# Patient Record
Sex: FEMALE | Race: WHITE | Employment: FULL TIME | URBAN - METROPOLITAN AREA
[De-identification: names, ages, dates, MRNs, and addresses within clinical notes are randomized per-mention and may not be internally consistent; named-entity substitution may affect disease eponyms.]

---

## 2017-03-01 ENCOUNTER — OFFICE VISIT (OUTPATIENT)
Dept: FAMILY MEDICINE CLINIC | Age: 37
End: 2017-03-01

## 2017-03-01 VITALS
BODY MASS INDEX: 32.49 KG/M2 | DIASTOLIC BLOOD PRESSURE: 74 MMHG | HEART RATE: 82 BPM | SYSTOLIC BLOOD PRESSURE: 116 MMHG | HEIGHT: 65 IN | TEMPERATURE: 97.3 F | RESPIRATION RATE: 12 BRPM | OXYGEN SATURATION: 98 % | WEIGHT: 195 LBS

## 2017-03-01 DIAGNOSIS — J06.9 PROTRACTED URI: Primary | ICD-10-CM

## 2017-03-01 PROCEDURE — 99213 OFFICE O/P EST LOW 20 MIN: CPT | Performed by: NURSE PRACTITIONER

## 2017-03-01 RX ORDER — NORETHINDRONE ACETATE AND ETHINYL ESTRADIOL 1MG-20(21)
1 KIT ORAL DAILY
COMMUNITY
Start: 2017-02-03 | End: 2019-02-11

## 2017-03-01 RX ORDER — AZITHROMYCIN 250 MG/1
TABLET, FILM COATED ORAL
Qty: 1 PACKET | Refills: 0 | Status: SHIPPED | OUTPATIENT
Start: 2017-03-01 | End: 2017-03-11

## 2017-03-01 ASSESSMENT — ENCOUNTER SYMPTOMS
CHEST TIGHTNESS: 1
SORE THROAT: 0
SINUS PRESSURE: 0
SHORTNESS OF BREATH: 1
COUGH: 1
WHEEZING: 0
RHINORRHEA: 1

## 2017-03-07 RX ORDER — ESCITALOPRAM OXALATE 20 MG/1
TABLET ORAL
Qty: 30 TABLET | Refills: 5 | Status: SHIPPED | OUTPATIENT
Start: 2017-03-07 | End: 2017-09-12 | Stop reason: SDUPTHER

## 2017-04-01 ENCOUNTER — OFFICE VISIT (OUTPATIENT)
Dept: FAMILY MEDICINE CLINIC | Age: 37
End: 2017-04-01

## 2017-04-01 VITALS
BODY MASS INDEX: 32.45 KG/M2 | OXYGEN SATURATION: 98 % | HEART RATE: 97 BPM | DIASTOLIC BLOOD PRESSURE: 76 MMHG | SYSTOLIC BLOOD PRESSURE: 106 MMHG | WEIGHT: 195 LBS | TEMPERATURE: 98.4 F

## 2017-04-01 DIAGNOSIS — R50.9 FEVER, UNSPECIFIED FEVER CAUSE: ICD-10-CM

## 2017-04-01 DIAGNOSIS — J10.1 INFLUENZA B: Primary | ICD-10-CM

## 2017-04-01 LAB
INFLUENZA A ANTIBODY: NEGATIVE
INFLUENZA B ANTIBODY: NORMAL

## 2017-04-01 PROCEDURE — 87804 INFLUENZA ASSAY W/OPTIC: CPT | Performed by: FAMILY MEDICINE

## 2017-04-01 PROCEDURE — 99213 OFFICE O/P EST LOW 20 MIN: CPT | Performed by: FAMILY MEDICINE

## 2017-04-01 RX ORDER — OSELTAMIVIR PHOSPHATE 75 MG/1
75 CAPSULE ORAL 2 TIMES DAILY
Qty: 10 CAPSULE | Refills: 0 | Status: SHIPPED | OUTPATIENT
Start: 2017-04-01 | End: 2017-04-06

## 2017-06-13 ENCOUNTER — OFFICE VISIT (OUTPATIENT)
Dept: FAMILY MEDICINE CLINIC | Age: 37
End: 2017-06-13

## 2017-06-13 ENCOUNTER — TELEPHONE (OUTPATIENT)
Dept: FAMILY MEDICINE CLINIC | Age: 37
End: 2017-06-13

## 2017-06-13 VITALS
HEIGHT: 65 IN | RESPIRATION RATE: 16 BRPM | SYSTOLIC BLOOD PRESSURE: 116 MMHG | WEIGHT: 196 LBS | DIASTOLIC BLOOD PRESSURE: 64 MMHG | HEART RATE: 72 BPM | OXYGEN SATURATION: 99 % | BODY MASS INDEX: 32.65 KG/M2

## 2017-06-13 DIAGNOSIS — R55 VASOVAGAL NEAR-SYNCOPE: Primary | ICD-10-CM

## 2017-06-13 PROCEDURE — 99213 OFFICE O/P EST LOW 20 MIN: CPT | Performed by: NURSE PRACTITIONER

## 2017-06-13 ASSESSMENT — ENCOUNTER SYMPTOMS
BLURRED VISION: 0
DOUBLE VISION: 0
SHORTNESS OF BREATH: 0
EYE PAIN: 0
SORE THROAT: 0
NAUSEA: 0
COUGH: 0
VOMITING: 0

## 2017-06-13 ASSESSMENT — PATIENT HEALTH QUESTIONNAIRE - PHQ9
SUM OF ALL RESPONSES TO PHQ QUESTIONS 1-9: 0
1. LITTLE INTEREST OR PLEASURE IN DOING THINGS: 0
SUM OF ALL RESPONSES TO PHQ9 QUESTIONS 1 & 2: 0
2. FEELING DOWN, DEPRESSED OR HOPELESS: 0

## 2017-08-25 ENCOUNTER — OFFICE VISIT (OUTPATIENT)
Dept: FAMILY MEDICINE CLINIC | Age: 37
End: 2017-08-25

## 2017-08-25 VITALS
TEMPERATURE: 96.8 F | BODY MASS INDEX: 32.45 KG/M2 | HEART RATE: 81 BPM | OXYGEN SATURATION: 98 % | SYSTOLIC BLOOD PRESSURE: 120 MMHG | DIASTOLIC BLOOD PRESSURE: 80 MMHG | WEIGHT: 195 LBS

## 2017-08-25 DIAGNOSIS — R09.82 POSTNASAL DRIP: ICD-10-CM

## 2017-08-25 DIAGNOSIS — J06.9 PROTRACTED URI: Primary | ICD-10-CM

## 2017-08-25 PROCEDURE — 99213 OFFICE O/P EST LOW 20 MIN: CPT | Performed by: FAMILY MEDICINE

## 2017-08-25 RX ORDER — FLUTICASONE PROPIONATE 50 MCG
2 SPRAY, SUSPENSION (ML) NASAL DAILY
Qty: 1 BOTTLE | Refills: 3 | Status: SHIPPED | OUTPATIENT
Start: 2017-08-25 | End: 2019-04-09 | Stop reason: SDUPTHER

## 2017-08-25 RX ORDER — AMOXICILLIN 500 MG/1
500 CAPSULE ORAL 3 TIMES DAILY
Qty: 21 CAPSULE | Refills: 0 | Status: SHIPPED | OUTPATIENT
Start: 2017-08-25 | End: 2017-09-01

## 2017-09-12 RX ORDER — ESCITALOPRAM OXALATE 20 MG/1
TABLET ORAL
Qty: 30 TABLET | Refills: 5 | Status: SHIPPED | OUTPATIENT
Start: 2017-09-12 | End: 2018-03-18 | Stop reason: SDUPTHER

## 2017-11-30 ENCOUNTER — OFFICE VISIT (OUTPATIENT)
Dept: FAMILY MEDICINE CLINIC | Age: 37
End: 2017-11-30

## 2017-11-30 VITALS
SYSTOLIC BLOOD PRESSURE: 110 MMHG | DIASTOLIC BLOOD PRESSURE: 70 MMHG | WEIGHT: 190 LBS | OXYGEN SATURATION: 98 % | BODY MASS INDEX: 31.65 KG/M2 | HEIGHT: 65 IN | HEART RATE: 86 BPM

## 2017-11-30 DIAGNOSIS — R05.9 COUGH: ICD-10-CM

## 2017-11-30 DIAGNOSIS — J00 NASOPHARYNGITIS: Primary | ICD-10-CM

## 2017-11-30 PROCEDURE — 99213 OFFICE O/P EST LOW 20 MIN: CPT | Performed by: NURSE PRACTITIONER

## 2017-11-30 RX ORDER — BENZONATATE 200 MG/1
200 CAPSULE ORAL 3 TIMES DAILY PRN
Qty: 30 CAPSULE | Refills: 0 | Status: SHIPPED | OUTPATIENT
Start: 2017-11-30 | End: 2017-12-10

## 2017-11-30 ASSESSMENT — ENCOUNTER SYMPTOMS
SHORTNESS OF BREATH: 1
SINUS PRESSURE: 1
COUGH: 1
SORE THROAT: 0
CHEST TIGHTNESS: 1
SINUS PAIN: 1
RHINORRHEA: 1
WHEEZING: 1

## 2017-11-30 NOTE — PROGRESS NOTES
Subjective:      Patient ID: Nydia Dudley is a 40 y.o. female. URI    This is a new problem. The current episode started in the past 7 days. The problem has been gradually worsening. There has been no fever. Associated symptoms include congestion, coughing, headaches, rhinorrhea, sinus pain and wheezing. Pertinent negatives include no ear pain or sore throat. She has tried nothing for the symptoms. Prior to Visit Medications    Medication Sig Taking? Authorizing Provider   escitalopram (LEXAPRO) 20 MG tablet TAKE ONE TABLET BY MOUTH DAILY WITH ONE-HALF OF A 10 MG TABLET  TO EQUAL 25 MG  Patient taking differently: Take 20 mg by mouth daily TAKE ONE TABLET BY MOUTH DAILY WITH ONE-HALF OF A 10 MG TABLET  TO EQUAL 25 MG PT TAKES 20 MG DAILY Yes Elías Badillo CNP   fluticasone (FLONASE) 50 MCG/ACT nasal spray 2 sprays by Nasal route daily  Patient taking differently: 2 sprays by Nasal route daily NOT USING Yes Kyle Virgen MD   BLISOVI FE 1/20 1-20 MG-MCG per tablet Take 1 tablet by mouth daily Yes Historical Provider, MD   Multiple Vitamin (VITAMIN E/FOLIC QHTZ/L-8/T-16 PO) Take 1 capsule by mouth daily  Yes Historical Provider, MD     Patient's allergies, past medical, surgical, social and family histories were reviewed and updated as appropriate. Review of Systems   Constitutional: Positive for chills and fatigue. Negative for fever. HENT: Positive for congestion, postnasal drip, rhinorrhea, sinus pain and sinus pressure. Negative for ear pain and sore throat. Respiratory: Positive for cough, chest tightness, shortness of breath and wheezing. Neurological: Positive for headaches. Objective:   Physical Exam   Constitutional: She is oriented to person, place, and time. She appears well-developed and well-nourished.    HENT:   Right Ear: Tympanic membrane normal.   Left Ear: Tympanic membrane normal.   Nose: Nose normal.   Mouth/Throat: Uvula is midline and mucous membranes are normal. Posterior oropharyngeal erythema present. No oropharyngeal exudate. Cardiovascular: Normal rate, regular rhythm and normal heart sounds. Pulmonary/Chest: Effort normal and breath sounds normal.   Lymphadenopathy:     She has no cervical adenopathy. Neurological: She is alert and oriented to person, place, and time. Skin: Skin is warm and dry. Vitals reviewed. Assessment:      1. Nasopharyngitis  benzonatate (TESSALON) 200 MG capsule   2. Cough             Plan:      Per orders  Symtomatic treatment: Tylenol / Advil, rest, salt water gargles, increase fluids. May also use: Advil cold and sinus. Can make appointment of symptoms worsen or fail to improve over the next 3-4 days. Most viral illnesses last 7-10 days, and antibiotics do not help. See pt instructions  Discussed use, benefit, and side effects of prescribed medications. All patient questions answered. Pt voiced understanding.

## 2017-11-30 NOTE — LETTER
[address] [address]  Phone: [phone]  Fax: [phone]    Ron Pugh CNP        November 30, 2017     Patient: Nader Babcock   YOB: 1980   Date of Visit: 11/30/2017       To Whom it May Concern:    Elyse Forrester was seen in my office on 11/30/2017. She may return to work on 12/4/17. If you have any questions or concerns, please don't hesitate to call.     Sincerely,         Ron Pugh CNP

## 2017-11-30 NOTE — PATIENT INSTRUCTIONS
Symtomatic treatment: Tylenol / Advil, rest, salt water gargles, increase fluids. May also use: Advil cold and sinus. Can make appointment of symptoms worsen or fail to improve over the next 3-4 days. Most viral illnesses last 7-10 days, and antibiotics do not help. Patient Education        Viral Respiratory Infection: Care Instructions  Your Care Instructions  Viruses are very small organisms. They grow in number after they enter your body. There are many types that cause different illnesses, such as colds and the mumps. The symptoms of a viral respiratory infection often start quickly. They include a fever, sore throat, and runny nose. You may also just not feel well. Or you may not want to eat much. Most viral respiratory infections are not serious. They usually get better with time and self-care. Antibiotics are not used to treat a viral infection. That's because antibiotics will not help cure a viral illness. In some cases, antiviral medicine can help your body fight a serious viral infection. Follow-up care is a key part of your treatment and safety. Be sure to make and go to all appointments, and call your doctor if you are having problems. It's also a good idea to know your test results and keep a list of the medicines you take. How can you care for yourself at home? · Rest as much as possible until you feel better. · Be safe with medicines. Take your medicine exactly as prescribed. Call your doctor if you think you are having a problem with your medicine. You will get more details on the specific medicine your doctor prescribes. · Take an over-the-counter pain medicine, such as acetaminophen (Tylenol), ibuprofen (Advil, Motrin), or naproxen (Aleve), as needed for pain and fever. Read and follow all instructions on the label. Do not give aspirin to anyone younger than 20. It has been linked to Reye syndrome, a serious illness.   · Drink plenty of fluids, enough so that your urine is light adapted under license by [facility] ([facility] If you have questions about a medical condition or this instruction, always ask your healthcare professional. [address] any warranty or liability for your use of this information.

## 2018-01-16 ENCOUNTER — OFFICE VISIT (OUTPATIENT)
Dept: FAMILY MEDICINE CLINIC | Age: 38
End: 2018-01-16

## 2018-01-16 VITALS
OXYGEN SATURATION: 98 % | HEART RATE: 81 BPM | BODY MASS INDEX: 32.82 KG/M2 | SYSTOLIC BLOOD PRESSURE: 120 MMHG | DIASTOLIC BLOOD PRESSURE: 80 MMHG | WEIGHT: 197.2 LBS | RESPIRATION RATE: 16 BRPM

## 2018-01-16 DIAGNOSIS — M54.42 ACUTE LEFT-SIDED LOW BACK PAIN WITH LEFT-SIDED SCIATICA: Primary | ICD-10-CM

## 2018-01-16 PROCEDURE — 99213 OFFICE O/P EST LOW 20 MIN: CPT | Performed by: NURSE PRACTITIONER

## 2018-01-16 RX ORDER — PREDNISONE 20 MG/1
TABLET ORAL
Qty: 20 TABLET | Refills: 0 | Status: SHIPPED | OUTPATIENT
Start: 2018-01-16 | End: 2018-03-15 | Stop reason: ALTCHOICE

## 2018-01-16 RX ORDER — TRAMADOL HYDROCHLORIDE 50 MG/1
50 TABLET ORAL EVERY 6 HOURS PRN
Qty: 28 TABLET | Refills: 0 | Status: SHIPPED | OUTPATIENT
Start: 2018-01-16 | End: 2018-01-23

## 2018-01-16 ASSESSMENT — ENCOUNTER SYMPTOMS
ABDOMINAL PAIN: 0
BACK PAIN: 1

## 2018-01-16 NOTE — LETTER
[address] [address]  Phone: [phone]  Fax: [phone]    Zafar Licea CNP        January 16, 2018     Patient: Anjelica Hanley   YOB: 1980   Date of Visit: 1/16/2018       To Whom it May Concern:    Gavino Choe was seen in my office on 1/16/2018. She may return to work on 1/17/18. If you have any questions or concerns, please don't hesitate to call.     Sincerely,         Zafar Licea CNP

## 2018-01-16 NOTE — PATIENT INSTRUCTIONS
Lali [phone]         Patient Education        Low Back Pain: Exercises  Your Care Instructions  Here are some examples of typical rehabilitation exercises for your condition. Start each exercise slowly. Ease off the exercise if you start to have pain. Your doctor or physical therapist will tell you when you can start these exercises and which ones will work best for you. How to do the exercises  Press-up    1. Lie on your stomach, supporting your body with your forearms. 2. Press your elbows down into the floor to raise your upper back. As you do this, relax your stomach muscles and allow your back to arch without using your back muscles. As your press up, do not let your hips or pelvis come off the floor. 3. Hold for 15 to 30 seconds, then relax. 4. Repeat 2 to 4 times. Alternate arm and leg (bird dog) exercise    Do this exercise slowly. Try to keep your body straight at all times, and do not let one hip drop lower than the other. 1. Start on the floor, on your hands and knees. 2. Tighten your belly muscles. 3. Raise one leg off the floor, and hold it straight out behind you. Be careful not to let your hip drop down, because that will twist your trunk. 4. Hold for about 6 seconds, then lower your leg and switch to the other leg. 5. Repeat 8 to 12 times on each leg. 6. Over time, work up to holding for 10 to 30 seconds each time. 7. If you feel stable and secure with your leg raised, try raising the opposite arm straight out in front of you at the same time. Knee-to-chest exercise    1. Lie on your back with your knees bent and your feet flat on the floor. 2. Bring one knee to your chest, keeping the other foot flat on the floor (or keeping the other leg straight, whichever feels better on your lower back). 3. Keep your lower back pressed to the floor. Hold for at least 15 to 30 seconds. 4. Relax, and lower the knee to the starting position. 5. Repeat with the other leg.  Repeat 2

## 2018-01-16 NOTE — PROGRESS NOTES
SUBJECTIVE:  Pt is a of 40 y.o. female comes in today with   Chief Complaint   Patient presents with    Back Pain     started 12/22/2017. had a herniated disk 2103. feels like the same pain. lower back into her leg, shooting pain       Patient presenting today for evaluation of low back pain. Symptoms present for 6 weeks. Symptoms worsening for 10 days. States pain intermittent initially, now constant for 24 hours. Associated symptoms: change in gait- limping due to pain  and paresthesias- left leg. She denies weakness, saddle anesthesia and new bowel or bladder dysfunction. Current treatment: rest, Tyl and Ibu, which has been  ineffective. Medication side effects: none. H/o herniated disc in 2013, treated by Dr Meena Claros. Laminectomy left L5-S1. Requires MRI before eval        Prior to Visit Medications    Medication Sig Taking? Authorizing Provider   escitalopram (LEXAPRO) 20 MG tablet TAKE ONE TABLET BY MOUTH DAILY WITH ONE-HALF OF A 10 MG TABLET  TO EQUAL 25 MG  Patient taking differently: Take 20 mg by mouth daily TAKE ONE TABLET BY MOUTH DAILY WITH ONE-HALF OF A 10 MG TABLET  TO EQUAL 25 MG PT TAKES 20 MG DAILY Yes Hattie Badillo CNP   fluticasone (FLONASE) 50 MCG/ACT nasal spray 2 sprays by Nasal route daily  Patient taking differently: 2 sprays by Nasal route daily NOT USING Yes Lorie Plasencia MD   BLISOVI FE 1/20 1-20 MG-MCG per tablet Take 1 tablet by mouth daily Yes Historical Provider, MD   Multiple Vitamin (VITAMIN E/FOLIC PRDF/J-6/H-18 PO) Take 1 capsule by mouth daily  Yes Historical Provider, MD         Patient's allergies, past medical, surgical, social and family histories were reviewed and updated as appropriate. Review of Systems   Constitutional: Negative for chills, fever and malaise/fatigue. Gastrointestinal: Negative for abdominal pain. Genitourinary: Negative for dysuria, flank pain, frequency, hematuria and urgency. Musculoskeletal: Positive for back pain (low back).

## 2018-03-15 ENCOUNTER — OFFICE VISIT (OUTPATIENT)
Dept: FAMILY MEDICINE CLINIC | Age: 38
End: 2018-03-15

## 2018-03-15 VITALS
SYSTOLIC BLOOD PRESSURE: 130 MMHG | BODY MASS INDEX: 33.12 KG/M2 | RESPIRATION RATE: 16 BRPM | WEIGHT: 199 LBS | OXYGEN SATURATION: 98 % | DIASTOLIC BLOOD PRESSURE: 84 MMHG | HEART RATE: 84 BPM

## 2018-03-15 DIAGNOSIS — L98.9 SKIN LESION: Primary | ICD-10-CM

## 2018-03-15 PROCEDURE — 90630 INFLUENZA, QUADV, 18-64 YRS, ID, PF, MICRO INJ, 0.1ML (FLUZONE QUADV, PF): CPT | Performed by: NURSE PRACTITIONER

## 2018-03-15 PROCEDURE — 90471 IMMUNIZATION ADMIN: CPT | Performed by: NURSE PRACTITIONER

## 2018-03-15 PROCEDURE — 99213 OFFICE O/P EST LOW 20 MIN: CPT | Performed by: NURSE PRACTITIONER

## 2018-03-15 NOTE — PATIENT INSTRUCTIONS
hydrogen peroxide or alcohol, which can slow healing. · Protect your skin from the sun. Wear hats with wide brims, sunglasses, and loose-fitting, tightly woven clothing that covers your arms and legs. Make sure to use a broad-spectrum sunscreen that has a sun protection factor (SPF) of 30 or higher. Apply it several times a day. What are the possible side effects? Many people do not have side effects from antibiotics. But sometimes people have problems, such as:  · Nausea, diarrhea, and belly pain. · Allergic reactions, such as a skin rash. · Vaginal yeast infections. If the side effects bother you, ask your doctor if there is another antibiotic that will work as well but will not cause these effects. When should you call for help? Call your doctor now or seek immediate medical care if:  ? · You have signs of an infection, such as:  ¨ Increased pain, swelling, warmth, and redness. ¨ Red streaks leading from the affected area. ¨ Pus draining from the area. ¨ A fever. ? Watch closely for changes in your health, and be sure to contact your doctor if:  ? · You think you may be having a problem with the medicine. ? · You do not get better as expected. Where can you learn more? Go to https://Engine EcologypeVascular Designseb.Evergram. org and sign in to your [employer] account. Enter A641 in the [facility] box to learn more about \"Antibiotics for Skin Conditions: Care Instructions. \"     If you do not have an account, please click on the \"Sign Up Now\" link. Current as of: October 13, 2016  Content Version: 11.5  © 6251-4436 [employer] Care instructions adapted under license by [facility] [address] If you have questions about a medical condition or this instruction, always ask your healthcare professional. [place] [address] any warranty or liability for your use of this information.

## 2018-03-15 NOTE — PROGRESS NOTES
and dry. Rash noted. Right upper, lateral thigh: approx 6 mm in diameter flat red lesion present. TTP  No surrounding erythema or swelling     Psychiatric: She has a normal mood and affect. Her behavior is normal. Judgment and thought content normal.   Vitals reviewed. Vitals:    03/15/18 0929   BP: 130/84   Site: Left Arm   Position: Sitting   Cuff Size: Small Adult   Pulse: 84   Resp: 16   SpO2: 98%   Weight: 199 lb (90.3 kg)             ASSESSMENT:  1. Skin lesion        PLAN:  Skin lesion  -     mupirocin (BACTROBAN) 2 % ointment; Apply 3 times daily. Avoid touching  Warm compresses prn    Other orders  -     INFLUENZA, QUADV, 18-64 YRS, ID, PF, MICRO INJ, 0.1ML (FLUZONE QUADV, PF)  See pt instructions for derm referrals  F/u prn. Discussed use, benefit, and side effects of prescribed medications. All patient questions answered. Pt voiced understanding.

## 2018-04-04 ENCOUNTER — HOSPITAL ENCOUNTER (OUTPATIENT)
Dept: OTHER | Age: 38
Discharge: HOME | End: 2018-04-04
Attending: OBSTETRICS & GYNECOLOGY | Admitting: OBSTETRICS & GYNECOLOGY

## 2018-04-04 LAB
ABO/RH: NORMAL
ANTIBODY SCREEN: NORMAL
BASOPHILS ABSOLUTE: 0 K/UL (ref 0–0.2)
BASOPHILS RELATIVE PERCENT: 0.3 %
EOSINOPHILS ABSOLUTE: 0.2 K/UL (ref 0–0.6)
EOSINOPHILS RELATIVE PERCENT: 1.6 %
HCT VFR BLD CALC: 41.1 % (ref 36–48)
HEMOGLOBIN: 13.9 G/DL (ref 12–16)
LYMPHOCYTES ABSOLUTE: 3.2 K/UL (ref 1–5.1)
LYMPHOCYTES RELATIVE PERCENT: 34.1 %
MCH RBC QN AUTO: 31.1 PG (ref 26–34)
MCHC RBC AUTO-ENTMCNC: 33.7 G/DL (ref 31–36)
MCV RBC AUTO: 92.3 FL (ref 80–100)
MONOCYTES ABSOLUTE: 0.7 K/UL (ref 0–1.3)
MONOCYTES RELATIVE PERCENT: 7.1 %
NEUTROPHILS ABSOLUTE: 5.4 K/UL (ref 1.7–7.7)
NEUTROPHILS RELATIVE PERCENT: 56.9 %
PDW BLD-RTO: 12.5 % (ref 12.4–15.4)
PLATELET # BLD: 281 K/UL (ref 135–450)
PMV BLD AUTO: 9.2 FL (ref 5–10.5)
RBC # BLD: 4.45 M/UL (ref 4–5.2)
WBC # BLD: 9.5 K/UL (ref 4–11)

## 2018-04-13 ENCOUNTER — HOSPITAL ENCOUNTER (OUTPATIENT)
Dept: SURGERY | Age: 38
Discharge: HOME | End: 2018-04-13
Attending: OBSTETRICS & GYNECOLOGY | Admitting: OBSTETRICS & GYNECOLOGY

## 2018-04-13 VITALS
SYSTOLIC BLOOD PRESSURE: 99 MMHG | HEART RATE: 83 BPM | OXYGEN SATURATION: 96 % | DIASTOLIC BLOOD PRESSURE: 68 MMHG | BODY MASS INDEX: 33.05 KG/M2 | TEMPERATURE: 97.7 F | WEIGHT: 198.63 LBS | RESPIRATION RATE: 16 BRPM

## 2018-04-13 DIAGNOSIS — Z98.890 S/P LAPAROSCOPIC SURGERY: Primary | ICD-10-CM

## 2018-04-13 LAB
ABO/RH: NORMAL
ANION GAP SERPL CALCULATED.3IONS-SCNC: 13 MMOL/L (ref 3–16)
ANTIBODY SCREEN: NORMAL
BUN BLDV-MCNC: 8 MG/DL (ref 7–20)
CALCIUM SERPL-MCNC: 8.2 MG/DL (ref 8.3–10.6)
CHLORIDE BLD-SCNC: 103 MMOL/L (ref 99–110)
CO2: 17 MMOL/L (ref 21–32)
CREAT SERPL-MCNC: 0.6 MG/DL (ref 0.6–1.1)
GFR AFRICAN AMERICAN: >60
GFR NON-AFRICAN AMERICAN: >60
GLUCOSE BLD-MCNC: 107 MG/DL (ref 70–99)
HCG(URINE) PREGNANCY TEST: NEGATIVE
POTASSIUM SERPL-SCNC: 3.6 MMOL/L (ref 3.5–5.1)
SODIUM BLD-SCNC: 133 MMOL/L (ref 136–145)

## 2018-04-13 RX ORDER — HYDROMORPHONE HCL 110MG/55ML
0.25 PATIENT CONTROLLED ANALGESIA SYRINGE INTRAVENOUS EVERY 5 MIN PRN
Status: DISCONTINUED | OUTPATIENT
Start: 2018-04-13 | End: 2018-04-14 | Stop reason: HOSPADM

## 2018-04-13 RX ORDER — SODIUM CHLORIDE 0.9 % (FLUSH) 0.9 %
10 SYRINGE (ML) INJECTION PRN
Status: DISCONTINUED | OUTPATIENT
Start: 2018-04-13 | End: 2018-04-14 | Stop reason: HOSPADM

## 2018-04-13 RX ORDER — SODIUM CHLORIDE 9 MG/ML
INJECTION, SOLUTION INTRAVENOUS CONTINUOUS
Status: DISCONTINUED | OUTPATIENT
Start: 2018-04-13 | End: 2018-04-14 | Stop reason: HOSPADM

## 2018-04-13 RX ORDER — LIDOCAINE HYDROCHLORIDE 10 MG/ML
0.5 INJECTION, SOLUTION EPIDURAL; INFILTRATION; INTRACAUDAL; PERINEURAL ONCE
Status: DISCONTINUED | OUTPATIENT
Start: 2018-04-13 | End: 2018-04-14 | Stop reason: HOSPADM

## 2018-04-13 RX ORDER — LIDOCAINE HYDROCHLORIDE 10 MG/ML
1 INJECTION, SOLUTION EPIDURAL; INFILTRATION; INTRACAUDAL; PERINEURAL
Status: ACTIVE | OUTPATIENT
Start: 2018-04-13 | End: 2018-04-13

## 2018-04-13 RX ORDER — ONDANSETRON 2 MG/ML
4 INJECTION INTRAMUSCULAR; INTRAVENOUS PRN
Status: DISCONTINUED | OUTPATIENT
Start: 2018-04-13 | End: 2018-04-14 | Stop reason: HOSPADM

## 2018-04-13 RX ORDER — SODIUM CHLORIDE, SODIUM LACTATE, POTASSIUM CHLORIDE, CALCIUM CHLORIDE 600; 310; 30; 20 MG/100ML; MG/100ML; MG/100ML; MG/100ML
INJECTION, SOLUTION INTRAVENOUS CONTINUOUS
Status: DISCONTINUED | OUTPATIENT
Start: 2018-04-13 | End: 2018-04-14 | Stop reason: HOSPADM

## 2018-04-13 RX ORDER — OXYCODONE HYDROCHLORIDE AND ACETAMINOPHEN 5; 325 MG/1; MG/1
1-2 TABLET ORAL EVERY 6 HOURS PRN
Qty: 30 TABLET | Refills: 0 | Status: SHIPPED | OUTPATIENT
Start: 2018-04-13 | End: 2018-04-18

## 2018-04-13 RX ORDER — KETOROLAC TROMETHAMINE 30 MG/ML
INJECTION, SOLUTION INTRAMUSCULAR; INTRAVENOUS
Status: COMPLETED
Start: 2018-04-13 | End: 2018-04-13

## 2018-04-13 RX ORDER — HYDROMORPHONE HCL 110MG/55ML
0.5 PATIENT CONTROLLED ANALGESIA SYRINGE INTRAVENOUS EVERY 5 MIN PRN
Status: DISCONTINUED | OUTPATIENT
Start: 2018-04-13 | End: 2018-04-14 | Stop reason: HOSPADM

## 2018-04-13 RX ORDER — FENTANYL CITRATE 50 UG/ML
50 INJECTION, SOLUTION INTRAMUSCULAR; INTRAVENOUS EVERY 5 MIN PRN
Status: DISCONTINUED | OUTPATIENT
Start: 2018-04-13 | End: 2018-04-14 | Stop reason: HOSPADM

## 2018-04-13 RX ORDER — FENTANYL CITRATE 50 UG/ML
25 INJECTION, SOLUTION INTRAMUSCULAR; INTRAVENOUS EVERY 5 MIN PRN
Status: DISCONTINUED | OUTPATIENT
Start: 2018-04-13 | End: 2018-04-14 | Stop reason: HOSPADM

## 2018-04-13 RX ORDER — OXYCODONE HYDROCHLORIDE 5 MG/1
10 TABLET ORAL PRN
Status: ACTIVE | OUTPATIENT
Start: 2018-04-13 | End: 2018-04-13

## 2018-04-13 RX ORDER — SODIUM CHLORIDE 0.9 % (FLUSH) 0.9 %
10 SYRINGE (ML) INJECTION EVERY 12 HOURS SCHEDULED
Status: DISCONTINUED | OUTPATIENT
Start: 2018-04-13 | End: 2018-04-14 | Stop reason: HOSPADM

## 2018-04-13 RX ORDER — IBUPROFEN 800 MG/1
800 TABLET ORAL EVERY 6 HOURS PRN
Qty: 50 TABLET | Refills: 3 | Status: SHIPPED | OUTPATIENT
Start: 2018-04-13 | End: 2019-11-06

## 2018-04-13 RX ORDER — OXYCODONE HYDROCHLORIDE 5 MG/1
5 TABLET ORAL PRN
Status: ACTIVE | OUTPATIENT
Start: 2018-04-13 | End: 2018-04-13

## 2018-04-13 RX ORDER — ONDANSETRON 2 MG/ML
4 INJECTION INTRAMUSCULAR; INTRAVENOUS
Status: ACTIVE | OUTPATIENT
Start: 2018-04-13 | End: 2018-04-13

## 2018-04-13 RX ORDER — CEFAZOLIN SODIUM 2 G/100ML
2 INJECTION, SOLUTION INTRAVENOUS
Status: COMPLETED | OUTPATIENT
Start: 2018-04-13 | End: 2018-04-13

## 2018-04-13 RX ORDER — MEPERIDINE HYDROCHLORIDE 25 MG/ML
12.5 INJECTION INTRAMUSCULAR; INTRAVENOUS; SUBCUTANEOUS EVERY 5 MIN PRN
Status: DISCONTINUED | OUTPATIENT
Start: 2018-04-13 | End: 2018-04-14 | Stop reason: HOSPADM

## 2018-04-13 RX ORDER — KETOROLAC TROMETHAMINE 30 MG/ML
15 INJECTION, SOLUTION INTRAMUSCULAR; INTRAVENOUS ONCE
Status: COMPLETED | OUTPATIENT
Start: 2018-04-13 | End: 2018-04-13

## 2018-04-13 RX ADMIN — FENTANYL CITRATE 25 MCG: 50 INJECTION, SOLUTION INTRAMUSCULAR; INTRAVENOUS at 12:26

## 2018-04-13 RX ADMIN — SODIUM CHLORIDE, SODIUM LACTATE, POTASSIUM CHLORIDE, CALCIUM CHLORIDE: 600; 310; 30; 20 INJECTION, SOLUTION INTRAVENOUS at 09:17

## 2018-04-13 RX ADMIN — KETOROLAC TROMETHAMINE 15 MG: 30 INJECTION, SOLUTION INTRAMUSCULAR; INTRAVENOUS at 12:31

## 2018-04-13 RX ADMIN — CEFAZOLIN SODIUM 2 G: 2 INJECTION, SOLUTION INTRAVENOUS at 10:23

## 2018-04-13 ASSESSMENT — PAIN SCALES - GENERAL
PAINLEVEL_OUTOF10: 6
PAINLEVEL_OUTOF10: 3
PAINLEVEL_OUTOF10: 3
PAINLEVEL_OUTOF10: 0
PAINLEVEL_OUTOF10: 0
PAINLEVEL_OUTOF10: 5

## 2018-04-13 ASSESSMENT — PAIN DESCRIPTION - PAIN TYPE
TYPE: SURGICAL PAIN
TYPE: SURGICAL PAIN

## 2018-04-13 ASSESSMENT — PAIN - FUNCTIONAL ASSESSMENT: PAIN_FUNCTIONAL_ASSESSMENT: 0-10

## 2018-05-31 ENCOUNTER — HOSPITAL ENCOUNTER (OUTPATIENT)
Dept: OTHER | Age: 38
Discharge: HOME | End: 2018-05-31
Attending: OBSTETRICS & GYNECOLOGY | Admitting: OBSTETRICS & GYNECOLOGY

## 2018-05-31 LAB
ABO/RH: NORMAL
ANION GAP SERPL CALCULATED.3IONS-SCNC: 17 MMOL/L (ref 3–16)
ANTIBODY SCREEN: NORMAL
BUN BLDV-MCNC: 9 MG/DL (ref 7–20)
CALCIUM SERPL-MCNC: 9.4 MG/DL (ref 8.3–10.6)
CHLORIDE BLD-SCNC: 100 MMOL/L (ref 99–110)
CO2: 21 MMOL/L (ref 21–32)
CREAT SERPL-MCNC: 0.6 MG/DL (ref 0.6–1.1)
EKG ATRIAL RATE: 73 BPM
EKG DIAGNOSIS: NORMAL
EKG P AXIS: 38 DEGREES
EKG P-R INTERVAL: 148 MS
EKG Q-T INTERVAL: 398 MS
EKG QRS DURATION: 90 MS
EKG QTC CALCULATION (BAZETT): 438 MS
EKG R AXIS: 13 DEGREES
EKG T AXIS: 20 DEGREES
EKG VENTRICULAR RATE: 73 BPM
GFR AFRICAN AMERICAN: >60
GFR NON-AFRICAN AMERICAN: >60
GLUCOSE BLD-MCNC: 96 MG/DL (ref 70–99)
HCT VFR BLD CALC: 42.7 % (ref 36–48)
HEMOGLOBIN: 14.5 G/DL (ref 12–16)
MCH RBC QN AUTO: 31.3 PG (ref 26–34)
MCHC RBC AUTO-ENTMCNC: 34 G/DL (ref 31–36)
MCV RBC AUTO: 92 FL (ref 80–100)
PDW BLD-RTO: 12.3 % (ref 12.4–15.4)
PLATELET # BLD: 271 K/UL (ref 135–450)
PMV BLD AUTO: 9.4 FL (ref 5–10.5)
POTASSIUM SERPL-SCNC: 4.5 MMOL/L (ref 3.5–5.1)
RBC # BLD: 4.64 M/UL (ref 4–5.2)
SODIUM BLD-SCNC: 138 MMOL/L (ref 136–145)
WBC # BLD: 7.1 K/UL (ref 4–11)

## 2018-05-31 PROCEDURE — 93010 ELECTROCARDIOGRAM REPORT: CPT | Performed by: INTERNAL MEDICINE

## 2018-12-03 ENCOUNTER — OFFICE VISIT (OUTPATIENT)
Dept: FAMILY MEDICINE CLINIC | Age: 38
End: 2018-12-03
Payer: MEDICAID

## 2018-12-03 VITALS
TEMPERATURE: 97.9 F | DIASTOLIC BLOOD PRESSURE: 68 MMHG | SYSTOLIC BLOOD PRESSURE: 110 MMHG | BODY MASS INDEX: 34.88 KG/M2 | WEIGHT: 209.6 LBS | HEART RATE: 86 BPM | OXYGEN SATURATION: 98 %

## 2018-12-03 DIAGNOSIS — J01.90 ACUTE BACTERIAL SINUSITIS: Primary | ICD-10-CM

## 2018-12-03 DIAGNOSIS — B96.89 ACUTE BACTERIAL SINUSITIS: Primary | ICD-10-CM

## 2018-12-03 PROCEDURE — 99213 OFFICE O/P EST LOW 20 MIN: CPT | Performed by: NURSE PRACTITIONER

## 2018-12-03 RX ORDER — AMOXICILLIN 875 MG/1
875 TABLET, COATED ORAL 2 TIMES DAILY
Qty: 20 TABLET | Refills: 0 | Status: SHIPPED | OUTPATIENT
Start: 2018-12-03 | End: 2018-12-13

## 2018-12-03 ASSESSMENT — ENCOUNTER SYMPTOMS
COUGH: 1
CHEST TIGHTNESS: 0
WHEEZING: 0
RHINORRHEA: 1
SINUS PRESSURE: 1
SHORTNESS OF BREATH: 0
SINUS COMPLAINT: 1
SORE THROAT: 1

## 2018-12-03 ASSESSMENT — PATIENT HEALTH QUESTIONNAIRE - PHQ9
1. LITTLE INTEREST OR PLEASURE IN DOING THINGS: 0
SUM OF ALL RESPONSES TO PHQ QUESTIONS 1-9: 0
2. FEELING DOWN, DEPRESSED OR HOPELESS: 0
SUM OF ALL RESPONSES TO PHQ9 QUESTIONS 1 & 2: 0
SUM OF ALL RESPONSES TO PHQ QUESTIONS 1-9: 0

## 2018-12-03 NOTE — PROGRESS NOTES
SUBJECTIVE:  Pt is a of 45 y.o. female comes in today with   Chief Complaint   Patient presents with    Sinus Problem     pt states she's congested, cough, runny nose, and head pressure x 3 days          (+) sick contacts at home        Sinus Problem   This is a new problem. The current episode started in the past 7 days (5 days ago). The problem has been waxing and waning since onset. There has been no fever. Associated symptoms include chills, congestion, coughing, ear pain (intermittent dull pain), headaches, sinus pressure and a sore throat (intermittent dull pain). Pertinent negatives include no shortness of breath. Treatments tried: Mucinex, Theraflu, Adamaris-seltzer cold plus. The treatment provided mild relief. Prior to Visit Medications    Medication Sig Taking? Authorizing Provider   estradiol (CLIMARA) 0.1 MG/24HR Place 1 patch onto the skin every 7 days Yes Em Patel MD   ibuprofen (ADVIL;MOTRIN) 800 MG tablet Take 1 tablet by mouth every 6 hours as needed for Pain Yes Em Patel MD   escitalopram (LEXAPRO) 20 MG tablet TAKE ONE TABLET BY MOUTH DAILY WITH ONE-HALF OF A 10MG TO EQUAL 25MG  Patient taking differently: TAKE ONE TABLET BY MOUTH DAILY Yes ANDREA Rodriguez - CNP   fluticasone (FLONASE) 50 MCG/ACT nasal spray 2 sprays by Nasal route daily  Patient taking differently: 2 sprays by Nasal route daily Takes prn Yes Cecy Gómez MD   BLISOVI FE 1/20 1-20 MG-MCG per tablet Take 1 tablet by mouth daily Yes Historical Provider, MD       Patient's past medical, surgical, social and family histories were reviewed and updated as appropriate. Review of Systems   Constitutional: Positive for chills, fatigue (with body aches) and fever (low grade). HENT: Positive for congestion, ear pain (intermittent dull pain), postnasal drip, rhinorrhea, sinus pressure and sore throat (intermittent dull pain). Respiratory: Positive for cough.  Negative for chest tightness, shortness of breath and

## 2018-12-11 RX ORDER — ESCITALOPRAM OXALATE 20 MG/1
TABLET ORAL
Qty: 30 TABLET | Refills: 4 | Status: SHIPPED | OUTPATIENT
Start: 2018-12-11 | End: 2019-05-11 | Stop reason: SDUPTHER

## 2018-12-13 ENCOUNTER — TELEPHONE (OUTPATIENT)
Dept: FAMILY MEDICINE CLINIC | Age: 38
End: 2018-12-13

## 2018-12-13 RX ORDER — FLUCONAZOLE 100 MG/1
100 TABLET ORAL ONCE
Qty: 1 TABLET | Refills: 0 | Status: SHIPPED | OUTPATIENT
Start: 2018-12-13 | End: 2018-12-13

## 2018-12-31 ENCOUNTER — TELEPHONE (OUTPATIENT)
Dept: FAMILY MEDICINE CLINIC | Age: 38
End: 2018-12-31

## 2019-01-16 ENCOUNTER — OFFICE VISIT (OUTPATIENT)
Dept: FAMILY MEDICINE CLINIC | Age: 39
End: 2019-01-16
Payer: MEDICAID

## 2019-01-16 VITALS
RESPIRATION RATE: 14 BRPM | HEART RATE: 83 BPM | BODY MASS INDEX: 35.31 KG/M2 | WEIGHT: 212.2 LBS | OXYGEN SATURATION: 98 % | DIASTOLIC BLOOD PRESSURE: 82 MMHG | TEMPERATURE: 98 F | SYSTOLIC BLOOD PRESSURE: 102 MMHG

## 2019-01-16 DIAGNOSIS — R14.0 ABDOMINAL BLOATING: ICD-10-CM

## 2019-01-16 DIAGNOSIS — Z09 HOSPITAL DISCHARGE FOLLOW-UP: ICD-10-CM

## 2019-01-16 DIAGNOSIS — R10.84 GENERALIZED ABDOMINAL PAIN: ICD-10-CM

## 2019-01-16 DIAGNOSIS — R53.83 FATIGUE, UNSPECIFIED TYPE: ICD-10-CM

## 2019-01-16 DIAGNOSIS — R05.9 COUGH: ICD-10-CM

## 2019-01-16 DIAGNOSIS — R25.3 TWITCHING: Primary | ICD-10-CM

## 2019-01-16 DIAGNOSIS — R07.9 CHEST PAIN, UNSPECIFIED TYPE: ICD-10-CM

## 2019-01-16 DIAGNOSIS — Z13.1 SCREENING FOR DIABETES MELLITUS: ICD-10-CM

## 2019-01-16 LAB
A/G RATIO: 2 (ref 1.1–2.2)
ALBUMIN SERPL-MCNC: 4.6 G/DL (ref 3.4–5)
ALP BLD-CCNC: 85 U/L (ref 40–129)
ALT SERPL-CCNC: 16 U/L (ref 10–40)
ANION GAP SERPL CALCULATED.3IONS-SCNC: 13 MMOL/L (ref 3–16)
AST SERPL-CCNC: 16 U/L (ref 15–37)
BASOPHILS ABSOLUTE: 0 K/UL (ref 0–0.2)
BASOPHILS RELATIVE PERCENT: 0.7 %
BILIRUB SERPL-MCNC: 0.3 MG/DL (ref 0–1)
BUN BLDV-MCNC: 14 MG/DL (ref 7–20)
CALCIUM SERPL-MCNC: 9.3 MG/DL (ref 8.3–10.6)
CHLORIDE BLD-SCNC: 102 MMOL/L (ref 99–110)
CO2: 26 MMOL/L (ref 21–32)
CREAT SERPL-MCNC: 0.7 MG/DL (ref 0.6–1.1)
EOSINOPHILS ABSOLUTE: 0.2 K/UL (ref 0–0.6)
EOSINOPHILS RELATIVE PERCENT: 2.7 %
ESTRADIOL LEVEL: 30 PG/ML
FERRITIN: 150 NG/ML (ref 15–150)
FOLLICLE STIMULATING HORMONE: 32.1 MIU/ML
GFR AFRICAN AMERICAN: >60
GFR NON-AFRICAN AMERICAN: >60
GLOBULIN: 2.3 G/DL
GLUCOSE BLD-MCNC: 87 MG/DL (ref 70–99)
HCT VFR BLD CALC: 40.3 % (ref 36–48)
HEMOGLOBIN: 13.4 G/DL (ref 12–16)
LDL CHOLESTEROL DIRECT: 134 MG/DL
LIPASE: 34 U/L (ref 13–60)
LYMPHOCYTES ABSOLUTE: 2 K/UL (ref 1–5.1)
LYMPHOCYTES RELATIVE PERCENT: 35 %
MCH RBC QN AUTO: 30.6 PG (ref 26–34)
MCHC RBC AUTO-ENTMCNC: 33.3 G/DL (ref 31–36)
MCV RBC AUTO: 91.9 FL (ref 80–100)
MONOCYTES ABSOLUTE: 0.4 K/UL (ref 0–1.3)
MONOCYTES RELATIVE PERCENT: 7.2 %
NEUTROPHILS ABSOLUTE: 3.2 K/UL (ref 1.7–7.7)
NEUTROPHILS RELATIVE PERCENT: 54.4 %
PDW BLD-RTO: 12.9 % (ref 12.4–15.4)
PLATELET # BLD: 285 K/UL (ref 135–450)
PMV BLD AUTO: 8.9 FL (ref 5–10.5)
POTASSIUM SERPL-SCNC: 4.2 MMOL/L (ref 3.5–5.1)
RBC # BLD: 4.38 M/UL (ref 4–5.2)
SODIUM BLD-SCNC: 141 MMOL/L (ref 136–145)
T4 FREE: 0.9 NG/DL (ref 0.9–1.8)
TOTAL PROTEIN: 6.9 G/DL (ref 6.4–8.2)
TSH SERPL DL<=0.05 MIU/L-ACNC: 1.7 UIU/ML (ref 0.27–4.2)
WBC # BLD: 5.8 K/UL (ref 4–11)

## 2019-01-16 PROCEDURE — G8484 FLU IMMUNIZE NO ADMIN: HCPCS | Performed by: NURSE PRACTITIONER

## 2019-01-16 PROCEDURE — G8417 CALC BMI ABV UP PARAM F/U: HCPCS | Performed by: NURSE PRACTITIONER

## 2019-01-16 PROCEDURE — 1036F TOBACCO NON-USER: CPT | Performed by: NURSE PRACTITIONER

## 2019-01-16 PROCEDURE — 99214 OFFICE O/P EST MOD 30 MIN: CPT | Performed by: NURSE PRACTITIONER

## 2019-01-16 PROCEDURE — G8427 DOCREV CUR MEDS BY ELIG CLIN: HCPCS | Performed by: NURSE PRACTITIONER

## 2019-01-16 RX ORDER — DICYCLOMINE HCL 20 MG
20 TABLET ORAL PRN
COMMUNITY
Start: 2018-12-31 | End: 2020-03-05

## 2019-01-16 ASSESSMENT — ENCOUNTER SYMPTOMS
ABDOMINAL PAIN: 1
NAUSEA: 1
VOMITING: 0
ABDOMINAL DISTENTION: 1
DIARRHEA: 0
CONSTIPATION: 0
SHORTNESS OF BREATH: 0

## 2019-01-17 LAB
ESTIMATED AVERAGE GLUCOSE: 105.4 MG/DL
HBA1C MFR BLD: 5.3 %
VITAMIN D 25-HYDROXY: 26.2 NG/ML

## 2019-01-18 LAB
SEX HORMONE BINDING GLOBULIN: 41 NMOL/L (ref 30–135)
TESTOSTERONE FREE-NONMALE: 1.9 PG/ML (ref 1.3–9.2)
TESTOSTERONE TOTAL: 12 NG/DL (ref 20–70)

## 2019-02-11 ENCOUNTER — OFFICE VISIT (OUTPATIENT)
Dept: FAMILY MEDICINE CLINIC | Age: 39
End: 2019-02-11
Payer: MEDICAID

## 2019-02-11 VITALS
RESPIRATION RATE: 16 BRPM | WEIGHT: 212.8 LBS | HEART RATE: 94 BPM | BODY MASS INDEX: 35.41 KG/M2 | TEMPERATURE: 98.3 F | DIASTOLIC BLOOD PRESSURE: 72 MMHG | SYSTOLIC BLOOD PRESSURE: 116 MMHG | OXYGEN SATURATION: 98 %

## 2019-02-11 DIAGNOSIS — J04.0 LARYNGITIS: ICD-10-CM

## 2019-02-11 DIAGNOSIS — J00 NASOPHARYNGITIS: Primary | ICD-10-CM

## 2019-02-11 PROCEDURE — G8417 CALC BMI ABV UP PARAM F/U: HCPCS | Performed by: NURSE PRACTITIONER

## 2019-02-11 PROCEDURE — 99213 OFFICE O/P EST LOW 20 MIN: CPT | Performed by: NURSE PRACTITIONER

## 2019-02-11 PROCEDURE — G8427 DOCREV CUR MEDS BY ELIG CLIN: HCPCS | Performed by: NURSE PRACTITIONER

## 2019-02-11 PROCEDURE — 1036F TOBACCO NON-USER: CPT | Performed by: NURSE PRACTITIONER

## 2019-02-11 PROCEDURE — G8484 FLU IMMUNIZE NO ADMIN: HCPCS | Performed by: NURSE PRACTITIONER

## 2019-02-11 RX ORDER — AMOXICILLIN 500 MG/1
500 CAPSULE ORAL 2 TIMES DAILY
Qty: 20 CAPSULE | Refills: 0 | Status: SHIPPED | OUTPATIENT
Start: 2019-02-11 | End: 2019-02-21

## 2019-02-11 RX ORDER — FLUCONAZOLE 100 MG/1
100 TABLET ORAL ONCE
Qty: 1 TABLET | Refills: 0 | Status: SHIPPED | OUTPATIENT
Start: 2019-02-11 | End: 2019-02-11

## 2019-02-11 RX ORDER — ESTROGENS, CONJUGATED 1.25 MG
TABLET ORAL
COMMUNITY
Start: 2019-02-04 | End: 2019-11-06 | Stop reason: CLARIF

## 2019-02-11 RX ORDER — SUMATRIPTAN 100 MG/1
100 TABLET, FILM COATED ORAL DAILY PRN
COMMUNITY
Start: 2019-01-11

## 2019-02-11 ASSESSMENT — ENCOUNTER SYMPTOMS
CHEST TIGHTNESS: 0
COUGH: 1
VOICE CHANGE: 1
WHEEZING: 0
SINUS PRESSURE: 1
SORE THROAT: 1
SHORTNESS OF BREATH: 0
SINUS COMPLAINT: 1
RHINORRHEA: 1

## 2019-02-26 ENCOUNTER — OFFICE VISIT (OUTPATIENT)
Dept: NEUROLOGY | Age: 39
End: 2019-02-26
Payer: MEDICAID

## 2019-02-26 VITALS
HEART RATE: 92 BPM | WEIGHT: 214 LBS | SYSTOLIC BLOOD PRESSURE: 134 MMHG | BODY MASS INDEX: 35.65 KG/M2 | DIASTOLIC BLOOD PRESSURE: 86 MMHG | HEIGHT: 65 IN

## 2019-02-26 DIAGNOSIS — G47.33 OBSTRUCTIVE SLEEP APNEA: ICD-10-CM

## 2019-02-26 DIAGNOSIS — F95.1 CHRONIC MOTOR TIC DISORDER: Primary | ICD-10-CM

## 2019-02-26 PROCEDURE — G8484 FLU IMMUNIZE NO ADMIN: HCPCS | Performed by: PSYCHIATRY & NEUROLOGY

## 2019-02-26 PROCEDURE — G8417 CALC BMI ABV UP PARAM F/U: HCPCS | Performed by: PSYCHIATRY & NEUROLOGY

## 2019-02-26 PROCEDURE — G8427 DOCREV CUR MEDS BY ELIG CLIN: HCPCS | Performed by: PSYCHIATRY & NEUROLOGY

## 2019-02-26 PROCEDURE — 99244 OFF/OP CNSLTJ NEW/EST MOD 40: CPT | Performed by: PSYCHIATRY & NEUROLOGY

## 2019-03-21 ENCOUNTER — OFFICE VISIT (OUTPATIENT)
Dept: SLEEP MEDICINE | Age: 39
End: 2019-03-21
Payer: MEDICAID

## 2019-03-21 VITALS
OXYGEN SATURATION: 97 % | BODY MASS INDEX: 35.65 KG/M2 | HEART RATE: 70 BPM | WEIGHT: 214 LBS | HEIGHT: 65 IN | DIASTOLIC BLOOD PRESSURE: 74 MMHG | SYSTOLIC BLOOD PRESSURE: 110 MMHG | RESPIRATION RATE: 16 BRPM

## 2019-03-21 DIAGNOSIS — E66.9 OBESITY (BMI 35.0-39.9 WITHOUT COMORBIDITY): ICD-10-CM

## 2019-03-21 DIAGNOSIS — G43.901 MIGRAINE WITH STATUS MIGRAINOSUS, NOT INTRACTABLE, UNSPECIFIED MIGRAINE TYPE: ICD-10-CM

## 2019-03-21 DIAGNOSIS — G47.33 OSA (OBSTRUCTIVE SLEEP APNEA): Primary | ICD-10-CM

## 2019-03-21 PROCEDURE — G8484 FLU IMMUNIZE NO ADMIN: HCPCS | Performed by: PSYCHIATRY & NEUROLOGY

## 2019-03-21 PROCEDURE — 99204 OFFICE O/P NEW MOD 45 MIN: CPT | Performed by: PSYCHIATRY & NEUROLOGY

## 2019-03-21 PROCEDURE — G8417 CALC BMI ABV UP PARAM F/U: HCPCS | Performed by: PSYCHIATRY & NEUROLOGY

## 2019-03-21 PROCEDURE — 1036F TOBACCO NON-USER: CPT | Performed by: PSYCHIATRY & NEUROLOGY

## 2019-03-21 PROCEDURE — G8427 DOCREV CUR MEDS BY ELIG CLIN: HCPCS | Performed by: PSYCHIATRY & NEUROLOGY

## 2019-03-21 ASSESSMENT — ENCOUNTER SYMPTOMS
EYES NEGATIVE: 1
GASTROINTESTINAL NEGATIVE: 1
RESPIRATORY NEGATIVE: 1
ALLERGIC/IMMUNOLOGIC NEGATIVE: 1

## 2019-03-21 ASSESSMENT — SLEEP AND FATIGUE QUESTIONNAIRES
HOW LIKELY ARE YOU TO NOD OFF OR FALL ASLEEP WHILE SITTING INACTIVE IN A PUBLIC PLACE: 2
HOW LIKELY ARE YOU TO NOD OFF OR FALL ASLEEP WHILE WATCHING TV: 2
HOW LIKELY ARE YOU TO NOD OFF OR FALL ASLEEP WHILE LYING DOWN TO REST IN THE AFTERNOON WHEN CIRCUMSTANCES PERMIT: 2
ESS TOTAL SCORE: 11
HOW LIKELY ARE YOU TO NOD OFF OR FALL ASLEEP WHILE SITTING AND TALKING TO SOMEONE: 0
HOW LIKELY ARE YOU TO NOD OFF OR FALL ASLEEP WHEN YOU ARE A PASSENGER IN A CAR FOR AN HOUR WITHOUT A BREAK: 3
HOW LIKELY ARE YOU TO NOD OFF OR FALL ASLEEP IN A CAR, WHILE STOPPED FOR A FEW MINUTES IN TRAFFIC: 0
HOW LIKELY ARE YOU TO NOD OFF OR FALL ASLEEP WHILE SITTING AND READING: 2
NECK CIRCUMFERENCE (INCHES): 14
HOW LIKELY ARE YOU TO NOD OFF OR FALL ASLEEP WHILE SITTING QUIETLY AFTER LUNCH WITHOUT ALCOHOL: 0

## 2019-04-02 ENCOUNTER — HOSPITAL ENCOUNTER (OUTPATIENT)
Dept: SLEEP CENTER | Age: 39
Discharge: HOME | End: 2019-04-02
Payer: MEDICAID

## 2019-04-02 PROCEDURE — 95806 SLEEP STUDY UNATT&RESP EFFT: CPT | Performed by: PSYCHIATRY & NEUROLOGY

## 2019-04-02 PROCEDURE — 95806 SLEEP STUDY UNATT&RESP EFFT: CPT

## 2019-04-05 ENCOUNTER — TELEPHONE (OUTPATIENT)
Dept: PULMONOLOGY | Age: 39
End: 2019-04-05

## 2019-04-09 DIAGNOSIS — R09.82 POSTNASAL DRIP: ICD-10-CM

## 2019-04-10 DIAGNOSIS — G47.33 OSA (OBSTRUCTIVE SLEEP APNEA): ICD-10-CM

## 2019-04-10 DIAGNOSIS — G43.901 MIGRAINE WITH STATUS MIGRAINOSUS, NOT INTRACTABLE, UNSPECIFIED MIGRAINE TYPE: ICD-10-CM

## 2019-04-10 RX ORDER — FLUTICASONE PROPIONATE 50 MCG
SPRAY, SUSPENSION (ML) NASAL
Qty: 1 BOTTLE | Refills: 2 | Status: SHIPPED | OUTPATIENT
Start: 2019-04-10 | End: 2019-09-07 | Stop reason: SDUPTHER

## 2019-04-25 ENCOUNTER — HOSPITAL ENCOUNTER (OUTPATIENT)
Dept: SLEEP CENTER | Age: 39
Discharge: HOME | End: 2019-04-25
Payer: MEDICAID

## 2019-04-25 PROCEDURE — 95811 POLYSOM 6/>YRS CPAP 4/> PARM: CPT

## 2019-04-25 PROCEDURE — 95811 POLYSOM 6/>YRS CPAP 4/> PARM: CPT | Performed by: PSYCHIATRY & NEUROLOGY

## 2019-04-30 ENCOUNTER — TELEPHONE (OUTPATIENT)
Dept: PULMONOLOGY | Age: 39
End: 2019-04-30

## 2019-04-30 NOTE — TELEPHONE ENCOUNTER
History of mild  sleep apnea adequately controled on cpap pressure of 8  Left message asking for patient to call back  She will need to give a DME company. Order is on Dr. Munir Downing desk to sign.

## 2019-05-03 DIAGNOSIS — G47.33 OSA (OBSTRUCTIVE SLEEP APNEA): ICD-10-CM

## 2019-05-13 RX ORDER — ESCITALOPRAM OXALATE 20 MG/1
TABLET ORAL
Qty: 30 TABLET | Refills: 0 | Status: SHIPPED | OUTPATIENT
Start: 2019-05-13 | End: 2019-05-20 | Stop reason: SINTOL

## 2019-05-20 ENCOUNTER — OFFICE VISIT (OUTPATIENT)
Dept: FAMILY MEDICINE CLINIC | Age: 39
End: 2019-05-20
Payer: MEDICAID

## 2019-05-20 VITALS
OXYGEN SATURATION: 99 % | DIASTOLIC BLOOD PRESSURE: 82 MMHG | SYSTOLIC BLOOD PRESSURE: 124 MMHG | BODY MASS INDEX: 36.51 KG/M2 | WEIGHT: 219.4 LBS | HEART RATE: 82 BPM

## 2019-05-20 DIAGNOSIS — F32.A FATIGUE DUE TO DEPRESSION: ICD-10-CM

## 2019-05-20 DIAGNOSIS — T50.905A WEIGHT GAIN DUE TO MEDICATION: ICD-10-CM

## 2019-05-20 DIAGNOSIS — G47.33 OSA (OBSTRUCTIVE SLEEP APNEA): ICD-10-CM

## 2019-05-20 DIAGNOSIS — R53.83 FATIGUE DUE TO DEPRESSION: ICD-10-CM

## 2019-05-20 DIAGNOSIS — F41.8 DEPRESSION WITH ANXIETY: Primary | ICD-10-CM

## 2019-05-20 DIAGNOSIS — R63.5 WEIGHT GAIN DUE TO MEDICATION: ICD-10-CM

## 2019-05-20 PROCEDURE — 1036F TOBACCO NON-USER: CPT | Performed by: NURSE PRACTITIONER

## 2019-05-20 PROCEDURE — 99214 OFFICE O/P EST MOD 30 MIN: CPT | Performed by: NURSE PRACTITIONER

## 2019-05-20 PROCEDURE — G8427 DOCREV CUR MEDS BY ELIG CLIN: HCPCS | Performed by: NURSE PRACTITIONER

## 2019-05-20 PROCEDURE — G8417 CALC BMI ABV UP PARAM F/U: HCPCS | Performed by: NURSE PRACTITIONER

## 2019-05-20 RX ORDER — DESVENLAFAXINE 25 MG/1
25 TABLET, EXTENDED RELEASE ORAL DAILY
Qty: 30 TABLET | Refills: 5 | Status: SHIPPED | OUTPATIENT
Start: 2019-05-20 | End: 2019-11-13 | Stop reason: SDUPTHER

## 2019-05-20 ASSESSMENT — ENCOUNTER SYMPTOMS: SHORTNESS OF BREATH: 0

## 2019-05-20 NOTE — PATIENT INSTRUCTIONS
Patient Education        Learning About Anxiety Disorders  What are anxiety disorders? Anxiety disorders are a type of medical problem. They cause severe anxiety. When you feel anxious, you feel that something bad is about to happen. This feeling interferes with your life. These disorders include:  · Generalized anxiety disorder. You feel worried and stressed about many everyday events and activities. This goes on for several months and disrupts your life on most days. · Panic disorder. You have repeated panic attacks. A panic attack is a sudden, intense fear or anxiety. It may make you feel short of breath. Your heart may pound. · Social anxiety disorder. You feel very anxious about what you will say or do in front of people. For example, you may be scared to talk or eat in public. This problem affects your daily life. · Phobias. You are very scared of a specific object, situation, or activity. For example, you may fear spiders, high places, or small spaces. What are the symptoms? Generalized anxiety disorder  Symptoms may include:  · Feeling worried and stressed about many things almost every day. · Feeling tired or irritable. You may have a hard time concentrating. · Having headaches or muscle aches. · Having a hard time getting to sleep or staying asleep. Panic disorder  You may have repeated panic attacks when there is no reason for feeling afraid. You may change your daily activities because you worry that you will have another attack. Symptoms may include:  · Intense fear, terror, or anxiety. · Trouble breathing or very fast breathing. · Chest pain or tightness. · A heartbeat that races or is not regular. Social anxiety disorder  Symptoms may include:  · Fear about a social situation, such as eating in front of others or speaking in public. You may worry a lot. Or you may be afraid that something bad will happen. · Anxiety that can cause you to blush, sweat, and feel shaky.   · A heartbeat that is faster than normal.  · A hard time focusing. Phobias  Symptoms may include:  · More fear than most people of being around an object, being in a situation, or doing an activity. You might also be stressed about the chance of being around the thing you fear. · Worry about losing control, panicking, fainting, or having physical symptoms like a faster heartbeat when you are around the situation or object. How are these disorders treated? Anxiety disorders can be treated with medicines or counseling. A combination of both may be used. Medicines may include:  · Antidepressants. These may help your symptoms by keeping chemicals in your brain in balance. · Benzodiazepines. These may give you short-term relief of your symptoms. Some people use cognitive-behavioral therapy. A therapist helps you learn to change stressful or bad thoughts into helpful thoughts. Lead a healthy lifestyle  A healthy lifestyle may help you feel better. · Get at least 30 minutes of exercise on most days of the week. Walking is a good choice. · Eat a healthy diet. Include fruits, vegetables, lean proteins, and whole grains in your diet each day. · Try to go to bed at the same time every night. Try for 8 hours of sleep a night. · Find ways to manage stress. Try relaxation exercises. · Avoid alcohol and illegal drugs. Follow-up care is a key part of your treatment and safety. Be sure to make and go to all appointments, and call your doctor if you are having problems. It's also a good idea to know your test results and keep a list of the medicines you take. Where can you learn more? Go to https://ramón.Fieldbook. org and sign in to your [employer] account. Enter U295 in the [facility] box to learn more about \"Learning About Anxiety Disorders. \"     If you do not have an account, please click on the \"Sign Up Now\" link.   Current as of: September 11, 2018  Content Version: 12.0  © 3740-5508 [employer]. Care instructions adapted under license by [facility] ([facility]). If you have questions about a medical condition or this instruction, always ask your healthcare professional. [address] any warranty or liability for your use of this information.

## 2019-05-20 NOTE — PROGRESS NOTES
SUBJECTIVE:  Pt is a of 45 y.o. female comes in today with   Chief Complaint   Patient presents with    Depression     follow up        Patient presenting today for evaluation of multiple complaints  States anxiety worsening. Eating all the time, states \"my weight is at an all time high\". Recently diagnosed with sleep apnea- using CPAP for 9 nights, still adjusting- seeing no improvement yet. States not helping with chronic fatigue and apathy. Has been taking lexapro for 10 yrs or more, not sure it is working. Has tried to stop on own in past and \"I felt crazy going off it\". Feeling overwhelmed, no motivation for anything. Failed Zoloft in past. Denies panic attacks, sleep disturbances or SI. Prior to Visit Medications    Medication Sig Taking? Authorizing Provider   escitalopram (LEXAPRO) 20 MG tablet TAKE ONE TABLET BY MOUTH DAILY ALONG WITH ONE-HALF TABLET OF 10MG TO EQUAL 25MG Yes ANDREA Alston CNP   fluticasone (FLONASE) 50 MCG/ACT nasal spray SPRAY TWO SPRAYS IN EACH NOSTRIL ONCE DAILY Yes ANDREA Alston CNP   PREMARIN 1.25 MG tablet  Yes Historical Provider, MD   SUMAtriptan (IMITREX) 100 MG tablet Take 100 mg by mouth daily as needed Yes Historical Provider, MD   dicyclomine (BENTYL) 20 MG tablet Take 20 mg by mouth Yes Historical Provider, MD   ibuprofen (ADVIL;MOTRIN) 800 MG tablet Take 1 tablet by mouth every 6 hours as needed for Pain Yes Laura Frederick MD         Patient's allergies, past medical, surgical, social and family histories werereviewed and updated as appropriate. Review of Systems   Constitutional: Positive for appetite change (increased), fatigue and unexpected weight change (weight gain). Respiratory: Negative for shortness of breath. Cardiovascular: Negative for chest pain and palpitations. Psychiatric/Behavioral: Negative for suicidal ideas. The patient is nervous/anxious.          Apathy           Physical Exam   Constitutional: She is oriented to person, place, and time. She appears well-developed and well-nourished. Cardiovascular: Normal rate, regular rhythm and normal heart sounds. No murmur heard. Pulmonary/Chest: Effort normal and breath sounds normal.   Neurological: She is alert and oriented to person, place, and time. Psychiatric: She has a normal mood and affect. Her behavior is normal. Judgment and thought content normal.   Vitals reviewed. Vitals:    05/20/19 1327   BP: 124/82   Pulse: 82   SpO2: 99%   Weight: 219 lb 6.4 oz (99.5 kg)             ASSESSMENT:  1. Depression with anxiety    2. EMELINA (obstructive sleep apnea)    3. Fatigue due to depression    4. Weight gain due to medication        PLAN:  1. Depression with anxiety  Stop Lexapro, take 1/2 tab for 2 weeks, start Pristiq  -     desvenlafaxine succinate (PRISTIQ) 25 MG TB24 extended release tablet; Take 1 tablet by mouth daily    2. EMELINA (obstructive sleep apnea)  Continue night CPAP  Continue treatment plan per Dr. Koby Patino    3. Fatigue due to depression  Suspect related to depression  Switch to Pristiq    4. Weight gain due to medication  Suspect related to lexapro  See # 1  See pt instructions  F/u 6 weeks, sooner prn  Discussed use, benefit, and side effects of prescribed medications. All patient questions answered. Pt voiced understanding.

## 2019-05-21 ENCOUNTER — OFFICE VISIT (OUTPATIENT)
Dept: NEUROLOGY | Age: 39
End: 2019-05-21
Payer: MEDICAID

## 2019-05-21 VITALS
WEIGHT: 218 LBS | HEART RATE: 71 BPM | HEIGHT: 65 IN | SYSTOLIC BLOOD PRESSURE: 133 MMHG | DIASTOLIC BLOOD PRESSURE: 87 MMHG | BODY MASS INDEX: 36.32 KG/M2

## 2019-05-21 DIAGNOSIS — R51.9 CHRONIC DAILY HEADACHE: Primary | ICD-10-CM

## 2019-05-21 DIAGNOSIS — R42 DIZZINESS: ICD-10-CM

## 2019-05-21 DIAGNOSIS — F95.1 CHRONIC MOTOR TIC DISORDER: ICD-10-CM

## 2019-05-21 DIAGNOSIS — G47.33 OBSTRUCTIVE SLEEP APNEA: ICD-10-CM

## 2019-05-21 DIAGNOSIS — R53.83 FATIGUE, UNSPECIFIED TYPE: ICD-10-CM

## 2019-05-21 PROCEDURE — G8427 DOCREV CUR MEDS BY ELIG CLIN: HCPCS | Performed by: PSYCHIATRY & NEUROLOGY

## 2019-05-21 PROCEDURE — 99214 OFFICE O/P EST MOD 30 MIN: CPT | Performed by: PSYCHIATRY & NEUROLOGY

## 2019-05-21 PROCEDURE — 1036F TOBACCO NON-USER: CPT | Performed by: PSYCHIATRY & NEUROLOGY

## 2019-05-21 PROCEDURE — G8417 CALC BMI ABV UP PARAM F/U: HCPCS | Performed by: PSYCHIATRY & NEUROLOGY

## 2019-05-21 NOTE — PATIENT INSTRUCTIONS
Please call with any questions or concerns:   [facility]  @ [phone]. LAB RESULTS:  Please obtain any labs or diagnostic tests as discussed today. You should call the office to check the results. Please allow  3 to 7 days for us to get these results. MEDICATION LIST:  Please bring an accurate list of your medications to every visit. APPOINTMENT CONFIRMATION:  We will call you the day before your scheduled appointment to confirm. If we are unable to reach you, you MUST call back by the end of the day to confirm the appointment or we may be forced to cancel.

## 2019-05-21 NOTE — PROGRESS NOTES
Erica Lara   Neurology followup    Subjective:   CC/HP  History was obtained from the patient. Patient continues to have significant fatigue. She does not have much energy and feels tired all the time. She also has significant depression. Her primary care physician is trying to take her off the Lexapro and switch her to a different antidepressant medication. Her gynecologist is trying to get insurance authorization for a new hormone treatment  Patient had sleep study done which showed mild obstructive sleep apnea and patient is using her CPAP machine. Patient also has dizziness. Patient continues to have a minor tic in the muscles of her neck and head at times. Patient also complains of a daily headache.     REVIEW OF SYSTEMS    Constitutional:  []   Chills   [x]  Fatigue   []  Fevers   []  Malaise   []  Weight loss     [] Denies all of the above    Respiratory:   []  Cough    []  Shortness of breath         [x] Denies all of the above     Cardiovascular:   []  Chest pain    []  Exertional chest pressure/discomfort           [] Palpitations    []  Syncope     [x] Denies all of the above        Past Medical History:   Diagnosis Date    Anxiety     Depression     WELL CONTROLLED 10-16-17    Heart rate fast     intermittent    Kidney stone     Motor tic disorder     Sleep apnea      Family History   Problem Relation Age of Onset    Diabetes Father     Atrial Fibrillation Father     Heart Disease Paternal Grandmother     Heart Attack Paternal Grandmother     Stroke Paternal Grandfather     Stroke Maternal Grandfather     Atrial Fibrillation Maternal Grandmother     Cancer Neg Hx      Social History     Socioeconomic History    Marital status:      Spouse name: None    Number of children: None    Years of education: None    Highest education level: None   Occupational History    None   Social Needs    Financial resource strain: None    Food insecurity:     Worry: None Inability: None    Transportation needs:     Medical: None     Non-medical: None   Tobacco Use    Smoking status: Never Smoker    Smokeless tobacco: Never Used   Substance and Sexual Activity    Alcohol use: No     Alcohol/week: 0.0 oz     Comment: once a week    Drug use: No    Sexual activity: None   Lifestyle    Physical activity:     Days per week: None     Minutes per session: None    Stress: None   Relationships    Social connections:     Talks on phone: None     Gets together: None     Attends Protestant service: None     Active member of club or organization: None     Attends meetings of clubs or organizations: None     Relationship status: None    Intimate partner violence:     Fear of current or ex partner: None     Emotionally abused: None     Physically abused: None     Forced sexual activity: None   Other Topics Concern    None   Social History Narrative    None        Objective:  Exam:  /87   Pulse 71   Ht 5' 5\" (1.651 m)   Wt 218 lb (98.9 kg)   LMP 03/13/2018 (Exact Date)   BMI 36.28 kg/m²   This is a well-nourished patient in no acute distress  Patient is awake, alert and oriented x3. Speech is normal.  Pupils are equal round reacting to light. Extraocular movements intact. Face symmetrical. Tongue midline. Motor exam shows normal symmetrical strength. Deep tendon reflexes normal. Plantar reflexes downgoing. Sensory exam normal. Coordination normal. Gait normal. No carotid bruit. No neck stiffness.       Data :  LABS:  General Labs:    CBC:   Lab Results   Component Value Date    WBC 5.8 01/16/2019    RBC 4.38 01/16/2019    HGB 13.4 01/16/2019    HCT 40.3 01/16/2019    MCV 91.9 01/16/2019    MCH 30.6 01/16/2019    MCHC 33.3 01/16/2019    RDW 12.9 01/16/2019     01/16/2019    MPV 8.9 01/16/2019     BMP:    Lab Results   Component Value Date     01/16/2019    K 4.2 01/16/2019     01/16/2019    CO2 26 01/16/2019    BUN 14 01/16/2019    LABALBU 4.6 01/16/2019

## 2019-06-11 RX ORDER — ESCITALOPRAM OXALATE 20 MG/1
TABLET ORAL
Qty: 30 TABLET | Refills: 0 | OUTPATIENT
Start: 2019-06-11

## 2019-06-14 ENCOUNTER — TELEPHONE (OUTPATIENT)
Dept: FAMILY MEDICINE CLINIC | Age: 39
End: 2019-06-14

## 2019-06-14 ENCOUNTER — OFFICE VISIT (OUTPATIENT)
Dept: SLEEP MEDICINE | Age: 39
End: 2019-06-14
Payer: MEDICAID

## 2019-06-14 VITALS
DIASTOLIC BLOOD PRESSURE: 80 MMHG | HEIGHT: 65 IN | SYSTOLIC BLOOD PRESSURE: 120 MMHG | RESPIRATION RATE: 16 BRPM | BODY MASS INDEX: 35.99 KG/M2 | HEART RATE: 91 BPM | OXYGEN SATURATION: 96 % | WEIGHT: 216 LBS

## 2019-06-14 DIAGNOSIS — Z99.89 DEPENDENCE ON OTHER ENABLING MACHINES AND DEVICES: ICD-10-CM

## 2019-06-14 DIAGNOSIS — G47.33 OSA ON CPAP: Primary | ICD-10-CM

## 2019-06-14 DIAGNOSIS — Z99.89 OSA ON CPAP: Primary | ICD-10-CM

## 2019-06-14 PROCEDURE — 1036F TOBACCO NON-USER: CPT | Performed by: PSYCHIATRY & NEUROLOGY

## 2019-06-14 PROCEDURE — G8427 DOCREV CUR MEDS BY ELIG CLIN: HCPCS | Performed by: PSYCHIATRY & NEUROLOGY

## 2019-06-14 PROCEDURE — G8417 CALC BMI ABV UP PARAM F/U: HCPCS | Performed by: PSYCHIATRY & NEUROLOGY

## 2019-06-14 PROCEDURE — 99213 OFFICE O/P EST LOW 20 MIN: CPT | Performed by: PSYCHIATRY & NEUROLOGY

## 2019-06-14 ASSESSMENT — SLEEP AND FATIGUE QUESTIONNAIRES
ESS TOTAL SCORE: 10
HOW LIKELY ARE YOU TO NOD OFF OR FALL ASLEEP WHILE SITTING QUIETLY AFTER LUNCH WITHOUT ALCOHOL: 0
HOW LIKELY ARE YOU TO NOD OFF OR FALL ASLEEP WHILE SITTING INACTIVE IN A PUBLIC PLACE: 1
HOW LIKELY ARE YOU TO NOD OFF OR FALL ASLEEP WHILE SITTING AND READING: 1
HOW LIKELY ARE YOU TO NOD OFF OR FALL ASLEEP WHILE SITTING AND TALKING TO SOMEONE: 0
HOW LIKELY ARE YOU TO NOD OFF OR FALL ASLEEP WHILE WATCHING TV: 2
HOW LIKELY ARE YOU TO NOD OFF OR FALL ASLEEP IN A CAR, WHILE STOPPED FOR A FEW MINUTES IN TRAFFIC: 0
HOW LIKELY ARE YOU TO NOD OFF OR FALL ASLEEP WHEN YOU ARE A PASSENGER IN A CAR FOR AN HOUR WITHOUT A BREAK: 3
HOW LIKELY ARE YOU TO NOD OFF OR FALL ASLEEP WHILE LYING DOWN TO REST IN THE AFTERNOON WHEN CIRCUMSTANCES PERMIT: 3

## 2019-06-14 ASSESSMENT — ENCOUNTER SYMPTOMS
CHOKING: 0
APNEA: 0

## 2019-06-14 NOTE — TELEPHONE ENCOUNTER
Pt states you told her to taper of by taking the 10mg of lexapro, she just wanted to know for how long?

## 2019-06-14 NOTE — PATIENT INSTRUCTIONS

## 2019-06-14 NOTE — TELEPHONE ENCOUNTER
Lexapro was stopped and pt was switched to Pristiq, she should no longer be taking Lexapro  Why does pt need booster for measles?  If she had vaccine as child, she should be fine

## 2019-06-14 NOTE — TELEPHONE ENCOUNTER
Pt called and stated Betty Bhattis lowered the dosage of escitalopram (LEXAPRO) 20 MG tablet     Pt would like to know how long she should take the 10mg. Pt would also like to know if she needs a booster for measles    Please call back and advise.

## 2019-06-14 NOTE — PROGRESS NOTES
MD ALINA Curiel Board Certified in Sleep Medicine  Certified in [address] [place] [address] Certified in Neurology Bladimir Abbasi [address], [address], [address]  [phone]   [address] [place] [address]  [address] [place] [address], [address] [facility]                      [address] [id number] [phone]    Subjective:     Patient ID: Cristina Mcadams is a 45 y.o. female. Chief Complaint   Patient presents with    Follow-up     1st cpap       HPI:        Cristina Mcadams is a 45 y.o. female was seen today as a follow for obstructive sleep apnea. The patient underwent home sleep testing on 04/03/2019, the overnight registration revealed mild obstructive sleep apnea with apnea hypopnea index of 5.3/h with lowest O2 saturation of 85%, patient spent about 1.3 minutes below 90%. Subsequently, the patient underwent successful PAP titration on 04/25/2019, the lowest O2 saturation while on PAP was 94%. Patient is using the PAP machine about 97% of the time, more than 4 hours a nightabout  90 %, in total average of 6:18 hours a night in last 30 days. Currently on PAP at 8 cm, the AHI is only 3.3 events per hour atthis pressure. Patient improved regarding daytime sleepiness and fatigue, wakes up refreshed in the morning. The Patient scored Total score: 10 on [place] Sleepiness Scale ( more than 10 is indicative of daytime sleepiness)   Patient improved after mask fitting with FFM ([employer])    DOT/CDL - N/A        Previous Report(s)Reviewed: historical medical records         Social History     Socioeconomic History    Marital status:      Spouse name: Not on file    Number of children: Not on file    Years of education: Not on file    Highest education level: Not on file   Occupational History    Not on file   Social Needs    Financial Noted    Metronidazole Rash 10/26/2017    Other  05/15/2018       Patient Active Problem List   Diagnosis    Depression    Anxiety    EMELINA (obstructive sleep apnea)       Past Medical History:   Diagnosis Date    Anxiety     Depression     WELL CONTROLLED 10-16-17    Heart rate fast     intermittent    Kidney stone     Motor tic disorder     Sleep apnea        Past Surgical History:   Procedure Laterality Date    ABDOMINAL EXPLORATION SURGERY  7/22/11    excision Meckels diverticulum    ABDOMINOPLASTY  4/14/14    Sourav Kerr ADENOIDECTOMY Bilateral     APPENDECTOMY  7/22/11    BLADDER REMOVAL  04/13/2018    OPERATIVE LAPAROSCOPY, LEFT OVARIAN CYSTECTOMY WITH DILATATION AND CURETTAGE    HYSTERECTOMY, TOTAL ABDOMINAL  06/12/2018    robotic total hyst BSO, Dr Ryan Fairbanks LITHOTRIPSY  x2    LUMBAR LAMINECTOMY  6/13    Rad; left l5-s1    OTHER SURGICAL HISTORY      hymenoplasty    TONSILLECTOMY Bilateral        Family History   Problem Relation Age of Onset    Diabetes Father     Atrial Fibrillation Father     Heart Disease Paternal Grandmother     Heart Attack Paternal Grandmother     Stroke Paternal Grandfather     Stroke Maternal Grandfather     Atrial Fibrillation Maternal Grandmother     Cancer Neg Hx        Review of Systems   Constitutional: Positive for fatigue (improved 50%). Respiratory: Negative for apnea and choking. Cardiovascular: Negative for leg swelling. Genitourinary: Negative for frequency. Neurological: Negative for headaches (resolved). All other systems reviewed and are negative. Objective:     Vitals:  Weight BMI Neck circumference    Wt Readings from Last 3 Encounters:   06/14/19 216 lb (98 kg)   05/21/19 218 lb (98.9 kg)   05/20/19 219 lb 6.4 oz (99.5 kg)    Body mass index is 35.94 kg/m².        BP HR SaO2   BP Readings from Last 3 Encounters:   06/14/19 120/80   05/21/19 133/87   05/20/19 124/82    Pulse Readings from Last 3 Encounters:   06/14/19 91 05/21/19 71   05/20/19 82    SpO2 Readings from Last 3 Encounters:   06/14/19 96%   05/20/19 99%   03/21/19 97%      Themandibular molar Class :   [x]1 []2 []3      Mallampati I Airway Classification:   []1 []2 [x]3 []4      Physical Exam   Constitutional: No distress. HENT:   Nose: Nose normal.   Eyes: EOM are normal.   Neck: Neck supple. Cardiovascular: Normal rate, regular rhythm and normal heart sounds. Pulmonary/Chest: Effort normal and breath sounds normal. No respiratory distress. Musculoskeletal: Normal range of motion. She exhibits no edema. Neurological: She is alert. Skin: Skin is warm. Psychiatric: She has a normal mood and affect. Nursing note and vitals reviewed. Assessment:   Mild Obstructive Sleep Apnea/Hypopnea Syndrome under good control on PAP at 8 cmwp. Diagnosis Orders   1. EMELINA on CPAP     2. Dependence on other enabling machines and devices       Plan: Will continue the PAP at 8 cmwp,   I will order PAP supplies, mask, filters. ... No orders of the defined types were placed in this encounter. Return in about 1 year (around 6/14/2020) for Reveiwing CPAP usage and compliance report and tro.     Yemi Mejias MD  Medical Director - [facility]

## 2019-06-18 DIAGNOSIS — R53.83 FATIGUE, UNSPECIFIED TYPE: ICD-10-CM

## 2019-06-18 LAB — VITAMIN B-12: 383 PG/ML (ref 211–911)

## 2019-07-01 ENCOUNTER — OFFICE VISIT (OUTPATIENT)
Dept: FAMILY MEDICINE CLINIC | Age: 39
End: 2019-07-01
Payer: MEDICAID

## 2019-07-01 VITALS
SYSTOLIC BLOOD PRESSURE: 130 MMHG | HEART RATE: 75 BPM | OXYGEN SATURATION: 98 % | WEIGHT: 217 LBS | DIASTOLIC BLOOD PRESSURE: 88 MMHG | BODY MASS INDEX: 36.11 KG/M2

## 2019-07-01 DIAGNOSIS — L23.7 POISON IVY DERMATITIS: ICD-10-CM

## 2019-07-01 DIAGNOSIS — G25.81 RESTLESS LEG SYNDROME: ICD-10-CM

## 2019-07-01 DIAGNOSIS — F41.8 DEPRESSION WITH ANXIETY: Primary | ICD-10-CM

## 2019-07-01 DIAGNOSIS — G47.33 OSA (OBSTRUCTIVE SLEEP APNEA): ICD-10-CM

## 2019-07-01 PROCEDURE — G8417 CALC BMI ABV UP PARAM F/U: HCPCS | Performed by: NURSE PRACTITIONER

## 2019-07-01 PROCEDURE — 99214 OFFICE O/P EST MOD 30 MIN: CPT | Performed by: NURSE PRACTITIONER

## 2019-07-01 PROCEDURE — G8427 DOCREV CUR MEDS BY ELIG CLIN: HCPCS | Performed by: NURSE PRACTITIONER

## 2019-07-01 PROCEDURE — 1036F TOBACCO NON-USER: CPT | Performed by: NURSE PRACTITIONER

## 2019-07-01 RX ORDER — ROPINIROLE 0.25 MG/1
0.25 TABLET, FILM COATED ORAL NIGHTLY
Qty: 90 TABLET | Refills: 0 | Status: SHIPPED | OUTPATIENT
Start: 2019-07-01 | End: 2019-08-20 | Stop reason: SDUPTHER

## 2019-07-01 RX ORDER — TRIAMCINOLONE ACETONIDE 1 MG/G
CREAM TOPICAL
Qty: 30 G | Refills: 1 | Status: SHIPPED | OUTPATIENT
Start: 2019-07-01 | End: 2019-09-16

## 2019-07-01 ASSESSMENT — ENCOUNTER SYMPTOMS: SHORTNESS OF BREATH: 0

## 2019-07-01 NOTE — PROGRESS NOTES
myalgias. Skin: Positive for rash (itchy rash to arms). Neurological:        Restless legs     Psychiatric/Behavioral: Negative for suicidal ideas. The patient is not nervous/anxious. Physical Exam   Constitutional: She is oriented to person, place, and time. She appears well-developed and well-nourished. Cardiovascular: Normal rate, regular rhythm and normal heart sounds. No murmur heard. Pulmonary/Chest: Effort normal and breath sounds normal.   Neurological: She is alert and oriented to person, place, and time. Skin: Rash (slightly erythematous patches scattered to right arm, 3 noted to left arm) noted. Psychiatric: She has a normal mood and affect. Her behavior is normal. Judgment and thought content normal.   Vitals reviewed. Vitals:    07/01/19 1103   BP: 130/88   Pulse: 75   SpO2: 98%   Weight: 217 lb (98.4 kg)             ASSESSMENT:  1. Depression with anxiety    2. Restless leg syndrome    3. Poison ivy dermatitis    4. EMELINA (obstructive sleep apnea)        PLAN:  1. Depression with anxiety  Stable, improving  No changes today    2. Restless leg syndrome  New problem  Trial-     rOPINIRole (REQUIP) 0.25 MG tablet; Take 1 tablet by mouth nightly    3. Poison ivy dermatitis  New problem  Trial-     triamcinolone (KENALOG) 0.1 % cream; Apply topically 2 times daily. Explained new lesions can develop for up to 21 days after exposure  May also use OTC Calamine, Zanfel and Benadryl  Bleach bath soaks: fill tub with warm water add 1/4 cup bleach. Soak for 10 min. Avoid hot temp    4. EMELINA (obstructive sleep apnea)  Encouraged nightly compliance  See pt instructions  F/u 6 months, sooner prn  Discussed use, benefit, and side effects of prescribed medications. All patient questions answered. Pt voiced understanding.

## 2019-07-01 NOTE — PATIENT INSTRUCTIONS
worse.  · Avoid caffeine products, such as coffee, tea, cola, and chocolate. Caffeine can interrupt your sleep and stimulate you. · Do not smoke. Nicotine can make restless legs worse. If you need help quitting, talk to your doctor about stop-smoking programs and medicines. These can increase your chances of quitting for good. · Do not drink alcohol late in the evening. Take steps to help you sleep better  · Get plenty of sunlight in the outdoors, particularly later in the afternoon. · Use the evening hours for settling down. Avoid activities that challenge you in the hours before bedtime. · Eat meals at regular times, and do not snack before bedtime. · Keep your bedroom quiet, dark, and cool. Try using a sleep mask and earplugs to help you sleep. · Limit how much you drink at night to reduce your need to get up to urinate. But do not go to bed thirsty. · Run a fan or other steady \"white noise\" during the night if noises wake you up. · [place] the bed for sleeping and sex. Do your reading or TV watching in another room. · Once you are in bed, relax from head to toe, and guide your mind to pleasant thoughts. · Do not stay in bed longer than 8 hours, and try to avoid naps. When should you call for help? Watch closely for changes in your health, and be sure to contact your doctor if:    · You are still not getting enough sleep.     · Your symptoms become more severe or happen more often. Where can you learn more? Go to https://[email or web address] org and sign in to your [employer] account. Enter V181 in the [facility] box to learn more about \"Restless Legs Syndrome: Care Instructions. \"     If you do not have an account, please click on the \"Sign Up Now\" link. Current as of: Devi 3, 2018  Content Version: 12.0  © 6215-2817 Healthwise, Incorporated. Care instructions adapted under license by [facility] ([facility]).  If you have questions about a medical condition or this instruction, always ask your healthcare professional. [place] [address] any warranty or liability for your use of this information.

## 2019-08-12 ENCOUNTER — OFFICE VISIT (OUTPATIENT)
Dept: FAMILY MEDICINE CLINIC | Age: 39
End: 2019-08-12
Payer: MEDICAID

## 2019-08-12 VITALS
DIASTOLIC BLOOD PRESSURE: 80 MMHG | SYSTOLIC BLOOD PRESSURE: 122 MMHG | WEIGHT: 220.2 LBS | OXYGEN SATURATION: 98 % | BODY MASS INDEX: 36.64 KG/M2 | HEART RATE: 96 BPM

## 2019-08-12 DIAGNOSIS — R25.3 TWITCHING: ICD-10-CM

## 2019-08-12 DIAGNOSIS — M25.50 ARTHRALGIA, UNSPECIFIED JOINT: ICD-10-CM

## 2019-08-12 DIAGNOSIS — Z23 NEED FOR VACCINE FOR TD (TETANUS-DIPHTHERIA): ICD-10-CM

## 2019-08-12 DIAGNOSIS — R41.3 MEMORY IMPAIRMENT: ICD-10-CM

## 2019-08-12 DIAGNOSIS — K08.89 TOOTHACHE: Primary | ICD-10-CM

## 2019-08-12 PROCEDURE — 1036F TOBACCO NON-USER: CPT | Performed by: NURSE PRACTITIONER

## 2019-08-12 PROCEDURE — G8427 DOCREV CUR MEDS BY ELIG CLIN: HCPCS | Performed by: NURSE PRACTITIONER

## 2019-08-12 PROCEDURE — 99214 OFFICE O/P EST MOD 30 MIN: CPT | Performed by: NURSE PRACTITIONER

## 2019-08-12 PROCEDURE — G8417 CALC BMI ABV UP PARAM F/U: HCPCS | Performed by: NURSE PRACTITIONER

## 2019-08-12 RX ORDER — AMOXICILLIN 500 MG/1
500 CAPSULE ORAL 3 TIMES DAILY
Qty: 30 CAPSULE | Refills: 0 | Status: SHIPPED | OUTPATIENT
Start: 2019-08-12 | End: 2019-08-22

## 2019-08-12 ASSESSMENT — PATIENT HEALTH QUESTIONNAIRE - PHQ9
SUM OF ALL RESPONSES TO PHQ QUESTIONS 1-9: 0
2. FEELING DOWN, DEPRESSED OR HOPELESS: 0
SUM OF ALL RESPONSES TO PHQ QUESTIONS 1-9: 0
SUM OF ALL RESPONSES TO PHQ9 QUESTIONS 1 & 2: 0
1. LITTLE INTEREST OR PLEASURE IN DOING THINGS: 0

## 2019-08-12 ASSESSMENT — ENCOUNTER SYMPTOMS
COUGH: 0
SHORTNESS OF BREATH: 0
RHINORRHEA: 0
SORE THROAT: 0

## 2019-08-12 NOTE — PROGRESS NOTES
SUBJECTIVE:  Pt is a of 45 y.o. female comes in today with   Chief Complaint   Patient presents with    Generalized Body Aches     body aches all the time. Tdap due. Patient presenting today for evaluation of multiple complaints  1: requesting tetanus booster today    2: c/o right upper tooth pain. Dentist filled cavity, but still having pain. C/o pain when eating, hot and cold sensitivity, and severe pain when flossing. He prescribed Augmentin, but unable to tolerate d/t GI issues so stopped. 3: also c/o joint pain. States entire body hurts. Achy pain. Occasional aching in ankles. States every joint in body hurts. Mom has h/o RA- pt had labs with previous PCP 2011 and RA, OFE, and sed rate were normal.     4. Memory loss: states issue for \"a while\". Discussed with 1/16/19. States getting worse. Noticing with short term. Family member will ask for water or ketchup from kitchen- she goes to get it and forgets. States now even family is starting to comment that it is getting worse. States twitch to head is improving. Prior to Visit Medications    Medication Sig Taking? Authorizing Provider   triamcinolone (KENALOG) 0.1 % cream Apply topically 2 times daily.  Yes ANDREA Dickson CNP   rOPINIRole (REQUIP) 0.25 MG tablet Take 1 tablet by mouth nightly Yes ANDREA Dickson CNP   desvenlafaxine succinate (PRISTIQ) 25 MG TB24 extended release tablet Take 1 tablet by mouth daily Yes ANDREA Dickson CNP   fluticasone (FLONASE) 50 MCG/ACT nasal spray SPRAY TWO SPRAYS IN EACH NOSTRIL ONCE DAILY Yes ANDREA Dickson CNP   PREMARIN 1.25 MG tablet  Yes Historical Provider, MD   SUMAtriptan (IMITREX) 100 MG tablet Take 100 mg by mouth daily as needed Yes Historical Provider, MD   dicyclomine (BENTYL) 20 MG tablet Take 20 mg by mouth Yes Historical Provider, MD   ibuprofen (ADVIL;MOTRIN) 800 MG tablet Take 1 tablet by mouth every 6 hours as needed for Pain Yes Shu Pappas MD         Patient's

## 2019-08-12 NOTE — PATIENT INSTRUCTIONS
label.  If you have swelling, put an ice pack on the area where the shot was given. Do this for 20 minutes, 3 or 4 times a day, for the first 24 to 48 hours. After 48 hours, heat may feel better. Follow-up care is a key part of your treatment and safety. Be sure to make and go to all appointments, and call your doctor if you are having problems. It's also a good idea to know your test results and keep a list of the medicines you take. Where can you learn more? Go to https://[email or web address] [email or web address].[email or web address] org and sign in to your En[employer]i account. Enter [phone] in the [place] box to learn more about \"Learning About Tetanus. \"     If you do not have an account, please click on the \"Sign Up Now\" link. Current as of: July 30, 2018  Content Version: 12.1  © 1860-4028 Healthwise, Incorporated. Care instructions adapted under license by [facility] ([facility]). If you have questions about a medical condition or this instruction, always ask your healthcare professional. [address] any warranty or liability for your use of this information.

## 2019-08-13 ENCOUNTER — TELEPHONE (OUTPATIENT)
Dept: PULMONOLOGY | Age: 39
End: 2019-08-13
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
## 2019-08-20 DIAGNOSIS — G25.81 RESTLESS LEG SYNDROME: ICD-10-CM

## 2019-08-21 RX ORDER — ROPINIROLE 0.25 MG/1
TABLET, FILM COATED ORAL
Qty: 90 TABLET | Refills: 3 | Status: SHIPPED | OUTPATIENT
Start: 2019-08-21 | End: 2019-08-29

## 2019-08-29 ENCOUNTER — TELEPHONE (OUTPATIENT)
Dept: FAMILY MEDICINE CLINIC | Age: 39
End: 2019-08-29

## 2019-08-29 DIAGNOSIS — G25.81 RESTLESS LEG SYNDROME: ICD-10-CM

## 2019-08-29 RX ORDER — ROPINIROLE 0.5 MG/1
0.5 TABLET, FILM COATED ORAL NIGHTLY
Qty: 30 TABLET | Refills: 5 | Status: SHIPPED | OUTPATIENT
Start: 2019-08-29 | End: 2019-10-24

## 2019-08-29 RX ORDER — ROPINIROLE 0.25 MG/1
TABLET, FILM COATED ORAL
Qty: 180 TABLET | Refills: 3 | Status: CANCELLED | OUTPATIENT
Start: 2019-08-29

## 2019-08-29 NOTE — TELEPHONE ENCOUNTER
Pt is calling in requesting a rx dosage instruction change for her Requip . 25mg rx. States it was prescribed to take 1 tablet by mouth nightly but that she has been taking 2 tablets by mouth nightly (states it was ok Per aaron during OV). States insurance is denying refills since its too soon because of dosage instructions. Please advise on new rx order. Pt is also requesting if she needs a Hep B vaccine, received information from [facility] they are requiring staff to have that vaccine. Please advise if patient needs vaccine and if so can make an appointment to have that done in our office. Thanks!

## 2019-08-29 NOTE — TELEPHONE ENCOUNTER
New Rx sent for 0.5 mg  I do not show that she has had Hep B vaccine.  I do recommend it  She can be put on MA schedule for Hep series

## 2019-09-04 ENCOUNTER — NURSE ONLY (OUTPATIENT)
Dept: FAMILY MEDICINE CLINIC | Age: 39
End: 2019-09-04
Payer: MEDICAID

## 2019-09-04 DIAGNOSIS — Z23 NEED FOR VACCINATION: Primary | ICD-10-CM

## 2019-09-04 PROCEDURE — 90746 HEPB VACCINE 3 DOSE ADULT IM: CPT | Performed by: NURSE PRACTITIONER

## 2019-09-04 PROCEDURE — 90471 IMMUNIZATION ADMIN: CPT | Performed by: NURSE PRACTITIONER

## 2019-09-07 DIAGNOSIS — R09.82 POSTNASAL DRIP: ICD-10-CM

## 2019-09-09 RX ORDER — FLUTICASONE PROPIONATE 50 MCG
SPRAY, SUSPENSION (ML) NASAL
Qty: 1 BOTTLE | Refills: 5 | Status: SHIPPED | OUTPATIENT
Start: 2019-09-09 | End: 2020-11-09

## 2019-09-16 ENCOUNTER — OFFICE VISIT (OUTPATIENT)
Dept: FAMILY MEDICINE CLINIC | Age: 39
End: 2019-09-16
Payer: MEDICAID

## 2019-09-16 VITALS
OXYGEN SATURATION: 98 % | HEART RATE: 88 BPM | DIASTOLIC BLOOD PRESSURE: 80 MMHG | SYSTOLIC BLOOD PRESSURE: 116 MMHG | WEIGHT: 220 LBS | BODY MASS INDEX: 36.61 KG/M2

## 2019-09-16 DIAGNOSIS — L98.9 SCALP LESION: Primary | ICD-10-CM

## 2019-09-16 PROCEDURE — G8417 CALC BMI ABV UP PARAM F/U: HCPCS | Performed by: NURSE PRACTITIONER

## 2019-09-16 PROCEDURE — 99213 OFFICE O/P EST LOW 20 MIN: CPT | Performed by: NURSE PRACTITIONER

## 2019-09-16 PROCEDURE — 1036F TOBACCO NON-USER: CPT | Performed by: NURSE PRACTITIONER

## 2019-09-16 PROCEDURE — G8427 DOCREV CUR MEDS BY ELIG CLIN: HCPCS | Performed by: NURSE PRACTITIONER

## 2019-09-16 NOTE — PATIENT INSTRUCTIONS
pain, swelling, warmth, or redness. ? Red streaks leading from the area. ? Pus draining from the area. ? A fever.     · You have joint pain along with the rash.    Watch closely for changes in your health, and be sure to contact your doctor if:    · Your rash is changing or getting worse.     · You are not getting better as expected. Where can you learn more? Go to https://Oxford [employer]ics.Runfaces. org and sign in to your [employer]tec account. Enter [phone] in the [facility] box to learn more about \"Dermatitis: Care Instructions. \"     If you do not have an account, please click on the \"Sign Up Now\" link. Current as of: April 1, 2019  Content Version: 12.1  © 9064-6173 Healthwise, Incorporated. Care instructions adapted under license by [facility] ([facility]). If you have questions about a medical condition or this instruction, always ask your healthcare professional. [place] [address] any warranty or liability for your use of this information.

## 2019-09-27 ENCOUNTER — TELEPHONE (OUTPATIENT)
Dept: RHEUMATOLOGY | Age: 39
End: 2019-09-27

## 2019-10-02 ENCOUNTER — TELEPHONE (OUTPATIENT)
Dept: FAMILY MEDICINE CLINIC | Age: 39
End: 2019-10-02

## 2019-10-09 ENCOUNTER — OFFICE VISIT (OUTPATIENT)
Dept: NEUROLOGY | Age: 39
End: 2019-10-09
Payer: MEDICAID

## 2019-10-09 VITALS
HEART RATE: 87 BPM | WEIGHT: 215 LBS | DIASTOLIC BLOOD PRESSURE: 79 MMHG | SYSTOLIC BLOOD PRESSURE: 116 MMHG | BODY MASS INDEX: 35.78 KG/M2

## 2019-10-09 DIAGNOSIS — R42 DIZZINESS: ICD-10-CM

## 2019-10-09 DIAGNOSIS — G47.33 OBSTRUCTIVE SLEEP APNEA: ICD-10-CM

## 2019-10-09 DIAGNOSIS — G44.1 OTHER VASCULAR HEADACHE: Primary | ICD-10-CM

## 2019-10-09 PROCEDURE — G8484 FLU IMMUNIZE NO ADMIN: HCPCS | Performed by: PSYCHIATRY & NEUROLOGY

## 2019-10-09 PROCEDURE — 1036F TOBACCO NON-USER: CPT | Performed by: PSYCHIATRY & NEUROLOGY

## 2019-10-09 PROCEDURE — 99213 OFFICE O/P EST LOW 20 MIN: CPT | Performed by: PSYCHIATRY & NEUROLOGY

## 2019-10-09 PROCEDURE — G8427 DOCREV CUR MEDS BY ELIG CLIN: HCPCS | Performed by: PSYCHIATRY & NEUROLOGY

## 2019-10-09 PROCEDURE — G8417 CALC BMI ABV UP PARAM F/U: HCPCS | Performed by: PSYCHIATRY & NEUROLOGY

## 2019-10-10 ENCOUNTER — NURSE ONLY (OUTPATIENT)
Dept: FAMILY MEDICINE CLINIC | Age: 39
End: 2019-10-10
Payer: MEDICAID

## 2019-10-10 DIAGNOSIS — Z23 NEED FOR VACCINATION: Primary | ICD-10-CM

## 2019-10-10 PROCEDURE — 90472 IMMUNIZATION ADMIN EACH ADD: CPT | Performed by: NURSE PRACTITIONER

## 2019-10-10 PROCEDURE — 90686 IIV4 VACC NO PRSV 0.5 ML IM: CPT | Performed by: NURSE PRACTITIONER

## 2019-10-10 PROCEDURE — 90746 HEPB VACCINE 3 DOSE ADULT IM: CPT | Performed by: NURSE PRACTITIONER

## 2019-10-10 PROCEDURE — 90471 IMMUNIZATION ADMIN: CPT | Performed by: NURSE PRACTITIONER

## 2019-10-15 ENCOUNTER — TELEPHONE (OUTPATIENT)
Dept: FAMILY MEDICINE CLINIC | Age: 39
End: 2019-10-15

## 2019-10-16 ENCOUNTER — HOSPITAL ENCOUNTER (OUTPATIENT)
Dept: CT IMAGING | Age: 39
Discharge: HOME | End: 2019-10-16
Payer: MEDICAID

## 2019-10-16 DIAGNOSIS — R42 DIZZINESS: ICD-10-CM

## 2019-10-16 DIAGNOSIS — G44.1 OTHER VASCULAR HEADACHE: ICD-10-CM

## 2019-10-16 PROCEDURE — 70450 CT HEAD/BRAIN W/O DYE: CPT

## 2019-10-24 ENCOUNTER — OFFICE VISIT (OUTPATIENT)
Dept: SLEEP MEDICINE | Age: 39
End: 2019-10-24
Payer: MEDICAID

## 2019-10-24 VITALS
SYSTOLIC BLOOD PRESSURE: 122 MMHG | HEIGHT: 65 IN | DIASTOLIC BLOOD PRESSURE: 88 MMHG | HEART RATE: 84 BPM | RESPIRATION RATE: 16 BRPM | WEIGHT: 211 LBS | BODY MASS INDEX: 35.16 KG/M2 | OXYGEN SATURATION: 96 %

## 2019-10-24 DIAGNOSIS — Z99.89 DEPENDENCE ON OTHER ENABLING MACHINES AND DEVICES: ICD-10-CM

## 2019-10-24 DIAGNOSIS — G25.81 RLS (RESTLESS LEGS SYNDROME): Primary | ICD-10-CM

## 2019-10-24 DIAGNOSIS — Z99.89 OSA ON CPAP: ICD-10-CM

## 2019-10-24 DIAGNOSIS — G47.33 OSA ON CPAP: ICD-10-CM

## 2019-10-24 PROCEDURE — 99214 OFFICE O/P EST MOD 30 MIN: CPT | Performed by: PSYCHIATRY & NEUROLOGY

## 2019-10-24 PROCEDURE — G8427 DOCREV CUR MEDS BY ELIG CLIN: HCPCS | Performed by: PSYCHIATRY & NEUROLOGY

## 2019-10-24 PROCEDURE — 1036F TOBACCO NON-USER: CPT | Performed by: PSYCHIATRY & NEUROLOGY

## 2019-10-24 PROCEDURE — G8482 FLU IMMUNIZE ORDER/ADMIN: HCPCS | Performed by: PSYCHIATRY & NEUROLOGY

## 2019-10-24 PROCEDURE — G8417 CALC BMI ABV UP PARAM F/U: HCPCS | Performed by: PSYCHIATRY & NEUROLOGY

## 2019-10-24 RX ORDER — ROPINIROLE 1 MG/1
TABLET, FILM COATED ORAL
Qty: 120 TABLET | Refills: 5 | Status: SHIPPED | OUTPATIENT
Start: 2019-10-24 | End: 2020-05-11

## 2019-10-24 ASSESSMENT — SLEEP AND FATIGUE QUESTIONNAIRES
HOW LIKELY ARE YOU TO NOD OFF OR FALL ASLEEP WHILE SITTING AND TALKING TO SOMEONE: 0
HOW LIKELY ARE YOU TO NOD OFF OR FALL ASLEEP WHILE LYING DOWN TO REST IN THE AFTERNOON WHEN CIRCUMSTANCES PERMIT: 3
HOW LIKELY ARE YOU TO NOD OFF OR FALL ASLEEP IN A CAR, WHILE STOPPED FOR A FEW MINUTES IN TRAFFIC: 0
ESS TOTAL SCORE: 9
HOW LIKELY ARE YOU TO NOD OFF OR FALL ASLEEP WHEN YOU ARE A PASSENGER IN A CAR FOR AN HOUR WITHOUT A BREAK: 3
HOW LIKELY ARE YOU TO NOD OFF OR FALL ASLEEP WHILE SITTING INACTIVE IN A PUBLIC PLACE: 0
HOW LIKELY ARE YOU TO NOD OFF OR FALL ASLEEP WHILE SITTING AND READING: 1
HOW LIKELY ARE YOU TO NOD OFF OR FALL ASLEEP WHILE WATCHING TV: 2
HOW LIKELY ARE YOU TO NOD OFF OR FALL ASLEEP WHILE SITTING QUIETLY AFTER LUNCH WITHOUT ALCOHOL: 0

## 2019-10-24 ASSESSMENT — ENCOUNTER SYMPTOMS
APNEA: 0
GASTROINTESTINAL NEGATIVE: 1
EYES NEGATIVE: 1
ALLERGIC/IMMUNOLOGIC NEGATIVE: 1

## 2019-11-06 ENCOUNTER — OFFICE VISIT (OUTPATIENT)
Dept: RHEUMATOLOGY | Age: 39
End: 2019-11-06
Payer: MEDICAID

## 2019-11-06 VITALS
HEIGHT: 65 IN | WEIGHT: 213 LBS | SYSTOLIC BLOOD PRESSURE: 132 MMHG | BODY MASS INDEX: 35.49 KG/M2 | DIASTOLIC BLOOD PRESSURE: 89 MMHG | HEART RATE: 91 BPM

## 2019-11-06 DIAGNOSIS — G89.29 CHRONIC PAIN OF MULTIPLE JOINTS: Primary | ICD-10-CM

## 2019-11-06 DIAGNOSIS — M25.50 CHRONIC PAIN OF MULTIPLE JOINTS: Primary | ICD-10-CM

## 2019-11-06 DIAGNOSIS — L98.9 SCALP LESION: ICD-10-CM

## 2019-11-06 DIAGNOSIS — G89.29 CHRONIC PAIN OF MULTIPLE JOINTS: ICD-10-CM

## 2019-11-06 DIAGNOSIS — M54.9 CHRONIC BACK PAIN GREATER THAN 3 MONTHS DURATION: ICD-10-CM

## 2019-11-06 DIAGNOSIS — M25.50 CHRONIC PAIN OF MULTIPLE JOINTS: ICD-10-CM

## 2019-11-06 DIAGNOSIS — G89.29 CHRONIC BACK PAIN GREATER THAN 3 MONTHS DURATION: ICD-10-CM

## 2019-11-06 LAB
A/G RATIO: 1.7 (ref 1.1–2.2)
ALBUMIN SERPL-MCNC: 4.7 G/DL (ref 3.4–5)
ALP BLD-CCNC: 107 U/L (ref 40–129)
ALT SERPL-CCNC: 16 U/L (ref 10–40)
ANION GAP SERPL CALCULATED.3IONS-SCNC: 13 MMOL/L (ref 3–16)
AST SERPL-CCNC: 17 U/L (ref 15–37)
BASOPHILS ABSOLUTE: 0 K/UL (ref 0–0.2)
BASOPHILS RELATIVE PERCENT: 0.4 %
BILIRUB SERPL-MCNC: <0.2 MG/DL (ref 0–1)
BUN BLDV-MCNC: 9 MG/DL (ref 7–20)
C-REACTIVE PROTEIN: 10.8 MG/L (ref 0–5.1)
CALCIUM SERPL-MCNC: 9.7 MG/DL (ref 8.3–10.6)
CHLORIDE BLD-SCNC: 100 MMOL/L (ref 99–110)
CO2: 24 MMOL/L (ref 21–32)
CREAT SERPL-MCNC: 0.7 MG/DL (ref 0.6–1.1)
EOSINOPHILS ABSOLUTE: 0.1 K/UL (ref 0–0.6)
EOSINOPHILS RELATIVE PERCENT: 1.5 %
GFR AFRICAN AMERICAN: >60
GFR NON-AFRICAN AMERICAN: >60
GLOBULIN: 2.8 G/DL
GLUCOSE BLD-MCNC: 90 MG/DL (ref 70–99)
HCT VFR BLD CALC: 42.1 % (ref 36–48)
HEMOGLOBIN: 14.2 G/DL (ref 12–16)
HEPATITIS B SURFACE ANTIGEN INTERPRETATION: NORMAL
HEPATITIS C ANTIBODY INTERPRETATION: NORMAL
LYMPHOCYTES ABSOLUTE: 3 K/UL (ref 1–5.1)
LYMPHOCYTES RELATIVE PERCENT: 36.5 %
MCH RBC QN AUTO: 30.4 PG (ref 26–34)
MCHC RBC AUTO-ENTMCNC: 33.7 G/DL (ref 31–36)
MCV RBC AUTO: 90 FL (ref 80–100)
MONOCYTES ABSOLUTE: 0.5 K/UL (ref 0–1.3)
MONOCYTES RELATIVE PERCENT: 6 %
NEUTROPHILS ABSOLUTE: 4.6 K/UL (ref 1.7–7.7)
NEUTROPHILS RELATIVE PERCENT: 55.6 %
PDW BLD-RTO: 13.4 % (ref 12.4–15.4)
PLATELET # BLD: 268 K/UL (ref 135–450)
PMV BLD AUTO: 9 FL (ref 5–10.5)
POTASSIUM SERPL-SCNC: 4.2 MMOL/L (ref 3.5–5.1)
RBC # BLD: 4.68 M/UL (ref 4–5.2)
RHEUMATOID FACTOR: <10 IU/ML
SEDIMENTATION RATE, ERYTHROCYTE: 11 MM/HR (ref 0–20)
SODIUM BLD-SCNC: 137 MMOL/L (ref 136–145)
TOTAL PROTEIN: 7.5 G/DL (ref 6.4–8.2)
WBC # BLD: 8.3 K/UL (ref 4–11)

## 2019-11-06 PROCEDURE — 99244 OFF/OP CNSLTJ NEW/EST MOD 40: CPT | Performed by: INTERNAL MEDICINE

## 2019-11-06 PROCEDURE — G8427 DOCREV CUR MEDS BY ELIG CLIN: HCPCS | Performed by: INTERNAL MEDICINE

## 2019-11-06 PROCEDURE — G8482 FLU IMMUNIZE ORDER/ADMIN: HCPCS | Performed by: INTERNAL MEDICINE

## 2019-11-06 PROCEDURE — G8417 CALC BMI ABV UP PARAM F/U: HCPCS | Performed by: INTERNAL MEDICINE

## 2019-11-06 RX ORDER — ESTERIFIED ESTROGEN AND METHYLTESTOSTERONE 1.25; 2.5 MG/1; MG/1
TABLET ORAL
COMMUNITY
Start: 2019-10-28 | End: 2021-01-28

## 2019-11-06 RX ORDER — ACETAMINOPHEN 500 MG
500 TABLET ORAL EVERY 6 HOURS PRN
COMMUNITY

## 2019-11-06 RX ORDER — NAPROXEN 500 MG/1
500 TABLET ORAL 2 TIMES DAILY WITH MEALS
Qty: 60 TABLET | Refills: 1 | Status: SHIPPED | OUTPATIENT
Start: 2019-11-06 | End: 2020-01-20

## 2019-11-07 ENCOUNTER — HOSPITAL ENCOUNTER (OUTPATIENT)
Age: 39
Discharge: HOME | End: 2019-11-07
Payer: MEDICAID

## 2019-11-07 ENCOUNTER — HOSPITAL ENCOUNTER (OUTPATIENT)
Dept: GENERAL RADIOLOGY | Age: 39
Discharge: HOME | End: 2019-11-07
Payer: MEDICAID

## 2019-11-07 DIAGNOSIS — M25.50 CHRONIC PAIN OF MULTIPLE JOINTS: ICD-10-CM

## 2019-11-07 DIAGNOSIS — G89.29 CHRONIC BACK PAIN GREATER THAN 3 MONTHS DURATION: ICD-10-CM

## 2019-11-07 DIAGNOSIS — G89.29 CHRONIC PAIN OF MULTIPLE JOINTS: ICD-10-CM

## 2019-11-07 DIAGNOSIS — M54.9 CHRONIC BACK PAIN GREATER THAN 3 MONTHS DURATION: ICD-10-CM

## 2019-11-07 LAB
CYCLIC CITRULLINATED PEPTIDE ANTIBODY IGG: <0.5 U/ML (ref 0–2.9)
HIV AG/AB: NORMAL
HIV ANTIGEN: NORMAL
HIV-1 ANTIBODY: NORMAL
HIV-2 AB: NORMAL

## 2019-11-07 PROCEDURE — 73130 X-RAY EXAM OF HAND: CPT

## 2019-11-07 PROCEDURE — 73562 X-RAY EXAM OF KNEE 3: CPT

## 2019-11-07 PROCEDURE — 73630 X-RAY EXAM OF FOOT: CPT

## 2019-11-07 PROCEDURE — 72200 X-RAY EXAM SI JOINTS: CPT

## 2019-11-07 PROCEDURE — 72100 X-RAY EXAM L-S SPINE 2/3 VWS: CPT

## 2019-11-08 LAB
HEPATITIS B CORE TOTAL ANTIBODY: NEGATIVE
HLA B27: NEGATIVE

## 2019-11-13 DIAGNOSIS — F41.8 DEPRESSION WITH ANXIETY: ICD-10-CM

## 2019-11-13 RX ORDER — DESVENLAFAXINE 25 MG/1
TABLET, EXTENDED RELEASE ORAL
Qty: 30 TABLET | Refills: 4 | Status: SHIPPED | OUTPATIENT
Start: 2019-11-13 | End: 2020-04-09

## 2019-11-15 ENCOUNTER — TELEPHONE (OUTPATIENT)
Dept: RHEUMATOLOGY | Age: 39
End: 2019-11-15

## 2019-11-15 DIAGNOSIS — G89.29 CHRONIC BACK PAIN GREATER THAN 3 MONTHS DURATION: Primary | ICD-10-CM

## 2019-11-15 DIAGNOSIS — F40.240 CLAUSTROPHOBIA: ICD-10-CM

## 2019-11-15 DIAGNOSIS — R79.82 ELEVATED C-REACTIVE PROTEIN (CRP): ICD-10-CM

## 2019-11-15 DIAGNOSIS — M54.9 CHRONIC BACK PAIN GREATER THAN 3 MONTHS DURATION: Primary | ICD-10-CM

## 2019-11-15 RX ORDER — ALPRAZOLAM 0.5 MG/1
TABLET ORAL
Qty: 2 TABLET | Refills: 0 | OUTPATIENT
Start: 2019-11-15 | End: 2019-12-15

## 2019-11-20 ENCOUNTER — HOSPITAL ENCOUNTER (OUTPATIENT)
Dept: MRI IMAGING | Age: 39
Discharge: HOME | End: 2019-11-20
Payer: MEDICAID

## 2019-11-20 DIAGNOSIS — G89.29 CHRONIC BACK PAIN GREATER THAN 3 MONTHS DURATION: ICD-10-CM

## 2019-11-20 DIAGNOSIS — R79.82 ELEVATED C-REACTIVE PROTEIN (CRP): ICD-10-CM

## 2019-11-20 DIAGNOSIS — M54.9 CHRONIC BACK PAIN GREATER THAN 3 MONTHS DURATION: ICD-10-CM

## 2019-11-20 PROCEDURE — 72195 MRI PELVIS W/O DYE: CPT

## 2019-11-29 ENCOUNTER — OFFICE VISIT (OUTPATIENT)
Dept: FAMILY MEDICINE CLINIC | Age: 39
End: 2019-11-29
Payer: MEDICAID

## 2019-11-29 VITALS
DIASTOLIC BLOOD PRESSURE: 82 MMHG | OXYGEN SATURATION: 97 % | HEART RATE: 78 BPM | SYSTOLIC BLOOD PRESSURE: 112 MMHG | TEMPERATURE: 97.7 F

## 2019-11-29 DIAGNOSIS — R05.9 COUGH: Primary | ICD-10-CM

## 2019-11-29 PROCEDURE — 99213 OFFICE O/P EST LOW 20 MIN: CPT | Performed by: FAMILY MEDICINE

## 2019-11-29 PROCEDURE — G8482 FLU IMMUNIZE ORDER/ADMIN: HCPCS | Performed by: FAMILY MEDICINE

## 2019-11-29 PROCEDURE — G8417 CALC BMI ABV UP PARAM F/U: HCPCS | Performed by: FAMILY MEDICINE

## 2019-11-29 PROCEDURE — G8427 DOCREV CUR MEDS BY ELIG CLIN: HCPCS | Performed by: FAMILY MEDICINE

## 2019-11-29 PROCEDURE — 1036F TOBACCO NON-USER: CPT | Performed by: FAMILY MEDICINE

## 2019-11-29 RX ORDER — AZITHROMYCIN 250 MG/1
TABLET, FILM COATED ORAL
Qty: 6 TABLET | Refills: 0 | Status: SHIPPED | OUTPATIENT
Start: 2019-11-29 | End: 2019-12-09

## 2019-11-29 ASSESSMENT — ENCOUNTER SYMPTOMS
COUGH: 1
RHINORRHEA: 0
GASTROINTESTINAL NEGATIVE: 1
SORE THROAT: 1

## 2019-12-02 ENCOUNTER — TELEPHONE (OUTPATIENT)
Dept: INTERNAL MEDICINE CLINIC | Age: 39
End: 2019-12-02

## 2019-12-02 DIAGNOSIS — M25.50 MULTIPLE JOINT PAIN: Primary | ICD-10-CM

## 2019-12-02 RX ORDER — PREDNISONE 1 MG/1
TABLET ORAL
Qty: 35 TABLET | Refills: 0 | Status: SHIPPED | OUTPATIENT
Start: 2019-12-02 | End: 2019-12-17

## 2019-12-04 ENCOUNTER — TELEPHONE (OUTPATIENT)
Dept: FAMILY MEDICINE CLINIC | Age: 39
End: 2019-12-04

## 2019-12-20 ENCOUNTER — TELEPHONE (OUTPATIENT)
Dept: FAMILY MEDICINE CLINIC | Age: 39
End: 2019-12-20

## 2019-12-30 ENCOUNTER — OFFICE VISIT (OUTPATIENT)
Dept: PSYCHOLOGY | Age: 39
End: 2019-12-30
Payer: MEDICAID

## 2019-12-30 DIAGNOSIS — F33.2 SEVERE EPISODE OF RECURRENT MAJOR DEPRESSIVE DISORDER, WITHOUT PSYCHOTIC FEATURES (HCC): Primary | ICD-10-CM

## 2019-12-30 DIAGNOSIS — F41.1 GENERALIZED ANXIETY DISORDER: ICD-10-CM

## 2019-12-30 PROCEDURE — 90791 PSYCH DIAGNOSTIC EVALUATION: CPT | Performed by: PSYCHOLOGIST

## 2019-12-30 ASSESSMENT — PATIENT HEALTH QUESTIONNAIRE - PHQ9
SUM OF ALL RESPONSES TO PHQ9 QUESTIONS 1 & 2: 5
9. THOUGHTS THAT YOU WOULD BE BETTER OFF DEAD, OR OF HURTING YOURSELF: 0
10. IF YOU CHECKED OFF ANY PROBLEMS, HOW DIFFICULT HAVE THESE PROBLEMS MADE IT FOR YOU TO DO YOUR WORK, TAKE CARE OF THINGS AT HOME, OR GET ALONG WITH OTHER PEOPLE: 1
3. TROUBLE FALLING OR STAYING ASLEEP: 3
4. FEELING TIRED OR HAVING LITTLE ENERGY: 3
2. FEELING DOWN, DEPRESSED OR HOPELESS: 3
7. TROUBLE CONCENTRATING ON THINGS, SUCH AS READING THE NEWSPAPER OR WATCHING TELEVISION: 3
1. LITTLE INTEREST OR PLEASURE IN DOING THINGS: 2
SUM OF ALL RESPONSES TO PHQ QUESTIONS 1-9: 20
6. FEELING BAD ABOUT YOURSELF - OR THAT YOU ARE A FAILURE OR HAVE LET YOURSELF OR YOUR FAMILY DOWN: 3
5. POOR APPETITE OR OVEREATING: 3
8. MOVING OR SPEAKING SO SLOWLY THAT OTHER PEOPLE COULD HAVE NOTICED. OR THE OPPOSITE, BEING SO FIGETY OR RESTLESS THAT YOU HAVE BEEN MOVING AROUND A LOT MORE THAN USUAL: 0
SUM OF ALL RESPONSES TO PHQ QUESTIONS 1-9: 20

## 2019-12-30 ASSESSMENT — ANXIETY QUESTIONNAIRES
4. TROUBLE RELAXING: 3-NEARLY EVERY DAY
5. BEING SO RESTLESS THAT IT IS HARD TO SIT STILL: 3-NEARLY EVERY DAY
6. BECOMING EASILY ANNOYED OR IRRITABLE: 3-NEARLY EVERY DAY
1. FEELING NERVOUS, ANXIOUS, OR ON EDGE: 3-NEARLY EVERY DAY
GAD7 TOTAL SCORE: 21
7. FEELING AFRAID AS IF SOMETHING AWFUL MIGHT HAPPEN: 3-NEARLY EVERY DAY
2. NOT BEING ABLE TO STOP OR CONTROL WORRYING: 3-NEARLY EVERY DAY
3. WORRYING TOO MUCH ABOUT DIFFERENT THINGS: 3-NEARLY EVERY DAY

## 2020-01-02 ENCOUNTER — OFFICE VISIT (OUTPATIENT)
Dept: FAMILY MEDICINE CLINIC | Age: 40
End: 2020-01-02
Payer: MEDICAID

## 2020-01-02 VITALS
OXYGEN SATURATION: 98 % | BODY MASS INDEX: 36.84 KG/M2 | HEART RATE: 81 BPM | SYSTOLIC BLOOD PRESSURE: 120 MMHG | DIASTOLIC BLOOD PRESSURE: 80 MMHG | WEIGHT: 220 LBS

## 2020-01-02 PROCEDURE — G8482 FLU IMMUNIZE ORDER/ADMIN: HCPCS | Performed by: FAMILY MEDICINE

## 2020-01-02 PROCEDURE — 1036F TOBACCO NON-USER: CPT | Performed by: FAMILY MEDICINE

## 2020-01-02 PROCEDURE — G8427 DOCREV CUR MEDS BY ELIG CLIN: HCPCS | Performed by: FAMILY MEDICINE

## 2020-01-02 PROCEDURE — 99213 OFFICE O/P EST LOW 20 MIN: CPT | Performed by: FAMILY MEDICINE

## 2020-01-02 PROCEDURE — G8417 CALC BMI ABV UP PARAM F/U: HCPCS | Performed by: FAMILY MEDICINE

## 2020-01-02 RX ORDER — CEFDINIR 300 MG/1
300 CAPSULE ORAL 2 TIMES DAILY
Qty: 14 CAPSULE | Refills: 0 | Status: SHIPPED | OUTPATIENT
Start: 2020-01-02 | End: 2020-01-09

## 2020-01-02 NOTE — PROGRESS NOTES
[address] Note    Date: 1/2/2020                                               Subjective/Objective:     Chief Complaint   Patient presents with    Ear Fullness     dizzy       HPI   L ear problems starting about a month ago. Was seen in urgent care about 4 weeks ago and given antibiotics and medicine for vertigo. Continues to have \"fullness\" in L ear. Hearing is a little muffled. No cough or cold symptoms. No fever. Patient Active Problem List    Diagnosis Date Noted    Restless leg syndrome 07/01/2019    EMELINA (obstructive sleep apnea)        Past Medical History:   Diagnosis Date    Anxiety     Depression     WELL CONTROLLED 10-16-17    Heart rate fast     intermittent    Kidney stone     Motor tic disorder     Sleep apnea        Current Outpatient Medications   Medication Sig Dispense Refill    cefdinir (OMNICEF) 300 MG capsule Take 1 capsule by mouth 2 times daily for 7 days 14 capsule 0    desvenlafaxine succinate (PRISTIQ) 25 MG TB24 extended release tablet TAKE ONE TABLET BY MOUTH DAILY 30 tablet 4    estrogens, conjugated,-methyltestosterone (ESTRATEST) 1.25-2.5 MG per tablet       acetaminophen (TYLENOL) 500 MG tablet Take 500 mg by mouth every 6 hours as needed for Pain      naproxen (NAPROSYN) 500 MG tablet Take 1 tablet by mouth 2 times daily (with meals) 60 tablet 1    rOPINIRole (REQUIP) 1 MG tablet 1-2 tabs 2 hours before the bedtime. And one tab BID  tablet 5    fluticasone (FLONASE) 50 MCG/ACT nasal spray SPRAY TWO SPRAYS IN EACH NOSTRIL ONCE DAILY 1 Bottle 5    SUMAtriptan (IMITREX) 100 MG tablet Take 100 mg by mouth daily as needed      dicyclomine (BENTYL) 20 MG tablet Take 20 mg by mouth as needed        No current facility-administered medications for this visit.         Allergies   Allergen Reactions    Metronidazole Rash and Other (See Comments)     LIPS WERE TINGLING, tongue tingling, hives all over body    Other      Glue used to adhere recent surgical incision       Review of Systems   No ear drainage. No vomiting    Vitals:  /80   Pulse 81   Wt 220 lb (99.8 kg)   LMP 03/13/2018 (Exact Date)   SpO2 98%   BMI 36.84 kg/m²     Physical Exam   General:  Well-appearing, NAD, alert, non-toxic  HEENT:  Normocephalic, atraumatic. +L TM erythematous and opacified. R TM normal  CHEST/LUNGS: No dyspnea  EXTREMETIES: Normal movement of all extremities. No edema. No joint swelling. SKIN:  No rash, no cellulitis, no bruising, no petechiae/purpura/vesicles/pustules/abscess  PSYCH:  A+O x 3; normal affect  NEURO:  GCS 15, CN2-12 grossly intact, no focal motor/sensory deficits, normal gait, normal speech      Assessment/Plan     44 y. o.yo female with otitis media. Will treat with Cefdinir x7 days. Could be a component of eustachian tube dysfunction. Recommend anti-histamine and flonase. Discussed with patient medication/s dosage, instructions, potential S/E, A/R and Drug Interaction  Tylenol or Motrin as needed for pain or fever  Advise to return here if worse or go to nearest ER  Encourage fluids  Pt discharged in stable condition at 12:41      1. Left otitis media, unspecified otitis media type    - cefdinir (OMNICEF) 300 MG capsule; Take 1 capsule by mouth 2 times daily for 7 days  Dispense: 14 capsule; Refill: 0       No orders of the defined types were placed in this encounter. No follow-ups on file.     Kerrie Mendez MD    1/2/2020  12:40 PM

## 2020-01-13 ENCOUNTER — OFFICE VISIT (OUTPATIENT)
Dept: NEUROLOGY | Age: 40
End: 2020-01-13
Payer: MEDICAID

## 2020-01-13 VITALS
DIASTOLIC BLOOD PRESSURE: 91 MMHG | HEIGHT: 65 IN | BODY MASS INDEX: 36.92 KG/M2 | HEART RATE: 82 BPM | WEIGHT: 221.6 LBS | SYSTOLIC BLOOD PRESSURE: 127 MMHG

## 2020-01-13 PROCEDURE — 99213 OFFICE O/P EST LOW 20 MIN: CPT | Performed by: PSYCHIATRY & NEUROLOGY

## 2020-01-13 PROCEDURE — G8417 CALC BMI ABV UP PARAM F/U: HCPCS | Performed by: PSYCHIATRY & NEUROLOGY

## 2020-01-13 PROCEDURE — G8427 DOCREV CUR MEDS BY ELIG CLIN: HCPCS | Performed by: PSYCHIATRY & NEUROLOGY

## 2020-01-13 PROCEDURE — G8482 FLU IMMUNIZE ORDER/ADMIN: HCPCS | Performed by: PSYCHIATRY & NEUROLOGY

## 2020-01-13 PROCEDURE — 1036F TOBACCO NON-USER: CPT | Performed by: PSYCHIATRY & NEUROLOGY

## 2020-01-13 RX ORDER — LORATADINE 10 MG/1
10 TABLET ORAL DAILY
COMMUNITY
End: 2020-10-07 | Stop reason: ALTCHOICE

## 2020-01-13 RX ORDER — ACETAMINOPHEN 160 MG
2000 TABLET,DISINTEGRATING ORAL
COMMUNITY

## 2020-01-13 NOTE — PATIENT INSTRUCTIONS
Please call with any questions or concerns:   [facility]  @ [phone]. LAB RESULTS:  Please obtain any labs or diagnostic tests as discussed today. You should call the office to check the results. Please allow  3 to 7 days for us to get these results. MEDICATION LIST:  Please bring an accurate list of your medications to every visit. APPOINTMENT CONFIRMATION:  We will call you the day before your scheduled appointment to confirm. If we are unable to reach you, you MUST call back by the end of the day to confirm the appointment or we may be forced to cancel.

## 2020-01-13 NOTE — PROGRESS NOTES
[facility]   Neurology followup    Subjective:   CC/HP  History was obtained from the patient. Patient stated that headaches have improved. Patient is using her CPAP regularly. Patient's fatigue is also improved after she started using the CPAP on a regular basis. Patient also has dizziness. Patient continues to have a minor tic in the muscles of her neck and head at times. No new neurological symptoms. Patient states that she was recently diagnosed with arthritis and her rheumatologist has her taking daily Naprosyn.     REVIEW OF SYSTEMS    Constitutional:  []   Chills   [x]  Fatigue   []  Fevers   []  Malaise   []  Weight loss     [] Denies all of the above    Respiratory:   []  Cough    []  Shortness of breath         [x] Denies all of the above     Cardiovascular:   []  Chest pain    []  Exertional chest pressure/discomfort           [] Palpitations    []  Syncope     [x] Denies all of the above        Past Medical History:   Diagnosis Date    Anxiety     Depression     WELL CONTROLLED 10-16-17    Heart rate fast     intermittent    Kidney stone     Motor tic disorder     Sleep apnea      Family History   Problem Relation Age of Onset    Asthma Mother     High Blood Pressure Mother     Diabetes Father     Atrial Fibrillation Father     Alcohol Abuse Father     High Blood Pressure Father     Heart Disease Paternal Grandmother     Heart Attack Paternal Grandmother     Diabetes Paternal Grandmother     Stroke Paternal Grandfather     Stroke Maternal Grandfather     Atrial Fibrillation Maternal Grandmother     Diabetes Maternal Grandmother     Cancer Neg Hx      Social History     Socioeconomic History    Marital status:      Spouse name: None    Number of children: None    Years of education: None    Highest education level: None   Occupational History    None   Social Needs    Financial resource strain: None    Food insecurity:     Worry: None     Inability: None    Transportation needs:     Medical: None     Non-medical: None   Tobacco Use    Smoking status: Never Smoker    Smokeless tobacco: Never Used   Substance and Sexual Activity    Alcohol use: No     Alcohol/week: 0.0 standard drinks     Comment: once a week    Drug use: No    Sexual activity: None   Lifestyle    Physical activity:     Days per week: None     Minutes per session: None    Stress: None   Relationships    Social connections:     Talks on phone: None     Gets together: None     Attends Jainism service: None     Active member of club or organization: None     Attends meetings of clubs or organizations: None     Relationship status: None    Intimate partner violence:     Fear of current or ex partner: None     Emotionally abused: None     Physically abused: None     Forced sexual activity: None   Other Topics Concern    None   Social History Narrative    None        Objective:  Exam:  BP (!) 127/91   Pulse 82   Ht 5' 4.8\" (1.646 m)   Wt 221 lb 9.6 oz (100.5 kg)   LMP 03/13/2018 (Exact Date)   BMI 37.10 kg/m²   This is a well-nourished patient in no acute distress  Patient is awake, alert and oriented x3. Speech is normal.  Pupils are equal round reacting to light. Extraocular movements intact. Face symmetrical. Tongue midline. Motor exam shows normal symmetrical strength. Deep tendon reflexes normal. Plantar reflexes downgoing. Sensory exam normal. Coordination normal. Gait normal. No carotid bruit. No neck stiffness.       Data :  LABS:  General Labs:    CBC:   Lab Results   Component Value Date    WBC 8.3 11/06/2019    RBC 4.68 11/06/2019    HGB 14.2 11/06/2019    HCT 42.1 11/06/2019    MCV 90.0 11/06/2019    MCH 30.4 11/06/2019    MCHC 33.7 11/06/2019    RDW 13.4 11/06/2019     11/06/2019    MPV 9.0 11/06/2019     BMP:    Lab Results   Component Value Date     11/06/2019    K 4.2 11/06/2019     11/06/2019    CO2 24 11/06/2019    BUN 9 11/06/2019    LABALBU 4.7

## 2020-01-20 RX ORDER — NAPROXEN 500 MG/1
TABLET ORAL
Qty: 60 TABLET | Refills: 0 | Status: SHIPPED | OUTPATIENT
Start: 2020-01-20 | End: 2020-03-05

## 2020-01-21 ENCOUNTER — OFFICE VISIT (OUTPATIENT)
Dept: FAMILY MEDICINE CLINIC | Age: 40
End: 2020-01-21
Payer: MEDICAID

## 2020-01-21 VITALS
BODY MASS INDEX: 37.04 KG/M2 | OXYGEN SATURATION: 98 % | RESPIRATION RATE: 16 BRPM | SYSTOLIC BLOOD PRESSURE: 116 MMHG | HEART RATE: 87 BPM | WEIGHT: 221.2 LBS | TEMPERATURE: 98.3 F | DIASTOLIC BLOOD PRESSURE: 78 MMHG

## 2020-01-21 PROCEDURE — G8482 FLU IMMUNIZE ORDER/ADMIN: HCPCS | Performed by: NURSE PRACTITIONER

## 2020-01-21 PROCEDURE — 1036F TOBACCO NON-USER: CPT | Performed by: NURSE PRACTITIONER

## 2020-01-21 PROCEDURE — 99213 OFFICE O/P EST LOW 20 MIN: CPT | Performed by: NURSE PRACTITIONER

## 2020-01-21 PROCEDURE — G8417 CALC BMI ABV UP PARAM F/U: HCPCS | Performed by: NURSE PRACTITIONER

## 2020-01-21 PROCEDURE — G8427 DOCREV CUR MEDS BY ELIG CLIN: HCPCS | Performed by: NURSE PRACTITIONER

## 2020-01-21 RX ORDER — CEPHALEXIN 500 MG/1
500 CAPSULE ORAL 3 TIMES DAILY
Qty: 30 CAPSULE | Refills: 0 | Status: SHIPPED | OUTPATIENT
Start: 2020-01-21 | End: 2020-01-31

## 2020-01-21 ASSESSMENT — PATIENT HEALTH QUESTIONNAIRE - PHQ9
SUM OF ALL RESPONSES TO PHQ QUESTIONS 1-9: 0
1. LITTLE INTEREST OR PLEASURE IN DOING THINGS: 0
SUM OF ALL RESPONSES TO PHQ QUESTIONS 1-9: 0
SUM OF ALL RESPONSES TO PHQ9 QUESTIONS 1 & 2: 0
2. FEELING DOWN, DEPRESSED OR HOPELESS: 0

## 2020-01-21 ASSESSMENT — ENCOUNTER SYMPTOMS
COUGH: 1
RHINORRHEA: 1
SORE THROAT: 0

## 2020-01-21 NOTE — PROGRESS NOTES
Positive for fatigue. Negative for chills and fever. HENT: Positive for congestion, postnasal drip and rhinorrhea. Negative for ear pain and sore throat. Ear fullness   Respiratory: Positive for cough. Neurological: Positive for dizziness and headaches. Physical Exam  Vitals signs reviewed. Constitutional:       Appearance: She is well-developed. HENT:      Right Ear: Tympanic membrane is bulging. Tympanic membrane is not erythematous. Left Ear: Tympanic membrane is bulging. Tympanic membrane is not erythematous. Nose: Nose normal.      Mouth/Throat:      Pharynx: Uvula midline. No oropharyngeal exudate or posterior oropharyngeal erythema. Cardiovascular:      Rate and Rhythm: Normal rate and regular rhythm. Heart sounds: Normal heart sounds. Pulmonary:      Effort: Pulmonary effort is normal.      Breath sounds: Normal breath sounds. Lymphadenopathy:      Cervical: No cervical adenopathy. Skin:     General: Skin is warm and dry. Neurological:      Mental Status: She is alert and oriented to person, place, and time. /78   Pulse 87   Temp 98.3 °F (36.8 °C)   Resp 16   Wt 221 lb 3.2 oz (100.3 kg)   LMP 03/13/2018 (Exact Date)   SpO2 98%   Breastfeeding No   BMI 37.04 kg/m²       Assessment/Plan    1. Acute bacterial sinusitis  - cephALEXin (KEFLEX) 500 MG capsule; Take 1 capsule by mouth 3 times daily for 10 days  Dispense: 30 capsule; Refill: 0  Symtomatic treatment: Tylenol / Advil, rest, increase fluids. May also use:Adamaris-seltzer cold plus. Work note given  See pt instructions  F/u 5-7 days if no improvement, sooner if symptoms worsen. Discussed use, benefit, and side effects of prescribed medications. All patient questions answered. Pt voiced understanding.

## 2020-01-21 NOTE — PATIENT INSTRUCTIONS
Patient Education        Sinusitis: Care Instructions  Your Care Instructions    Sinusitis is an infection of the lining of the sinus cavities in your head. Sinusitis often follows a cold. It causes pain and pressure in your head and face. In most cases, sinusitis gets better on its own in 1 to 2 weeks. But some mild symptoms may last for several weeks. Sometimes antibiotics are needed. Follow-up care is a key part of your treatment and safety. Be sure to make and go to all appointments, and call your doctor if you are having problems. It's also a good idea to know your test results and keep a list of the medicines you take. How can you care for yourself at home? · Take an over-the-counter pain medicine, such as acetaminophen (Tylenol), ibuprofen (Advil, Motrin), or naproxen (Aleve). Read and follow all instructions on the label. · If the doctor prescribed antibiotics, take them as directed. Do not stop taking them just because you feel better. You need to take the full course of antibiotics. · Be careful when taking over-the-counter cold or flu medicines and Tylenol at the same time. Many of these medicines have acetaminophen, which is Tylenol. Read the labels to make sure that you are not taking more than the recommended dose. Too much acetaminophen (Tylenol) can be harmful. · Breathe warm, moist air from a steamy shower, a hot bath, or a sink filled with hot water. Avoid cold, dry air. Using a humidifier in your home may help. Follow the directions for cleaning the machine. · Use saline (saltwater) nasal washes to help keep your nasal passages open and wash out mucus and bacteria. You can buy saline nose drops at a grocery store or drugstore. Or you can make your own at home by adding 1 teaspoon of salt and 1 teaspoon of baking soda to 2 cups of distilled water. If you make your own, fill a bulb syringe with the solution, insert the tip into your nostril, and squeeze gently. Marce Bowdensch your nose.   · Put a hot, wet towel or a warm gel pack on your face 3 or 4 times a day for 5 to 10 minutes each time. · Try a decongestant nasal spray like oxymetazoline (Afrin). Do not use it for more than 3 days in a row. Using it for more than 3 days can make your congestion worse. When should you call for help? Call your doctor now or seek immediate medical care if:    · You have new or worse swelling or redness in your face or around your eyes.     · You have a new or higher fever.    Watch closely for changes in your health, and be sure to contact your doctor if:    · You have new or worse facial pain.     · The mucus from your nose becomes thicker (like pus) or has new blood in it.     · You are not getting better as expected. Where can you learn more? Go to https://[email or web address].[employer]. org and sign in to your [employer] account. Enter J825 in the Conscious Box box to learn more about \"Sinusitis: Care Instructions. \"     If you do not have an account, please click on the \"Sign Up Now\" link. Current as of: July 28, 2019  Content Version: 12.3  © 3146-1705 Healthwise, Incorporated. Care instructions adapted under license by [facility] ([facility]). If you have questions about a medical condition or this instruction, always ask your healthcare professional. [address] any warranty or liability for your use of this information.

## 2020-02-03 ENCOUNTER — OFFICE VISIT (OUTPATIENT)
Dept: PSYCHOLOGY | Age: 40
End: 2020-02-03
Payer: MEDICAID

## 2020-02-03 PROCEDURE — 90832 PSYTX W PT 30 MINUTES: CPT | Performed by: PSYCHOLOGIST

## 2020-02-03 ASSESSMENT — ANXIETY QUESTIONNAIRES
3. WORRYING TOO MUCH ABOUT DIFFERENT THINGS: 3-NEARLY EVERY DAY
6. BECOMING EASILY ANNOYED OR IRRITABLE: 1-SEVERAL DAYS
GAD7 TOTAL SCORE: 17
1. FEELING NERVOUS, ANXIOUS, OR ON EDGE: 3-NEARLY EVERY DAY
5. BEING SO RESTLESS THAT IT IS HARD TO SIT STILL: 2-OVER HALF THE DAYS
4. TROUBLE RELAXING: 2-OVER HALF THE DAYS
2. NOT BEING ABLE TO STOP OR CONTROL WORRYING: 3-NEARLY EVERY DAY
7. FEELING AFRAID AS IF SOMETHING AWFUL MIGHT HAPPEN: 3-NEARLY EVERY DAY

## 2020-02-03 ASSESSMENT — PATIENT HEALTH QUESTIONNAIRE - PHQ9
2. FEELING DOWN, DEPRESSED OR HOPELESS: 1
9. THOUGHTS THAT YOU WOULD BE BETTER OFF DEAD, OR OF HURTING YOURSELF: 0
3. TROUBLE FALLING OR STAYING ASLEEP: 3
10. IF YOU CHECKED OFF ANY PROBLEMS, HOW DIFFICULT HAVE THESE PROBLEMS MADE IT FOR YOU TO DO YOUR WORK, TAKE CARE OF THINGS AT HOME, OR GET ALONG WITH OTHER PEOPLE: 0
SUM OF ALL RESPONSES TO PHQ QUESTIONS 1-9: 14
SUM OF ALL RESPONSES TO PHQ9 QUESTIONS 1 & 2: 2
7. TROUBLE CONCENTRATING ON THINGS, SUCH AS READING THE NEWSPAPER OR WATCHING TELEVISION: 2
8. MOVING OR SPEAKING SO SLOWLY THAT OTHER PEOPLE COULD HAVE NOTICED. OR THE OPPOSITE, BEING SO FIGETY OR RESTLESS THAT YOU HAVE BEEN MOVING AROUND A LOT MORE THAN USUAL: 0
6. FEELING BAD ABOUT YOURSELF - OR THAT YOU ARE A FAILURE OR HAVE LET YOURSELF OR YOUR FAMILY DOWN: 2
5. POOR APPETITE OR OVEREATING: 3
SUM OF ALL RESPONSES TO PHQ QUESTIONS 1-9: 14
4. FEELING TIRED OR HAVING LITTLE ENERGY: 2
1. LITTLE INTEREST OR PLEASURE IN DOING THINGS: 1

## 2020-02-03 NOTE — PROGRESS NOTES
Behavioral Health Consultation  Jarret Carvajal M.A. Psychology Assistant  Solitario Montiel, Ph.D. Supervising Psychologist  2/3/2020   3:00 PM       Time spent with Patient: 30 minutes  This is patient's second [facility] appointment. Reason for Consult:    Chief Complaint   Patient presents with    Anxiety    Depression     Referring Provider: Jorge Davis, APRN - CNP  [address], [address]    Feedback given to PCP. S:  Pt seen for f/u. Pt noted that she is feeling \"better than last time,\" which she attributes to going to the gym more often, working more, and using diaphragmatic breathing. Pt noted continued experience of stress. She noted significant anxiety regarding her current romantic relationship and also noted being generally stressed about finances and turning 40 this year. Intervention focused on validating and normalizing emotional experience and providing tips for stress management.       O:  MSE:  Appearance: good hygiene   Attitude: cooperative, friendly and tearful  Consciousness: alert  Orientation: oriented to person, place, time, general circumstance  Memory: recent and remote memory intact  Attention/Concentration: intact during session  Psychomotor Activity:normal  Eye Contact: normal  Speech: normal rate and volume, well-articulated  Mood: euthymic  Affect: euthymic and anxious  Perception: within normal limits  Thought Content: within normal limits  Thought Process: logical, coherent  Insight: good  Judgment: intact  Ability to understand instructions: Yes  Morbid Ideation: no   Suicide Assessment: no suicidal ideation, plan, or intent  Homicidal Ideation: no     History:  Social History:   Social History     Socioeconomic History    Marital status:      Spouse name: Not on file    Number of children: Not on file    Years of education: Not on file    Highest education level: Not on file   Occupational History    Not on file   Social Needs    Financial resource recurrent major depressive disorder ([facility]    2. MARY (generalized anxiety disorder)      Plan:  Patient Instructions    STRESS MANAGEMENT STRATEGIES    1. Recognize Stress:  Learning to recognize when your body is reacting to stress and identifying our stressors are the first steps in managing stress. 2. Take a Break:  A change of pace, no matter how short, gives us a new outlook on old problems. Take a vacation 20 minutes a day - enjoy a change from the daily routine. 3. Learn to Relax:  Under stress, the muscles in our bodies stay tight. One of the most effective ways to combat tensions is deep muscle relaxation. Other techniques that produce muscle and mental relaxation are yoga, prayer, and deep breathing. 4. Be Nutritionally Aware:  Good nutrition is vital to optimum health, and is especially critical when we are under unusual stress, or going through a major life change. 5. Exercise Regularly:  Just like nutrition, exercise is imperative for maintaining good fitness. Whatever you enjoy - swimming, walking, jogging, aerobic exercise - will help you let off steam and work out stress. 6. Plan your Work:  Tension and anxiety really build up when our work seems endless. Plan your work to use time and energy more efficiently. Take one thing at a time. 7. Talk it Over: This may be the most important thing you can do for yourself if you cant get a handle on things. Find a good listener. Just as a pressure relief valve allows steam to flow out of a pressure cooker and keeps it from blowing up, so talking allows stress to flow out of the body and keeps us from blowing up. 8. Accept What You Cannot Change:  If the problem is beyond your control at this time, try your best to accept it until you can change it. It beats spinning your wheels and getting nowhere. 9. Evaluate Your Perceptions:  What we think is sometimes what we feel.   If we constantly think unrealistic or alarming thoughts about ourselves or other folks, then our stress level is increased. 10. Relax Unrealistic Standards:  When we set unrealistic standards for ourselves, we usually can never reach them. If we do, we burn out quickly. Set reasonable goals and standards. 11. Reward Yourself:  Find ways to reward yourself when youve completed a minor or major task. We cannot always depend on others to recognize us, so we must develop our own reward system. 12. Become Assertive: Take steps to solve problems instead of feeling helpless. Distinguishing assertiveness (respecting others rights and your rights) from aggressiveness and passivity can do much to resolve internal stress. 13. Rediscover Humor:  Learn to laugh at yourself and your situation! 14. Increase Pleasurable Activities:  Take time to participate in fun, pleasurable, activities on a regular basis. Pt interventions:  Provided handout on  stress management and Supportive techniques    Return in about 3 weeks (around 2/24/2020).

## 2020-02-03 NOTE — PATIENT INSTRUCTIONS
STRESS MANAGEMENT STRATEGIES    1. Recognize Stress:  Learning to recognize when your body is reacting to stress and identifying our stressors are the first steps in managing stress. 2. Take a Break:  A change of pace, no matter how short, gives us a new outlook on old problems. Take a vacation 20 minutes a day - enjoy a change from the daily routine. 3. Learn to Relax:  Under stress, the muscles in our bodies stay tight. One of the most effective ways to combat tensions is deep muscle relaxation. Other techniques that produce muscle and mental relaxation are yoga, prayer, and deep breathing. 4. Be Nutritionally Aware:  Good nutrition is vital to optimum health, and is especially critical when we are under unusual stress, or going through a major life change. 5. Exercise Regularly:  Just like nutrition, exercise is imperative for maintaining good fitness. Whatever you enjoy - swimming, walking, jogging, aerobic exercise - will help you let off steam and work out stress. 6. Plan your Work:  Tension and anxiety really build up when our work seems endless. Plan your work to use time and energy more efficiently. Take one thing at a time. 7. Talk it Over: This may be the most important thing you can do for yourself if you cant get a handle on things. Find a good listener. Just as a pressure relief valve allows steam to flow out of a pressure cooker and keeps it from blowing up, so talking allows stress to flow out of the body and keeps us from blowing up. 8. Accept What You Cannot Change:  If the problem is beyond your control at this time, try your best to accept it until you can change it. It beats spinning your wheels and getting nowhere. 9. Evaluate Your Perceptions:  What we think is sometimes what we feel. If we constantly think unrealistic or alarming thoughts about ourselves or other folks, then our stress level is increased.     10. Relax Unrealistic Standards:  When we set unrealistic standards for ourselves, we usually can never reach them. If we do, we burn out quickly. Set reasonable goals and standards. 11. Reward Yourself:  Find ways to reward yourself when youve completed a minor or major task. We cannot always depend on others to recognize us, so we must develop our own reward system. 12. Become Assertive: Take steps to solve problems instead of feeling helpless. Distinguishing assertiveness (respecting others rights and your rights) from aggressiveness and passivity can do much to resolve internal stress. 13. Rediscover Humor:  Learn to laugh at yourself and your situation! 14. Increase Pleasurable Activities:  Take time to participate in fun, pleasurable, activities on a regular basis.

## 2020-02-05 ENCOUNTER — OFFICE VISIT (OUTPATIENT)
Dept: FAMILY MEDICINE CLINIC | Age: 40
End: 2020-02-05
Payer: MEDICAID

## 2020-02-05 VITALS
DIASTOLIC BLOOD PRESSURE: 86 MMHG | BODY MASS INDEX: 37.74 KG/M2 | HEART RATE: 95 BPM | WEIGHT: 225.4 LBS | OXYGEN SATURATION: 98 % | SYSTOLIC BLOOD PRESSURE: 124 MMHG

## 2020-02-05 PROCEDURE — 99214 OFFICE O/P EST MOD 30 MIN: CPT | Performed by: NURSE PRACTITIONER

## 2020-02-05 PROCEDURE — G8417 CALC BMI ABV UP PARAM F/U: HCPCS | Performed by: NURSE PRACTITIONER

## 2020-02-05 PROCEDURE — G8482 FLU IMMUNIZE ORDER/ADMIN: HCPCS | Performed by: NURSE PRACTITIONER

## 2020-02-05 PROCEDURE — G8427 DOCREV CUR MEDS BY ELIG CLIN: HCPCS | Performed by: NURSE PRACTITIONER

## 2020-02-05 PROCEDURE — 1036F TOBACCO NON-USER: CPT | Performed by: NURSE PRACTITIONER

## 2020-02-05 RX ORDER — METHOCARBAMOL 500 MG/1
500 TABLET, FILM COATED ORAL 3 TIMES DAILY PRN
Qty: 30 TABLET | Refills: 0 | Status: SHIPPED | OUTPATIENT
Start: 2020-02-05 | End: 2020-02-15

## 2020-02-05 ASSESSMENT — ENCOUNTER SYMPTOMS: COLOR CHANGE: 1

## 2020-02-05 NOTE — PATIENT INSTRUCTIONS
test results and keep a list of the medicines you take. Where can you learn more? Go to https://chpepiceweb.[email or web address]. org and sign in to your [employer] account. Enter N111 in the [facility] box to learn more about \"Learning About Obesity. \"     If you do not have an account, please click on the \"Sign Up Now\" link. Current as of: March 28, 2019  Content Version: 12.3  © 5547-5309 Healthwise, Incorporated. Care instructions adapted under license by [facility] ([facility]). If you have questions about a medical condition or this instruction, always ask your healthcare professional. [address] any warranty or liability for your use of this information.

## 2020-02-08 NOTE — PROGRESS NOTES
Christian Dumont MD  [facility]) Physicians - Rheumatology    [x] [facility]:  Eduardo Elder [address]  [facility], [address] [] Lord [place] Office:  [address], [address] [facility]  [place]   Office: [phone]  Fax: [phone] PROGRESS NOTE    SUBJECTIVE:    Background:   Shannon Rashid is a 44 y.o. female w/ one yr Hx of arthralgias involving the BLE (knees, ankles) and chronic LBP s/p lumbar laminectomy in 2013. PMHx pertinent for EMELINA on CPAP, RLS, depression, anxiety. Current rheum meds:  Naproxen    Prior rheum meds:  Ibuprofen: ineffective    Past medical/surgical history, medications and allergies are reviewed and updated as appropriate. Interval Hx:   Pt reports no significant change to her lower extremity arthralgias involving predominantly her b/l knees and ankles. Knee pain is exacerbated by her going up and down the stairs. Ankles feel stiff for 15 min after she wakes up in the morning. She denies significant joint pain or swelling involving her hand or wrist joints. No change to chronic LBP, pain and stiffness are exacerbated by prolonged period of inactivity and improves w/ physical activity. She denies any morning stiffness in her back. Denies any nocturnal awakening from back pain. She reports some pain relief from Naproxen - improves LBP and foot pain mostly. She has not started Prednisone d/t fear of weight gain as she feels she's gained weight recently. PCP recently prescribed Robaxin which she has not started taking.     Rheumatologic ROS:  Constitutional: denies chronic fatigue, fever/chills, night sweats, unintentional weight loss  Integumentary: denies photosensitivity, rash, patchy alopecia, or Sx of Raynaud's phenomenon  Eyes: denies dry eyes, redness or pain, visual disturbance, or floaters  Ears: denies hearing loss, tinnitus, vertigo, or recurrent ear infections  Nose: denies nasal ulcers or recurrent sinusitis  Oral cavity: denies dry mouth or every 6 hours as needed for Pain      rOPINIRole (REQUIP) 1 MG tablet 1-2 tabs 2 hours before the bedtime. And one tab BID  tablet 5    fluticasone (FLONASE) 50 MCG/ACT nasal spray SPRAY TWO SPRAYS IN EACH NOSTRIL ONCE DAILY 1 Bottle 5    SUMAtriptan (IMITREX) 100 MG tablet Take 100 mg by mouth daily as needed      dicyclomine (BENTYL) 20 MG tablet Take 20 mg by mouth as needed       methocarbamol (ROBAXIN) 500 MG tablet Take 1 tablet by mouth 3 times daily as needed (leg pain) (Patient not taking: Reported on 2/12/2020) 30 tablet 0     No current facility-administered medications for this visit.          OBJECTIVE:  Physical Exam:  BP (!) 121/93 (Site: Left Upper Arm, Position: Sitting, Cuff Size: Large Adult)   Pulse 83   Wt 220 lb 9.6 oz (100.1 kg)   LMP 03/13/2018 (Exact Date)   BMI 36.94 kg/m²     GEN: AAOx3, in NAD, well-appearing  HEAD: normocephalic, atraumatic  EYES: EOMI, PERRLA, no injection or icterus  NOSE: no nasal ulcers or nasal drainage  ORAL CAVITY: moist oral mucosa w/ good saliva pooling, no oral lesions, no evidence of thrush, no evidence of parotid gland enlargement  CVS: RRR  LUNGS: in no acute respiratory distress, CTAB  MSK:  Spine: no kyphosis or lordosis, axial spine w/ FROM, no paraspinal muscle or vertebral tenderness, SI joints NTTP  Upper extremities:              Hands: mild bogginess in b/l PIP joints slightly TTP, MCP and DIP joints w/o swelling NTTP, able to make strong full fists, +Phalen test, -Tinel test              Wrist: no synovitis in the wrist joints b/l slightly TTP, FROM              Elbow: no synovitis or bursitis, FROM              Shoulders: no pain or swelling or warmth on palpation, FROM  Lower extremities:              Hip: negative ABELINO test b/l, trochanteric bursa TTP b/l              Knees: no joint effusion or warmth, NTTP, good ROM              Ankles: R ankle w/ synovial proliferation cool and NTTP, good ROM              Feet: no toe swelling Reported on 2/12/2020) 30 tablet 0     No facility-administered encounter medications on file as of 2/12/2020. Return in about 3 months (around 5/12/2020) for lab result discussion and treatment plan, medication monitoring. The risks and benefits of my recommendations, as well as other treatment options, benefits and side effects were discussed with the patient today. Questions were answered. NOTE: This report is transcribed by using voice recognition software dragon. Every effort is made to ensure accuracy; however, inadvertent computerized  transcription errors may be present.

## 2020-02-12 ENCOUNTER — OFFICE VISIT (OUTPATIENT)
Dept: RHEUMATOLOGY | Age: 40
End: 2020-02-12
Payer: MEDICAID

## 2020-02-12 VITALS
DIASTOLIC BLOOD PRESSURE: 93 MMHG | WEIGHT: 220.6 LBS | BODY MASS INDEX: 36.94 KG/M2 | SYSTOLIC BLOOD PRESSURE: 121 MMHG | HEART RATE: 83 BPM

## 2020-02-12 DIAGNOSIS — G89.29 CHRONIC BACK PAIN GREATER THAN 3 MONTHS DURATION: ICD-10-CM

## 2020-02-12 DIAGNOSIS — G89.29 CHRONIC PAIN OF MULTIPLE JOINTS: ICD-10-CM

## 2020-02-12 DIAGNOSIS — R79.82 ELEVATED C-REACTIVE PROTEIN (CRP): ICD-10-CM

## 2020-02-12 DIAGNOSIS — M25.50 CHRONIC PAIN OF MULTIPLE JOINTS: ICD-10-CM

## 2020-02-12 DIAGNOSIS — M54.9 CHRONIC BACK PAIN GREATER THAN 3 MONTHS DURATION: ICD-10-CM

## 2020-02-12 LAB — C-REACTIVE PROTEIN: 13.5 MG/L (ref 0–5.1)

## 2020-02-12 PROCEDURE — G8482 FLU IMMUNIZE ORDER/ADMIN: HCPCS | Performed by: INTERNAL MEDICINE

## 2020-02-12 PROCEDURE — 99214 OFFICE O/P EST MOD 30 MIN: CPT | Performed by: INTERNAL MEDICINE

## 2020-02-12 PROCEDURE — 1036F TOBACCO NON-USER: CPT | Performed by: INTERNAL MEDICINE

## 2020-02-12 PROCEDURE — G8428 CUR MEDS NOT DOCUMENT: HCPCS | Performed by: INTERNAL MEDICINE

## 2020-02-12 PROCEDURE — G8417 CALC BMI ABV UP PARAM F/U: HCPCS | Performed by: INTERNAL MEDICINE

## 2020-02-26 ENCOUNTER — APPOINTMENT (OUTPATIENT)
Dept: GENERAL RADIOLOGY | Age: 40
End: 2020-02-26
Payer: MEDICAID

## 2020-02-26 ENCOUNTER — HOSPITAL ENCOUNTER (EMERGENCY)
Age: 40
Discharge: HOME | End: 2020-02-26
Payer: MEDICAID

## 2020-02-26 VITALS
HEIGHT: 65 IN | HEART RATE: 78 BPM | OXYGEN SATURATION: 96 % | WEIGHT: 210 LBS | DIASTOLIC BLOOD PRESSURE: 92 MMHG | RESPIRATION RATE: 18 BRPM | TEMPERATURE: 97.4 F | BODY MASS INDEX: 34.99 KG/M2 | SYSTOLIC BLOOD PRESSURE: 119 MMHG

## 2020-02-26 LAB
A/G RATIO: 1.5 (ref 1.1–2.2)
ALBUMIN SERPL-MCNC: 4.5 G/DL (ref 3.4–5)
ALP BLD-CCNC: 100 U/L (ref 40–129)
ALT SERPL-CCNC: 14 U/L (ref 10–40)
ANION GAP SERPL CALCULATED.3IONS-SCNC: 11 MMOL/L (ref 3–16)
AST SERPL-CCNC: 18 U/L (ref 15–37)
BASOPHILS ABSOLUTE: 0 K/UL (ref 0–0.2)
BASOPHILS RELATIVE PERCENT: 0.4 %
BILIRUB SERPL-MCNC: <0.2 MG/DL (ref 0–1)
BUN BLDV-MCNC: 10 MG/DL (ref 7–20)
CALCIUM SERPL-MCNC: 9.1 MG/DL (ref 8.3–10.6)
CHLORIDE BLD-SCNC: 103 MMOL/L (ref 99–110)
CO2: 25 MMOL/L (ref 21–32)
CREAT SERPL-MCNC: 0.7 MG/DL (ref 0.6–1.1)
EOSINOPHILS ABSOLUTE: 0.1 K/UL (ref 0–0.6)
EOSINOPHILS RELATIVE PERCENT: 1.2 %
GFR AFRICAN AMERICAN: >60
GFR NON-AFRICAN AMERICAN: >60
GLOBULIN: 3 G/DL
GLUCOSE BLD-MCNC: 119 MG/DL (ref 70–99)
HCT VFR BLD CALC: 42.1 % (ref 36–48)
HEMOGLOBIN: 14.1 G/DL (ref 12–16)
LYMPHOCYTES ABSOLUTE: 2.5 K/UL (ref 1–5.1)
LYMPHOCYTES RELATIVE PERCENT: 33.8 %
MCH RBC QN AUTO: 30.3 PG (ref 26–34)
MCHC RBC AUTO-ENTMCNC: 33.6 G/DL (ref 31–36)
MCV RBC AUTO: 90.2 FL (ref 80–100)
MONOCYTES ABSOLUTE: 0.5 K/UL (ref 0–1.3)
MONOCYTES RELATIVE PERCENT: 6.3 %
NEUTROPHILS ABSOLUTE: 4.2 K/UL (ref 1.7–7.7)
NEUTROPHILS RELATIVE PERCENT: 58.3 %
PDW BLD-RTO: 13.1 % (ref 12.4–15.4)
PLATELET # BLD: 269 K/UL (ref 135–450)
PMV BLD AUTO: 8.7 FL (ref 5–10.5)
POTASSIUM SERPL-SCNC: 3.9 MMOL/L (ref 3.5–5.1)
RBC # BLD: 4.67 M/UL (ref 4–5.2)
SODIUM BLD-SCNC: 139 MMOL/L (ref 136–145)
TOTAL PROTEIN: 7.5 G/DL (ref 6.4–8.2)
TROPONIN: <0.01 NG/ML
WBC # BLD: 7.3 K/UL (ref 4–11)

## 2020-02-26 PROCEDURE — 6360000002 HC RX W HCPCS: Performed by: PHYSICIAN ASSISTANT

## 2020-02-26 PROCEDURE — 93005 ELECTROCARDIOGRAM TRACING: CPT

## 2020-02-26 PROCEDURE — 85025 COMPLETE CBC W/AUTO DIFF WBC: CPT

## 2020-02-26 PROCEDURE — 84484 ASSAY OF TROPONIN QUANT: CPT

## 2020-02-26 PROCEDURE — 99285 EMERGENCY DEPT VISIT HI MDM: CPT

## 2020-02-26 PROCEDURE — 80053 COMPREHEN METABOLIC PANEL: CPT

## 2020-02-26 PROCEDURE — 71046 X-RAY EXAM CHEST 2 VIEWS: CPT

## 2020-02-26 PROCEDURE — 96374 THER/PROPH/DIAG INJ IV PUSH: CPT

## 2020-02-26 PROCEDURE — 36415 COLL VENOUS BLD VENIPUNCTURE: CPT

## 2020-02-26 RX ORDER — LIDOCAINE 50 MG/G
1 PATCH TOPICAL DAILY
Qty: 30 PATCH | Refills: 0 | Status: SHIPPED | OUTPATIENT
Start: 2020-02-26 | End: 2020-12-14 | Stop reason: ALTCHOICE

## 2020-02-26 RX ORDER — KETOROLAC TROMETHAMINE 30 MG/ML
15 INJECTION, SOLUTION INTRAMUSCULAR; INTRAVENOUS ONCE
Status: COMPLETED | OUTPATIENT
Start: 2020-02-26 | End: 2020-02-26

## 2020-02-26 RX ORDER — NAPROXEN 500 MG/1
500 TABLET ORAL 2 TIMES DAILY
Qty: 20 TABLET | Refills: 0 | Status: SHIPPED | OUTPATIENT
Start: 2020-02-26 | End: 2020-03-05

## 2020-02-26 RX ADMIN — KETOROLAC TROMETHAMINE 15 MG: 30 INJECTION, SOLUTION INTRAMUSCULAR; INTRAVENOUS at 12:22

## 2020-02-26 ASSESSMENT — PAIN DESCRIPTION - PAIN TYPE
TYPE: ACUTE PAIN
TYPE: ACUTE PAIN

## 2020-02-26 ASSESSMENT — PAIN - FUNCTIONAL ASSESSMENT: PAIN_FUNCTIONAL_ASSESSMENT: 0-10

## 2020-02-26 ASSESSMENT — PAIN DESCRIPTION - FREQUENCY: FREQUENCY: CONTINUOUS

## 2020-02-26 ASSESSMENT — PAIN DESCRIPTION - LOCATION
LOCATION: ARM;NECK
LOCATION: CHEST

## 2020-02-26 ASSESSMENT — PAIN SCALES - GENERAL
PAINLEVEL_OUTOF10: 6
PAINLEVEL_OUTOF10: 6

## 2020-02-26 ASSESSMENT — PAIN DESCRIPTION - PROGRESSION: CLINICAL_PROGRESSION: NOT CHANGED

## 2020-02-26 ASSESSMENT — PAIN DESCRIPTION - ORIENTATION: ORIENTATION: LEFT

## 2020-02-26 ASSESSMENT — HEART SCORE: ECG: 0

## 2020-02-26 NOTE — ED PROVIDER NOTES
[facility]        Pt Name: Vannesa Lynne  MRN: [id number]  [place] 1980  Date of evaluation: 2/26/2020  Provider: Barney Miller PA-C  PCP: ANDREA Aguilera CNP    This patient was not seen and evaluated by the attending physician No att. providers found. CHIEF COMPLAINT       Chief Complaint   Patient presents with    Chest Pain     pt brought in by  EMS c/o left shoulder and neck pain radiating into left arm since this morning. denies SOB       HISTORY OF PRESENT ILLNESS   (Location, Timing/Onset, Context/Setting, Quality, Duration, Modifying Factors, Severity, Associated Signs and Symptoms)  Note limiting factors. Vannesa Lynne is a 44 y.o. female that presents to the emergency department with a chief complaint of pain in her left shoulder and neck that radiates down her left upper arm. She states this began when she was just sitting at a desk at work. She works as an  at [employer] She states this happened around 8:00 this morning. She went to see the school nurse and her blood pressure was elevated to the 150s over 100s so they called the squad. She states in the squad she felt slightly short of breath but was extremely anxious. No longer feeling short of breath and did not feel short of breath initially. Denies chest pain, nausea, vomiting, cough, difficulty moving her extremities, numbness, abdominal pain, urinary or bowel symptoms. Denies smoking, illicit drug use, premature family history of heart disease, personal history of heart disease, hypertension, hyperlipidemia or diabetes. Denies any leg swelling. She states that her symptoms have actually improved and only rates it a 4 out of 10 at this time. She received aspirin and nitro in squad. Patient states that the blood pressure cuff squeezing her arm makes the pain worse.     Nursing Notes were all reviewed PainHistorical Med      rOPINIRole (REQUIP) 1 MG tablet 1-2 tabs 2 hours before the bedtime. And one tab BID PRN, Disp-120 tablet, R-5Normal      fluticasone (FLONASE) 50 MCG/ACT nasal spray SPRAY TWO SPRAYS IN EACH NOSTRIL ONCE DAILY, Disp-1 Bottle, R-5Normal      SUMAtriptan (IMITREX) 100 MG tablet Take 100 mg by mouth daily as neededHistorical Med      dicyclomine (BENTYL) 20 MG tablet Take 20 mg by mouth as needed Historical Med       !! - Potential duplicate medications found. Please discuss with provider. ALLERGIES     Metronidazole and Other    FAMILYHISTORY       Family History   Problem Relation Age of Onset    Asthma Mother     High Blood Pressure Mother     Diabetes Father     Atrial Fibrillation Father     Alcohol Abuse Father     High Blood Pressure Father     Heart Disease Paternal Grandmother     Heart Attack Paternal Grandmother     Diabetes Paternal Grandmother     Stroke Paternal Grandfather     Stroke Maternal Grandfather     Atrial Fibrillation Maternal Grandmother     Diabetes Maternal Grandmother     Cancer Neg Hx           SOCIAL HISTORY       Social History     Tobacco Use    Smoking status: Never Smoker    Smokeless tobacco: Never Used   Substance Use Topics    Alcohol use: No     Alcohol/week: 0.0 standard drinks     Comment: once a week    Drug use: No       SCREENINGS      Heart Score for chest pain patients  History: Slightly Suspicious  ECG: Normal  Patient Age: < 39 years  *Risk factors for Atherosclerotic disease: Obesity, Positive family History  Risk Factors: 1 or 2 risk factors  Troponin: < 1X normal limit  Heart Score Total: 1      PHYSICAL EXAM    (up to 7 for level 4, 8 or more for level 5)     ED Triage Vitals [02/26/20 1133]   BP Temp Temp Source Pulse Resp SpO2 Height Weight   (!) 152/93 97.4 °F (36.3 °C) Oral 85 20 97 % 5' 5\" (1.651 m) 210 lb (95.3 kg)       Physical Exam  Vitals signs and nursing note reviewed.    Constitutional: Performed at:  [facility]  [address], [address]   Phone [phone]       All other labs were within normal range or not returned as of this dictation. EKG: All EKG's are interpreted by the Emergency Department Physician in the absence of a cardiologist.  Please see their note for interpretation of EKG. RADIOLOGY:   Non-plain film images such as CT, Ultrasound and MRI are read by the radiologist. Plain radiographic images are visualized and preliminarily interpreted by the ED Provider with the below findings:        Interpretation per the Radiologist below, if available at the time of this note:    XR CHEST STANDARD (2 VW)   Final Result   No active cardiopulmonary disease               PROCEDURES   Unless otherwise noted below, none     Procedures    CRITICAL CARE TIME   N/A    CONSULTS:  None      EMERGENCY DEPARTMENT COURSE and DIFFERENTIAL DIAGNOSIS/MDM:   Vitals:    Vitals:    02/26/20 1133 02/26/20 1302   BP: (!) 152/93 (!) 119/92   Pulse: 85 78   Resp: 20 18   Temp: 97.4 °F (36.3 °C)    TempSrc: Oral    SpO2: 97% 96%   Weight: 210 lb (95.3 kg)    Height: 5' 5\" (1.651 m)        Patient was given the following medications:  Medications   ketorolac (TORADOL) injection 15 mg (15 mg Intravenous Given 2/26/20 1222)     Patient presented with some left upper back pain that radiated down her left upper arm. This is reproducible to palpate over her left trapezius and left posterior shoulder and pain is worse when the blood pressure cuff is squeezing on her left upper arm. There is no pitting edema. She is distally neurovascular intact. Low suspicion for acute coronary syndrome, pulmonary embolus, aortic dissection, arterial occlusion, DVT, septic arthritis or other emergent etiology. Pain is reproducible in the left upper back.   She never had any chest pain only had some shortness of breath while she was in the squad and stated that she was very anxious at this time. Did not have any shortness of breath when the symptoms first began and she was still at her job. I did discuss a low heart score and admission versus follow-up as an outpatient. After discussion patient wishes to go home and follow-up with her primary care physician. I believe this is reasonable and suspect most of this is musculoskeletal.  Understood her strict return precautions and was stable time of discharge. FINAL IMPRESSION      1. Upper back pain on left side          DISPOSITION/PLAN   DISPOSITION Decision To Discharge 02/26/2020 12:48:47 PM      PATIENT REFERREDTO:  Jorge Davis, APRN - CNP  [address] [address]  [phone]    Schedule an appointment as soon as possible for a visit in 3 days  For re-check    [facility] Emergency Department  [address] [place] [address]  [phone]    As needed      DISCHARGE MEDICATIONS:  Discharge Medication List as of 2/26/2020 12:52 PM      START taking these medications    Details   !! naproxen (NAPROSYN) 500 MG tablet Take 1 tablet by mouth 2 times daily for 20 doses, Disp-20 tablet, R-0Print      lidocaine (LIDODERM) 5 % Place 1 patch onto the skin daily 12 hours on, 12 hours off., Disp-30 patch, R-0Print       !! - Potential duplicate medications found. Please discuss with provider.           DISCONTINUED MEDICATIONS:  Discharge Medication List as of 2/26/2020 12:52 PM                 (Please note that portions of this note were completed with a voice recognition program.  Efforts were made to edit the dictations but occasionally words are mis-transcribed.)    Lisa Talbot PA-C (electronically signed)            Lisa Talbot PA-C  02/26/20 1644

## 2020-02-27 ENCOUNTER — TELEPHONE (OUTPATIENT)
Dept: FAMILY MEDICINE CLINIC | Age: 40
End: 2020-02-27

## 2020-02-27 LAB
EKG ATRIAL RATE: 88 BPM
EKG DIAGNOSIS: NORMAL
EKG P AXIS: 44 DEGREES
EKG P-R INTERVAL: 150 MS
EKG Q-T INTERVAL: 380 MS
EKG QRS DURATION: 98 MS
EKG QTC CALCULATION (BAZETT): 459 MS
EKG R AXIS: 4 DEGREES
EKG T AXIS: 6 DEGREES
EKG VENTRICULAR RATE: 88 BPM

## 2020-02-27 PROCEDURE — 93010 ELECTROCARDIOGRAM REPORT: CPT | Performed by: INTERNAL MEDICINE

## 2020-02-27 NOTE — TELEPHONE ENCOUNTER
Patient needed a ED Follow up after being seen in the ER yesterday. She left work early after having chest pain.  Patient will already be in the office next Thursday 3/5 and wanted to be seen around 3pm. Please call patient

## 2020-03-05 ENCOUNTER — OFFICE VISIT (OUTPATIENT)
Dept: FAMILY MEDICINE CLINIC | Age: 40
End: 2020-03-05
Payer: MEDICAID

## 2020-03-05 VITALS
DIASTOLIC BLOOD PRESSURE: 88 MMHG | HEART RATE: 78 BPM | WEIGHT: 223 LBS | SYSTOLIC BLOOD PRESSURE: 118 MMHG | BODY MASS INDEX: 37.11 KG/M2 | OXYGEN SATURATION: 99 %

## 2020-03-05 PROCEDURE — 1036F TOBACCO NON-USER: CPT | Performed by: NURSE PRACTITIONER

## 2020-03-05 PROCEDURE — G8482 FLU IMMUNIZE ORDER/ADMIN: HCPCS | Performed by: NURSE PRACTITIONER

## 2020-03-05 PROCEDURE — 99214 OFFICE O/P EST MOD 30 MIN: CPT | Performed by: NURSE PRACTITIONER

## 2020-03-05 PROCEDURE — G8417 CALC BMI ABV UP PARAM F/U: HCPCS | Performed by: NURSE PRACTITIONER

## 2020-03-05 PROCEDURE — 90746 HEPB VACCINE 3 DOSE ADULT IM: CPT | Performed by: NURSE PRACTITIONER

## 2020-03-05 PROCEDURE — 90471 IMMUNIZATION ADMIN: CPT | Performed by: NURSE PRACTITIONER

## 2020-03-05 PROCEDURE — G8427 DOCREV CUR MEDS BY ELIG CLIN: HCPCS | Performed by: NURSE PRACTITIONER

## 2020-03-05 ASSESSMENT — ENCOUNTER SYMPTOMS: SHORTNESS OF BREATH: 0

## 2020-03-05 NOTE — PATIENT INSTRUCTIONS
Patient Education        Neck Pain: Care Instructions  Your Care Instructions    You can have neck pain anywhere from the bottom of your head to the top of your shoulders. It can spread to the upper back or arms. Injuries, painting a ceiling, sleeping with your neck twisted, staying in one position for too long, and many other activities can cause neck pain. Most neck pain gets better with home care. Your doctor may recommend medicine to relieve pain or relax your muscles. He or she may suggest exercise and physical therapy to increase flexibility and relieve stress. You may need to wear a special (cervical) collar to support your neck for a day or two. Follow-up care is a key part of your treatment and safety. Be sure to make and go to all appointments, and call your doctor if you are having problems. It's also a good idea to know your test results and keep a list of the medicines you take. How can you care for yourself at home? · Try using a heating pad on a low or medium setting for 15 to 20 minutes every 2 or 3 hours. Try a warm shower in place of one session with the heating pad. · You can also try an ice pack for 10 to 15 minutes every 2 to 3 hours. Put a thin cloth between the ice and your skin. · Take pain medicines exactly as directed. ? If the doctor gave you a prescription medicine for pain, take it as prescribed. ? If you are not taking a prescription pain medicine, ask your doctor if you can take an over-the-counter medicine. · If your doctor recommends a cervical collar, wear it exactly as directed. When should you call for help? Call your doctor now or seek immediate medical care if:    · You have new or worsening numbness in your arms, buttocks or legs.     · You have new or worsening weakness in your arms or legs.  (This could make it hard to stand up.)     · You lose control of your bladder or bowels.    Watch closely for changes in your health, and be sure to contact your doctor if:    · Your neck pain is getting worse.     · You are not getting better after 1 week.     · You do not get better as expected. Where can you learn more? Go to https://chpepiceweb.Zady. org and sign in to your Plain Vanilla account. Enter [id number] in the [facility] box to learn more about \"Neck Pain: Care Instructions. \"     If you do not have an account, please click on the \"Sign Up Now\" link. Current as of: June 26, 2019  Content Version: 12.3  © 9442-5747 Healthwise, [employer]. Care instructions adapted under license by [facility] ([facility]). If you have questions about a medical condition or this instruction, always ask your healthcare professional. [address] any warranty or liability for your use of this information. Patient Education        Neck: Exercises  Introduction  Here are some examples of exercises for you to try. The exercises may be suggested for a condition or for rehabilitation. Start each exercise slowly. Ease off the exercises if you start to have pain. You will be told when to start these exercises and which ones will work best for you. How to do the exercises  Neck stretch   1. This stretch works best if you keep your shoulder down as you lean away from it. To help you remember to do this, start by relaxing your shoulders and lightly holding on to your thighs or your chair. 2. Tilt your head toward your shoulder and hold for 15 to 30 seconds. Let the weight of your head stretch your muscles. 3. If you would like a little added stretch, use your hand to gently and steadily pull your head toward your shoulder. For example, keeping your right shoulder down, lean your head to the left. 4. Repeat 2 to 4 times toward each shoulder. Diagonal neck stretch   1. Turn your head slightly toward the direction you will be stretching, and tilt your head diagonally toward your chest and hold for 15 to 30 seconds.   2. If you would like a little added stretch, use your hand to gently and steadily pull your head forward on the diagonal.  3. Repeat 2 to 4 times toward each side. Dorsal glide stretch   1. Sit or stand tall and look straight ahead. 2. Slowly tuck your chin as you glide your head backward over your body  3. Hold for a count of 6, and then relax for up to 10 seconds. 4. Repeat 8 to 12 times. Chest and shoulder stretch   1. Sit or stand tall and glide your head backward as in the dorsal glide stretch. 2. Raise both arms so that your hands are next to your ears. 3. Take a deep breath, and as you breathe out, lower your elbows down and behind your back. You will feel your shoulder blades slide down and together, and at the same time you will feel a stretch across your chest and the front of your shoulders. 4. Hold for about 6 seconds, and then relax for up to 10 seconds. 5. Repeat 8 to 12 times. Strengthening: Hands on head   1. Move your head backward, forward, and side to side against gentle pressure from your hands, holding each position for about 6 seconds. 2. Repeat 8 to 12 times. Follow-up care is a key part of your treatment and safety. Be sure to make and go to all appointments, and call your doctor if you are having problems. It's also a good idea to know your test results and keep a list of the medicines you take. Where can you learn more? Go to https://Check-Cappepiceweb.Adyoulike. org and sign in to your OneRiot account. Enter P975 in the [facility] box to learn more about \"Neck: Exercises. \"     If you do not have an account, please click on the \"Sign Up Now\" link. Current as of: June 26, 2019  Content Version: 12.3  © 8840-7932 Healthwise, Incorporated. Care instructions adapted under license by [facility] ([facility]). If you have questions about a medical condition or this instruction, always ask your healthcare professional. [address] any warranty or liability for your use of this information.

## 2020-03-05 NOTE — LETTER
[address]  Phone: [phone]  Fax: [phone]    ANDREA Mckeon CNP        March 5, 2020     Patient: Francisco Javier Richmond   YOB: 1980   Date of Visit: 3/5/2020       To Whom it May Concern:    Aníbal Deng was seen in my office on 3/5/2020. She may return to work on 3/6/20. If you have any questions or concerns, please don't hesitate to call.     Sincerely,         ANDREA Mckeon CNP

## 2020-03-05 NOTE — PROGRESS NOTES
nasal spray SPRAY TWO SPRAYS IN EACH NOSTRIL ONCE DAILY Yes ANDREA Olson - CNP   SUMAtriptan (IMITREX) 100 MG tablet Take 100 mg by mouth daily as needed Yes Historical Provider, MD         Patient's allergies, past medical, surgical, social and family histories werereviewed and updated as appropriate. Review of Systems   Constitutional: Negative for diaphoresis. Respiratory: Negative for shortness of breath. Cardiovascular: Negative for chest pain and palpitations. Musculoskeletal: Positive for arthralgias (legs). Skin: Positive for rash (itchy red bumps to arms). Neurological: Negative for dizziness and headaches. Physical Exam  Vitals signs reviewed. Constitutional:       Appearance: She is well-developed. Neck:      Musculoskeletal: Full passive range of motion without pain, normal range of motion and neck supple. No spinous process tenderness or muscular tenderness. Cardiovascular:      Rate and Rhythm: Normal rate and regular rhythm. Pulses:           Radial pulses are 2+ on the right side and 2+ on the left side. Heart sounds: Normal heart sounds. No murmur. Comments: No lower extremity edema noted. Pulmonary:      Effort: Pulmonary effort is normal.      Breath sounds: Normal breath sounds. Musculoskeletal:      Left shoulder: Normal.   Skin:     General: Skin is warm and dry. Findings: Rash present. Rash is papular (red, scattered to forarms with excorations present). Neurological:      Mental Status: She is alert and oriented to person, place, and time. Vitals:    03/05/20 1428   BP: 118/88   Pulse: 78   SpO2: 99%   Weight: 223 lb (101.2 kg)             ASSESSMENT:  1. Acute pain of left shoulder    2. Neck pain on left side    3. Dermatitis    4. Arthralgia, unspecified joint    5. Need for hepatitis B vaccination        PLAN:  1. Acute pain of left shoulder  Resolved- ED records reviewed today    2. Neck pain on left side  Resolved    3. Dermatitis  New problem  Discussed with pt possible environmental allergy  She will start prednisone taper prescribed by Dr Mesfin Horne    4. Arthralgia, unspecified joint  She is agreeable to starting Pred taper from Dr Fadumo Pastor    5. Need for hepatitis B vaccination  #3 today-     Hep B Vaccine Adult (ENGERIX B)    See pt instructions  F/u prn  Discussed use, benefit, and side effects of prescribed medications. All patient questions answered. Pt voiced understanding.

## 2020-04-09 RX ORDER — DESVENLAFAXINE 25 MG/1
TABLET, EXTENDED RELEASE ORAL
Qty: 30 TABLET | Refills: 3 | Status: SHIPPED | OUTPATIENT
Start: 2020-04-09 | End: 2020-06-22 | Stop reason: SDUPTHER

## 2020-04-14 ENCOUNTER — TELEPHONE (OUTPATIENT)
Dept: FAMILY MEDICINE CLINIC | Age: 40
End: 2020-04-14

## 2020-04-21 ENCOUNTER — VIRTUAL VISIT (OUTPATIENT)
Dept: FAMILY MEDICINE CLINIC | Age: 40
End: 2020-04-21
Payer: MEDICAID

## 2020-04-21 PROCEDURE — 99214 OFFICE O/P EST MOD 30 MIN: CPT | Performed by: NURSE PRACTITIONER

## 2020-04-21 PROCEDURE — G8427 DOCREV CUR MEDS BY ELIG CLIN: HCPCS | Performed by: NURSE PRACTITIONER

## 2020-04-21 RX ORDER — NAPROXEN 500 MG/1
500 TABLET ORAL 2 TIMES DAILY
Qty: 60 TABLET | Refills: 3 | Status: SHIPPED | OUTPATIENT
Start: 2020-04-21 | End: 2020-07-30 | Stop reason: SDUPTHER

## 2020-04-21 NOTE — PROGRESS NOTES
500 MG tablet; Take 1 tablet by mouth 2 times daily  Dispense: 60 tablet; Refill: 3    5. Depression with anxiety  Discussed with pt symptoms likely related to Pristiq. Pt will continue for now, will consider med change in 2-3 months prn    6. Injury of right toe, initial encounter  New problem  Continue dressings. Re-eval prn      Return in about 2 months (around 6/21/2020) for depression follow up, anxiety follow up. Gil Mendez is a 44 y.o. female being evaluated by a Virtual Visit (video visit) encounter to address concerns as mentioned above. A caregiver was present when appropriate. Due to this being a TeleHealth encounter (During HPNDJ-02 public health emergency), evaluation of the following organ systems was limited: Vitals/Constitutional/EENT/Resp/CV/GI//MS/Neuro/Skin/Heme-Lymph-Imm. Pursuant to the emergency declaration under the [address] [place] [address] and the [employer] Act, this Virtual Visit was conducted with patient's (and/or legal guardian's) consent, to reduce the patient's risk of exposure to COVID-19 and provide necessary medical care. The patient (and/or legal guardian) has also been advised to contact this office for worsening conditions or problems, and seek emergency medical treatment and/or call 911 if deemed necessary. Services were provided through a video synchronous discussion virtually to substitute for in-person clinic visit. Patient and provider were located at their individual homes. --ANDREA Sahu CNP on 4/21/2020 at 11:59 AM    An electronic signature was used to authenticate this note.

## 2020-04-29 ENCOUNTER — E-VISIT (OUTPATIENT)
Dept: FAMILY MEDICINE CLINIC | Age: 40
End: 2020-04-29
Payer: MEDICAID

## 2020-04-29 PROCEDURE — 99421 OL DIG E/M SVC 5-10 MIN: CPT | Performed by: NURSE PRACTITIONER

## 2020-04-29 RX ORDER — POLYMYXIN B SULFATE AND TRIMETHOPRIM 1; 10000 MG/ML; [USP'U]/ML
1 SOLUTION OPHTHALMIC EVERY 6 HOURS
Qty: 1 BOTTLE | Refills: 0 | Status: SHIPPED | OUTPATIENT
Start: 2020-04-29 | End: 2020-05-06

## 2020-05-11 RX ORDER — ROPINIROLE 1 MG/1
TABLET, FILM COATED ORAL
Qty: 90 TABLET | Refills: 4 | Status: SHIPPED | OUTPATIENT
Start: 2020-05-11 | End: 2020-10-07

## 2020-05-12 ENCOUNTER — VIRTUAL VISIT (OUTPATIENT)
Dept: RHEUMATOLOGY | Age: 40
End: 2020-05-12
Payer: MEDICAID

## 2020-05-12 PROCEDURE — 1036F TOBACCO NON-USER: CPT | Performed by: INTERNAL MEDICINE

## 2020-05-12 PROCEDURE — G8428 CUR MEDS NOT DOCUMENT: HCPCS | Performed by: INTERNAL MEDICINE

## 2020-05-12 PROCEDURE — 99214 OFFICE O/P EST MOD 30 MIN: CPT | Performed by: INTERNAL MEDICINE

## 2020-05-12 PROCEDURE — G8417 CALC BMI ABV UP PARAM F/U: HCPCS | Performed by: INTERNAL MEDICINE

## 2020-05-12 RX ORDER — SULFASALAZINE 500 MG/1
TABLET ORAL
Qty: 70 TABLET | Refills: 1 | Status: SHIPPED | OUTPATIENT
Start: 2020-05-12 | End: 2020-07-22 | Stop reason: SDUPTHER

## 2020-05-12 NOTE — PROGRESS NOTES
abnormality.      Lumbar spine and sacroiliac joints:   No ankylosis or erosion is appreciated. Mild degenerative changes about the right sacroiliac joint.     I independently reviewed above X-rays, R SI joint appears irregular, L-spine w/o evidence of SpA, hand joints w/o evidence of chronic inflammatory arthritis/erosive changes.      MRI pelvis (11/20/19):  1. Trace amount of nonspecific fluid in the left sacroiliac joint.  No additional changes to suggest inflammatory arthropathy. 2. Minimal right sacroiliac degenerative change. 3. No abnormality in the hip joints. 4. Mild bilateral gluteus minimus tendinosis and proximal hamstring tendinosis. 5. Mild degenerative changes at L4-5 and L5-S1.      I discussed her MRI findings w/ the reading radiologist on 12/2/19, she has mild degenerative arthritis in her sacroiliac joints (inside her pelvis) but nothing inflammatory in nature. Above results were discussed w/ the pt in detail during today's visit. ASSESSMENT/PLAN:   Inflammatory arthralgias in LE w/ good response to oral Prednisone. Pt reports recent onset MCP swelling. She reports partial response to Naproxen. Will start trial of SSZ titrated up to 1000 mg BID. Discussed the s/e and risks of SSZ and ordered safety labs to be drawn in 4 wks. Will also repeat OFE at that time. Instructed pt to f/u w/ her dermatologist if her rash does not improve. Will also need Ophthalmology evaluation if her \"conjunctivitis\" returns, need to r/o uveitis. Diagnoses and all orders for this visit:    Chronic inflammatory arthritis  -     C-Reactive Protein; Future  -     OFE Reflex to Antibody Cascade; Future  -     Angiotensin Converting Enzyme;  Future  -     sulfaSALAzine (AZULFIDINE) 500 MG tablet; WK 1: 1 pill with dinner   WK 2: 1 pill with meal twice daily   WK 3: 1 pill with breakfast and 2 pills with dinner  WK 4: 2 pills with meal twice daily    High risk medication use  -     ALT; Future  -     AST; Future  -     CBC Auto Differential; Future  -     Creatinine, Serum; Standing    Rash  -     C-Reactive Protein; Future  -     OFE Reflex to Antibody Cascade; Future  -     Angiotensin Converting Enzyme; Future    Conjunctivitis of both eyes, unspecified conjunctivitis type  -     C-Reactive Protein; Future  -     OFE Reflex to Antibody Cascade; Future  -     Angiotensin Converting Enzyme; Future          Orders Placed This Encounter   Procedures    ALT     Standing Status:   Future     Standing Expiration Date:   5/12/2021    AST     Standing Status:   Future     Standing Expiration Date:   5/12/2021    CBC Auto Differential     Standing Status:   Future     Standing Expiration Date:   11/12/2020    C-Reactive Protein     Standing Status:   Future     Standing Expiration Date:   11/12/2020    Creatinine, Serum     Standing Status:   Standing     Number of Occurrences:   6     Standing Expiration Date:   5/12/2021    OFE Reflex to Antibody Cascade     Standing Status:   Future     Standing Expiration Date:   11/12/2020    Angiotensin Converting Enzyme     Standing Status:   Future     Standing Expiration Date:   5/12/2021     Outpatient Encounter Medications as of 5/12/2020   Medication Sig Dispense Refill    sulfaSALAzine (AZULFIDINE) 500 MG tablet WK 1: 1 pill with dinner   WK 2: 1 pill with meal twice daily   WK 3: 1 pill with breakfast and 2 pills with dinner  WK 4: 2 pills with meal twice daily 70 tablet 1    rOPINIRole (REQUIP) 1 MG tablet TAKE ONE TABLET BY MOUTH DAILY AS NEEDED AND THEN TAKE ONE TO TWO TABLETS BY MOUTH EVERY NIGHT 2 HOURS BEFORE BEDTIME 90 tablet 4    naproxen (NAPROSYN) 500 MG tablet Take 1 tablet by mouth 2 times daily 60 tablet 3    desvenlafaxine succinate (PRISTIQ) 25 MG TB24 extended release tablet TAKE ONE TABLET BY MOUTH DAILY 30 tablet 3    lidocaine (LIDODERM) 5 % Place 1 patch onto the skin daily 12 hours on, 12 hours off.  30 patch 0    Cholecalciferol (VITAMIN D3) 50 MCG (2000 UT) CAPS Take 2,000 capsules by mouth daily      loratadine (CLARITIN) 10 MG tablet Take 10 mg by mouth daily      ELDERBERRY PO Take by mouth 1 gummy nightly      estrogens, conjugated,-methyltestosterone (ESTRATEST) 1.25-2.5 MG per tablet       acetaminophen (TYLENOL) 500 MG tablet Take 500 mg by mouth every 6 hours as needed for Pain      fluticasone (FLONASE) 50 MCG/ACT nasal spray SPRAY TWO SPRAYS IN EACH NOSTRIL ONCE DAILY 1 Bottle 5    SUMAtriptan (IMITREX) 100 MG tablet Take 100 mg by mouth daily as needed       No facility-administered encounter medications on file as of 5/12/2020. Return in about 2 months (around 7/12/2020) for lab result discussion and treatment plan, medication monitoring. The risks and benefits of my recommendations, as well as other treatment options, benefits and side effects were discussed with the patient today. Questions were answered. --Ji Bailey MD on 5/12/2020 at 9:43 AM    An electronic signature was used to authenticate this note.

## 2020-06-21 ENCOUNTER — PATIENT MESSAGE (OUTPATIENT)
Dept: RHEUMATOLOGY | Age: 40
End: 2020-06-21

## 2020-06-22 ENCOUNTER — OFFICE VISIT (OUTPATIENT)
Dept: FAMILY MEDICINE CLINIC | Age: 40
End: 2020-06-22
Payer: MEDICAID

## 2020-06-22 VITALS
OXYGEN SATURATION: 98 % | HEIGHT: 65 IN | BODY MASS INDEX: 37.19 KG/M2 | WEIGHT: 223.2 LBS | SYSTOLIC BLOOD PRESSURE: 116 MMHG | DIASTOLIC BLOOD PRESSURE: 76 MMHG | TEMPERATURE: 98.3 F | HEART RATE: 88 BPM

## 2020-06-22 PROCEDURE — G8427 DOCREV CUR MEDS BY ELIG CLIN: HCPCS | Performed by: NURSE PRACTITIONER

## 2020-06-22 PROCEDURE — G8417 CALC BMI ABV UP PARAM F/U: HCPCS | Performed by: NURSE PRACTITIONER

## 2020-06-22 PROCEDURE — 99214 OFFICE O/P EST MOD 30 MIN: CPT | Performed by: NURSE PRACTITIONER

## 2020-06-22 PROCEDURE — 1036F TOBACCO NON-USER: CPT | Performed by: NURSE PRACTITIONER

## 2020-06-22 RX ORDER — DESVENLAFAXINE 50 MG/1
50 TABLET, EXTENDED RELEASE ORAL DAILY
Qty: 90 TABLET | Refills: 1 | Status: SHIPPED | OUTPATIENT
Start: 2020-06-22 | End: 2020-10-07 | Stop reason: SDUPTHER

## 2020-06-22 ASSESSMENT — ENCOUNTER SYMPTOMS
SHORTNESS OF BREATH: 0
COUGH: 0
SINUS PRESSURE: 0
SORE THROAT: 0
RHINORRHEA: 0

## 2020-06-22 NOTE — TELEPHONE ENCOUNTER
From: Hollie Jain  To: Isaiah Bashir MD  Sent: 6/21/2020 10:34 PM EDT  Subject: Prescription Question    Good evening, Dr. Deborah Juarez,     I picked up my rx of Sulfasalazine this weekend. I noticed that it has the same instructions as the first round. Is that correct?      Thanks, [place]

## 2020-06-22 NOTE — PATIENT INSTRUCTIONS
social groups, or volunteer to help others. Being alone sometimes makes things seem worse than they are. · Get at least 30 minutes of exercise on most days of the week to relieve stress. Walking is a good choice. You also may want to do other activities, such as running, swimming, cycling, or playing tennis or team sports. Relaxation techniques  Do relaxation exercises 10 to 20 minutes a day. You can play soothing, relaxing music while you do them, if you wish. · Tell others in your house that you are going to do your relaxation exercises. Ask them not to disturb you. · Find a comfortable place, away from all distractions and noise. · Lie down on your back, or sit with your back straight. · Focus on your breathing. Make it slow and steady. · Breathe in through your nose. Breathe out through either your nose or mouth. · Breathe deeply, filling up the area between your navel and your rib cage. Breathe so that your belly goes up and down. · Do not hold your breath. · Breathe like this for 5 to 10 minutes. Notice the feeling of calmness throughout your whole body. As you continue to breathe slowly and deeply, relax by doing the following for another 5 to 10 minutes:  · Tighten and relax each muscle group in your body. You can begin at your toes and work your way up to your head. · Imagine your muscle groups relaxing and becoming heavy. · Empty your mind of all thoughts. · Let yourself relax more and more deeply. · Become aware of the state of calmness that surrounds you. · When your relaxation time is over, you can bring yourself back to alertness by moving your fingers and toes and then your hands and feet and then stretching and moving your entire body. Sometimes people fall asleep during relaxation, but they usually wake up shortly afterward. · Always give yourself time to return to full alertness before you drive a car or do anything that might cause an accident if you are not fully alert.  Never play a relaxation tape while you drive a car. When should you call for help? [id number] anytime you think you may need emergency care. For example, call if:  · You feel you cannot stop from hurting yourself or someone else. Keep the numbers for these national suicide hotlines: 7-919-452-TALK (8-407.959.2355) and 7-345-KKALXSE (1-653.909.4027). If you or someone you know talks about suicide or feeling hopeless, get help right away. Watch closely for changes in your health, and be sure to contact your doctor if:  · You have anxiety or fear that affects your life. · You have symptoms of anxiety that are new or different from those you had before. Where can you learn more? Go to https://[email or web address] org and sign in to your [employer] account. Enter P754 in the [employer] box to learn more about \"Anxiety Disorder: Care Instructions. \"     If you do not have an account, please click on the \"Sign Up Now\" link. Current as of: January 31, 2020               Content Version: 12.5  © 2006-2020 Healthwise, Incorporated. Care instructions adapted under license by [facility] ([facility]). If you have questions about a medical condition or this instruction, always ask your healthcare professional. [address] any warranty or liability for your use of this information.

## 2020-06-22 NOTE — PROGRESS NOTES
2,000 capsules by mouth daily Yes Historical Provider, MD   loratadine (CLARITIN) 10 MG tablet Take 10 mg by mouth daily Yes Historical Provider, MD   ELDERBERRY PO Take by mouth 1 gummy nightly Yes Historical Provider, MD   estrogens, conjugated,-methyltestosterone (ESTRATEST) 1.25-2.5 MG per tablet  Yes Historical Provider, MD   acetaminophen (TYLENOL) 500 MG tablet Take 500 mg by mouth every 6 hours as needed for Pain Yes Historical Provider, MD   fluticasone (FLONASE) 50 MCG/ACT nasal spray SPRAY TWO SPRAYS IN EACH NOSTRIL ONCE DAILY Yes ANDREA Pryor CNP   SUMAtriptan (IMITREX) 100 MG tablet Take 100 mg by mouth daily as needed Yes Historical Provider, MD         Patient's allergies, past medical, surgical, social and family histories werereviewed and updated as appropriate. Review of Systems   Constitutional: Negative for chills and fever. HENT: Positive for nosebleeds. Negative for congestion, ear pain, rhinorrhea, sinus pressure and sore throat. Respiratory: Negative for cough and shortness of breath. Cardiovascular: Negative for chest pain and palpitations. Musculoskeletal: Negative for myalgias. Skin: Positive for rash (between breasts). Psychiatric/Behavioral: Negative for suicidal ideas. The patient is not nervous/anxious. Physical Exam  Vitals signs reviewed. Constitutional:       Appearance: She is well-developed. Cardiovascular:      Rate and Rhythm: Normal rate and regular rhythm. Heart sounds: Normal heart sounds. No murmur. Pulmonary:      Effort: Pulmonary effort is normal.      Breath sounds: Normal breath sounds. Skin:     Findings: Rash (few scattered red papules between breasts. no underlying erythema or swelling. ) present. Neurological:      Mental Status: She is alert and oriented to person, place, and time. Psychiatric:         Mood and Affect: Mood normal.         Behavior: Behavior normal.         Thought Content:  Thought content normal.         Judgment: Judgment normal.       Vitals:    06/22/20 1105   BP: 116/76   Pulse: 88   Temp: 98.3 °F (36.8 °C)   SpO2: 98%   Weight: 223 lb 3.2 oz (101.2 kg)   Height: 5' 5\" (1.651 m)             ASSESSMENT:  1. Depression with anxiety    2. Epistaxis    3. Obstructive sleep apnea on CPAP    4. Dermatitis        PLAN:  1. Depression with anxiety  Improving   Increase dose today-     desvenlafaxine succinate (PRISTIQ) 50 MG TB24 extended release tablet; Take 1 tablet by mouth daily    2. Epistaxis  New problem  Continue humidifier with CPAP  May swab nares with Aquaphor or Vaseline. If still occurring will refer to ENT    3. Obstructive sleep apnea on CPAP  Stable  Continue nightly use of CPAP    4. Dermatitis  New problem  Suspect heat rash  May use OTC hydrocortisone rash. Keep area clean and dry  See pt instructions  F/u 6 months for CPE with routine labs  Discussed use, benefit, and side effects of prescribed medications. All patient questions answered. Pt voiced understanding.

## 2020-07-05 ENCOUNTER — PATIENT MESSAGE (OUTPATIENT)
Dept: FAMILY MEDICINE CLINIC | Age: 40
End: 2020-07-05

## 2020-07-06 NOTE — TELEPHONE ENCOUNTER
Patient called this morning about her concerns regarding Covid 19 exposure. Gave her the # for test site in [place], but she said she would wait to hear back from Randy.

## 2020-07-06 NOTE — TELEPHONE ENCOUNTER
Spoke with patient and she will make her mind up about going or not, she is having no symptoms and her  (co worker tested+) states he was never around him bc he works the [employer] and the other katiana [place] the [place] [employer]

## 2020-07-07 ENCOUNTER — OFFICE VISIT (OUTPATIENT)
Dept: PRIMARY CARE CLINIC | Age: 40
End: 2020-07-07
Payer: MEDICAID

## 2020-07-07 PROCEDURE — G8417 CALC BMI ABV UP PARAM F/U: HCPCS | Performed by: FAMILY MEDICINE

## 2020-07-07 PROCEDURE — 99211 OFF/OP EST MAY X REQ PHY/QHP: CPT | Performed by: FAMILY MEDICINE

## 2020-07-07 PROCEDURE — G8428 CUR MEDS NOT DOCUMENT: HCPCS | Performed by: FAMILY MEDICINE

## 2020-07-07 NOTE — PROGRESS NOTES
Nubia Jimmymykel received a viral test for COVID-19. They were educated on isolation and quarantine as appropriate. For any symptoms, they were directed to seek care from their PCP, given contact information to establish with a doctor, directed to an urgent care or the emergency room.

## 2020-07-07 NOTE — PATIENT INSTRUCTIONS
hands are visibly dirty.   ; Avoid touching: Avoid touching your eyes, nose, and mouth with unwashed hands. Handwashing Tips   ; Wet your hands with clean, running water (warm or cold), turn off the tap, and apply soap.  ; Lather your hands by rubbing them together with the soap. Lather the backs of your hands, between your fingers, and under your nails. ; Scrub your hands for at least 20 seconds. Need a timer? Hum the Dolphin from beginning to end twice.  ; Rinse your hands well under clean, running water.  ; Dry your hands using a clean towel or air dry them. Avoid sharing personal household items   ; Do not share: You should not share dishes, drinking glasses, cups, eating utensils, towels, or bedding with other people or pets in your home.   ; Wash thoroughly after use: After using these items, they should be washed thoroughly with soap and water. Clean all high-touch surfaces everyday   ; Clean and disinfect: Practice routine cleaning of high touch surfaces.  ; High touch surfaces include counters, tabletops, doorknobs, bathroom fixtures, toilets, phones, keyboards, tablets, and bedside tables.  ; Disinfect areas with bodily fluids: Also, clean any surfaces that may have blood, stool, or body fluids on them.   ; Household : Use a household cleaning spray or wipe, according to the label instructions. Labels contain instructions for safe and effective use of the cleaning product including precautions you should take when applying the product, such as wearing gloves and making sure you have good ventilation during use of the product.     Monitor your symptoms   Seek medical attention: Seek prompt medical attention if your illness is worsening     (e.g., difficulty breathing).   ; Call your doctor: Before seeking care, call your healthcare provider and tell them that you have, or are being evaluated for, COVID-19.   ; Wear a facemask when sick: Put on a facemask before you enter the

## 2020-07-10 LAB
SARS-COV-2: NOT DETECTED
SOURCE: NORMAL

## 2020-07-16 DIAGNOSIS — H10.9 CONJUNCTIVITIS OF BOTH EYES, UNSPECIFIED CONJUNCTIVITIS TYPE: ICD-10-CM

## 2020-07-16 DIAGNOSIS — M19.90 CHRONIC INFLAMMATORY ARTHRITIS: ICD-10-CM

## 2020-07-16 DIAGNOSIS — Z79.899 HIGH RISK MEDICATION USE: ICD-10-CM

## 2020-07-16 DIAGNOSIS — R21 RASH: ICD-10-CM

## 2020-07-16 LAB
ALT SERPL-CCNC: 13 U/L (ref 10–40)
AST SERPL-CCNC: 18 U/L (ref 15–37)
BASOPHILS ABSOLUTE: 0 K/UL (ref 0–0.2)
BASOPHILS RELATIVE PERCENT: 0.3 %
C-REACTIVE PROTEIN: 11.1 MG/L (ref 0–5.1)
CREAT SERPL-MCNC: 0.8 MG/DL (ref 0.6–1.1)
EOSINOPHILS ABSOLUTE: 0.1 K/UL (ref 0–0.6)
EOSINOPHILS RELATIVE PERCENT: 2.2 %
GFR AFRICAN AMERICAN: >60
GFR NON-AFRICAN AMERICAN: >60
HCT VFR BLD CALC: 41.8 % (ref 36–48)
HEMOGLOBIN: 13.6 G/DL (ref 12–16)
LYMPHOCYTES ABSOLUTE: 1.7 K/UL (ref 1–5.1)
LYMPHOCYTES RELATIVE PERCENT: 34.4 %
MCH RBC QN AUTO: 30.2 PG (ref 26–34)
MCHC RBC AUTO-ENTMCNC: 32.4 G/DL (ref 31–36)
MCV RBC AUTO: 93.3 FL (ref 80–100)
MONOCYTES ABSOLUTE: 0.3 K/UL (ref 0–1.3)
MONOCYTES RELATIVE PERCENT: 5.4 %
NEUTROPHILS ABSOLUTE: 2.9 K/UL (ref 1.7–7.7)
NEUTROPHILS RELATIVE PERCENT: 57.7 %
PDW BLD-RTO: 13.2 % (ref 12.4–15.4)
PLATELET # BLD: 212 K/UL (ref 135–450)
PMV BLD AUTO: 9.3 FL (ref 5–10.5)
RBC # BLD: 4.48 M/UL (ref 4–5.2)
WBC # BLD: 4.9 K/UL (ref 4–11)

## 2020-07-17 LAB — ANTI-NUCLEAR ANTIBODY (ANA): NEGATIVE

## 2020-07-18 LAB — ANGIOTENSIN CONVERTING ENZYME: 24 U/L (ref 9–67)

## 2020-07-22 RX ORDER — SULFASALAZINE 500 MG/1
1000 TABLET ORAL 2 TIMES DAILY
Qty: 120 TABLET | Refills: 1 | Status: SHIPPED | OUTPATIENT
Start: 2020-07-22 | End: 2020-07-30 | Stop reason: SDUPTHER

## 2020-07-30 ENCOUNTER — VIRTUAL VISIT (OUTPATIENT)
Dept: RHEUMATOLOGY | Age: 40
End: 2020-07-30
Payer: MEDICAID

## 2020-07-30 PROCEDURE — G8417 CALC BMI ABV UP PARAM F/U: HCPCS | Performed by: INTERNAL MEDICINE

## 2020-07-30 PROCEDURE — 1036F TOBACCO NON-USER: CPT | Performed by: INTERNAL MEDICINE

## 2020-07-30 PROCEDURE — G8427 DOCREV CUR MEDS BY ELIG CLIN: HCPCS | Performed by: INTERNAL MEDICINE

## 2020-07-30 PROCEDURE — 99214 OFFICE O/P EST MOD 30 MIN: CPT | Performed by: INTERNAL MEDICINE

## 2020-07-30 RX ORDER — NAPROXEN 500 MG/1
500 TABLET ORAL 2 TIMES DAILY
Qty: 60 TABLET | Refills: 3 | Status: SHIPPED | OUTPATIENT
Start: 2020-07-30 | End: 2020-11-05 | Stop reason: SDUPTHER

## 2020-07-30 RX ORDER — SULFASALAZINE 500 MG/1
1000 TABLET ORAL 2 TIMES DAILY
Qty: 120 TABLET | Refills: 2 | Status: SHIPPED | OUTPATIENT
Start: 2020-07-30 | End: 2020-10-30

## 2020-07-30 NOTE — PROGRESS NOTES
TELEHEALTH EVALUATION -- Audio/Visual (During BQWWD-39 public health emergency)    Pursuant to the emergency declaration under the [address] [place] [address] [facility] authority and the [employer] Act, this Virtual  Visit was conducted, with patient's consent, to reduce the patient's risk of exposure to COVID-19 and provide continuity of care for an established patient. Services were provided through a video synchronous discussion virtually to substitute for in-person clinic visit. RHEUMATOLOGY TELEHEALTH VIDEO VISIT NOTE    SUBJECTIVE:    Background:   Vilma Lockwood (:  1980) has requested an audio/video evaluation for the following rheumatologic concern(s):    Vilma Lockwood is a 44 y.o. female w/ probable early seronegative inflammatory arthritis/seronegative SpA (predominantly ankles and knees) and chronic LBP d/t mild degenerative arthritis s/p lumbar laminectomy in .  PMHx pertinent for EMELINA on CPAP, RLS, depression, anxiety.     Current rheum meds:  SSZ 1000 mg BID: started in   Naproxen 500 mg BID     Prior rheum meds:  Ibuprofen: ineffective    Past medical/surgical history, medications and allergies are reviewed and updated as appropriate. Interval Hx:   Pt states she started SSZ approximately two months ago, she is currently taking 1000 mg BID and is tolerating this dose w/o difficulty. She feels SSZ has improved her arthralgias and stiffness in her MCP joints, knees and ankles. She feels the swelling in her MCP joints has improved but still occurs at times. Denies significant morning stiffness. She feels her chronic LBP has also improved to some extent, she develops stiffness in her lower back after prolonged period of inactivity such as sitting in the car. She remains on Naproxen and finds good pain relief from this.     Pt states she has been evaluated by Dermatology for her rash and was diagnosed sulfaSALAzine (AZULFIDINE) 500 MG tablet Take 2 tablets by mouth 2 times daily 120 tablet 2    naproxen (NAPROSYN) 500 MG tablet Take 1 tablet by mouth 2 times daily 60 tablet 3    desvenlafaxine succinate (PRISTIQ) 50 MG TB24 extended release tablet Take 1 tablet by mouth daily 90 tablet 1    rOPINIRole (REQUIP) 1 MG tablet TAKE ONE TABLET BY MOUTH DAILY AS NEEDED AND THEN TAKE ONE TO TWO TABLETS BY MOUTH EVERY NIGHT 2 HOURS BEFORE BEDTIME 90 tablet 4    lidocaine (LIDODERM) 5 % Place 1 patch onto the skin daily 12 hours on, 12 hours off. 30 patch 0    Cholecalciferol (VITAMIN D3) 50 MCG (2000 UT) CAPS Take 2,000 capsules by mouth daily      loratadine (CLARITIN) 10 MG tablet Take 10 mg by mouth daily      ELDERBERRY PO Take by mouth 1 gummy nightly      estrogens, conjugated,-methyltestosterone (ESTRATEST) 1.25-2.5 MG per tablet       acetaminophen (TYLENOL) 500 MG tablet Take 500 mg by mouth every 6 hours as needed for Pain      fluticasone (FLONASE) 50 MCG/ACT nasal spray SPRAY TWO SPRAYS IN EACH NOSTRIL ONCE DAILY 1 Bottle 5    SUMAtriptan (IMITREX) 100 MG tablet Take 100 mg by mouth daily as needed       No current facility-administered medications for this visit. OBJECTIVE:    PHYSICAL EXAMINATION:  Due to this being a TeleHealth encounter, evaluation of the following organ systems is limited: Vitals/Constitutional/EENT/Resp/CV/GI//MS/Neuro/Skin/Heme-Lymph-Imm. Constitutional: Normal  Level of distress: Normal  Overall appearance: Normal  Psychiatric: Normal, Appropriate mood and affect. Good insight, good judgment. Orientation: Oriented to time, place, person and situation. Eyes: Vision grossly intact, no visible conjunctival injection  Hearing: Grossly intact  Musculoskeletal: (exam limited by TeleHealth encounter), prior swelling in b/l MCP joints resolved, able to form full fist w/ both hands w/o difficulty    DATA:  Labs:   I personally reviewed interval labs and discussed w/ changes.      MRI pelvis (11/20/19):  1. Trace amount of nonspecific fluid in the left sacroiliac joint.  No additional changes to suggest inflammatory arthropathy. 2. Minimal right sacroiliac degenerative change. 3. No abnormality in the hip joints. 4. Mild bilateral gluteus minimus tendinosis and proximal hamstring tendinosis. 5. Mild degenerative changes at L4-5 and L5-S1.      I discussed her MRI findings w/ the reading radiologist on 12/2/19, she has mild degenerative arthritis in her sacroiliac joints (inside her pelvis) but nothing inflammatory in nature. Above results were discussed w/ the pt in detail during today's visit. ASSESSMENT/PLAN:   Inflammatory arthralgias and chronic LBP responding well to Naproxen and SSZ. Suspect she has early seronegative RA/SpA given her clinical report of inflammatory arthralgias w/ good response to NSAIDs and Prednisone and MCP swelling appreciated on prior exam.  Repeat CRP slightly improved but still elevated, for now cont current IS regimen and will re-examine joints in the office at her next office visit. Instructed pt to call our office if her LBP worsens, will consider repeating MRI study at that point to evaluate for SpA. Alanna Carreno was seen today for follow-up. Diagnoses and all orders for this visit:    Chronic inflammatory arthritis  -     sulfaSALAzine (AZULFIDINE) 500 MG tablet; Take 2 tablets by mouth 2 times daily  -     naproxen (NAPROSYN) 500 MG tablet; Take 1 tablet by mouth 2 times daily    High risk medication use    Encounter for therapeutic drug monitoring    Spondylosis of lumbar region without myelopathy or radiculopathy  -     naproxen (NAPROSYN) 500 MG tablet; Take 1 tablet by mouth 2 times daily          No orders of the defined types were placed in this encounter.     Outpatient Encounter Medications as of 7/30/2020   Medication Sig Dispense Refill    sulfaSALAzine (AZULFIDINE) 500 MG tablet Take 2 tablets by mouth 2 times daily 120 tablet 2    naproxen (NAPROSYN) 500 MG tablet Take 1 tablet by mouth 2 times daily 60 tablet 3    desvenlafaxine succinate (PRISTIQ) 50 MG TB24 extended release tablet Take 1 tablet by mouth daily 90 tablet 1    rOPINIRole (REQUIP) 1 MG tablet TAKE ONE TABLET BY MOUTH DAILY AS NEEDED AND THEN TAKE ONE TO TWO TABLETS BY MOUTH EVERY NIGHT 2 HOURS BEFORE BEDTIME 90 tablet 4    lidocaine (LIDODERM) 5 % Place 1 patch onto the skin daily 12 hours on, 12 hours off. 30 patch 0    Cholecalciferol (VITAMIN D3) 50 MCG (2000 UT) CAPS Take 2,000 capsules by mouth daily      loratadine (CLARITIN) 10 MG tablet Take 10 mg by mouth daily      ELDERBERRY PO Take by mouth 1 gummy nightly      estrogens, conjugated,-methyltestosterone (ESTRATEST) 1.25-2.5 MG per tablet       acetaminophen (TYLENOL) 500 MG tablet Take 500 mg by mouth every 6 hours as needed for Pain      fluticasone (FLONASE) 50 MCG/ACT nasal spray SPRAY TWO SPRAYS IN EACH NOSTRIL ONCE DAILY 1 Bottle 5    SUMAtriptan (IMITREX) 100 MG tablet Take 100 mg by mouth daily as needed      [DISCONTINUED] sulfaSALAzine (AZULFIDINE) 500 MG tablet Take 2 tablets by mouth 2 times daily 120 tablet 1    [DISCONTINUED] naproxen (NAPROSYN) 500 MG tablet Take 1 tablet by mouth 2 times daily 60 tablet 3     No facility-administered encounter medications on file as of 7/30/2020. Return in about 3 months (around 10/30/2020) for lab result discussion and treatment plan, medication monitoring. The risks and benefits of my recommendations, as well as other treatment options, benefits and side effects were discussed with the patient today. Questions were answered. --Tong Simental MD on 7/30/2020 at 9:17 AM    An electronic signature was used to authenticate this note.

## 2020-09-08 RX ORDER — CETIRIZINE HYDROCHLORIDE 10 MG/1
10 TABLET ORAL DAILY
Qty: 30 TABLET | Refills: 11 | Status: SHIPPED | OUTPATIENT
Start: 2020-09-08 | End: 2020-10-08

## 2020-09-22 ENCOUNTER — VIRTUAL VISIT (OUTPATIENT)
Dept: FAMILY MEDICINE CLINIC | Age: 40
End: 2020-09-22
Payer: MEDICAID

## 2020-09-22 ENCOUNTER — NURSE TRIAGE (OUTPATIENT)
Dept: OTHER | Facility: CLINIC | Age: 40
End: 2020-09-22

## 2020-09-22 PROCEDURE — 99213 OFFICE O/P EST LOW 20 MIN: CPT | Performed by: NURSE PRACTITIONER

## 2020-09-22 PROCEDURE — G8427 DOCREV CUR MEDS BY ELIG CLIN: HCPCS | Performed by: NURSE PRACTITIONER

## 2020-09-22 RX ORDER — PREDNISONE 10 MG/1
TABLET ORAL
Qty: 20 TABLET | Refills: 0 | Status: SHIPPED | OUTPATIENT
Start: 2020-09-22 | End: 2020-10-07 | Stop reason: ALTCHOICE

## 2020-09-22 RX ORDER — ALBUTEROL SULFATE 90 UG/1
2 AEROSOL, METERED RESPIRATORY (INHALATION) EVERY 6 HOURS PRN
Qty: 1 INHALER | Refills: 3 | Status: SHIPPED | OUTPATIENT
Start: 2020-09-22 | End: 2022-06-08 | Stop reason: SDUPTHER

## 2020-09-22 ASSESSMENT — ENCOUNTER SYMPTOMS
CHEST TIGHTNESS: 1
DIARRHEA: 0
SORE THROAT: 0
EYE ITCHING: 1
VOMITING: 0
SINUS PRESSURE: 1
COUGH: 1
SHORTNESS OF BREATH: 1
WHEEZING: 1

## 2020-09-22 NOTE — TELEPHONE ENCOUNTER
Reason for Disposition   MODERATE-SEVERE nasal allergy symptoms (i.e., interfere with sleep, school, or work) and taking antihistamines > 2 days    Answer Assessment - Initial Assessment Questions  1. SYMPTOM: \"What's the main symptom you're concerned about? \" (e.g., runny nose, stuffiness, sneezing, itching)      Itchy watery eye, cough, post nasal drip  2. SEVERITY: \"How bad is it? \" \"What does it keep you from doing? \" (e.g., sleeping, working)      Difficulty sleeping  3. EYES: \"Are the eyes also red, watery, and itchy? \"       Yes  4. TRIGGER: \"What pollen or other allergic substance do you think is causing the symptoms? \"       Ragweed   5. TREATMENT: \"What medicine are you using? \" \"What medicine worked best in the past?\"      Zyrtec and flonase, also taking benadryl and eye drops    6. OTHER SYMPTOMS: \"Do you have any other symptoms? \" (e.g., coughing, difficulty breathing, wheezing)     Wheezing started 9/21 but seems to be better this morning  7. PREGNANCY: \"Is there any chance you are pregnant? \" \"When was your last menstrual period? \"        No    Protocols used: NASAL ALLERGIES (HAY FEVER)-ADULT-[place]    Caller provided care advice and instructed to call back with worsening symptoms. Spoke with DJ to schedule appointment.

## 2020-09-22 NOTE — PATIENT INSTRUCTIONS
Patient Education        Managing Your Allergies: Care Instructions  Your Care Instructions     Managing your allergies is an important part of staying healthy. Your doctor will help you find out what may be causing the allergies. Common causes of allergy symptoms are house dust and dust mites, animal dander, mold, and pollen. As soon as you know what triggers your symptoms, try to reduce your exposure to your triggers. This can help prevent allergy symptoms, asthma, and other health problems. Ask your doctor about allergy medicine or immunotherapy. These treatments may help reduce or prevent allergy symptoms. Follow-up care is a key part of your treatment and safety. Be sure to make and go to all appointments, and call your doctor if you are having problems. It's also a good idea to know your test results and keep a list of the medicines you take. How can you care for yourself at home? · If you think that dust or dust mites are causing your allergies:  ? Wash sheets, pillowcases, and other bedding every week in hot water. ? Use airtight, dust-proof covers for pillows, duvets, and mattresses. Avoid plastic covers, because they tend to tear quickly and do not \"breathe. \" Wash according to the instructions. ? Remove extra blankets and pillows that you don't need. ? Use blankets that are machine-washable. ? Don't use home humidifiers. They can help mites live longer. · Use air-conditioning. Change or clean all filters every month. Keep windows closed. Use high-efficiency air filters. Don't use window or attic fans, which draw dust into the air. · If you're allergic to pet dander, keep pets outside or, at the very least, out of your bedroom. Old carpet and cloth-covered furniture can hold a lot of animal dander. You may need to replace them. · Look for signs of cockroaches. Use cockroach baits to get rid of them. Then clean your home well.   · If you're allergic to mold, don't keep indoor plants, because molds can grow in soil. Get rid of furniture, rugs, and drapes that smell musty. Check for mold in the bathroom. · If you're allergic to pollen, stay inside when pollen counts are high. · Don't smoke or let anyone else smoke in your house. Don't use fireplaces or wood-burning stoves. Avoid paint fumes, perfumes, and other strong odors. When should you call for help? Give an epinephrine shot if:  · You think you are having a severe allergic reaction. After giving an epinephrine shot call 911, even if you feel better. [id number] if:  · You have symptoms of a severe allergic reaction. These may include:  ? Sudden raised, red areas (hives) all over your body. ? Swelling of the throat, mouth, lips, or tongue. ? Trouble breathing. ? Passing out (losing consciousness). Or you may feel very lightheaded or suddenly feel weak, confused, or restless. · You have been given an epinephrine shot, even if you feel better. Call your doctor now or seek immediate medical care if:  · You have symptoms of an allergic reaction, such as:  ? A rash or hives (raised, red areas on the skin). ? Itching. ? Swelling. ? Belly pain, nausea, or vomiting. Watch closely for changes in your health, and be sure to contact your doctor if:  · Your allergies get worse. · You need help controlling your allergies. · You have questions about allergy testing. · You do not get better as expected. Where can you learn more? Go to [email or web address] and sign in to your [facility] account. Enter L249 in the [facility] box to learn more about \"Managing Your Allergies: Care Instructions. \"     If you do not have an account, please click on the \"Sign Up Now\" link. Current as of: October 7, 2019               Content Version: 12.5  © 1522-8338 [employer]wise, Incorporated. Care instructions adapted under license by [facility] ([facility]).  If you have questions about a medical condition or this instruction, always ask your healthcare professional. [address] any warranty or liability for your use of this information.

## 2020-09-22 NOTE — PROGRESS NOTES
2020      TELEHEALTH EVALUATION -- Audio/Visual (During EWVEJ-42 public health emergency)    HPI:    Earl Jansen (:  1980) has requested an audio/video evaluation for the following concern(s):    Cough   This is a new problem. The current episode started in the past 7 days (3 days ago). The problem has been gradually worsening. Associated symptoms include postnasal drip, shortness of breath and wheezing. Pertinent negatives include no chills, ear pain, fever or sore throat. Treatments tried: Zyrtec, Benadryl. Review of Systems   Constitutional: Positive for fatigue. Negative for chills and fever. HENT: Positive for congestion, postnasal drip and sinus pressure. Negative for ear pain and sore throat. Eyes: Positive for itching. Respiratory: Positive for cough, chest tightness, shortness of breath and wheezing. Gastrointestinal: Negative for diarrhea and vomiting. No loss of smell or taste   Neurological: Negative for dizziness and light-headedness. Prior to Visit Medications    Medication Sig Taking?  Authorizing Provider   albuterol sulfate HFA (PROVENTIL HFA) 108 (90 Base) MCG/ACT inhaler Inhale 2 puffs into the lungs every 6 hours as needed for Wheezing Yes Levon Favre, APRN - CNP   predniSONE (DELTASONE) 10 MG tablet 4 po daily for 2 days, 3 for 2 days, 2 for 2 days, 1 for 2 days, then stop Yes Levon Favre, APRN - CNP   cetirizine (ZYRTEC) 10 MG tablet Take 1 tablet by mouth daily Yes Levon Favre, APRN - CNP   naproxen (NAPROSYN) 500 MG tablet Take 1 tablet by mouth 2 times daily Yes Tiff Mackenzie MD   desvenlafaxine succinate (PRISTIQ) 50 MG TB24 extended release tablet Take 1 tablet by mouth daily Yes Levon Favre, APRN - CNP   rOPINIRole (REQUIP) 1 MG tablet TAKE ONE TABLET BY MOUTH DAILY AS NEEDED AND THEN TAKE ONE TO TWO TABLETS BY MOUTH EVERY NIGHT 2 HOURS BEFORE BEDTIME Yes Eitan Eduardo MD   lidocaine (LIDODERM) 5 % Place 1 patch onto the skin daily 12 hours on, 12 hours off.  Yes Shivani Marks PA-C   Cholecalciferol (VITAMIN D3) 50 MCG (2000 UT) CAPS Take 2,000 capsules by mouth daily Yes Historical Provider, MD   loratadine (CLARITIN) 10 MG tablet Take 10 mg by mouth daily Yes Historical Provider, MD   ELDERBERRY PO Take by mouth 1 gummy nightly Yes Historical Provider, MD   estrogens, conjugated,-methyltestosterone (ESTRATEST) 1.25-2.5 MG per tablet  Yes Historical Provider, MD   acetaminophen (TYLENOL) 500 MG tablet Take 500 mg by mouth every 6 hours as needed for Pain Yes Historical Provider, MD   fluticasone (FLONASE) 50 MCG/ACT nasal spray SPRAY TWO SPRAYS IN EACH NOSTRIL ONCE DAILY Yes ANDREA Dent CNP   SUMAtriptan (IMITREX) 100 MG tablet Take 100 mg by mouth daily as needed Yes Historical Provider, MD   sulfaSALAzine (AZULFIDINE) 500 MG tablet Take 2 tablets by mouth 2 times daily  Renetta Bee MD       Past Medical History:   Diagnosis Date    Anxiety     Depression     WELL CONTROLLED 10-16-17    Heart rate fast     intermittent    Kidney stone     Motor tic disorder     Sleep apnea        Past Surgical History:   Procedure Laterality Date    ABDOMINAL EXPLORATION SURGERY  7/22/11    excision Meckels diverticulum    ABDOMINOPLASTY  4/14/14    Akira Kotyk ADENOIDECTOMY Bilateral     APPENDECTOMY  7/22/11    BLADDER REMOVAL  04/13/2018    OPERATIVE LAPAROSCOPY, LEFT OVARIAN CYSTECTOMY WITH DILATATION AND CURETTAGE    HYSTERECTOMY, TOTAL ABDOMINAL  06/12/2018    robotic total hyst BSO, Dr Severo Cummins LITHOTRIPSY  x2    LUMBAR LAMINECTOMY  6/13    Rad; left l5-s1    OTHER SURGICAL HISTORY      hymenoplasty    TONSILLECTOMY Bilateral        Family History   Problem Relation Age of Onset    Asthma Mother     High Blood Pressure Mother     Diabetes Father     Atrial Fibrillation Father     Alcohol Abuse Father     High Blood Pressure Father     Heart Disease Paternal Grandmother     Heart Attack Paternal Grandmother     Diabetes Paternal Grandmother     Stroke Paternal Grandfather     Stroke Maternal Grandfather     Atrial Fibrillation Maternal Grandmother     Diabetes Maternal Grandmother     Cancer Neg Hx        Allergies   Allergen Reactions    Metronidazole Rash and Other (See Comments)     LIPS WERE TINGLING, tongue tingling, hives all over body    Other      Glue used to adhere recent surgical incision       Social History     Tobacco Use    Smoking status: Never Smoker    Smokeless tobacco: Never Used   Substance Use Topics    Alcohol use: No     Alcohol/week: 0.0 standard drinks     Comment: once a week    Drug use: No          PHYSICAL EXAMINATION:  Vital Signs: (As obtained by patient/caregiver or practitioner observation)  [facility] 03/13/2018 (Exact Date)   Respiratory rate appears normal  Able to speak in full sentences without difficulty    Constitutional: Appears well-developed and well-nourished. No apparent distress    Mental status: Alert and awake. Oriented to person/place/darwin. Able to follow commands    Eyes: EOM normal. Sclera normal. No discharge visible  HENT: Normocephalic, atraumatic. Mouth/Throat: Mucous membranes are moist. External Ears Normal    Neck: No visualized mass   Pulmonary/Chest: Respiratory effort normal.  No visualized signs of difficulty breathing or respiratory distress        Musculoskeletal:  Normal range of motion of neck  Neurological:       No Facial Asymmetry (Cranial nerve 7 motor function) (limited exam to video visit). No gaze palsy       Skin:  No significant exanthematous lesions or discoloration noted on facial skin       Psychiatric: Normal Affect. No Hallucinations            ASSESSMENT/PLAN:  1. Allergic cough  Continue Zyrtec  Can add Benadryl nightly for 2-3 nights  Rest and push fluids  - albuterol sulfate HFA (PROVENTIL HFA) 108 (90 Base) MCG/ACT inhaler;  Inhale 2 puffs into the lungs every 6 hours as needed for Wheezing  Dispense: 1 Inhaler; Refill: 3  - predniSONE (DELTASONE) 10 MG tablet; 4 po daily for 2 days, 3 for 2 days, 2 for 2 days, 1 for 2 days, then stop  Dispense: 20 tablet; Refill: 0  Flu clinic schedule number given  Recommend quarantine until 3 days asymptomatic    2. Wheezing  - albuterol sulfate HFA (PROVENTIL HFA) 108 (90 Base) MCG/ACT inhaler; Inhale 2 puffs into the lungs every 6 hours as needed for Wheezing  Dispense: 1 Inhaler; Refill: 3  - predniSONE (DELTASONE) 10 MG tablet; 4 po daily for 2 days, 3 for 2 days, 2 for 2 days, 1 for 2 days, then stop  Dispense: 20 tablet; Refill: 0      Return if symptoms worsen or fail to improve. Pao Hooker is a 44 y.o. female being evaluated by a Virtual Visit (video visit) encounter to address concerns as mentioned above. A caregiver was present when appropriate. Due to this being a TeleHealth encounter ([facility] emergency), evaluation of the following organ systems was limited: Vitals/Constitutional/EENT/Resp/CV/GI//MS/Neuro/Skin/Heme-Lymph-Imm. Pursuant to the emergency declaration under the [address] [place] [address] and the [employer] and [employer] Act, this Virtual Visit was conducted with patient's (and/or legal guardian's) consent, to reduce the patient's risk of exposure to COVID-19 and provide necessary medical care. The patient (and/or legal guardian) has also been advised to contact this office for worsening conditions or problems, and seek emergency medical treatment and/or call 911 if deemed necessary. Patient identification was verified at the start of the visit: Yes    Total time spent on this encounter: Not billed by time minutes    Services were provided through a video synchronous discussion virtually to substitute for in-person clinic visit. Patient and provider were located at their individual homes.     --ANDREA Amador - CNP on 9/22/2020 at 1:36

## 2020-09-24 ENCOUNTER — TELEPHONE (OUTPATIENT)
Dept: FAMILY MEDICINE CLINIC | Age: 40
End: 2020-09-24

## 2020-09-24 ENCOUNTER — OFFICE VISIT (OUTPATIENT)
Dept: PRIMARY CARE CLINIC | Age: 40
End: 2020-09-24
Payer: MEDICAID

## 2020-09-24 ENCOUNTER — NURSE TRIAGE (OUTPATIENT)
Dept: OTHER | Facility: CLINIC | Age: 40
End: 2020-09-24

## 2020-09-24 PROCEDURE — G8428 CUR MEDS NOT DOCUMENT: HCPCS | Performed by: NURSE PRACTITIONER

## 2020-09-24 PROCEDURE — G8417 CALC BMI ABV UP PARAM F/U: HCPCS | Performed by: NURSE PRACTITIONER

## 2020-09-24 PROCEDURE — 99211 OFF/OP EST MAY X REQ PHY/QHP: CPT | Performed by: NURSE PRACTITIONER

## 2020-09-24 NOTE — TELEPHONE ENCOUNTER
Reason for Disposition   [1] COVID-19 infection suspected by caller or triager AND [2] mild symptoms (cough, fever, or others) AND [1] no complications or SOB    Answer Assessment - Initial Assessment Questions  1. COVID-19 DIAGNOSIS: \"Who made your Coronavirus (COVID-19) diagnosis? \" \"Was it confirmed by a positive lab test?\" If not diagnosed by a HCP, ask \"Are there lots of cases (community spread) where you live? \" (See public health department website, if unsure)      yes  2. ONSET: \"When did the COVID-19 symptoms start? \"       Monday  3. WORST SYMPTOM: \"What is your worst symptom? \" (e.g., cough, fever, shortness of breath, muscle aches)      Fatigue, weakness  4. COUGH: \"Do you have a cough? \" If so, ask: \"How bad is the cough? \"        Yes dry cough  5. FEVER: \"Do you have a fever? \" If so, ask: \"What is your temperature, how was it measured, and when did it start? \"      denies  6. RESPIRATORY STATUS: \"Describe your breathing? \" (e.g., shortness of breath, wheezing, unable to speak)       denies  7. BETTER-SAME-WORSE: [facility] you getting better, staying the same or getting worse compared to yesterday? \"  If getting worse, ask, \"In what way? \"      worse  8. HIGH RISK DISEASE: \"Do you have any chronic medical problems? \" (e.g., asthma, heart or lung disease, weak immune system, etc.)      Possible autoimmune disease  9. PREGNANCY: \"Is there any chance you are pregnant? \" \"When was your last menstrual period? \"      no  10. OTHER SYMPTOMS: \"Do you have any other symptoms? \"  (e.g., chills, fatigue, headache, loss of smell or taste, muscle pain, sore throat)       Fatigue, loss of taste, dry throat, decreased smell    Protocols used: CORONAVIRUS (COVID-19) DIAGNOSED OR SUSPECTED-ADULT-[place]

## 2020-09-24 NOTE — PROGRESS NOTES
Litzy Waggoner received a viral test for COVID-19. They were educated on isolation and quarantine as appropriate. For any symptoms, they were directed to seek care from their PCP, given contact information to establish with a doctor, directed to an urgent care or the emergency room.

## 2020-09-24 NOTE — TELEPHONE ENCOUNTER
Pt is being tested for covid. She will wait on the return to work letter until she gets results back.

## 2020-09-25 LAB — SARS-COV-2, NAA: NOT DETECTED

## 2020-09-28 ENCOUNTER — PATIENT MESSAGE (OUTPATIENT)
Dept: FAMILY MEDICINE CLINIC | Age: 40
End: 2020-09-28

## 2020-09-28 NOTE — LETTER
[address]  Phone: [phone]  Fax: [phone]    ANDREA Lee CNP        September 22, 2020    Patient: Yemi Hendrix   YOB: 1980   Date of Visit: 9/22/2020       To Whom it May Concern:    Yadi Allen was seen in my clinic on 9/22/2020. Please excuse her from work from 9/22-9/25. If you have any questions or concerns, please don't hesitate to call.     Sincerely,         ANDREA Lee CNP

## 2020-09-28 NOTE — TELEPHONE ENCOUNTER
From: Ileana Garcia  To: ANDREA Hobbs - CNP  Sent: 9/28/2020 8:48 AM EDT  Subject: Non-Urgent Medical Question    Thank you!      ----- Message -----   From:JOANNA Garcia   Sent:9/28/2020 8:35 AM EDT   To:Savita Kohler   Subject:RE: Non-Urgent Medical Question    Letter being faxed. Thanks,   Marybeth Barragan      ----- Message -----   From:Savita Kohler   Sent:9/28/2020 8:10 AM EDT   To:ANDREA Alvarado - CNP   Subject:Non-Urgent Medical Question    Can I please get a note excusing my absence from work 9/22-9/25? Please fax to [phone]. Thank you!

## 2020-09-28 NOTE — TELEPHONE ENCOUNTER
Letter has been faxed unsuccessfully several times. Message has been sent to patient through [employer] with a copy of the excuse.

## 2020-10-07 ENCOUNTER — OFFICE VISIT (OUTPATIENT)
Dept: FAMILY MEDICINE CLINIC | Age: 40
End: 2020-10-07
Payer: MEDICAID

## 2020-10-07 VITALS
BODY MASS INDEX: 37.24 KG/M2 | HEART RATE: 93 BPM | WEIGHT: 223.8 LBS | OXYGEN SATURATION: 97 % | DIASTOLIC BLOOD PRESSURE: 84 MMHG | SYSTOLIC BLOOD PRESSURE: 130 MMHG

## 2020-10-07 PROBLEM — E66.01 MORBIDLY OBESE (HCC): Status: ACTIVE | Noted: 2020-10-07

## 2020-10-07 PROCEDURE — G8482 FLU IMMUNIZE ORDER/ADMIN: HCPCS | Performed by: NURSE PRACTITIONER

## 2020-10-07 PROCEDURE — 90686 IIV4 VACC NO PRSV 0.5 ML IM: CPT | Performed by: NURSE PRACTITIONER

## 2020-10-07 PROCEDURE — 99213 OFFICE O/P EST LOW 20 MIN: CPT | Performed by: NURSE PRACTITIONER

## 2020-10-07 PROCEDURE — G8427 DOCREV CUR MEDS BY ELIG CLIN: HCPCS | Performed by: NURSE PRACTITIONER

## 2020-10-07 PROCEDURE — G8417 CALC BMI ABV UP PARAM F/U: HCPCS | Performed by: NURSE PRACTITIONER

## 2020-10-07 PROCEDURE — 1036F TOBACCO NON-USER: CPT | Performed by: NURSE PRACTITIONER

## 2020-10-07 PROCEDURE — 90471 IMMUNIZATION ADMIN: CPT | Performed by: NURSE PRACTITIONER

## 2020-10-07 RX ORDER — AMITRIPTYLINE HYDROCHLORIDE 25 MG/1
25 TABLET, FILM COATED ORAL NIGHTLY
Qty: 30 TABLET | Refills: 2 | Status: SHIPPED | OUTPATIENT
Start: 2020-10-07 | End: 2021-01-04

## 2020-10-07 RX ORDER — DESVENLAFAXINE 25 MG/1
75 TABLET, EXTENDED RELEASE ORAL DAILY
Qty: 270 TABLET | Refills: 1 | Status: SHIPPED | OUTPATIENT
Start: 2020-10-07 | End: 2021-04-13 | Stop reason: SDUPTHER

## 2020-10-07 RX ORDER — ROPINIROLE 1 MG/1
TABLET, FILM COATED ORAL
Qty: 90 TABLET | Refills: 3 | Status: SHIPPED | OUTPATIENT
Start: 2020-10-07 | End: 2020-11-16 | Stop reason: SDUPTHER

## 2020-10-07 ASSESSMENT — ENCOUNTER SYMPTOMS: SHORTNESS OF BREATH: 0

## 2020-10-07 NOTE — PATIENT INSTRUCTIONS

## 2020-10-07 NOTE — PROGRESS NOTES
extended release tablet; Take 3 tablets by mouth daily  Trial-     amitriptyline (ELAVIL) 25 MG tablet; Take 1 tablet by mouth nightly    2. Morbidly obese ([address]  Strongly recommend eliminating concentrated sweets, reducing simple carbs. Avoid corn syrup and hydrogenated oils. Focus on fruits, vegs, whole grains, lean meats and push water. Fish oil, high fiber foods recommended. Recommended at least 30 minutes of aerobic exercise daily. 3. Need for vaccination  -     INFLUENZA, QUADV, 3 YRS AND OLDER, IM PF, PREFILL SYR OR SDV, 0.5ML (AFLURIA QUADV, PF)  See pt instructions  F/u 2 months, sooner prn. Discussed use, benefit, and side effects of prescribed medications. All patient questions answered. Pt voiced understanding.

## 2020-10-08 ENCOUNTER — TELEPHONE (OUTPATIENT)
Dept: FAMILY MEDICINE CLINIC | Age: 40
End: 2020-10-08

## 2020-10-08 NOTE — TELEPHONE ENCOUNTER
Office has been notified that pt is requiring Prior Authorization for the following medication:  -- Desvenlafaxine    Please initiate this request through CoverMyMeds, contacting the following Payor/Insurance:  -- [facility]    Please see below, or the documentation attached to this encounter for any additional information that may assist in processing PA:  --[id number]     Thank you!

## 2020-10-08 NOTE — TELEPHONE ENCOUNTER
Submitted PA for    Desvenlafaxine    [facility] Key: [id number]    STATUS: approved through 10/08/2021    . Please notify patient, thank you.

## 2020-10-29 ENCOUNTER — PATIENT MESSAGE (OUTPATIENT)
Dept: FAMILY MEDICINE CLINIC | Age: 40
End: 2020-10-29

## 2020-10-29 RX ORDER — VALACYCLOVIR HYDROCHLORIDE 1 G/1
2000 TABLET, FILM COATED ORAL 2 TIMES DAILY
Qty: 4 TABLET | Refills: 0 | Status: SHIPPED | OUTPATIENT
Start: 2020-10-29 | End: 2020-10-30

## 2020-10-29 NOTE — TELEPHONE ENCOUNTER
From: Nusrat Rubin  To: ANDREA Mittal CNP  Sent: 10/29/2020 6:20 AM EDT  Subject: Non-Urgent Medical Question    Any magical medication to get rid of a canker sore quicker?

## 2020-10-30 RX ORDER — SULFASALAZINE 500 MG/1
TABLET ORAL
Qty: 120 TABLET | Refills: 0 | Status: SHIPPED | OUTPATIENT
Start: 2020-10-30 | End: 2020-11-05 | Stop reason: SDUPTHER

## 2020-11-05 ENCOUNTER — OFFICE VISIT (OUTPATIENT)
Dept: RHEUMATOLOGY | Age: 40
End: 2020-11-05
Payer: MEDICAID

## 2020-11-05 VITALS
TEMPERATURE: 97.8 F | SYSTOLIC BLOOD PRESSURE: 124 MMHG | BODY MASS INDEX: 37.15 KG/M2 | DIASTOLIC BLOOD PRESSURE: 84 MMHG | WEIGHT: 223 LBS | HEIGHT: 65 IN

## 2020-11-05 DIAGNOSIS — Z79.899 HIGH RISK MEDICATION USE: ICD-10-CM

## 2020-11-05 DIAGNOSIS — Z51.81 ENCOUNTER FOR THERAPEUTIC DRUG MONITORING: ICD-10-CM

## 2020-11-05 DIAGNOSIS — M06.00 SERONEGATIVE RHEUMATOID ARTHRITIS (HCC): ICD-10-CM

## 2020-11-05 LAB
ALT SERPL-CCNC: 15 U/L (ref 10–40)
AST SERPL-CCNC: 18 U/L (ref 15–37)
BASOPHILS ABSOLUTE: 0 K/UL (ref 0–0.2)
BASOPHILS RELATIVE PERCENT: 0.2 %
C-REACTIVE PROTEIN: 11.8 MG/L (ref 0–5.1)
CREAT SERPL-MCNC: 0.8 MG/DL (ref 0.6–1.1)
EOSINOPHILS ABSOLUTE: 0 K/UL (ref 0–0.6)
EOSINOPHILS RELATIVE PERCENT: 0.5 %
GFR AFRICAN AMERICAN: >60
GFR NON-AFRICAN AMERICAN: >60
HCT VFR BLD CALC: 39.5 % (ref 36–48)
HEMOGLOBIN: 12.9 G/DL (ref 12–16)
LYMPHOCYTES ABSOLUTE: 2 K/UL (ref 1–5.1)
LYMPHOCYTES RELATIVE PERCENT: 32.6 %
MCH RBC QN AUTO: 30.5 PG (ref 26–34)
MCHC RBC AUTO-ENTMCNC: 32.8 G/DL (ref 31–36)
MCV RBC AUTO: 93.1 FL (ref 80–100)
MONOCYTES ABSOLUTE: 0.5 K/UL (ref 0–1.3)
MONOCYTES RELATIVE PERCENT: 7.5 %
NEUTROPHILS ABSOLUTE: 3.7 K/UL (ref 1.7–7.7)
NEUTROPHILS RELATIVE PERCENT: 59.2 %
PDW BLD-RTO: 12.8 % (ref 12.4–15.4)
PLATELET # BLD: 261 K/UL (ref 135–450)
PMV BLD AUTO: 9.1 FL (ref 5–10.5)
RBC # BLD: 4.25 M/UL (ref 4–5.2)
WBC # BLD: 6.3 K/UL (ref 4–11)

## 2020-11-05 PROCEDURE — 1036F TOBACCO NON-USER: CPT | Performed by: INTERNAL MEDICINE

## 2020-11-05 PROCEDURE — G8427 DOCREV CUR MEDS BY ELIG CLIN: HCPCS | Performed by: INTERNAL MEDICINE

## 2020-11-05 PROCEDURE — G8417 CALC BMI ABV UP PARAM F/U: HCPCS | Performed by: INTERNAL MEDICINE

## 2020-11-05 PROCEDURE — 99214 OFFICE O/P EST MOD 30 MIN: CPT | Performed by: INTERNAL MEDICINE

## 2020-11-05 PROCEDURE — G8482 FLU IMMUNIZE ORDER/ADMIN: HCPCS | Performed by: INTERNAL MEDICINE

## 2020-11-05 RX ORDER — NAPROXEN 500 MG/1
500 TABLET ORAL 2 TIMES DAILY PRN
Qty: 60 TABLET | Refills: 3 | Status: SHIPPED | OUTPATIENT
Start: 2020-11-05 | End: 2021-05-06 | Stop reason: SDUPTHER

## 2020-11-05 RX ORDER — CETIRIZINE HYDROCHLORIDE 10 MG/1
TABLET ORAL
COMMUNITY
Start: 2020-10-09 | End: 2021-10-05

## 2020-11-05 RX ORDER — SULFASALAZINE 500 MG/1
1000 TABLET ORAL 2 TIMES DAILY
Qty: 360 TABLET | Refills: 0 | Status: SHIPPED | OUTPATIENT
Start: 2020-11-05 | End: 2021-02-19

## 2020-11-05 NOTE — PROGRESS NOTES
Carol Hodge MD  [facility]) Physicians - Rheumatology    [] [facility] HOSPITAL:  [address] [facility]  TLabs, [facility] [x] [facility] Office:  [address], [address]   Office: [phone]  Fax: [phone] PROGRESS NOTE    SUBJECTIVE:    Background:   Vu Espana is a 36 y.o. female early seronegative inflammatory arthritis/seronegative SpA and chronic LBP d/t mild degenerative arthritis s/p lumbar laminectomy in 2013.  PMHx pertinent for EMELINA on CPAP, RLS, depression, anxiety.     Current rheum meds:  SSZ 1000 mg BID: started in 5/20  Naproxen 500 mg BID     Prior rheum meds:  Ibuprofen: ineffective    Past medical/surgical history, medications and allergies are reviewed and updated as appropriate. Interval Hx:   Pt reports significant improvement in her arthralgias, joint swelling and morning stiffness since starting SSZ 6 months ago. Specifically she is having less arthralgias in her fingers, knees and ankles. Bottom of feet still hurt after getting out of bed on some mornings. Morning stiffness lasts no more than a few min. She remains on Naproxen.       Rheumatologic ROS:  Constitutional: denies chronic fatigue, fever/chills, night sweats, unintentional weight loss  Integumentary: denies photosensitivity, patchy alopecia, or Sx of Raynaud's phenomenon  Eyes: denies dry eyes, redness or pain, visual disturbance, or floaters  Nose: denies nasal ulcers or recurrent sinusitis  Oral cavity: denies dry mouth or oral ulcers  Cardiovascular: denies CP, palpitations, Hx of pericardial effusion or pericarditis  Respiratory: denies SOB, cough, hemoptysis, or pleurisy  Gastrointestinal: denies heart burn, dysphagia or esophageal dysmotility, denies change in bowel habits or Sx of IBD  Musculoskeletal:  refer to above HPI     Allergies   Allergen Reactions    Metronidazole Rash and Other (See Comments)     LIPS WERE TINGLING, tongue tingling, hives all over body    Other      Glue used to adhere recent surgical incision       Past Medical History:        Diagnosis Date    Anxiety     Depression     WELL CONTROLLED 10-16-17    Heart rate fast     intermittent    Kidney stone     Motor tic disorder     Sleep apnea        Past Surgical History:        Procedure Laterality Date    ABDOMINAL EXPLORATION SURGERY  7/22/11    excision Meckels diverticulum    ABDOMINOPLASTY  4/14/14    Janeane Clarity ADENOIDECTOMY Bilateral     APPENDECTOMY  7/22/11    BLADDER REMOVAL  04/13/2018    OPERATIVE LAPAROSCOPY, LEFT OVARIAN CYSTECTOMY WITH DILATATION AND CURETTAGE    HYSTERECTOMY, TOTAL ABDOMINAL  06/12/2018    robotic total hyst [facility], Dr Lewis Gone    LITHOTRIPSY  x2    LUMBAR LAMINECTOMY  6/13    Rad; left l5-s1    OTHER SURGICAL HISTORY      hymenoplasty    TONSILLECTOMY Bilateral        Medications:    Current Outpatient Medications   Medication Sig Dispense Refill    cetirizine (ZYRTEC) 10 MG tablet       sulfaSALAzine (AZULFIDINE) 500 MG tablet Take 2 tablets by mouth 2 times daily 360 tablet 0    naproxen (NAPROSYN) 500 MG tablet Take 1 tablet by mouth 2 times daily as needed for Pain 60 tablet 3    rOPINIRole (REQUIP) 1 MG tablet TAKE ONE TABLET BY MOUTH DAILY AS NEEDED AND THEN TAKE ONE TO TWO TABLETS BY MOUTH EVERY NIGHT TWO HOURS BEFORE BEDTIME 90 tablet 3    desvenlafaxine succinate (PRISTIQ) 25 MG TB24 extended release tablet Take 3 tablets by mouth daily 270 tablet 1    amitriptyline (ELAVIL) 25 MG tablet Take 1 tablet by mouth nightly 30 tablet 2    albuterol sulfate HFA (PROVENTIL HFA) 108 (90 Base) MCG/ACT inhaler Inhale 2 puffs into the lungs every 6 hours as needed for Wheezing 1 Inhaler 3    Cholecalciferol (VITAMIN D3) 50 MCG (2000 UT) CAPS Take 2,000 capsules by mouth daily      estrogens, conjugated,-methyltestosterone (ESTRATEST) 1.25-2.5 MG per tablet       fluticasone (FLONASE) 50 MCG/ACT nasal spray SPRAY TWO SPRAYS IN EACH NOSTRIL ONCE DAILY 07/16/2020    RDW 13.2 07/16/2020     Lab Results   Component Value Date     02/26/2020    K 3.9 02/26/2020     02/26/2020    CO2 25 02/26/2020    BUN 10 02/26/2020    CREATININE 0.8 07/16/2020    GLUCOSE 119 (H) 02/26/2020    CALCIUM 9.1 02/26/2020    PROT 7.5 02/26/2020    LABALBU 4.5 02/26/2020    BILITOT <0.2 02/26/2020    ALKPHOS 100 02/26/2020    AST 18 07/16/2020    ALT 13 07/16/2020    LABGLOM >60 07/16/2020    GFRAA >60 07/16/2020    AGRATIO 1.5 02/26/2020    GLOB 3.0 02/26/2020       Negative OFE (11/10/11, 7/16/20)  Negative RF (11/10/11)     Labs from 11/6/19:  Negative RF, CCP, HLA B27  Negative hepatitis B and C serologies, HIV screen     CRP 10.8 mg/L, ESR wnl (11/6/19) --> CRP 13.5 mg/L (2/12/20)    Imaging:  I personally reviewed interval imaging and discussed w/ the pt in detail which included:    MRI L-spine (5/29/13): IMPRESSION:   Moderate size central and L paracentral disc herniation L5-S1 primarily affecting the left S1 nerve root.   Broad-based disc protrusion centrally L4-L5.      X-rays (11/7/19): Bilateral hands:   No significant plain film abnormality.  No erosion, chondrocalcinosis or fracture.      Bilateral knees: Moderate degenerative changes about the patellofemoral space bilaterally with lateral subluxation of the patella bilaterally.      Bilateral feet:   No significant plain film abnormality.      Lumbar spine and sacroiliac joints:   No ankylosis or erosion is appreciated. Mild degenerative changes about the right sacroiliac joint.     I independently reviewed above X-rays, R SI joint appears irregular, L-spine w/o evidence of SpA, hand joints w/o evidence of chronic inflammatory arthritis/erosive changes.      MRI pelvis (11/20/19):  1. Trace amount of nonspecific fluid in the left sacroiliac joint.  No additional changes to suggest inflammatory arthropathy. 2. Minimal right sacroiliac degenerative change. 3. No abnormality in the hip joints.    4. Mild bilateral gluteus minimus tendinosis and proximal hamstring tendinosis. 5. Mild degenerative changes at L4-5 and L5-S1.      I discussed her MRI findings w/ the reading radiologist on 12/2/19, she has mild degenerative arthritis in her sacroiliac joints (inside her pelvis) but nothing inflammatory in nature.     Above results were discussed w/ the pt in detail during today's visit. ASSESSMENT/PLAN:   Inflammatory arthritis w/ significant response to SSZ. She has some mild bogginess in her hand joints on exam today. Will repeat safety labs and CRP today, if persistent elevation discussed option of adding HCQ titrated up to 200 mg BID. Discussed s/e and risks of HCQ. Cont Naproxen for PRN use. Pt states she has already received the flu vaccine. Cuca Kaminski was seen today for follow-up. Diagnoses and all orders for this visit:    Seronegative rheumatoid arthritis ([facility] [place] [address]  -     sulfaSALAzine (AZULFIDINE) 500 MG tablet; Take 2 tablets by mouth 2 times daily  -     C-Reactive Protein; Future  -     naproxen (NAPROSYN) 500 MG tablet; Take 1 tablet by mouth 2 times daily as needed for Pain    High risk medication use  -     ALT; Future  -     AST; Future  -     CBC Auto Differential; Future  -     Creatinine, Serum; Future    Encounter for therapeutic drug monitoring  -     ALT; Future  -     AST; Future  -     CBC Auto Differential; Future  -     Creatinine, Serum; Future    Spondylosis of lumbar region without myelopathy or radiculopathy  -     naproxen (NAPROSYN) 500 MG tablet;  Take 1 tablet by mouth 2 times daily as needed for Pain          Orders Placed This Encounter   Procedures    ALT     Standing Status:   Future     Standing Expiration Date:   5/5/2021    AST     Standing Status:   Future     Standing Expiration Date:   5/5/2021    CBC Auto Differential     Standing Status:   Future     Standing Expiration Date:   5/5/2021    Creatinine, Serum     Standing Status:   Future     Standing Expiration Date:   5/5/2021    C-Reactive Protein     Standing Status:   Future     Standing Expiration Date:   5/5/2021     Outpatient Encounter Medications as of 11/5/2020   Medication Sig Dispense Refill    cetirizine (ZYRTEC) 10 MG tablet       sulfaSALAzine (AZULFIDINE) 500 MG tablet Take 2 tablets by mouth 2 times daily 360 tablet 0    naproxen (NAPROSYN) 500 MG tablet Take 1 tablet by mouth 2 times daily as needed for Pain 60 tablet 3    rOPINIRole (REQUIP) 1 MG tablet TAKE ONE TABLET BY MOUTH DAILY AS NEEDED AND THEN TAKE ONE TO TWO TABLETS BY MOUTH EVERY NIGHT TWO HOURS BEFORE BEDTIME 90 tablet 3    desvenlafaxine succinate (PRISTIQ) 25 MG TB24 extended release tablet Take 3 tablets by mouth daily 270 tablet 1    amitriptyline (ELAVIL) 25 MG tablet Take 1 tablet by mouth nightly 30 tablet 2    albuterol sulfate HFA (PROVENTIL HFA) 108 (90 Base) MCG/ACT inhaler Inhale 2 puffs into the lungs every 6 hours as needed for Wheezing 1 Inhaler 3    Cholecalciferol (VITAMIN D3) 50 MCG (2000 UT) CAPS Take 2,000 capsules by mouth daily      estrogens, conjugated,-methyltestosterone (ESTRATEST) 1.25-2.5 MG per tablet       fluticasone (FLONASE) 50 MCG/ACT nasal spray SPRAY TWO SPRAYS IN EACH NOSTRIL ONCE DAILY 1 Bottle 5    SUMAtriptan (IMITREX) 100 MG tablet Take 100 mg by mouth daily as needed      [DISCONTINUED] sulfaSALAzine (AZULFIDINE) 500 MG tablet TAKE TWO TABLETS BY MOUTH TWICE A  tablet 0    [DISCONTINUED] naproxen (NAPROSYN) 500 MG tablet Take 1 tablet by mouth 2 times daily 60 tablet 3    lidocaine (LIDODERM) 5 % Place 1 patch onto the skin daily 12 hours on, 12 hours off. (Patient not taking: Reported on 11/5/2020) 30 patch 0    ELDERBERRY PO Take by mouth 1 gummy nightly      acetaminophen (TYLENOL) 500 MG tablet Take 500 mg by mouth every 6 hours as needed for Pain       No facility-administered encounter medications on file as of 11/5/2020.       Return in about 3 months (around 2/5/2021) for lab result discussion and treatment plan, medication monitoring. The risks and benefits of my recommendations, as well as other treatment options, benefits and side effects were discussed with the patient today. Questions were answered. NOTE: This report is transcribed by using voice recognition software dragon. Every effort is made to ensure accuracy; however, inadvertent computerized  transcription errors may be present.

## 2020-11-05 NOTE — PATIENT INSTRUCTIONS
Plaquenil instructions: Take one tablet daily for the first week. If well tolerated, then take one tablet twice a day. Hydroxychloroquine     Pronunciation: noreen cramerx dima KELLY oh shireen   Brand: Plaquenil Sulfate     What is hydroxychloroquine? Hydroxychloroquine is used to treat or prevent malaria, a disease caused by parasites that enter the body through the bite of a mosquito. Malaria is common in areas such as [place], [place], and [place]. Hydroxychloroquine is also used to treat symptoms of rheumatoid arthritis and discoid or systemic lupus erythematosus. Hydroxychloroquine may also be used for purposes not listed in this medication guide. What should I discuss with my health care provider before taking hydroxychloroquine? You should not use this medication if you are allergic to hydroxychloroquine, or if you have a history of vision changes or damage to your retina caused by an anti-malaria medication. Hydroxychloroquine should not be used for long-term treatment in children. To make sure hydroxychloroquine is safe for you, tell your doctor if you have any of these conditions:    psoriasis;    porphyria;    liver disease;    alcoholism; or    glucose-6-phosphate dehydrogenase (G-6-PD) deficiency. It is not known whether hydroxychloroquine will harm an unborn baby. Tell your doctor if you are pregnant or plan to become pregnant while using this medication. Malaria is more likely to cause death in a pregnant woman. If you are pregnant, talk with your doctor about the risks of traveling to areas where malaria is common. It is not known whether hydroxychloroquine passes into breast milk or if it could harm a nursing baby. Do not use this medication without telling your doctor if you are breast-feeding a baby. How should I take hydroxychloroquine? Take exactly as prescribed by your doctor. Do not take in larger or smaller amounts or for longer than recommended.  Follow the directions on your prescription label. Take hydroxychloroquine with a meal or a glass of milk, unless your doctor tells you otherwise. Hydroxychloroquine is sometimes given only once per week. Choose the same day each week to take this medication if you are on a weekly dosing schedule. To prevent malaria: Start taking the medicine 2 weeks before entering an area where malaria is common. Continue taking the medicine regularly during your stay and for at least 8 weeks after you leave the area. To treat malaria: Your doctor may recommend a single dose, or a high starting dose followed by a smaller dose during the last 2 days of treatment. Follow your doctor's instructions. Take this medicine for the full prescribed length of time for malaria. Your symptoms may improve before the infection is completely cleared. In addition to taking hydroxychloroquine, use protective clothing, insect repellents, and mosquito netting around your bed to further prevent mosquito bites that could cause malaria. Contact your doctor as soon as possible if you have been exposed to malaria, or if you have fever or other symptoms of illness during or after a stay in an area where malaria is common. When treating lupus or arthritis, hydroxychloroquine is usually given daily for several weeks or months. For best results, keep using the medication as directed. Talk with your doctor if your symptoms do not improve after 6 months of treatment. While using hydroxychloroquine, you may need frequent blood tests at your doctor's office. No medication is 100% effective in treating or preventing all types of malaria. For best results, keep using the medication as directed. Talk with your doctor if you have fever, vomiting, or diarrhea during your treatment. Store at room temperature away from moisture, heat, and light. What happens if I miss a dose? Take the missed dose as soon as you remember.  Skip the missed dose if it is almost time for your next scheduled dose. Do not take extra medicine to make up the missed dose. What happens if I overdose? Seek emergency medical attention or call the Poison Help line at 1-630.849.3751. An overdose of hydroxychloroquine can be fatal, especially in children. Treatment of a hydroxychloroquine overdose must be started quickly. You may be told to induce vomiting right away (at home, before transport to an emergency room). Ask the poison control center how to induce vomiting in the case of a hydroxychloroquine overdose. Overdose symptoms may include headache, drowsiness, vision changes, slow heart rate, chest pain or heavy feeling, pain spreading to the arm or shoulder, nausea, sweating, seizure (convulsions), shallow breathing, or breathing that stops. What should I avoid while taking hydroxychloroquine? Avoid taking an antacid or Kaopectate (kaolin-pectin) within 4 hours before or after you take hydroxychloroquine. Some antacids can make it harder for your body to absorb hydroxychloroquine. What are the possible side effects of hydroxychloroquine? Some people taking this medication over long periods of time or at high doses have developed irreversible damage to the retina of the eye. Stop taking hydroxychloroquine and call your doctor at once if you have trouble focusing, if you see light streaks or flashes in your vision, or if you notice any swelling or color changes in your eyes. Get emergency medical help if you have any of these signs of an allergic reaction: hives; difficulty breathing; swelling of your face, lips, tongue, or throat.    Call your doctor at once if you have a serious side effect such as:    muscle weakness, twitching, or uncontrolled movement;    loss of balance or coordination;    blurred vision, light sensitivity, seeing halos around lights;    pale skin, easy bruising or bleeding;    confusion, unusual thoughts or behavior; or    seizure (convulsions). Less serious side effects may include:    headache, ringing in your ears, spinning sensation;    nausea, vomiting, stomach pain;    loss of appetite, weight loss;    mood changes, feeling nervous or irritable;    skin rash or itching; or    hair loss.    This is not a complete list of side effects and others may occur. Call your doctor for medical advice about side effects. You may report side effects to FDA at 0-539-SPZ-2759. What other drugs will affect hydroxychloroquine? Hydroxychloroquine can harm your liver. This effect is increased when you also use other medicines harmful to the liver. You may need dose adjustments or special tests if you have recently used:    acetaminophen (Tylenol);    an antibiotic, antifungal medicine, sulfa drug, or tuberculosis medicine;    birth control pills or hormone replacement therapy;    blood pressure medication;    cancer medications;    cholesterol-lowering medications such as Crestor, Lipitor, Pravachol, Simcor, Vytorin, Zocor;    gout or arthritis medications (including gold injections);    HIV/AIDS medications;    medicines to treat psychiatric disorders;    an NSAID such as Advil, Aleve, Arthrotec, Cataflam, Celebrex, Indocin, Motrin, Naprosyn, Treximet, Voltaren; or    seizure medications.    This list is not complete and other drugs may interact with hydroxychloroquine. Tell your doctor about all medications you use. This includes prescription, over-the-counter, vitamin, and herbal products. Do not start a new medication without telling your doctor. Where can I get more information? Your pharmacist can provide more information about hydroxychloroquine. Remember, keep this and all other medicines out of the reach of children, never share your medicines with others, and use this medication only for the indication prescribed.    Every effort has been made to ensure that the information provided by [address] [place] [address] Dr PALACIOS ('[employer]') is accurate, up-to-date, and complete, but no guarantee is made to that effect. Drug information contained herein may be time sensitive. [employer] information has been compiled for use by healthcare practitioners and consumers in the [place] and therefore [employer] does not warrant that uses outside of the [place] are appropriate, unless specifically indicated otherwise. [employer]'s drug information does not endorse drugs, diagnose patients or recommend therapy. [employer]'s drug information is an informational resource designed to assist licensed healthcare practitioners in caring for their patients and/or to serve consumers viewing this service as a supplement to, and not a substitute for, the expertise, skill, knowledge and judgment of healthcare practitioners. The absence of a warning for a given drug or drug combination in no way should be construed to indicate that the drug or drug combination is safe, effective or appropriate for any given patient. [facility] does not assume any responsibility for any aspect of healthcare administered with the aid of information [employer] provides. The information contained herein is not intended to cover all possible uses, directions, precautions, warnings, drug interactions, allergic reactions, or adverse effects. If you have questions about the drugs you are taking, check with your doctor, nurse or pharmacist.   Copyright 4986-3191 [address]: 7.01. Revision date: 11/13/2012. This information does not replace the advice of a doctor. [employer] disclaims any warranty or liability for your use of this information.    Content Version: 38.5.938204

## 2020-11-09 ENCOUNTER — TELEPHONE (OUTPATIENT)
Dept: RHEUMATOLOGY | Age: 40
End: 2020-11-09

## 2020-11-09 RX ORDER — FLUTICASONE PROPIONATE 50 MCG
SPRAY, SUSPENSION (ML) NASAL
Qty: 1 BOTTLE | Refills: 4 | Status: SHIPPED | OUTPATIENT
Start: 2020-11-09 | End: 2022-06-08 | Stop reason: SDUPTHER

## 2020-11-09 NOTE — TELEPHONE ENCOUNTER
I don't think her mother's experience would affect her tolerance of HCQ. I recommend that she obtains a baseline eye exam from Ophthalmology to get clearance to start HCQ, this would also hopefully re-assure her. I made a referral to Dr. Maxine Santillan in [place], please have pt call us back after her eye consultation if she wishes to try HCQ.

## 2020-11-09 NOTE — TELEPHONE ENCOUNTER
Medication:   Requested Prescriptions     Pending Prescriptions Disp Refills    fluticasone (FLONASE) 50 MCG/ACT nasal spray [Pharmacy Med Name: FLUTICASONE PROP 50 MCG SPRAY] 1 Bottle 4     Sig: SPRAY TWO SPRAYS IN EACH NOSTRIL ONCE DAILY        Last Filled:      Patient Phone Number: [phone] (home)     Last appt: 3/15/2018   Next appt: Visit date not found    Last OARRS:   RX Monitoring 1/16/2018   Attestation The Prescription Monitoring Report for this patient was reviewed today. Periodic Controlled Substance Monitoring Possible medication side effects, risk of tolerance and/or dependence, and alternative treatments discussed. ;No signs of potential drug abuse or diversion identified.

## 2020-11-16 ENCOUNTER — TELEPHONE (OUTPATIENT)
Dept: BARIATRICS/WEIGHT MGMT | Age: 40
End: 2020-11-16

## 2020-11-16 ENCOUNTER — OFFICE VISIT (OUTPATIENT)
Dept: SLEEP MEDICINE | Age: 40
End: 2020-11-16
Payer: MEDICAID

## 2020-11-16 VITALS
DIASTOLIC BLOOD PRESSURE: 88 MMHG | SYSTOLIC BLOOD PRESSURE: 148 MMHG | RESPIRATION RATE: 16 BRPM | HEART RATE: 105 BPM | TEMPERATURE: 98.6 F | BODY MASS INDEX: 37.28 KG/M2 | OXYGEN SATURATION: 95 % | WEIGHT: 224 LBS

## 2020-11-16 PROCEDURE — 99214 OFFICE O/P EST MOD 30 MIN: CPT | Performed by: PSYCHIATRY & NEUROLOGY

## 2020-11-16 PROCEDURE — G8482 FLU IMMUNIZE ORDER/ADMIN: HCPCS | Performed by: PSYCHIATRY & NEUROLOGY

## 2020-11-16 PROCEDURE — 1036F TOBACCO NON-USER: CPT | Performed by: PSYCHIATRY & NEUROLOGY

## 2020-11-16 PROCEDURE — G8417 CALC BMI ABV UP PARAM F/U: HCPCS | Performed by: PSYCHIATRY & NEUROLOGY

## 2020-11-16 PROCEDURE — G8427 DOCREV CUR MEDS BY ELIG CLIN: HCPCS | Performed by: PSYCHIATRY & NEUROLOGY

## 2020-11-16 RX ORDER — ROPINIROLE 1 MG/1
TABLET, FILM COATED ORAL
Qty: 90 TABLET | Refills: 3 | Status: SHIPPED | OUTPATIENT
Start: 2020-11-16 | End: 2021-06-01

## 2020-11-16 RX ORDER — BIOTIN 1 MG
TABLET ORAL
COMMUNITY

## 2020-11-16 ASSESSMENT — SLEEP AND FATIGUE QUESTIONNAIRES
HOW LIKELY ARE YOU TO NOD OFF OR FALL ASLEEP WHILE SITTING INACTIVE IN A PUBLIC PLACE: 2
ESS TOTAL SCORE: 11
HOW LIKELY ARE YOU TO NOD OFF OR FALL ASLEEP IN A CAR, WHILE STOPPED FOR A FEW MINUTES IN TRAFFIC: 0
HOW LIKELY ARE YOU TO NOD OFF OR FALL ASLEEP WHILE SITTING AND READING: 2
HOW LIKELY ARE YOU TO NOD OFF OR FALL ASLEEP WHILE SITTING AND TALKING TO SOMEONE: 0
HOW LIKELY ARE YOU TO NOD OFF OR FALL ASLEEP WHEN YOU ARE A PASSENGER IN A CAR FOR AN HOUR WITHOUT A BREAK: 3
HOW LIKELY ARE YOU TO NOD OFF OR FALL ASLEEP WHILE WATCHING TV: 2
HOW LIKELY ARE YOU TO NOD OFF OR FALL ASLEEP WHILE SITTING QUIETLY AFTER LUNCH WITHOUT ALCOHOL: 0
HOW LIKELY ARE YOU TO NOD OFF OR FALL ASLEEP WHILE LYING DOWN TO REST IN THE AFTERNOON WHEN CIRCUMSTANCES PERMIT: 2

## 2020-11-16 ASSESSMENT — ENCOUNTER SYMPTOMS
GASTROINTESTINAL NEGATIVE: 1
ALLERGIC/IMMUNOLOGIC NEGATIVE: 1
APNEA: 0
EYES NEGATIVE: 1
COUGH: 0

## 2020-11-16 NOTE — TELEPHONE ENCOUNTER
Pt referred by Dr. Ryan Melgar. Wants to do MWM, explained to her about how the first appt is in person and then  Follow up visits are virtual. Pt phone number is  [phone].

## 2020-11-16 NOTE — PATIENT INSTRUCTIONS
Patient Education        Learning About CPAP for Sleep Apnea  What is CPAP? CPAP is a small machine that you use at home every night while you sleep. It increases air pressure in your throat to keep your airway open. When you have sleep apnea, this can help you sleep better so you feel much better. CPAP stands for \"continuous positive airway pressure. \"  The CPAP machine will have one of the following:  · A mask that covers your nose and mouth  · Prongs that fit into your nose  · A mask that covers your nose only, which is the most common type. This type is called NCPAP. The N stands for \"nasal.\"  Why is it done? CPAP is usually the best treatment for obstructive sleep apnea. It is the first treatment choice and the most widely used. CPAP:  · Helps you have more normal sleep, so you feel less sleepy and more alert during the daytime. · May help keep heart failure or other heart problems from getting worse. · May help lower your blood pressure. If you use CPAP, your bed partner may also sleep better. That's because you aren't snoring or restless. Your doctor may suggest CPAP if you have:  · Moderate to severe sleep apnea. · Sleep apnea and coronary artery disease (CAD). · Sleep apnea and heart failure. What are the side effects? Some people who use CPAP have:  · A dry or stuffy nose and a sore throat. · Irritated skin on the face. · Sore eyes. · Bloating. How can you care for yourself? If using CPAP is not comfortable, or if you have certain side effects, work with your doctor to fix them. Here are some things you can try:  · Be sure the mask or nasal prongs fit well. · See if your doctor can adjust the pressure of your CPAP. · If your nose is dry, try a humidifier. · If your nose is runny or stuffy, try decongestant medicine or a steroid nasal spray. Be safe with medicines. Read and follow all instructions on the label. Do not use the medicine longer than the label says.   If these things don't help, you might try a different type of machine. Some machines have air pressure that adjusts on its own. Others have air pressures that are different when you breathe in than when you breathe out. This may reduce discomfort caused by too much pressure in your nose. Where can you learn more? Go to https://chpepiceweb.UpCity. org and sign in to your Tigerstripe account. Enter J583 in the Perceptis box to learn more about \"Learning About CPAP for Sleep Apnea. \"     If you do not have an account, please click on the \"Sign Up Now\" link. Current as of: February 24, 2020               Content Version: 12.6  © 7416-3341 Learn with [employer]. Care instructions adapted under license by [facility] ([facility]). If you have questions about a medical condition or this instruction, always ask your healthcare professional. [address] any warranty or liability for your use of this information.

## 2020-11-16 NOTE — PROGRESS NOTES
Silvia Ng         : 1980        PHONE: [phone] (home)     Diagnosis: [x] EMELINA (G47.33) [] CSA (G47.31) [] Apnea (G47.30)   Length of Need: [] 12 Months [x] 99 Months [] Other:    Machine (ELDER!): [] Respironics Dream Station      Auto [] Loetta Code [] Other:     []  CPAP () [] Bilevel ()   Mode: [] Auto [] Spontaneous    Mode: [] Auto [] Spontaneous                                 Humidifier: [] Heated ()        [x] Water chamber replacement ()/ 1 per 6 months        Mask:   [] Nasal () /1 per 3 months [x] Full Face () /1 per 3 months   [] Patient choice -Size and fit mask [x] Patient Choice - Size and fit mask   [] Dispense:  [] Dispense:    [] Headgear () / 1 per 3 months [x] Headgear () / 1 per 3 months   [] Replacement Nasal Cushion ()/2 per month [x] Interface Replacement ()/1 per month   [] Replacement Nasal Pillows ()/2 per month         Tubing: [x] Heated ()/1 per 3 months    [] Standard ()/1 per 3 months [] Other:           Filters: [x] Non-disposable ()/1 per 6 months     [x] Ultra-Fine, Disposable ()/2 per month        Miscellaneous: [] Chin Strap ()/ 1 per 6 months [] O2 bleed-in:       LPM   [] Oximetry on CPAP/Bilevel []  Other:          Start Order Date: 20    MEDICAL JUSTIFICATION:  I, the undersigned, certify that the above prescribed supplies are medically necessary for this patients wellbeing. In my opinion, the supplies are both reasonable and necessary in reference to accepted standards of medicalpractice in treatment of this patients condition.     Darwin Quesada MD      NPI: [id number]       Order Signed Date: 20    Electronically signed by Darwin Quesada MD on 2020 at 8:56 AM

## 2020-11-21 ENCOUNTER — NURSE TRIAGE (OUTPATIENT)
Dept: OTHER | Facility: CLINIC | Age: 40
End: 2020-11-21

## 2020-11-21 NOTE — TELEPHONE ENCOUNTER
[facility]. Brief description of triage: Lidia Addison states that she was in the ER last night and she has cellulitis of her left ankle area. She is c/o pain more to the touch and swelling. She was started on Bactrim about 7 hours ago. Please see triage below for more detailed information. Triage indicates for patient to Follow up with PCP on Monday or Tuesday     Care advice provided, patient verbalizes understanding; denies any other questions or concerns; instructed to call back for any new or worsening symptoms. Writer provided warm transfer to [facility] for appointment scheduling. Attention Provider: Thank you for allowing me to participate in the care of your patient. The patient was connected to triage in response to information provided to the ECC. Please do not respond through this encounter as the response is not directed to a shared pool. Reason for Disposition   [1] Taking antibiotic < 24 hours AND [2] cellulitis symptoms are WORSE (e.g., spreading redness, pain, swelling, drainage) AND [3] no fever    Answer Assessment - Initial Assessment Questions  1. SYMPTOM: \"What's the main symptom you're concerned about? \" (e.g., redness, swelling, pain, fever, weakness)      Swelling  2. CELLULITIS LOCATION: \"Where is the cellulitis  located? \" (e.g., hand, arm, foot, leg, face)      Left ankle near the ankle bone    3. CELLULITIS  SIZE: \"What is the size of the red area? \" (e.g., inches, centimeters; compare to size of a coin) . Not very red    4. BETTER-SAME-WORSE: Ciara Horowitz you getting better, staying the same, or getting worse compared to the day you started the antibiotics? \"       Getting worse    5. PAIN: Do you have any pain? \"  If so, \"How bad is the pain? \"  (e.g., Scale 1-10; mild, moderate, or severe)     - MILD (1-3): doesn't interfere with normal activities      - MODERATE (4-7): interferes with normal activities or awakens from sleep     - SEVERE (8-10): excruciating pain, unable to do any normal activities        Flexing there is no pain, point toes 7/10, very tender to the touch    6. FEVER: \"Do you have a fever? \" If so, ask: \"What is it, how was it measured and when did it start? \"      Taking tylenol and not taking temperature (    7. OTHER SYMPTOMS: \"Do you have any other symptoms? \" (e.g., pus coming from a wound, red streaks, weakness)      Warm and tender to the touch    8. DIAGNOSIS DATE: \"When was the cellulitis diagnosed? \" \"By whom?\"       11/20/20 in the ER (last night)    9. ANTIBIOTIC NAME: \"What antibiotic(s) are you taking? \"  \"How many times per day? \" (Be sure the patient is receiving the antibiotic as directed). Bactrim BID    10. ANTIBIOTIC DATE: \"When was the antibiotic started? \"        11/20/20 (Early morning of 11/21/20)    11. FOLLOW-UP APPOINTMENT: \"Do you have follow-up appointment with your doctor? \"        Not yet but advised to f/u in 2-3 days    Protocols used: CELLULITIS ON ANTIBIOTIC FOLLOW-UP CALL-[facility]

## 2020-11-24 ENCOUNTER — VIRTUAL VISIT (OUTPATIENT)
Dept: FAMILY MEDICINE CLINIC | Age: 40
End: 2020-11-24
Payer: MEDICAID

## 2020-11-24 ENCOUNTER — TELEPHONE (OUTPATIENT)
Dept: BARIATRICS/WEIGHT MGMT | Age: 40
End: 2020-11-24

## 2020-11-24 PROCEDURE — G8427 DOCREV CUR MEDS BY ELIG CLIN: HCPCS | Performed by: NURSE PRACTITIONER

## 2020-11-24 PROCEDURE — 99213 OFFICE O/P EST LOW 20 MIN: CPT | Performed by: NURSE PRACTITIONER

## 2020-11-24 RX ORDER — DOXYCYCLINE HYCLATE 100 MG
100 TABLET ORAL 2 TIMES DAILY
COMMUNITY
Start: 2020-11-22 | End: 2020-12-08 | Stop reason: SDUPTHER

## 2020-11-24 NOTE — TELEPHONE ENCOUNTER
I scheduled this pt with Dr Yesy Meredith for [facility]. May be in on your wait list, but I scheduled her for both the assessment and mychart appointment. Explained protocols, emailed nppw, and insurance responsibility. Assessment scheduled for 12/28/2020 and vv scheduled for 01/05/2021.   Can take pt off the wait list.

## 2020-11-24 NOTE — PATIENT INSTRUCTIONS
scrapes, or other injuries to your skin. Cellulitis most often occurs where there is a break in the skin. · If you get a scrape, cut, mild burn, or bite, wash the wound with clean water as soon as you can to help avoid infection. Don't use hydrogen peroxide or alcohol, which can slow healing. · If you have swelling in your legs (edema), support stockings and good skin care may help prevent leg sores and cellulitis. · Take care of your feet, especially if you have diabetes or other conditions that increase the risk of infection. Wear shoes and socks. Do not go barefoot. If you have athlete's foot or other skin problems on your feet, talk to your doctor about how to treat them. When should you call for help? Call your doctor now or seek immediate medical care if:    · You have signs that your infection is getting worse, such as:  ? Increased pain, swelling, warmth, or redness. ? Red streaks leading from the area. ? Pus draining from the area. ? A fever.     · You get a rash. Watch closely for changes in your health, and be sure to contact your doctor if:    · You do not get better as expected. Where can you learn more? Go to https://ReachTax.Message Bus. org and sign in to your Practice Fusion account. Enter Z433 in the [facility] box to learn more about \"Cellulitis: Care Instructions. \"     If you do not have an account, please click on the \"Sign Up Now\" link. Current as of: July 2, 2020               Content Version: 12.6  © 2006-2020 CloudOn, Incorporated. Care instructions adapted under license by [facility] ([facility]). If you have questions about a medical condition or this instruction, always ask your healthcare professional. [place] [address] any warranty or liability for your use of this information.

## 2020-11-24 NOTE — PROGRESS NOTES
2020      TELEHEALTH EVALUATION -- Audio/Visual (During EHOEK-61 public health emergency)    HPI:    Nusrat Rubin (:  1980) has requested an audio/video evaluation for the following concern(s):    Small cut to left ankle from nail clipper . By end of day red, swollen and painful. [facility] ED and started on Bactrim. Symptoms continue to worse for 2 days. Went back to [facility]- IV abx while in ED. Discharged with Doxycycline. Looking better today. Less redness, swelling and pain. Fevers resolved 2 days ago. Doxycycline is causing nausea and diarrhea. No drainage. Was advised in ED potassium level low, 3.4. given supplement and advised needs rechecked    Review of Systems   Constitutional: Negative for chills and fever. Musculoskeletal: Negative for myalgias. Skin: Positive for wound (left lower ankle). Prior to Visit Medications    Medication Sig Taking?  Authorizing Provider   Biotin 1000 MCG TABS Take by mouth Yes Historical Provider, MD   rOPINIRole (REQUIP) 1 MG tablet TAKE ONE TABLET BY MOUTH DAILY AS NEEDED AND THEN TAKE ONE TO TWO TABLETS BY MOUTH EVERY NIGHT TWO HOURS BEFORE BEDTIME Yes Tulio Cruz MD   fluticasone (FLONASE) 50 MCG/ACT nasal spray SPRAY TWO SPRAYS IN EACH NOSTRIL ONCE DAILY Yes ANDREA Mittal CNP   cetirizine (ZYRTEC) 10 MG tablet  Yes Historical Provider, MD   sulfaSALAzine (AZULFIDINE) 500 MG tablet Take 2 tablets by mouth 2 times daily Yes Ernesto Danielle MD   naproxen (NAPROSYN) 500 MG tablet Take 1 tablet by mouth 2 times daily as needed for Pain Yes Ernesto Danielle MD   desvenlafaxine succinate (PRISTIQ) 25 MG TB24 extended release tablet Take 3 tablets by mouth daily Yes ANDREA Mittal CNP   amitriptyline (ELAVIL) 25 MG tablet Take 1 tablet by mouth nightly Yes ANDREA Mittal CNP   albuterol sulfate HFA (PROVENTIL HFA) 108 (90 Base) MCG/ACT inhaler Inhale 2 puffs into the lungs every 6 hours as needed for Wheezing Yes Vicki Query, APRN - CNP   lidocaine (LIDODERM) 5 % Place 1 patch onto the skin daily 12 hours on, 12 hours off.  Yes Katerina Mcmahon PA-C   Cholecalciferol (VITAMIN D3) 50 MCG (2000 UT) CAPS Take 2,000 capsules by mouth daily Yes Historical Provider, MD DUDLEYBERRY PO Take by mouth 1 gummy nightly Yes Historical Provider, MD   estrogens, conjugated,-methyltestosterone (ESTRATEST) 1.25-2.5 MG per tablet  Yes Historical Provider, MD   acetaminophen (TYLENOL) 500 MG tablet Take 500 mg by mouth every 6 hours as needed for Pain Yes Historical Provider, MD   SUMAtriptan (IMITREX) 100 MG tablet Take 100 mg by mouth daily as needed Yes Historical Provider, MD       Past Medical History:   Diagnosis Date    Anxiety     Depression     WELL CONTROLLED 10-16-17    Heart rate fast     intermittent    Kidney stone     Motor tic disorder     Sleep apnea        Past Surgical History:   Procedure Laterality Date    ABDOMINAL EXPLORATION SURGERY  7/22/11    excision Meckels diverticulum    ABDOMINOPLASTY  4/14/14    Tahir Tello ADENOIDECTOMY Bilateral     APPENDECTOMY  7/22/11    BLADDER REMOVAL  04/13/2018    OPERATIVE LAPAROSCOPY, LEFT OVARIAN CYSTECTOMY WITH DILATATION AND CURETTAGE    HYSTERECTOMY, TOTAL ABDOMINAL  06/12/2018    robotic total hyst BSO, Dr Pritchard [place]    LITHOTRIPSY  x2   [address]  6/13    Rad; left l5-s1    OTHER SURGICAL HISTORY      hymenoplasty    TONSILLECTOMY Bilateral        Family History   Problem Relation Age of Onset    Asthma Mother     High Blood Pressure Mother     Diabetes Father     Atrial Fibrillation Father     Alcohol Abuse Father     High Blood Pressure Father     Heart Disease Paternal Grandmother     Heart Attack Paternal Grandmother     Diabetes Paternal Grandmother     Stroke Paternal Grandfather     Stroke Maternal Grandfather     Atrial Fibrillation Maternal Grandmother     Diabetes Maternal Grandmother     Cancer Neg Hx Allergies   Allergen Reactions    Metronidazole Rash and Other (See Comments)     LIPS WERE TINGLING, tongue tingling, hives all over body    Other      Glue used to adhere recent surgical incision       Social History     Tobacco Use    Smoking status: Never Smoker    Smokeless tobacco: Never Used   Substance Use Topics    Alcohol use: No     Alcohol/week: 0.0 standard drinks     Comment: once a week    Drug use: No          PHYSICAL EXAMINATION:  Vital Signs: (As obtained by patient/caregiver or practitioner observation)  [facility] 03/13/2018 (Exact Date)   Respiratory rate appears normal      Constitutional: Appears well-developed and well-nourished. No apparent distress    Mental status: Alert and awake. Oriented to person/place/darwin. Able to follow commands    Eyes: EOM normal. Sclera normal. No discharge visible  HENT: Normocephalic, atraumatic. Mouth/Throat: Mucous membranes are moist. External Ears Normal    Neck: No visualized mass   Pulmonary/Chest: Respiratory effort normal.  No visualized signs of difficulty breathing or respiratory distress        Musculoskeletal:  Normal range of motion of neck  Neurological:       No Facial Asymmetry (Cranial nerve 7 motor function) (limited exam to video visit). No gaze palsy       Skin:  No significant exanthematous lesions or discoloration noted on facial skin       Psychiatric: Normal Affect. No Hallucinations            ASSESSMENT/PLAN:  1. Cellulitis of left lower extremity  Complete Doxycycline take after eating meal  ED records reviewed through care everywhere    2. Hypokalemia  Repeat K at follow up      Return in 6 days (on 11/30/2020) for cellulitis follow up. Marcello Adler is a 36 y.o. female being evaluated by a Virtual Visit (video visit) encounter to address concerns as mentioned above. A caregiver was present when appropriate.  Due to this being a TeleHealth encounter ([facility]), evaluation of the following organ systems was limited: Vitals/Constitutional/EENT/Resp/CV/GI//MS/Neuro/Skin/Heme-Lymph-Imm. Pursuant to the emergency declaration under the [address] [place] [address] and the [employer] Act, this Virtual Visit was conducted with patient's (and/or legal guardian's) consent, to reduce the patient's risk of exposure to COVID-19 and provide necessary medical care. The patient (and/or legal guardian) has also been advised to contact this office for worsening conditions or problems, and seek emergency medical treatment and/or call 911 if deemed necessary. Patient identification was verified at the start of the visit: Yes    Total time spent on this encounter: Not billed by time minutes    Services were provided through a video synchronous discussion virtually to substitute for in-person clinic visit. Patient and provider were located at their individual homes. --ANDREA Murillo - CNP on 11/24/2020 at 8:41 AM    An electronic signature was used to authenticate this note. Dewey Regalado

## 2020-11-30 ENCOUNTER — OFFICE VISIT (OUTPATIENT)
Dept: FAMILY MEDICINE CLINIC | Age: 40
End: 2020-11-30
Payer: MEDICAID

## 2020-11-30 VITALS
SYSTOLIC BLOOD PRESSURE: 124 MMHG | TEMPERATURE: 97.2 F | WEIGHT: 225.4 LBS | BODY MASS INDEX: 37.51 KG/M2 | DIASTOLIC BLOOD PRESSURE: 76 MMHG | OXYGEN SATURATION: 97 % | HEART RATE: 86 BPM

## 2020-11-30 DIAGNOSIS — E87.6 HYPOKALEMIA: ICD-10-CM

## 2020-11-30 LAB
ANION GAP SERPL CALCULATED.3IONS-SCNC: 12 MMOL/L (ref 3–16)
BUN BLDV-MCNC: 17 MG/DL (ref 7–20)
CALCIUM SERPL-MCNC: 9.6 MG/DL (ref 8.3–10.6)
CHLORIDE BLD-SCNC: 105 MMOL/L (ref 99–110)
CO2: 25 MMOL/L (ref 21–32)
CREAT SERPL-MCNC: 0.6 MG/DL (ref 0.6–1.1)
GFR AFRICAN AMERICAN: >60
GFR NON-AFRICAN AMERICAN: >60
GLUCOSE BLD-MCNC: 106 MG/DL (ref 70–99)
POTASSIUM SERPL-SCNC: 4 MMOL/L (ref 3.5–5.1)
SODIUM BLD-SCNC: 142 MMOL/L (ref 136–145)

## 2020-11-30 PROCEDURE — 99213 OFFICE O/P EST LOW 20 MIN: CPT | Performed by: NURSE PRACTITIONER

## 2020-11-30 PROCEDURE — 1036F TOBACCO NON-USER: CPT | Performed by: NURSE PRACTITIONER

## 2020-11-30 PROCEDURE — G8482 FLU IMMUNIZE ORDER/ADMIN: HCPCS | Performed by: NURSE PRACTITIONER

## 2020-11-30 PROCEDURE — G8417 CALC BMI ABV UP PARAM F/U: HCPCS | Performed by: NURSE PRACTITIONER

## 2020-11-30 PROCEDURE — G8427 DOCREV CUR MEDS BY ELIG CLIN: HCPCS | Performed by: NURSE PRACTITIONER

## 2020-11-30 ASSESSMENT — ENCOUNTER SYMPTOMS: COLOR CHANGE: 1

## 2020-11-30 NOTE — PROGRESS NOTES
SUBJECTIVE:  Pt is a of 36 y.o. female comes in today with   Chief Complaint   Patient presents with    Wound Check       Patient presenting today for re-evaluation of left ankle cellulitis. VV with me 11/24- taking Doxycycline per ED. Redness improving. Swelling and pain improving until yesterday. Cleaning house yesterday and now lateral ankle more swollen and painful today. Fevers have resolved. No drainage. Tolerating Doxy well. Also needing potassium level rechecked today. States was low when in ED. Given oral supplement there. Has been eating banana daily since    Deviated septum- c/o nasal congestion. Would like referral to ent. Prior to Visit Medications    Medication Sig Taking?  Authorizing Provider   doxycycline hyclate (VIBRA-TABS) 100 MG tablet Take 100 mg by mouth 2 times daily Yes Historical Provider, MD   Biotin 1000 MCG TABS Take by mouth Yes Historical Provider, MD   rOPINIRole (REQUIP) 1 MG tablet TAKE ONE TABLET BY MOUTH DAILY AS NEEDED AND THEN TAKE ONE TO TWO TABLETS BY MOUTH EVERY NIGHT TWO HOURS BEFORE BEDTIME Yes Jose M Kurtz MD   fluticasone (FLONASE) 50 MCG/ACT nasal spray SPRAY TWO SPRAYS IN EACH NOSTRIL ONCE DAILY Yes ANDREA Howe CNP   cetirizine (ZYRTEC) 10 MG tablet  Yes Historical Provider, MD   sulfaSALAzine (AZULFIDINE) 500 MG tablet Take 2 tablets by mouth 2 times daily Yes Thuan Ochoa MD   naproxen (NAPROSYN) 500 MG tablet Take 1 tablet by mouth 2 times daily as needed for Pain Yes Thuan Ochoa MD   desvenlafaxine succinate (PRISTIQ) 25 MG TB24 extended release tablet Take 3 tablets by mouth daily Yes ANDREA Howe CNP   amitriptyline (ELAVIL) 25 MG tablet Take 1 tablet by mouth nightly Yes ANDREA Howe CNP   albuterol sulfate HFA (PROVENTIL HFA) 108 (90 Base) MCG/ACT inhaler Inhale 2 puffs into the lungs every 6 hours as needed for Wheezing Yes ANDREA Howe CNP   lidocaine (LIDODERM) 5 % Place 1 patch onto the skin daily 12 hours on, 12 hours off. Yes Yemi Ramirez PA-C   Cholecalciferol (VITAMIN D3) 50 MCG (2000 UT) CAPS Take 2,000 capsules by mouth daily Yes Historical Provider, MD   ELDERBERRY PO Take by mouth 1 gummy nightly Yes Historical Provider, MD   estrogens, conjugated,-methyltestosterone (ESTRATEST) 1.25-2.5 MG per tablet  Yes Historical Provider, MD   acetaminophen (TYLENOL) 500 MG tablet Take 500 mg by mouth every 6 hours as needed for Pain Yes Historical Provider, MD   SUMAtriptan (IMITREX) 100 MG tablet Take 100 mg by mouth daily as needed Yes Historical Provider, MD         Patient's allergies, past medical, surgical, social and family histories werereviewed and updated as appropriate. Review of Systems   Constitutional: Negative for chills and fever. HENT: Positive for congestion. Musculoskeletal: Positive for arthralgias (left ankle). Negative for myalgias. Skin: Positive for color change (redness and swelling to left ankle). Physical Exam  Vitals signs reviewed. Constitutional:       Appearance: She is well-developed. HENT:      Head: Normocephalic and atraumatic. Musculoskeletal:        Feet:    Skin:     General: Skin is warm and dry. Neurological:      Mental Status: She is alert and oriented to person, place, and time. Psychiatric:         Behavior: Behavior normal.         Thought Content: Thought content normal.         Judgment: Judgment normal.       Vitals:    11/30/20 0903   BP: 124/76   Pulse: 86   Temp: 97.2 °F (36.2 °C)   SpO2: 97%   Weight: 225 lb 6.4 oz (102.2 kg)             ASSESSMENT:  1. Cellulitis of left lower extremity    2. Hypokalemia    3. Deviated septum        PLAN:  1. Cellulitis of left lower extremity  Improving. Reassurance today  Complete Doxy   Can wear compression stocking prn    2. Hypokalemia  -     Basic Metabolic Panel; Future    3.  Deviated septum  -     Camille Marques MD, Otolaryngology, [facility]  See pt instructions  F/u prn. Discussed use, benefit, and side effects of prescribed medications. All patient questions answered. Pt voiced understanding.

## 2020-11-30 NOTE — PATIENT INSTRUCTIONS
scrapes, or other injuries to your skin. Cellulitis most often occurs where there is a break in the skin. · If you get a scrape, cut, mild burn, or bite, wash the wound with clean water as soon as you can to help avoid infection. Don't use hydrogen peroxide or alcohol, which can slow healing. · If you have swelling in your legs (edema), support stockings and good skin care may help prevent leg sores and cellulitis. · Take care of your feet, especially if you have diabetes or other conditions that increase the risk of infection. Wear shoes and socks. Do not go barefoot. If you have athlete's foot or other skin problems on your feet, talk to your doctor about how to treat them. When should you call for help? Call your doctor now or seek immediate medical care if:    · You have signs that your infection is getting worse, such as:  ? Increased pain, swelling, warmth, or redness. ? Red streaks leading from the area. ? Pus draining from the area. ? A fever.     · You get a rash. Watch closely for changes in your health, and be sure to contact your doctor if:    · You do not get better as expected. Where can you learn more? Go to https://Entourage Medical Technologies.Trusteer. org and sign in to your Clearwire account. Enter H233 in the [employer] box to learn more about \"Cellulitis: Care Instructions. \"     If you do not have an account, please click on the \"Sign Up Now\" link. Current as of: July 2, 2020               Content Version: 12.6  © 2006-2020 Healthwise, Incorporated. Care instructions adapted under license by [employer]. If you have questions about a medical condition or this instruction, always ask your healthcare professional. [place] [address] any warranty or liability for your use of this information.

## 2020-12-08 RX ORDER — DOXYCYCLINE HYCLATE 100 MG
100 TABLET ORAL 2 TIMES DAILY
Qty: 20 TABLET | Refills: 0 | Status: SHIPPED | OUTPATIENT
Start: 2020-12-08 | End: 2020-12-18

## 2020-12-11 ENCOUNTER — TELEPHONE (OUTPATIENT)
Dept: FAMILY MEDICINE CLINIC | Age: 40
End: 2020-12-11

## 2020-12-11 ENCOUNTER — VIRTUAL VISIT (OUTPATIENT)
Dept: FAMILY MEDICINE CLINIC | Age: 40
End: 2020-12-11
Payer: MEDICAID

## 2020-12-11 PROCEDURE — 99213 OFFICE O/P EST LOW 20 MIN: CPT | Performed by: FAMILY MEDICINE

## 2020-12-11 PROCEDURE — 1036F TOBACCO NON-USER: CPT | Performed by: FAMILY MEDICINE

## 2020-12-11 PROCEDURE — G8417 CALC BMI ABV UP PARAM F/U: HCPCS | Performed by: FAMILY MEDICINE

## 2020-12-11 PROCEDURE — G8427 DOCREV CUR MEDS BY ELIG CLIN: HCPCS | Performed by: FAMILY MEDICINE

## 2020-12-11 PROCEDURE — G8482 FLU IMMUNIZE ORDER/ADMIN: HCPCS | Performed by: FAMILY MEDICINE

## 2020-12-11 RX ORDER — CLINDAMYCIN HYDROCHLORIDE 300 MG/1
300 CAPSULE ORAL 3 TIMES DAILY
Qty: 21 CAPSULE | Refills: 0 | Status: SHIPPED | OUTPATIENT
Start: 2020-12-11 | End: 2020-12-18

## 2020-12-11 NOTE — PROGRESS NOTES
[address] Note    Date: 12/11/2020                                               Subjective/Objective:     Chief Complaint   Patient presents with    Cellulitis     4th antibiotif left ankle swollen        HPI   Pt here for cellulitis of L ankle. Pt cut her leg while cleaning house, was seen in ED 2 days later, treated with Bactrim. Continue to get worse so she was seen there again 2 days later on 11/22/2020 and had a dose of IV ABX and was discharged on doxycycline. Saw Kt Willingham a few days later, doxycycline was continued. Did not resolve so she was put on a second round of doxycycline 3 days ago. Is not getting better since then.          Patient Active Problem List    Diagnosis Date Noted    Morbidly obese ([address] 10/07/2020    Restless leg syndrome 07/01/2019    Obstructive sleep apnea on CPAP        Past Medical History:   Diagnosis Date    Anxiety     Depression     WELL CONTROLLED 10-16-17    Heart rate fast     intermittent    Kidney stone     Motor tic disorder     Sleep apnea        Current Outpatient Medications   Medication Sig Dispense Refill    clindamycin (CLEOCIN) 300 MG capsule Take 1 capsule by mouth 3 times daily for 7 days 21 capsule 0    doxycycline hyclate (VIBRA-TABS) 100 MG tablet Take 1 tablet by mouth 2 times daily for 10 days 20 tablet 0    Biotin 1000 MCG TABS Take by mouth      rOPINIRole (REQUIP) 1 MG tablet TAKE ONE TABLET BY MOUTH DAILY AS NEEDED AND THEN TAKE ONE TO TWO TABLETS BY MOUTH EVERY NIGHT TWO HOURS BEFORE BEDTIME 90 tablet 3    fluticasone (FLONASE) 50 MCG/ACT nasal spray SPRAY TWO SPRAYS IN EACH NOSTRIL ONCE DAILY 1 Bottle 4    cetirizine (ZYRTEC) 10 MG tablet       sulfaSALAzine (AZULFIDINE) 500 MG tablet Take 2 tablets by mouth 2 times daily 360 tablet 0    desvenlafaxine succinate (PRISTIQ) 25 MG TB24 extended release tablet Take 3 tablets by mouth daily 270 tablet 1    amitriptyline (ELAVIL) 25 MG tablet Take 1 tablet by mouth nightly 30 tablet 2    albuterol sulfate HFA (PROVENTIL HFA) 108 (90 Base) MCG/ACT inhaler Inhale 2 puffs into the lungs every 6 hours as needed for Wheezing 1 Inhaler 3    lidocaine (LIDODERM) 5 % Place 1 patch onto the skin daily 12 hours on, 12 hours off. 30 patch 0    Cholecalciferol (VITAMIN D3) 50 MCG (2000 UT) CAPS Take 2,000 capsules by mouth daily      ELDERBERRY PO Take by mouth 1 gummy nightly      estrogens, conjugated,-methyltestosterone (ESTRATEST) 1.25-2.5 MG per tablet       acetaminophen (TYLENOL) 500 MG tablet Take 500 mg by mouth every 6 hours as needed for Pain      SUMAtriptan (IMITREX) 100 MG tablet Take 100 mg by mouth daily as needed      naproxen (NAPROSYN) 500 MG tablet Take 1 tablet by mouth 2 times daily as needed for Pain 60 tablet 3     No current facility-administered medications for this visit. Allergies   Allergen Reactions    Metronidazole Rash and Other (See Comments)     LIPS WERE TINGLING, tongue tingling, hives all over body    Other      Glue used to adhere recent surgical incision       Review of Systems   No fever, no vomiting    Vitals:  LMP 03/13/2018 (Exact Date)     Physical Exam   General:  Well-appearing, NAD, alert, non-toxic  HEENT:  Normocephalic, atraumatic. Normal appearance of nose. CHEST/LUNGS: No dyspnea  PSYCH:  A+O x 3; normal affect  SKIN: +erythema and swelling of lateral left ankle over malleolus. No drainage noted  NEURO:  GCS 15, CN2-12 grossly intact, normal speech      Assessment/Plan     72-year-old female with cellulitis of the left ankle. Likely resistant to doxycycline. For this reason we will switch to clindamycin. Discussed with patient medication/s dosage, instructions, potential S/E, A/R and Drug Interaction  Tylenol or Motrin as needed for pain or fever  Advise to return here if worse or go to nearest ER  Encourage fluids  Pt discharged in stable condition at 15:42    Note: This visit was done virtually.  Patient's consent was obtained prior to visit, which was done with both video and audio. Provider was in his office and patient was at home at the time of the visit. 1. Cellulitis of left ankle    - clindamycin (CLEOCIN) 300 MG capsule; Take 1 capsule by mouth 3 times daily for 7 days  Dispense: 21 capsule; Refill: 0       No orders of the defined types were placed in this encounter. No follow-ups on file.     Jose Manuel Ventura MD    12/11/2020  3:42 PM

## 2020-12-11 NOTE — TELEPHONE ENCOUNTER
Randy has been treating patient for a staff infection of her left ankle. She is getting concerned because it's not getting any better. She is asking for advice.

## 2020-12-14 ENCOUNTER — OFFICE VISIT (OUTPATIENT)
Dept: ENT CLINIC | Age: 40
End: 2020-12-14
Payer: MEDICAID

## 2020-12-14 VITALS — HEART RATE: 106 BPM | SYSTOLIC BLOOD PRESSURE: 138 MMHG | DIASTOLIC BLOOD PRESSURE: 90 MMHG | TEMPERATURE: 97.8 F

## 2020-12-14 PROCEDURE — G8427 DOCREV CUR MEDS BY ELIG CLIN: HCPCS | Performed by: STUDENT IN AN ORGANIZED HEALTH CARE EDUCATION/TRAINING PROGRAM

## 2020-12-14 PROCEDURE — G8482 FLU IMMUNIZE ORDER/ADMIN: HCPCS | Performed by: STUDENT IN AN ORGANIZED HEALTH CARE EDUCATION/TRAINING PROGRAM

## 2020-12-14 PROCEDURE — G8417 CALC BMI ABV UP PARAM F/U: HCPCS | Performed by: STUDENT IN AN ORGANIZED HEALTH CARE EDUCATION/TRAINING PROGRAM

## 2020-12-14 PROCEDURE — 31231 NASAL ENDOSCOPY DX: CPT | Performed by: STUDENT IN AN ORGANIZED HEALTH CARE EDUCATION/TRAINING PROGRAM

## 2020-12-14 PROCEDURE — 99204 OFFICE O/P NEW MOD 45 MIN: CPT | Performed by: STUDENT IN AN ORGANIZED HEALTH CARE EDUCATION/TRAINING PROGRAM

## 2020-12-14 NOTE — PROGRESS NOTES
[place]      Patient Name: [facility] Record Number:  [id number]  Primary Care Physician:  Carol Ann Casillas, ANDREA Betancourt CNP  Date of Consultation: 12/14/2020      Chief Complaint:   Chief Complaint   Patient presents with    Other     2nd opinion        [facility]  Zuleyka Fuller is a(n) 36 y.o. female who presents today for evaluation of issues related to her nose. She was previously seen by an outside hospital ear nose and throat physician and told that she had a deviated nasal septum and there was some mention that she could possibly have cancer, although it was unclear what this was related to. This made her very worried and so she presents today for a second opinion. Patient notes that over the last 6 months she has had trouble breathing out of the left side of her nose. She also will be getting episodes of nosebleeds during this time. She has not had any nosebleeds for the last several months. She is now using nasal saline in her nose. She notes she has a history of a staph infection of the lower extremity. This is not healed despite multiple rounds of antibiotics. I independently reviewed the patients past medical history, past surgical history, and social history. They are unremarkable except as noted in the HPI and below. The patient denies any family history related to the current complaint, and they deny any family history of bleeding disorders or difficulties with anesthesia unless noted below.      Patient Active Problem List   Diagnosis    Obstructive sleep apnea on CPAP    Restless leg syndrome    Morbidly obese [facility])     Past Surgical History:   Procedure Laterality Date    ABDOMINAL EXPLORATION SURGERY  7/22/11    excision Meckels diverticulum    ABDOMINOPLASTY  4/14/14    Sebas Willams ADENOIDECTOMY Bilateral     APPENDECTOMY  7/22/11    BLADDER REMOVAL  04/13/2018 OPERATIVE LAPAROSCOPY, LEFT OVARIAN CYSTECTOMY WITH DILATATION AND CURETTAGE    HYSTERECTOMY, TOTAL ABDOMINAL  06/12/2018    robotic total hyst [facility], Dr Isidro Meza    LITHOTRIPSY  x2    LUMBAR LAMINECTOMY  6/13    Rad; left l5-s1    OTHER SURGICAL HISTORY      hymenoplasty    TONSILLECTOMY Bilateral      Family History   Problem Relation Age of Onset    Asthma Mother     High Blood Pressure Mother     Diabetes Father     Atrial Fibrillation Father     Alcohol Abuse Father     High Blood Pressure Father     Heart Disease Paternal Grandmother     Heart Attack Paternal Grandmother     Diabetes Paternal Grandmother     Stroke Paternal Grandfather     Stroke Maternal Grandfather     Atrial Fibrillation Maternal Grandmother     Diabetes Maternal Grandmother     Cancer Neg Hx      Social History     Tobacco Use    Smoking status: Never Smoker    Smokeless tobacco: Never Used   Substance Use Topics    Alcohol use: No     Alcohol/week: 0.0 standard drinks     Comment: once a week    Drug use: No        Orders Only on 11/30/2020   Component Date Value Ref Range Status    Sodium 11/30/2020 142  136 - 145 mmol/L Final    Potassium 11/30/2020 4.0  3.5 - 5.1 mmol/L Final    Chloride 11/30/2020 105  99 - 110 mmol/L Final    CO2 11/30/2020 25  21 - 32 mmol/L Final    Anion Gap 11/30/2020 12  3 - 16 Final    Glucose 11/30/2020 106* 70 - 99 mg/dL Final    BUN 11/30/2020 17  7 - 20 mg/dL Final    CREATININE 11/30/2020 0.6  0.6 - 1.1 mg/dL Final    GFR Non- 11/30/2020 >60  >60 Final    Comment: >60 mL/min/1.73m2 EGFR, calc. for ages 25 and older using the  MDRD formula (not corrected for weight), is valid for stable  renal function.  GFR  11/30/2020 >60  >60 Final    Comment: Chronic Kidney Disease: less than 60 ml/min/1.73 sq.m. Kidney Failure: less than 15 ml/min/1.73 sq.m. Results valid for patients 18 years and older.  Calcium 11/30/2020 9.6  8.3 - 10.6 mg/dL Final        DRUG/FOOD ALLERGIES: Metronidazole and Other    CURRENT MEDICATIONS  Prior to Admission medications    Medication Sig Start Date End Date Taking?  Authorizing Provider   clindamycin (CLEOCIN) 300 MG capsule Take 1 capsule by mouth 3 times daily for 7 days 12/11/20 12/18/20 Yes Dexter Razo MD   Biotin 1000 MCG TABS Take by mouth   Yes Historical Provider, MD   rOPINIRole (REQUIP) 1 MG tablet TAKE ONE TABLET BY MOUTH DAILY AS NEEDED AND THEN TAKE ONE TO TWO TABLETS BY MOUTH EVERY NIGHT TWO HOURS BEFORE BEDTIME 11/16/20  Yes Jose Alfredo Salazar MD   cetirizine (ZYRTEC) 10 MG tablet  10/9/20  Yes Historical Provider, MD   sulfaSALAzine (AZULFIDINE) 500 MG tablet Take 2 tablets by mouth 2 times daily 11/5/20 2/3/21 Yes Chin Jovel MD   desvenlafaxine succinate (PRISTIQ) 25 MG TB24 extended release tablet Take 3 tablets by mouth daily 10/7/20 4/5/21 Yes ANDREA Johnson CNP   amitriptyline (ELAVIL) 25 MG tablet Take 1 tablet by mouth nightly 10/7/20  Yes ANDREA Johnson CNP   albuterol sulfate HFA (PROVENTIL HFA) 108 (90 Base) MCG/ACT inhaler Inhale 2 puffs into the lungs every 6 hours as needed for Wheezing 9/22/20  Yes ANDREA Johnson CNP   Cholecalciferol (VITAMIN D3) 50 MCG (2000 UT) CAPS Take 2,000 capsules by mouth daily   Yes Historical Provider, MD   estrogens, conjugated,-methyltestosterone (ESTRATEST) 1.25-2.5 MG per tablet  10/28/19  Yes Historical Provider, MD   acetaminophen (TYLENOL) 500 MG tablet Take 500 mg by mouth every 6 hours as needed for Pain   Yes Historical Provider, MD   SUMAtriptan (IMITREX) 100 MG tablet Take 100 mg by mouth daily as needed 1/11/19  Yes Historical Provider, MD   doxycycline hyclate (VIBRA-TABS) 100 MG tablet Take 1 tablet by mouth 2 times daily for 10 days  Patient not taking: Reported on 12/14/2020 12/8/20 12/18/20  ANDREA Johnson CNP fluticasone (FLONASE) 50 MCG/ACT nasal spray SPRAY TWO SPRAYS IN EACH NOSTRIL ONCE DAILY  Patient not taking: Reported on 12/14/2020 11/9/20   ANDREA Johnson CNP   naproxen (NAPROSYN) 500 MG tablet Take 1 tablet by mouth 2 times daily as needed for Pain 11/5/20 12/5/20  Chin Jovel MD   ELDERBERRY PO Take by mouth 1 gummy nightly    Historical Provider, MD       REVIEW OF SYSTEMS  The following systems were reviewed and revealed the following in addition to any already discussed in the HPI:    CONSTITUTIONAL: no weight loss, no fever, no night sweats, no chills  EYES: no vision changes, no blurry vision  EARS: no changes in hearing, no otalgia  NOSE: no epistaxis, no rhinorrhea  RESPIRATORY: no  Difficulty breathing, no shortness of breath  CV: no chest pain, no Peripheral vascular disease  HEME: No coagulation disorder, no Bleeding disorder  NEURO: no TIA or stroke-like symptoms  SKIN: No new rashes in the head and neck, no recent skin cancers  MOUTH: No new ulcers, no recent teeth infections  GASTROINTESTINAL: No diarrhea, stomach pain  PSYCH: No anxiety, no depression      PHYSICAL EXAM  BP (!) 138/90   Pulse 106   Temp 97.8 °F (36.6 °C)   LMP 03/13/2018 (Exact Date)     GENERAL: No acute distress, alert and oriented, no hoarseness, strong voice  EYES: EOMI, Anti-icteric  HENT:   Head: Normocephalic and atraumatic.    Face:  Symmetric, facial nerve intact, no sinus tenderness  Right Ear: Normal external ear, normal external auditory canal, intact tympanic membrane with normal mobility and aerated middle ear  Left Ear: Normal external ear, normal external auditory canal, intact tympanic membrane with normal mobility and aerated middle ear  Mouth/Oral Cavity:  normal lips, Uvula is midline, no mucosal lesions, no trismus, normal dentition, normal salivary quality/flow  Oropharynx/Larynx:  normal oropharynx, normal tonsils; patient did not tolerate mirror exam due to excessive gag reflex Discharge score = 0 (0 = no discharge, 1 = clear thin discharge, 2 = thick purulent discharge)    Septum: intact and deviated mildly to the left  Other:   -The inferior and middle turbinates were examined. The middle meatus, and sphenoethmoid recess was examined bilaterally.    -There is evidence of mild sinonasal inflammation. There is 1/2 cm area of crusting on the left anterior nasal septum. There is no ulceration or other concerning findings. .   -There were no complications. Tolerated well without complication. I attest that I was present for and did the entire procedure myself. ASSESSMENT/PLAN  1. Nasal obstruction    2. Deviated nasal septum    3. Hypertrophy of both inferior nasal turbinates    4. Epistaxis    This very pleasant 59-year-old female here today for evaluation of issues related to her nose. At a prior evaluation by an outside hospital ENT, there is concern for a deviated nasal septum and there was some unclear concern for cancer. On repeat evaluation today I see no evidence of any concerning findings that could be attributable to sinonasal cancer. She does have a small area of crusting along the left nasal septum. I suspect that this is related to a combination of desiccation of the nasal mucosa as well as digital manipulation. I have asked her to minimize any digital manipulation. Additionally I have asked her to use nasal saline gels and sprays several times per day. Due to her history of staph infection and the chronic crusting, I think it is reasonable to prescribe her mupirocin to be applied to the nasal septum 3 times daily for the next week. I will see her back in approximately 1 month to see if this area is healed. If it is not then we could certainly consider a biopsy at that time, however I think it is unlikely to be anything nefarious. This note was generated completely or in part utilizing Dragon dictation speech recognition software. Occasionally, words are mistranscribed and despite editing, the text may contain inaccuracies due to incorrect word recognition. If further clarification is needed please contact the office at [phone].

## 2020-12-17 RX ORDER — DESVENLAFAXINE 50 MG/1
TABLET, EXTENDED RELEASE ORAL
Qty: 90 TABLET | Refills: 0 | OUTPATIENT
Start: 2020-12-17

## 2020-12-17 NOTE — TELEPHONE ENCOUNTER
Medication:   Requested Prescriptions     Pending Prescriptions Disp Refills    desvenlafaxine succinate (PRISTIQ) 50 MG TB24 extended release tablet [Pharmacy Med Name: DESVENLAFAXINE SUCCNT ER 50 MG] 90 tablet 0     Sig: TAKE ONE TABLET BY MOUTH DAILY        Last Filled:      Patient Phone Number: [phone] (home)     Last appt: 12/11/2020   Next appt: 12/22/2020    Last OARRS:   RX Monitoring 1/16/2018   Attestation The Prescription Monitoring Report for this patient was reviewed today. Periodic Controlled Substance Monitoring Possible medication side effects, risk of tolerance and/or dependence, and alternative treatments discussed. ;No signs of potential drug abuse or diversion identified.

## 2020-12-22 ENCOUNTER — OFFICE VISIT (OUTPATIENT)
Dept: FAMILY MEDICINE CLINIC | Age: 40
End: 2020-12-22
Payer: MEDICAID

## 2020-12-22 ENCOUNTER — HOSPITAL ENCOUNTER (OUTPATIENT)
Age: 40
Discharge: HOME | End: 2020-12-22
Payer: MEDICAID

## 2020-12-22 ENCOUNTER — HOSPITAL ENCOUNTER (OUTPATIENT)
Dept: GENERAL RADIOLOGY | Age: 40
Discharge: HOME | End: 2020-12-22
Payer: MEDICAID

## 2020-12-22 VITALS
HEART RATE: 84 BPM | DIASTOLIC BLOOD PRESSURE: 82 MMHG | SYSTOLIC BLOOD PRESSURE: 126 MMHG | BODY MASS INDEX: 37.97 KG/M2 | OXYGEN SATURATION: 97 % | WEIGHT: 228.2 LBS | TEMPERATURE: 97.7 F

## 2020-12-22 DIAGNOSIS — Z13.1 SCREENING FOR DIABETES MELLITUS: ICD-10-CM

## 2020-12-22 DIAGNOSIS — M25.572 ACUTE LEFT ANKLE PAIN: Primary | ICD-10-CM

## 2020-12-22 DIAGNOSIS — Z00.00 WELL ADULT EXAM: ICD-10-CM

## 2020-12-22 PROBLEM — E66.09 CLASS 2 OBESITY DUE TO EXCESS CALORIES WITHOUT SERIOUS COMORBIDITY WITH BODY MASS INDEX (BMI) OF 37.0 TO 37.9 IN ADULT: Status: ACTIVE | Noted: 2020-10-07

## 2020-12-22 PROBLEM — E66.812 CLASS 2 OBESITY DUE TO EXCESS CALORIES WITHOUT SERIOUS COMORBIDITY WITH BODY MASS INDEX (BMI) OF 37.0 TO 37.9 IN ADULT: Status: ACTIVE | Noted: 2020-10-07

## 2020-12-22 PROBLEM — E66.01 MORBIDLY OBESE (HCC): Status: RESOLVED | Noted: 2020-10-07 | Resolved: 2020-12-22

## 2020-12-22 LAB
CHOLESTEROL, FASTING: 173 MG/DL (ref 0–199)
ESTIMATED AVERAGE GLUCOSE: 76.7 MG/DL
HBA1C MFR BLD: 4.3 %
HDLC SERPL-MCNC: 45 MG/DL (ref 40–60)
LDL CHOLESTEROL CALCULATED: 119 MG/DL
TRIGLYCERIDE, FASTING: 43 MG/DL (ref 0–150)
TSH REFLEX FT4: 1.35 UIU/ML (ref 0.27–4.2)
VLDLC SERPL CALC-MCNC: 9 MG/DL

## 2020-12-22 PROCEDURE — G8482 FLU IMMUNIZE ORDER/ADMIN: HCPCS | Performed by: NURSE PRACTITIONER

## 2020-12-22 PROCEDURE — 73610 X-RAY EXAM OF ANKLE: CPT

## 2020-12-22 PROCEDURE — 99396 PREV VISIT EST AGE 40-64: CPT | Performed by: NURSE PRACTITIONER

## 2020-12-22 NOTE — PROGRESS NOTES
 rOPINIRole (REQUIP) 1 MG tablet TAKE ONE TABLET BY MOUTH DAILY AS NEEDED AND THEN TAKE ONE TO TWO TABLETS BY MOUTH EVERY NIGHT TWO HOURS BEFORE BEDTIME 90 tablet 3    fluticasone (FLONASE) 50 MCG/ACT nasal spray SPRAY TWO SPRAYS IN EACH NOSTRIL ONCE DAILY 1 Bottle 4    cetirizine (ZYRTEC) 10 MG tablet       sulfaSALAzine (AZULFIDINE) 500 MG tablet Take 2 tablets by mouth 2 times daily 360 tablet 0    desvenlafaxine succinate (PRISTIQ) 25 MG TB24 extended release tablet Take 3 tablets by mouth daily 270 tablet 1    amitriptyline (ELAVIL) 25 MG tablet Take 1 tablet by mouth nightly 30 tablet 2    albuterol sulfate HFA (PROVENTIL HFA) 108 (90 Base) MCG/ACT inhaler Inhale 2 puffs into the lungs every 6 hours as needed for Wheezing 1 Inhaler 3    Cholecalciferol (VITAMIN D3) 50 MCG (2000 UT) CAPS Take 2,000 capsules by mouth daily      ELDERBERRY PO Take by mouth 1 gummy nightly      estrogens, conjugated,-methyltestosterone (ESTRATEST) 1.25-2.5 MG per tablet       acetaminophen (TYLENOL) 500 MG tablet Take 500 mg by mouth every 6 hours as needed for Pain      SUMAtriptan (IMITREX) 100 MG tablet Take 100 mg by mouth daily as needed       Allergies   Allergen Reactions    Metronidazole Rash and Other (See Comments)     LIPS WERE TINGLING, tongue tingling, hives all over body    Other      Glue used to adhere recent surgical incision       Social History     Socioeconomic History    Marital status:      Spouse name: None    Number of children: None    Years of education: None    Highest education level: None   Occupational History    None   Social Needs    Financial resource strain: None    Food insecurity     Worry: None     Inability: None    Transportation needs     Medical: None     Non-medical: None   Tobacco Use    Smoking status: Never Smoker    Smokeless tobacco: Never Used   Substance and Sexual Activity    Alcohol use: No     Alcohol/week: 0.0 standard drinks Comment: once a week    Drug use: No    Sexual activity: None   Lifestyle    Physical activity     Days per week: None     Minutes per session: None    Stress: None   Relationships    Social connections     Talks on phone: None     Gets together: None     Attends Jainism service: None     Active member of club or organization: None     Attends meetings of clubs or organizations: None     Relationship status: None    Intimate partner violence     Fear of current or ex partner: None     Emotionally abused: None     Physically abused: None     Forced sexual activity: None   Other Topics Concern    None   Social History Narrative    None       Family history was updated.     Health Maintenance   Topic Date Due    Diabetes screen  10/25/2020    Lipid screen  01/16/2024    DTaP/Tdap/Td vaccine (3 - Td) 08/12/2029    Flu vaccine  Completed    Hepatitis A vaccine  Aged Out    Hepatitis B vaccine  Aged Out    Hib vaccine  Aged Out    Meningococcal (ACWY) vaccine  Aged Out    Pneumococcal 0-64 years Vaccine  Aged Out    Varicella vaccine  Discontinued    HIV screen  Discontinued       Last eye exam: couple years, up coming appointment in January  Last dental exam: < 12 months  Regular exercise: not at present  Balanced diet: not at present      Review Of Systems:  Skin: no changing moles, abnormal pigmentation, rash, scaling, itching, masses, hair or nail changes  Eyes: no blurring, diplopia, or eye pain  Ears/Nose/Throat: no hearing loss, tinnitus, vertigo, nosebleed, nasal congestion, rhinorrhea, sore throat  Respiratory: no cough, pleuritic chest pain, dyspnea, or wheezing  Cardiovascular: no angina, PHELPS, orthopnea, palpitations  Gastrointestinal: no nausea, vomiting, heartburn, diarrhea, constipation, bloating, or abdominal pain  Genitourinary: no urinary urgency, frequency, dysuria, nocturia, hesitancy, or incontinence  Musculoskeletal: left ankle pain Neurologic: no paralysis, paresis, paresthesia, seizures, tremors, or headaches  Hematologic/Lymphatic/Immunologic: no anemia, abnormal bleeding/bruising, fever, chills, night sweats, swollen glands, or unexplained weight loss  Endocrine: no heat or cold intolerance and no polyphagia, polydipsia, or polyuria    PHYSICAL EXAMINATION:  /82   Pulse 84   Temp 97.7 °F (36.5 °C)   Wt 228 lb 3.2 oz (103.5 kg)   LMP 03/13/2018 (Exact Date)   SpO2 97%   Breastfeeding No   BMI 37.97 kg/m²   Constitutional: Oriented to person, place, and time. Appears well-developed and well-nourished. No distress. HENT:   Head: Normocephalic and atraumatic. Ears: Hearing, tympanic membrane, external ear and ear canal normal.   Nose: Nose normal.   Mouth/Throat: Uvula is midline, oropharynx is clear and moist and mucous membranes are normal.   Eyes: Conjunctivae and EOM are normal. Pupils are equal, round, and reactive to light. Neck: Normal range of motion. Neck supple. Normal carotid pulses and no JVD present. Carotid bruit is not present. No tracheal deviation present. No mass and no thyromegaly present. Cardiovascular: Normal rate, regular rhythm, S1 normal, S2 normal, normal heart sounds and intact distal pulses. No murmur heard. Pulmonary/Chest: Effort normal and breath sounds normal. No stridor. No respiratory distress. No decreased breath sounds. No wheezes. No rhonchi. No rales. Abdominal: Soft. Normal appearance and bowel sounds are normal. No distension, no abdominal bruit and no mass. There is no hepatomegaly. No tenderness. Musculoskeletal: Left ankle, lateral aspect: (+) swelling, nonpitting edema. Faint erythema. Non-tender. FROM. no warmth  Lymphadenopathy:     No cervical adenopathy. No supraclavicular adenopathy. Neurological: Alert and oriented to person, place, and time. No tremor. She exhibits normal muscle tone.  Coordination and gait normal. Skin: Skin is warm and dry. No rash noted. Not diaphoretic. No cyanosis. No pallor. Nails show no clubbing. Psychiatric:  Normal mood and affect. Speech is normal and behavior is normal. Cognition and memory are normal.   Pelvic: Exam deferred to OB/GYN      ASSESSMENT:   Complete physical without pap. 1. Well adult exam    2. Acute left ankle pain    3. Class 2 obesity due to excess calories without serious comorbidity with body mass index (BMI) of 37.0 to 37.9 in adult    4. Obstructive sleep apnea on CPAP    5. Screening for diabetes mellitus          PLAN:   1. Well adult exam  All health maintenance issues were updated. Recommend begin progressive daily aerobic exercise program, follow a low fat, low cholesterol diet, attempt to lose weight, continue current medications and return for routine annual checkups  -     Lipid, Fasting; Future  -     TSH WITH REFLEX TO FT4; Future    2. Acute left ankle pain  -     XR ANKLE LEFT (MIN 3 VIEWS); Future  Will consider referral to ortho prn    3. Class 2 obesity due to excess calories without serious comorbidity with body mass index (BMI) of 37.0 to 37.9 in adult  Strongly recommend eliminating concentrated sweets, reducing simple carbs. Avoid corn syrup and hydrogenated oils. Focus on fruits, vegs, whole grains, lean meats and push water. Fish oil, high fiber foods recommended. Recommended at least 30 minutes of aerobic exercise daily. 4. Obstructive sleep apnea on CPAP  Continue treatment plan per Dr Omero Minor. 5. Screening for diabetes mellitus  -     Hemoglobin A1C; Future  See pt instructions  F/u 1 yr, sooner prn  Discussed use, benefit, and side effects of prescribed medications. All patient questions answered. Pt voiced understanding.

## 2020-12-22 NOTE — PATIENT INSTRUCTIONS
Patient Education        Well Visit, Ages 25 to 48: Care Instructions  Your Care Instructions     Physical exams can help you stay healthy. Your doctor has checked your overall health and may have suggested ways to take good care of yourself. He or she also may have recommended tests. At home, you can help prevent illness with healthy eating, regular exercise, and other steps. Follow-up care is a key part of your treatment and safety. Be sure to make and go to all appointments, and call your doctor if you are having problems. It's also a good idea to know your test results and keep a list of the medicines you take. How can you care for yourself at home? · Reach and stay at a healthy weight. This will lower your risk for many problems, such as obesity, diabetes, heart disease, and high blood pressure. · Get at least 30 minutes of physical activity on most days of the week. Walking is a good choice. You also may want to do other activities, such as running, swimming, cycling, or playing tennis or team sports. Discuss any changes in your exercise program with your doctor. · Do not smoke or allow others to smoke around you. If you need help quitting, talk to your doctor about stop-smoking programs and medicines. These can increase your chances of quitting for good. · Talk to your doctor about whether you have any risk factors for sexually transmitted infections (STIs). Having one sex partner (who does not have STIs and does not have sex with anyone else) is a good way to avoid these infections. · Use birth control if you do not want to have children at this time. Talk with your doctor about the choices available and what might be best for you. · Protect your skin from too much sun. When you're outdoors from 10 a.m. to 4 p.m., stay in the shade or cover up with clothing and a hat with a wide brim. Wear sunglasses that block UV rays. Even when it's cloudy, put broad-spectrum sunscreen (SPF 30 or higher) on any exposed skin. · See a dentist one or two times a year for checkups and to have your teeth cleaned. · Wear a seat belt in the car. Follow your doctor's advice about when to have certain tests. These tests can spot problems early. For everyone  · Cholesterol. Have the fat (cholesterol) in your blood tested after age 21. Your doctor will tell you how often to have this done based on your age, family history, or other things that can increase your risk for heart disease. · Blood pressure. Have your blood pressure checked during a routine doctor visit. Your doctor will tell you how often to check your blood pressure based on your age, your blood pressure results, and other factors. · Vision. Talk with your doctor about how often to have a glaucoma test.  · Diabetes. Ask your doctor whether you should have tests for diabetes. · Colon cancer. Your risk for colorectal cancer gets higher as you get older. Some experts say that adults should start regular screening at age 48 and stop at age 76. Others say to start before age 48 or continue after age 76. Talk with your doctor about your risk and when to start and stop screening. For women  · Breast exam and mammogram. Talk to your doctor about when you should have a clinical breast exam and a mammogram. Medical experts differ on whether and how often women under 50 should have these tests. Your doctor can help you decide what is right for you. · Cervical cancer screening test and pelvic exam. Begin with a Pap test at age 24. The test often is part of a pelvic exam. Starting at age 27, you may choose to have a Pap test, an HPV test, or both tests at the same time (called co-testing). Talk with your doctor about how often to have testing. · Tests for sexually transmitted infections (STIs). Ask whether you should have tests for STIs. You may be at risk if you have sex with more than one person, especially if your partners do not wear condoms. For men  · Tests for sexually transmitted infections (STIs). Ask whether you should have tests for STIs. You may be at risk if you have sex with more than one person, especially if you do not wear a condom. · Testicular cancer exam. Ask your doctor whether you should check your testicles regularly. · Prostate exam. Talk to your doctor about whether you should have a blood test (called a PSA test) for prostate cancer. Experts differ on whether and when men should have this test. Some experts suggest it if you are older than 39 and are -American or have a father or brother who got prostate cancer when he was younger than 72. When should you call for help? Watch closely for changes in your health, and be sure to contact your doctor if you have any problems or symptoms that concern you. Where can you learn more? Go to [email or web address] [email or web address] org and sign in to your [employer] account. Enter P072 in the [facility] box to learn more about \"Well Visit, Ages 25 to 48: Care Instructions. \"     If you do not have an account, please click on the \"Sign Up Now\" link. Current as of: May 27, 2020               Content Version: 12.6  © 3679-8590 [employer]. Care instructions adapted under license by [facility] ([facility]). If you have questions about a medical condition or this instruction, always ask your healthcare professional. [address] any warranty or liability for your use of this information.

## 2020-12-29 ENCOUNTER — TELEPHONE (OUTPATIENT)
Dept: FAMILY MEDICINE CLINIC | Age: 40
End: 2020-12-29

## 2020-12-30 ENCOUNTER — OFFICE VISIT (OUTPATIENT)
Dept: PRIMARY CARE CLINIC | Age: 40
End: 2020-12-30
Payer: MEDICAID

## 2020-12-30 PROCEDURE — 99211 OFF/OP EST MAY X REQ PHY/QHP: CPT | Performed by: NURSE PRACTITIONER

## 2020-12-30 PROCEDURE — G8428 CUR MEDS NOT DOCUMENT: HCPCS | Performed by: NURSE PRACTITIONER

## 2020-12-30 PROCEDURE — G8417 CALC BMI ABV UP PARAM F/U: HCPCS | Performed by: NURSE PRACTITIONER

## 2020-12-30 NOTE — PATIENT INSTRUCTIONS
You have received a viral test for COVID-19. Below is education on quarantine per the CDC guidelines. For any symptoms, seek care from your PCP, call [phone] to establish care with a doctor, or go directly to an urgent care or the emergency room. Test results will take 2-7 days and will be sent to you in your [employer] account. If you test positive, you will be contacted via phone. If you test negative, the ONLY communication will be through [address] GO TO [employer] AND SIGN UP FOR [employer]  (LOWER LEFT OF THE HOME PAGE)  No test is 100%. If you have symptoms, you should follow the guidance of quarantine as previously stated. You can still be contagious if you have symptoms. Your [facility] will reach out to you if you have a positive result. They will provide you with a return to work date and note. If you were tested for a pre-op, then you should remain in quarantine until your procedure. How do I know if I need to be in quarantine? If you live in a community where COVID-19 is or might be spreading (currently, that is virtually everywhere in the [place])  Be alert for symptoms. Watch for fever, cough, shortness of breath, or other symptoms of COVID-19.  ? Take your temperature if symptoms develop. ? Practice social distancing. Maintain 6 feet of distance from others and stay out of crowded places. ? Follow CDC guidance if symptoms develop. If you feel healthy but:  ? Recently had close contact with a person with COVID-19 you need to Quarantine:  ? Stay home until 14 days after your last exposure. ? Check your temperature twice a day and watch for symptoms of COVID-19.  ? If possible, stay away from people who are at higher-risk for getting very sick from COVID-19. Stay Home and Monitor Your Health if you:  ? Have been diagnosed with COVID-19, or  ? Are waiting for test results, or  ?  Have cough, fever, or shortness of breath, or symptoms of COVID-19 When You Can be Around Others After You Had or Likely Had COVID-19     If you have or think you might have COVID-19, it is important to stay home and away from other people. Staying away from others helps stop the spread of COVID-19. If you have an emergency warning sign (including trouble breathing), get emergency medical care immediately. When you can be around others (end home isolation) depends on different factors for different situations. Find CDC's recommendations for your situation below. I think or know I had COVID-19, and I had symptoms  You can be with others after  ? 3 days with no fever and  ? Respiratory symptoms have improved (e.g. cough, shortness of breath) and  ? 10 days since symptoms first appeared  Depending on your healthcare provider's advice and availability of testing, you might get tested to see if you still have COVID-19. If you will be tested, you can be around others when you have no fever, respiratory symptoms have improved, and you receive two negative test results in a row, at least 24 hours apart. I tested positive for COVID-19 but had no symptoms  If you continue to have no symptoms, you can be with others after:  ? 10 days have passed since test or 14 days since your exposure test   Depending on your healthcare provider's advice and availability of testing, you might get tested to see if you still have COVID-19. If you will be tested, you can be around others after you receive two negative test results in a row, at least 24 hours apart. If you develop symptoms after testing positive, follow the guidance above for I think or know I had COVID, and I had symptoms.   For Anyone Who Has Been Around a Person with COVID-19  It is important to remember that anyone who has close contact with someone with COVID-19 should stay home for 14 days after exposure based on the time it takes to develop illness. Testing is not necessary.     www.cdc.gov/coronavirus/2019-ncov/index.html

## 2020-12-31 LAB — SARS-COV-2, NAA: DETECTED

## 2021-01-04 ENCOUNTER — TELEPHONE (OUTPATIENT)
Dept: FAMILY MEDICINE CLINIC | Age: 41
End: 2021-01-04

## 2021-01-05 ENCOUNTER — VIRTUAL VISIT (OUTPATIENT)
Dept: FAMILY MEDICINE CLINIC | Age: 41
End: 2021-01-05
Payer: MEDICAID

## 2021-01-05 ENCOUNTER — NURSE TRIAGE (OUTPATIENT)
Dept: OTHER | Facility: CLINIC | Age: 41
End: 2021-01-05

## 2021-01-05 DIAGNOSIS — R19.7 NAUSEA VOMITING AND DIARRHEA: ICD-10-CM

## 2021-01-05 DIAGNOSIS — U07.1 COVID-19: Primary | ICD-10-CM

## 2021-01-05 DIAGNOSIS — R11.2 NAUSEA VOMITING AND DIARRHEA: ICD-10-CM

## 2021-01-05 PROCEDURE — G8427 DOCREV CUR MEDS BY ELIG CLIN: HCPCS | Performed by: NURSE PRACTITIONER

## 2021-01-05 PROCEDURE — 99213 OFFICE O/P EST LOW 20 MIN: CPT | Performed by: NURSE PRACTITIONER

## 2021-01-05 RX ORDER — DIPHENOXYLATE HYDROCHLORIDE AND ATROPINE SULFATE 2.5; .025 MG/1; MG/1
1 TABLET ORAL 4 TIMES DAILY PRN
Qty: 40 TABLET | Refills: 0 | Status: SHIPPED | OUTPATIENT
Start: 2021-01-05 | End: 2021-01-15

## 2021-01-05 RX ORDER — ONDANSETRON 4 MG/1
4 TABLET, ORALLY DISINTEGRATING ORAL EVERY 8 HOURS PRN
Qty: 30 TABLET | Refills: 0 | Status: SHIPPED | OUTPATIENT
Start: 2021-01-05 | End: 2021-01-28

## 2021-01-05 ASSESSMENT — ENCOUNTER SYMPTOMS
DIARRHEA: 1
COUGH: 1
VOMITING: 0
NAUSEA: 1
SINUS PRESSURE: 1
RHINORRHEA: 1
SORE THROAT: 0
SHORTNESS OF BREATH: 0
ABDOMINAL PAIN: 1

## 2021-01-05 ASSESSMENT — PATIENT HEALTH QUESTIONNAIRE - PHQ9
SUM OF ALL RESPONSES TO PHQ9 QUESTIONS 1 & 2: 0
SUM OF ALL RESPONSES TO PHQ QUESTIONS 1-9: 0
1. LITTLE INTEREST OR PLEASURE IN DOING THINGS: 0

## 2021-01-05 NOTE — PATIENT INSTRUCTIONS
Patient Education        10 Things to Do When You Have COVID-19    Stay home. Don't go to school, work, or public areas. And don't use public transportation, ride-shares, or taxis unless you have no choice. Leave your home only if you need to get medical care. But call the doctor's office first so they know you're coming. And wear a cloth face cover. Ask before leaving isolation. Talk with your doctor or other health professional about when it will be safe for you to leave isolation. Wear a cloth face cover when you are around other people. It can help stop the spread of the virus when you cough or sneeze. Limit contact with people in your home. If possible, stay in a separate bedroom and use a separate bathroom. Avoid contact with pets and other animals. If possible, have a friend or family member care for them while you're sick. Cover your mouth and nose with a tissue when you cough or sneeze. Then throw the tissue in the trash right away. Wash your hands often, especially after you cough or sneeze. Use soap and water, and scrub for at least 20 seconds. If soap and water aren't available, use an alcohol-based hand . Don't share personal household items. These include bedding, towels, cups and glasses, and eating utensils. Clean and disinfect your home every day. Use household  or disinfectant wipes or sprays. Take special care to clean things that you grab with your hands. These include doorknobs, remote controls, phones, and handles on your refrigerator and microwave. And don't forget countertops, tabletops, bathrooms, and computer keyboards. Take acetaminophen (Tylenol) to relieve fever and body aches. Read and follow all instructions on the label. Current as of: July 10, 2020               Content Version: 12.6  © 4301-8030 [employer]. Care instructions adapted under license by [facility] ([facility]). If you have questions about a medical condition or this instruction, always ask your healthcare professional. [address] any warranty or liability for your use of this information.

## 2021-01-05 NOTE — PROGRESS NOTES
2021      TELEHEALTH EVALUATION -- Audio/Visual (During NECYT-04 public health emergency)    HPI:    Chriss Lopes (:  1980) has requested an audio/video evaluation for the following concern(s):    (+) COVID test: 20. Started with body aches and abd pain . URI   This is a new problem. The current episode started 1 to 4 weeks ago (9 days ago). The problem has been unchanged. Associated symptoms include abdominal pain (mild), congestion, coughing, diarrhea, headaches, nausea and rhinorrhea. Pertinent negatives include no chest pain, ear pain, sore throat or vomiting. Treatments tried: Mucinex. The treatment provided no relief. Review of Systems   Constitutional: Positive for chills, fatigue (with body aches) and fever (low grade). HENT: Positive for congestion, postnasal drip, rhinorrhea and sinus pressure. Negative for ear pain and sore throat. Loss of taste and smell   Respiratory: Positive for cough. Negative for shortness of breath. Cardiovascular: Negative for chest pain and palpitations. Gastrointestinal: Positive for abdominal pain (mild), diarrhea and nausea. Negative for vomiting. Neurological: Positive for headaches. Prior to Visit Medications    Medication Sig Taking? Authorizing Provider   amitriptyline (ELAVIL) 25 MG tablet TAKE ONE TABLET BY MOUTH ONCE NIGHTLY Yes ANDREA Myers CNP   mupirocin (BACTROBAN) 2 % ointment Apply a small amount to the affected area twice daily for 14 days.  Yes Sara Washington MD   Biotin 1000 MCG TABS Take by mouth Yes Historical Provider, MD   rOPINIRole (REQUIP) 1 MG tablet TAKE ONE TABLET BY MOUTH DAILY AS NEEDED AND THEN TAKE ONE TO TWO TABLETS BY MOUTH EVERY NIGHT TWO HOURS BEFORE BEDTIME Yes Leopold Card, MD   fluticasone (FLONASE) 50 MCG/ACT nasal spray SPRAY TWO SPRAYS IN EACH NOSTRIL ONCE DAILY Yes ANDREA Myers CNP   cetirizine (ZYRTEC) 10 MG tablet  Yes Historical Provider, MD  Asthma Mother     High Blood Pressure Mother     Diabetes Father     Atrial Fibrillation Father     Alcohol Abuse Father     High Blood Pressure Father     Heart Disease Paternal Grandmother     Heart Attack Paternal Grandmother     Diabetes Paternal Grandmother     Stroke Paternal Grandfather     Stroke Maternal Grandfather     Atrial Fibrillation Maternal Grandmother     Diabetes Maternal Grandmother     Cancer Neg Hx        Allergies   Allergen Reactions    Metronidazole Rash and Other (See Comments)     LIPS WERE TINGLING, tongue tingling, hives all over body    Other      Glue used to adhere recent surgical incision       Social History     Tobacco Use    Smoking status: Never Smoker    Smokeless tobacco: Never Used   Substance Use Topics    Alcohol use: No     Alcohol/week: 0.0 standard drinks     Comment: once a week    Drug use: No          PHYSICAL EXAMINATION:  Vital Signs: (As obtained by patient/caregiver or practitioner observation)  LMP 03/13/2018 (Exact Date)   Respiratory rate appears normal      Constitutional: Appears well-developed and well-nourished. No apparent distress    Mental status: Alert and awake. Oriented to person/place/darwin. Able to follow commands    Eyes: EOM normal. Sclera normal. No discharge visible  HENT: Normocephalic, atraumatic. Mouth/Throat: Mucous membranes are moist. External Ears Normal    Neck: No visualized mass   Pulmonary/Chest: Respiratory effort normal.  No visualized signs of difficulty breathing or respiratory distress        Musculoskeletal:  Normal range of motion of neck  Neurological:       No Facial Asymmetry (Cranial nerve 7 motor function) (limited exam to video visit). No gaze palsy       Skin:  No significant exanthematous lesions or discoloration noted on facial skin       Psychiatric: Normal Affect. No Hallucinations            ASSESSMENT/PLAN:  1.  COVID-19  Criteria for emergent care reviewed  Quarantine guidelines reviewed First missed day of work 12/29/20    2. Nausea vomiting and diarrhea  Push fluids, clear liquid diet, then advance as tolerated  - diphenoxylate-atropine (DIPHENATOL) 2.5-0.025 MG per tablet; Take 1 tablet by mouth 4 times daily as needed for Diarrhea for up to 10 days. Dispense: 40 tablet; Refill: 0  - ondansetron (ZOFRAN ODT) 4 MG disintegrating tablet; Place 1 tablet under the tongue every 8 hours as needed for Nausea or Vomiting  Dispense: 30 tablet; Refill: 0      Return if symptoms worsen or fail to improve. Parth Caraballo is a 36 y.o. female being evaluated by a Virtual Visit (video visit) encounter to address concerns as mentioned above. A caregiver was present when appropriate. Due to this being a TeleHealth encounter (During ONETV-29 public health emergency), evaluation of the following organ systems was limited: Vitals/Constitutional/EENT/Resp/CV/GI//MS/Neuro/Skin/Heme-Lymph-Imm. Pursuant to the emergency declaration under the [address] [place] [address], [address] [place] [address] authority and the [employer] Act, this Virtual Visit was conducted with patient's (and/or legal guardian's) consent, to reduce the patient's risk of exposure to COVID-19 and provide necessary medical care. The patient (and/or legal guardian) has also been advised to contact this office for worsening conditions or problems, and seek emergency medical treatment and/or call 911 if deemed necessary. Patient identification was verified at the start of the visit: Yes    Total time spent on this encounter: Not billed by time minutes    Services were provided through a video synchronous discussion virtually to substitute for in-person clinic visit. Patient and provider were located at their individual homes. --Alfonso Sam, ANDREA - CNP on 1/5/2021 at 10:46 AM    An electronic signature was used to authenticate this note. To [place]

## 2021-01-05 NOTE — TELEPHONE ENCOUNTER
Reason for Disposition   [1] COVID-19 infection suspected by caller or triager AND [2] mild symptoms (cough, fever, or others) AND [9] no complications or SOB    Answer Assessment - Initial Assessment Questions  1. COVID-19 DIAGNOSIS: \"Who made your Coronavirus (COVID-19) diagnosis? \" \"Was it confirmed by a positive lab test?\" If not diagnosed by a HCP, ask \"Are there lots of cases (community spread) where you live? \" (See public health department website, if unsure)      Last Wednesday positive result through [facility] drive through    2. COVID-19 EXPOSURE: \"Was there any known exposure to COVID before the symptoms began? \" CDC Definition of close contact: within 6 feet (2 meters) for a total of 15 minutes or more over a 24-hour period. Yes    3. ONSET: \"When did the COVID-19 symptoms start? \"       Last Monday    4. WORST SYMPTOM: \"What is your worst symptom? \" (e.g., cough, fever, shortness of breath, muscle aches)      Weakness    5. COUGH: \"Do you have a cough? \" If so, ask: \"How bad is the cough? \"        Yes mild    6. FEVER: \"Do you have a fever? \" If so, ask: \"What is your temperature, how was it measured, and when did it start? \"      100.4 at highest but not right now     7. RESPIRATORY STATUS: \"Describe your breathing? \" (e.g., shortness of breath, wheezing, unable to speak)       Normal    8. BETTER-SAME-WORSE: Estefany Guadarrama you getting better, staying the same or getting worse compared to yesterday? \"  If getting worse, ask, \"In what way? \"      Better    9. HIGH RISK DISEASE: \"Do you have any chronic medical problems? \" (e.g., asthma, heart or lung disease, weak immune system, obesity, etc.)      No    10. PREGNANCY: \"Is there any chance you are pregnant? \" \"When was your last menstrual period? \"        No    11. OTHER SYMPTOMS: \"Do you have any other symptoms? \"  (e.g., chills, fatigue, headache, loss of smell or taste, muscle pain, sore throat; new loss of smell or taste especially support the diagnosis of COVID-19)        Body aches, diarrhea    Protocols used: CORONAVIRUS (COVID-19) DIAGNOSED OR SUSPECTED-ADULT-AH    Pt c/o feeling fast heart rate at times states does not feel this way now but when checking on BP monitor HR is 129 pt c/o feeling lightheaded when going upstairs but not at this moment pt states feels very weak and shaky   Second level with Suzanne Homans NP ok to go to [facility] or have VV pt states she would rather wait for VV at this time     Caller provided care advice and instructed to call back with worsening symptoms. Attention Provider: Thank you for allowing me to participate in the care of your patient. The patient was connected to triage in response to information provided to the [facility]. Please do not respond through this encounter as the response is not directed to a shared pool.

## 2021-01-06 ENCOUNTER — PATIENT MESSAGE (OUTPATIENT)
Dept: FAMILY MEDICINE CLINIC | Age: 41
End: 2021-01-06

## 2021-01-06 ENCOUNTER — HOSPITAL ENCOUNTER (OUTPATIENT)
Age: 41
Discharge: HOME | End: 2021-01-06
Payer: MEDICAID

## 2021-01-06 ENCOUNTER — TELEPHONE (OUTPATIENT)
Dept: FAMILY MEDICINE CLINIC | Age: 41
End: 2021-01-06

## 2021-01-06 DIAGNOSIS — U07.1 COVID-19: Primary | ICD-10-CM

## 2021-01-06 DIAGNOSIS — U07.1 COVID-19: ICD-10-CM

## 2021-01-06 LAB
A/G RATIO: 1.4 (ref 1.1–2.2)
ALBUMIN SERPL-MCNC: 4.2 G/DL (ref 3.4–5)
ALP BLD-CCNC: 81 U/L (ref 40–129)
ALT SERPL-CCNC: 10 U/L (ref 10–40)
ANION GAP SERPL CALCULATED.3IONS-SCNC: 12 MMOL/L (ref 3–16)
AST SERPL-CCNC: 15 U/L (ref 15–37)
BASOPHILS ABSOLUTE: 0 K/UL (ref 0–0.2)
BASOPHILS RELATIVE PERCENT: 0.1 %
BILIRUB SERPL-MCNC: 0.3 MG/DL (ref 0–1)
BUN BLDV-MCNC: 14 MG/DL (ref 7–20)
CALCIUM SERPL-MCNC: 8.6 MG/DL (ref 8.3–10.6)
CHLORIDE BLD-SCNC: 99 MMOL/L (ref 99–110)
CO2: 26 MMOL/L (ref 21–32)
CREAT SERPL-MCNC: 0.8 MG/DL (ref 0.6–1.1)
EOSINOPHILS ABSOLUTE: 0 K/UL (ref 0–0.6)
EOSINOPHILS RELATIVE PERCENT: 0.1 %
GFR AFRICAN AMERICAN: >60
GFR NON-AFRICAN AMERICAN: >60
GLOBULIN: 3 G/DL
GLUCOSE BLD-MCNC: 85 MG/DL (ref 70–99)
HCT VFR BLD CALC: 37.6 % (ref 36–48)
HEMOGLOBIN: 12.1 G/DL (ref 12–16)
LYMPHOCYTES ABSOLUTE: 2 K/UL (ref 1–5.1)
LYMPHOCYTES RELATIVE PERCENT: 20 %
MCH RBC QN AUTO: 29.3 PG (ref 26–34)
MCHC RBC AUTO-ENTMCNC: 32.1 G/DL (ref 31–36)
MCV RBC AUTO: 91.2 FL (ref 80–100)
MONOCYTES ABSOLUTE: 0.7 K/UL (ref 0–1.3)
MONOCYTES RELATIVE PERCENT: 7.1 %
NEUTROPHILS ABSOLUTE: 7.2 K/UL (ref 1.7–7.7)
NEUTROPHILS RELATIVE PERCENT: 72.7 %
PDW BLD-RTO: 14.4 % (ref 12.4–15.4)
PLATELET # BLD: 236 K/UL (ref 135–450)
PMV BLD AUTO: 8.4 FL (ref 5–10.5)
POTASSIUM SERPL-SCNC: 3.9 MMOL/L (ref 3.5–5.1)
RBC # BLD: 4.12 M/UL (ref 4–5.2)
SODIUM BLD-SCNC: 137 MMOL/L (ref 136–145)
TOTAL PROTEIN: 7.2 G/DL (ref 6.4–8.2)
WBC # BLD: 9.9 K/UL (ref 4–11)

## 2021-01-06 PROCEDURE — 80053 COMPREHEN METABOLIC PANEL: CPT

## 2021-01-06 PROCEDURE — 36415 COLL VENOUS BLD VENIPUNCTURE: CPT

## 2021-01-06 PROCEDURE — 85025 COMPLETE CBC W/AUTO DIFF WBC: CPT

## 2021-01-06 NOTE — TELEPHONE ENCOUNTER
Pt called concerned about dehydration from diarrhea.  Pt advised Sutton Better, CNP replied to her MyChart message and will go get labs ordered

## 2021-01-07 ENCOUNTER — TELEPHONE (OUTPATIENT)
Dept: FAMILY MEDICINE CLINIC | Age: 41
End: 2021-01-07

## 2021-01-11 ENCOUNTER — PATIENT MESSAGE (OUTPATIENT)
Dept: FAMILY MEDICINE CLINIC | Age: 41
End: 2021-01-11

## 2021-01-11 DIAGNOSIS — R05.9 COUGH: Primary | ICD-10-CM

## 2021-01-11 DIAGNOSIS — U07.1 COVID-19: ICD-10-CM

## 2021-01-11 RX ORDER — PROMETHAZINE HYDROCHLORIDE AND CODEINE PHOSPHATE 6.25; 1 MG/5ML; MG/5ML
5 SYRUP ORAL EVERY 4 HOURS PRN
Qty: 120 ML | Refills: 0 | Status: SHIPPED | OUTPATIENT
Start: 2021-01-11 | End: 2021-01-18

## 2021-01-11 NOTE — TELEPHONE ENCOUNTER
From: Abhishek Iglesias  To: ANDREA Phillip - CNP  Sent: 1/11/2021 6:53 AM EST  Subject: Non-Urgent Medical Question    Today is day 14. I still have upset stomach with bloating and diarrhea. Do I just continue with the diarrhea meds? Also, is there any rx cough medicine that I can take? The couch is rough too. I'm so over this!

## 2021-01-18 ENCOUNTER — PATIENT MESSAGE (OUTPATIENT)
Dept: FAMILY MEDICINE CLINIC | Age: 41
End: 2021-01-18

## 2021-01-18 NOTE — TELEPHONE ENCOUNTER
From: Rakesh Wells  To: Fransisco Hamilton, APRN - CNP  Sent: 1/18/2021 7:52 AM EST  Subject: Non-Urgent Medical Question    Good morning,    I was wondering if I could get retested to go back to work? 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 She is worried about me coming back. Also, can I get a doctor note for the 2 weeks that I missed?  Latasha Garcia of [address])    Thanks,  Cosmo More

## 2021-01-27 ENCOUNTER — PATIENT MESSAGE (OUTPATIENT)
Dept: RHEUMATOLOGY | Age: 41
End: 2021-01-27

## 2021-01-27 ENCOUNTER — NURSE TRIAGE (OUTPATIENT)
Dept: OTHER | Facility: CLINIC | Age: 41
End: 2021-01-27

## 2021-01-27 ENCOUNTER — TELEPHONE (OUTPATIENT)
Dept: FAMILY MEDICINE CLINIC | Age: 41
End: 2021-01-27

## 2021-01-27 DIAGNOSIS — M06.00 SERONEGATIVE RHEUMATOID ARTHRITIS (HCC): Primary | ICD-10-CM

## 2021-01-27 RX ORDER — PREDNISONE 1 MG/1
TABLET ORAL
Qty: 40 TABLET | Refills: 0 | Status: SHIPPED | OUTPATIENT
Start: 2021-01-27 | End: 2021-04-13

## 2021-01-27 NOTE — TELEPHONE ENCOUNTER
According to [facility]:  Patient has a cough, vomiting, diarrhea and chills  No fever  Should patient have in office appt.today?

## 2021-01-27 NOTE — TELEPHONE ENCOUNTER
Bilateral hand swelling with pain and redness. Reason for Disposition   Localized rash is very painful (no fever)    Answer Assessment - Initial Assessment Questions  1. ONSET: \"When did the pain start? \"      Yesterday    2. LOCATION: \"Where is the pain located? \"      Joints  3. PAIN: \"How bad is the pain? \" (Scale 1-10; or mild, moderate, severe)    - MILD (1-3): doesn't interfere with normal activities    - MODERATE (4-7): interferes with normal activities (e.g., work or school) or awakens from sleep    - SEVERE (8-10): excruciating pain, unable to use hand at all      5-/10    4. WORK OR EXERCISE: \"Has there been any recent work or exercise that involved this part of the body? \"      Denies    5. CAUSE: \"What do you think is causing the pain? \"      ra    6. AGGRAVATING FACTORS: \"What makes the pain worse? \" (e.g., using computer)      Movement    7. OTHER SYMPTOMS: \"Do you have any other symptoms? \" (e.g., neck pain, swelling, rash, numbness, fever)      Swelling, redness    8. PREGNANCY: \"Is there any chance you are pregnant? \" \"When was your last menstrual period? \"      Denies    Protocols used: HAND AND WRIST PAIN-ADULT-[place]      Patient called pre-service center [facility]) to schedule appointment, with red flag complaint, transferred to RN access for triage. See above questions and answers. Caller talking full sentences without any distress on phone. Discussed disposition and patient agreeable. Discussed potential consequences for not following disposition recommendation. Aware to call back with any concerns or persistent, worsening, or new symptoms develop. Warm transfer to [facility] scheduling for appointment. Attention Provider: Thank you for allowing me to participate in the care of your patient. The  patient was connected to triage in response to information provided to the [facility]. Please do not respond through this encounter as the response is not directed to a shared pool.

## 2021-01-27 NOTE — TELEPHONE ENCOUNTER
From: Mehrdad Bullard  To: Stephy Koroma MD  Sent: 1/27/2021 10:33 AM EST  Subject: Non-Urgent Medical Question    Hi Dr. Deepika Sandra,    I have been working, for the last 3 days, to get rid of all the junk in my basement. Sometime in that time I hurt my left bicep. To the point where I can't lift my arm up. Yesterday, I woke up with my pointer finger(left hand) hurting really bad. As the day went off the pain got worse and it started to swell. I'm to the point where I can't even bend it. I also have the same pain and some swelling in my ring finger (right hand). I'm not sure what's going on but I feel like it might be a flare up. Is there anything I can do?     Moe Mak

## 2021-01-28 ENCOUNTER — OFFICE VISIT (OUTPATIENT)
Dept: FAMILY MEDICINE CLINIC | Age: 41
End: 2021-01-28
Payer: MEDICAID

## 2021-01-28 VITALS
DIASTOLIC BLOOD PRESSURE: 80 MMHG | TEMPERATURE: 97.2 F | BODY MASS INDEX: 36.91 KG/M2 | WEIGHT: 221.8 LBS | HEART RATE: 105 BPM | OXYGEN SATURATION: 98 % | SYSTOLIC BLOOD PRESSURE: 106 MMHG

## 2021-01-28 DIAGNOSIS — G44.89 OTHER HEADACHE SYNDROME: ICD-10-CM

## 2021-01-28 DIAGNOSIS — M06.09 RHEUMATOID ARTHRITIS OF MULTIPLE SITES WITH NEGATIVE RHEUMATOID FACTOR (HCC): Primary | ICD-10-CM

## 2021-01-28 DIAGNOSIS — M25.50 ARTHRALGIA, UNSPECIFIED JOINT: ICD-10-CM

## 2021-01-28 DIAGNOSIS — R11.0 NAUSEA: ICD-10-CM

## 2021-01-28 DIAGNOSIS — K64.9 BLEEDING HEMORRHOID: ICD-10-CM

## 2021-01-28 PROCEDURE — 99214 OFFICE O/P EST MOD 30 MIN: CPT | Performed by: NURSE PRACTITIONER

## 2021-01-28 PROCEDURE — G8482 FLU IMMUNIZE ORDER/ADMIN: HCPCS | Performed by: NURSE PRACTITIONER

## 2021-01-28 PROCEDURE — G8427 DOCREV CUR MEDS BY ELIG CLIN: HCPCS | Performed by: NURSE PRACTITIONER

## 2021-01-28 PROCEDURE — 1036F TOBACCO NON-USER: CPT | Performed by: NURSE PRACTITIONER

## 2021-01-28 PROCEDURE — G8417 CALC BMI ABV UP PARAM F/U: HCPCS | Performed by: NURSE PRACTITIONER

## 2021-01-28 ASSESSMENT — ENCOUNTER SYMPTOMS
NAUSEA: 1
ABDOMINAL PAIN: 0

## 2021-01-28 NOTE — PATIENT INSTRUCTIONS
Patient Education        Joint Pain: Care Instructions  Your Care Instructions     Many people have small aches and pains from overuse or injury to muscles and joints. Joint injuries often happen during sports or recreation, work tasks, or projects around the home. An overuse injury can happen when you put too much stress on a joint or when you do an activity that stresses the joint over and over, such as using the computer or rowing a boat. You can take action at home to help your muscles and joints get better. You should feel better in 1 to 2 weeks, but it can take 3 months or more to heal completely. Follow-up care is a key part of your treatment and safety. Be sure to make and go to all appointments, and call your doctor if you are having problems. It's also a good idea to know your test results and keep a list of the medicines you take. How can you care for yourself at home? · Do not put weight on the injured joint for at least a day or two. · For the first day or two after an injury, do not take hot showers or baths, and do not use hot packs. The heat could make swelling worse. · Put ice or a cold pack on the sore joint for 10 to 20 minutes at a time. Try to do this every 1 to 2 hours for the next 3 days (when you are awake) or until the swelling goes down. Put a thin cloth between the ice and your skin. · Wrap the injury in an elastic bandage. Do not wrap it too tightly because this can cause more swelling. · Prop up the sore joint on a pillow when you ice it or anytime you sit or lie down during the next 3 days. Try to keep it above the level of your heart. This will help reduce swelling. · Take an over-the-counter pain medicine, such as acetaminophen (Tylenol), ibuprofen (Advil, Motrin), or naproxen (Aleve). Read and follow all instructions on the label. · After 1 or 2 days of rest, begin moving the joint gently. While the joint is still healing, you can begin to exercise using activities that do not strain or hurt the painful joint. When should you call for help? Call your doctor now or seek immediate medical care if:    · You have signs of infection, such as:  ? Increased pain, swelling, warmth, and redness. ? Red streaks leading from the joint. ? A fever. Watch closely for changes in your health, and be sure to contact your doctor if:    · Your movement or symptoms are not getting better after 1 to 2 weeks of home treatment. Where can you learn more? Go to [email or web address] and sign in to your [facility] account. Enter P205 in the GOWEX box to learn more about \"Joint Pain: Care Instructions. \"     If you do not have an account, please click on the \"Sign Up Now\" link. Current as of: March 2, 2020               Content Version: 12.6  © 2006-2020 [employer]. Care instructions adapted under license by [facility] ([facility]). If you have questions about a medical condition or this instruction, always ask your healthcare professional. [place] [address] any warranty or liability for your use of this information.

## 2021-01-28 NOTE — PROGRESS NOTES
SUBJECTIVE:  Pt is a of 36 y.o. female comes in today with   Chief Complaint   Patient presents with    Edema     Hands swollen    Knee Pain    Back Pain    Headache    Nausea       Patient presenting today for evaluation of multiple joint pain. Started this week after cleaning out basement. Started yesterday with left index finger redness, swelling, warmth, pain and decreased ROM. States symptoms improving but still present. Now with severe right hand pain, swelling, redness, warmth and decreased ROM. Works on computer, unable to type. Also experiencing back pain, and knee pain. Has not started pred taper from Dr Maritza Cox that was sent yesterday- needs to  from pharmacy  Still with lingering COVID symptoms- nausea and HA. (+) COVID 12/30/2020. Diarrhea has resolved, however now with bleeding hemorrhoid. Using OTC cream with tucks pads, not much relief        Prior to Visit Medications    Medication Sig Taking?  Authorizing Provider   predniSONE (DELTASONE) 5 MG tablet Take 4 tabs daily for 4 days, 3 tabs daily for 4 days, 2 tabs daily for 4 days, 1 tab daily for 4 days and stop Yes Linda Malone MD   amitriptyline (ELAVIL) 25 MG tablet TAKE ONE TABLET BY MOUTH ONCE NIGHTLY Yes ANDREA Salinas CNP   Biotin 1000 MCG TABS Take by mouth Yes Historical Provider, MD   rOPINIRole (REQUIP) 1 MG tablet TAKE ONE TABLET BY MOUTH DAILY AS NEEDED AND THEN TAKE ONE TO TWO TABLETS BY MOUTH EVERY NIGHT TWO HOURS BEFORE BEDTIME Yes Geno Adkins MD   fluticasone (FLONASE) 50 MCG/ACT nasal spray SPRAY TWO SPRAYS IN EACH NOSTRIL ONCE DAILY Yes ANDREA Salinas CNP   cetirizine (ZYRTEC) 10 MG tablet  Yes Historical Provider, MD   sulfaSALAzine (AZULFIDINE) 500 MG tablet Take 2 tablets by mouth 2 times daily Yes García Yuan MD   desvenlafaxine succinate (PRISTIQ) 25 MG TB24 extended release tablet Take 3 tablets by mouth daily Yes ANDREA Salinas CNP albuterol sulfate HFA (PROVENTIL HFA) 108 (90 Base) MCG/ACT inhaler Inhale 2 puffs into the lungs every 6 hours as needed for Wheezing Yes ANDREA Henley CNP   Cholecalciferol (VITAMIN D3) 50 MCG (2000 UT) CAPS Take 2,000 capsules by mouth daily Yes Historical Provider, MD   ELDERBERRY PO Take by mouth 1 gummy nightly Yes Historical Provider, MD   acetaminophen (TYLENOL) 500 MG tablet Take 500 mg by mouth every 6 hours as needed for Pain Yes Historical Provider, MD   SUMAtriptan (IMITREX) 100 MG tablet Take 100 mg by mouth daily as needed Yes Historical Provider, MD   naproxen (NAPROSYN) 500 MG tablet Take 1 tablet by mouth 2 times daily as needed for Pain  Radha Villalobos MD         Patient's allergies, past medical, surgical, social and family histories werereviewed and updated as appropriate. Review of Systems   Constitutional: Positive for activity change (decreased) and fatigue. Negative for chills and fever. Appetite change: decreased. Gastrointestinal: Positive for nausea. Negative for abdominal pain. Bleeding hemorrhoid     Musculoskeletal: Positive for arthralgias (hands, fingers, back, knees). Physical Exam  Vitals signs reviewed. Constitutional:       Appearance: She is well-developed. HENT:      Head: Normocephalic and atraumatic. Skin:     General: Skin is warm and dry. Neurological:      Mental Status: She is alert and oriented to person, place, and time. Psychiatric:         Behavior: Behavior normal.         Thought Content: Thought content normal.         Judgment: Judgment normal.       Vitals:    01/28/21 0835   BP: 106/80   Pulse: 105   Temp: 97.2 °F (36.2 °C)   SpO2: 98%   Weight: 221 lb 12.8 oz (100.6 kg)             ASSESSMENT:  1. Rheumatoid arthritis of multiple sites with negative rheumatoid factor ([facility] [address]    2. Arthralgia, unspecified joint    3. Nausea    4. Other headache syndrome    5.  Bleeding hemorrhoid        PLAN: 1.Rheumatoid arthritis of multiple sites with negative rheumatoid factor (HCC)  Start prednisone taper prescribed by Dr Roc Boo, ice and elevate  Can take Naproxen BID prn pain    2. Arthralgia, unspecified joint  See #1    3. Nausea  Post viral- push fluids, bland diet, peppermints    4. Other headache syndrome  Post viral- rest and push fluids  Can take Tyl prn pain    5. Bleeding hemorrhoid  Continue prepH and Tucks pads, may see improvement with prednisone  Can consider referral to GI prn  See pt instructions  F/u 5-7 days if no improvement, sooner if symptomsworsen. Discussed use, benefit, and side effects of prescribed medications. All patient questions answered. Pt voiced understanding.

## 2021-01-28 NOTE — LETTER
[address]  Phone: [phone]  Fax: [phone]    ANDREA Nelson CNP        January 28, 2021     Patient: Ramana Vazquez   YOB: 1980   Date of Visit: 1/28/2021       To Whom it May Concern:    Nils Valadez was seen in my office on 1/28/2021. She may return to work on 2/1/21. If you have any questions or concerns, please don't hesitate to call.     Sincerely,         ANDREA Nelson CNP

## 2021-02-01 ENCOUNTER — TELEPHONE (OUTPATIENT)
Dept: FAMILY MEDICINE CLINIC | Age: 41
End: 2021-02-01

## 2021-02-01 NOTE — TELEPHONE ENCOUNTER
Updated lab code for [facility] lab  DOS - 12-22-20  Thyroid,HGB- no other ICD-10  Lipid- E78.00

## 2021-02-08 ENCOUNTER — OFFICE VISIT (OUTPATIENT)
Dept: BARIATRICS/WEIGHT MGMT | Age: 41
End: 2021-02-08

## 2021-02-08 VITALS
HEIGHT: 65 IN | HEART RATE: 94 BPM | SYSTOLIC BLOOD PRESSURE: 113 MMHG | BODY MASS INDEX: 36.82 KG/M2 | WEIGHT: 221 LBS | DIASTOLIC BLOOD PRESSURE: 70 MMHG | TEMPERATURE: 97.3 F

## 2021-02-08 DIAGNOSIS — G47.33 OBSTRUCTIVE SLEEP APNEA, ADULT: ICD-10-CM

## 2021-02-08 DIAGNOSIS — E66.9 OBESITY (BMI 30-39.9): Primary | ICD-10-CM

## 2021-02-08 PROCEDURE — 99999 PR OFFICE/OUTPT VISIT,PROCEDURE ONLY: CPT

## 2021-02-08 RX ORDER — ESTRADIOL 2 MG/1
2 TABLET ORAL DAILY
COMMUNITY

## 2021-02-08 NOTE — PROGRESS NOTES
Jono Alonso is a 36 y.o. female with a date of birth of 1980. Vitals:    02/08/21 1158   BP: 113/70   Pulse: 94   Temp: 97.3 °F (36.3 °C)   Weight: 221 lb (100.2 kg)   Height: 5' 4.5\" (1.638 m)    BMI: Body mass index is 37.35 kg/m². Obesity Classification: Class II    Weight History: Wt Readings from Last 3 Encounters:   02/08/21 221 lb (100.2 kg)   01/28/21 221 lb 12.8 oz (100.6 kg)   12/22/20 228 lb 3.2 oz (103.5 kg)       Patient's lowest adult weight was 150 lb at age 25. Patient's highest adult weight was 225 lb at age 44. Patient has participated in the following weight loss programs: Weight Watcher Anonymous and . Patient has not participated in meal replacement/liquid diets. Patient has not participated in weight loss medications. Patient is not lactose intolerant. Patient does not have Sikhism/cultural food concerns. Patient does not have food allergies. Pt reports inconsistent sleep schedule, currently on steroids r/t illness. Pt stated feels like constantly eating. 24 hour recall/food frequency chart:  Breakfast: yes. - Frosted cheerios with skim / [employer] sometimes -medium Jocy iced coffee with arthur egg and cheese croissant  Snack: yes. - something sweet - ice cream cone  Lunch: yes. - eats at Mom[employer] - ham salad sandwich and sometimes chips / order food occasionally / 2c beef and noodles with 1 can coke  Snack: yes. - chocolate / medium Jocy ice coffee / something sweet  Dinner: yes. Protein with veggie and carbs / sometimes fast food  Snack: yes. - hot chocolate  Drinks throughout the day: 2-3 cokes, ice coffees, 3-4 sparkling Ice  Do you drink alcohol? yes. How often/how much alcohol do you drink: 1-2 glasses of wine or beer per couple times a month.

## 2021-02-16 ENCOUNTER — TELEMEDICINE (OUTPATIENT)
Dept: BARIATRICS/WEIGHT MGMT | Age: 41
End: 2021-02-16
Payer: MEDICAID

## 2021-02-16 DIAGNOSIS — Z71.3 DIETARY COUNSELING AND SURVEILLANCE: ICD-10-CM

## 2021-02-16 DIAGNOSIS — E66.9 CLASS 2 OBESITY: Primary | ICD-10-CM

## 2021-02-16 PROCEDURE — 99214 OFFICE O/P EST MOD 30 MIN: CPT | Performed by: FAMILY MEDICINE

## 2021-02-16 ASSESSMENT — ENCOUNTER SYMPTOMS
NAUSEA: 0
DIARRHEA: 0
ABDOMINAL DISTENTION: 0
SHORTNESS OF BREATH: 0
CONSTIPATION: 0
EYE PAIN: 0
BLOOD IN STOOL: 0
WHEEZING: 0
ABDOMINAL PAIN: 0
COUGH: 0
VOMITING: 0
CHOKING: 0
APNEA: 0
CHEST TIGHTNESS: 0
PHOTOPHOBIA: 0

## 2021-02-16 NOTE — PROGRESS NOTES
The patient denies a history of glaucoma. The patient denies a history of seizure disorders/epiliepsy. H/o nephrolithiasis     Dietitian's assessment reviewed and addressed with patient. Reviewed:  [x] Nutrition and the importance of regular protein intake  [x] Hidden CHO/carbohydrate sources  [x] Alcohol use  [x] Tobacco use  [x] Drug use- Denies   [x] Importance of exercise and reducing sedentary time        Controlled Substance Monitoring:     RX Monitoring 1/16/2018   Attestation The Prescription Monitoring Report for this patient was reviewed today. Periodic Controlled Substance Monitoring Possible medication side effects, risk of tolerance and/or dependence, and alternative treatments discussed. ;No signs of potential drug abuse or diversion identified.         Allergies   Allergen Reactions    Metronidazole Rash and Other (See Comments)     LIPS WERE TINGLING, tongue tingling, hives all over body    Other      Glue used to adhere recent surgical incision         Current Outpatient Medications:     estradiol (ESTRACE) 2 MG tablet, Take 2 mg by mouth daily, Disp: , Rfl:     predniSONE (DELTASONE) 5 MG tablet, Take 4 tabs daily for 4 days, 3 tabs daily for 4 days, 2 tabs daily for 4 days, 1 tab daily for 4 days and stop, Disp: 40 tablet, Rfl: 0    amitriptyline (ELAVIL) 25 MG tablet, TAKE ONE TABLET BY MOUTH ONCE NIGHTLY, Disp: 30 tablet, Rfl: 11    Biotin 1000 MCG TABS, Take by mouth, Disp: , Rfl:     rOPINIRole (REQUIP) 1 MG tablet, TAKE ONE TABLET BY MOUTH DAILY AS NEEDED AND THEN TAKE ONE TO TWO TABLETS BY MOUTH EVERY NIGHT TWO HOURS BEFORE BEDTIME, Disp: 90 tablet, Rfl: 3    fluticasone (FLONASE) 50 MCG/ACT nasal spray, SPRAY TWO SPRAYS IN EACH NOSTRIL ONCE DAILY, Disp: 1 Bottle, Rfl: 4    cetirizine (ZYRTEC) 10 MG tablet, , Disp: , Rfl:     sulfaSALAzine (AZULFIDINE) 500 MG tablet, Take 2 tablets by mouth 2 times daily, Disp: 360 tablet, Rfl: 0   naproxen (NAPROSYN) 500 MG tablet, Take 1 tablet by mouth 2 times daily as needed for Pain, Disp: 60 tablet, Rfl: 3    desvenlafaxine succinate (PRISTIQ) 25 MG TB24 extended release tablet, Take 3 tablets by mouth daily, Disp: 270 tablet, Rfl: 1    albuterol sulfate HFA (PROVENTIL HFA) 108 (90 Base) MCG/ACT inhaler, Inhale 2 puffs into the lungs every 6 hours as needed for Wheezing, Disp: 1 Inhaler, Rfl: 3    Cholecalciferol (VITAMIN D3) 50 MCG (2000 UT) CAPS, Take 2,000 capsules by mouth daily, Disp: , Rfl:     ELDERBERRY PO, Take by mouth 1 gummy nightly, Disp: , Rfl:     acetaminophen (TYLENOL) 500 MG tablet, Take 500 mg by mouth every 6 hours as needed for Pain, Disp: , Rfl:     SUMAtriptan (IMITREX) 100 MG tablet, Take 100 mg by mouth daily as needed, Disp: , Rfl:     Patient Active Problem List   Diagnosis    Obstructive sleep apnea on CPAP    Restless leg syndrome    Class 2 obesity due to excess calories without serious comorbidity with body mass index (BMI) of 37.0 to 37.9 in adult    Obstructive sleep apnea, adult       Past Medical History:   Diagnosis Date    Anxiety     Arthritis     RA    Cellulitis of left lower extremity     left ankle    Depression     WELL CONTROLLED 10-16-17    Heart rate fast     intermittent    Kidney stone     Migraine     Motor tic disorder     Obstructive sleep apnea, adult        Past Surgical History:   Procedure Laterality Date    ABDOMINAL EXPLORATION SURGERY  07/22/2011    excision Meckels diverticulum    ABDOMINOPLASTY  04/14/2014    Sedrick Mendoza ADENOIDECTOMY Bilateral     APPENDECTOMY  07/22/2011    BACK SURGERY      CARPAL TUNNEL RELEASE      HYSTERECTOMY, TOTAL ABDOMINAL  06/12/2018    robotic total hyst [facility], Dr Tayo Aggarwal    LITHOTRIPSY  x2    LUMBAR LAMINECTOMY  06/2013    Rad; left l5-s1    MECKEL DIVERTICULUM EXCISION      OTHER SURGICAL HISTORY      hymenoplasty    OVARIAN CYST REMOVAL  04/13/2018 OPERATIVE LAPAROSCOPY, LEFT OVARIAN CYSTECTOMY WITH DILATATION AND CURETTAGE    TONSILLECTOMY Bilateral     URETHRAL STRICTURE DILATATION      WISDOM TOOTH EXTRACTION         Family History   Problem Relation Age of Onset    Asthma Mother     High Blood Pressure Mother     Diabetes Father     Atrial Fibrillation Father     Alcohol Abuse Father     High Blood Pressure Father     Heart Disease Paternal Grandmother     Heart Attack Paternal Grandmother     Diabetes Paternal Grandmother     Stroke Paternal Grandfather     Stroke Maternal Grandfather     Atrial Fibrillation Maternal Grandmother     Diabetes Maternal Grandmother     Cancer Neg Hx        Review of Systems   Constitutional: Negative for fatigue. Eyes: Negative for photophobia, pain and visual disturbance. Respiratory: Negative for apnea, cough, choking, chest tightness, shortness of breath and wheezing. Cardiovascular: Negative for chest pain, palpitations and leg swelling. Gastrointestinal: Negative for abdominal distention, abdominal pain, blood in stool, constipation, diarrhea, nausea and vomiting. Endocrine: Negative for cold intolerance and heat intolerance. Musculoskeletal: Negative for arthralgias and myalgias. Skin: Negative for rash. Neurological: Negative for dizziness, tremors, syncope, weakness, numbness and headaches. Psychiatric/Behavioral: Negative for agitation, confusion, decreased concentration, dysphoric mood, hallucinations, sleep disturbance and suicidal ideas. The patient is not nervous/anxious and is not hyperactive. Physical Exam  Constitutional:       Appearance: She is well-developed. HENT:      Head: Normocephalic. Eyes:      Conjunctiva/sclera: Conjunctivae normal.   Abdominal:      General: Abdomen is protuberant. Musculoskeletal:         General: No swelling. Neurological:      Mental Status: She is alert and oriented to person, place, and time.    Psychiatric: Mood and Affect: Mood normal.         Behavior: Behavior normal.         Thought Content: Thought content normal.         Judgment: Judgment normal.         Hospital Outpatient Visit on 01/06/2021   Component Date Value Ref Range Status    WBC 01/06/2021 9.9  4.0 - 11.0 K/uL Final    RBC 01/06/2021 4.12  4.00 - 5.20 M/uL Final    Hemoglobin 01/06/2021 12.1  12.0 - 16.0 g/dL Final    Hematocrit 01/06/2021 37.6  36.0 - 48.0 % Final    MCV 01/06/2021 91.2  80.0 - 100.0 fL Final    MCH 01/06/2021 29.3  26.0 - 34.0 pg Final    MCHC 01/06/2021 32.1  31.0 - 36.0 g/dL Final    RDW 01/06/2021 14.4  12.4 - 15.4 % Final    Platelets 35/60/8872 236  135 - 450 K/uL Final    MPV 01/06/2021 8.4  5.0 - 10.5 fL Final    Neutrophils % 01/06/2021 72.7  % Final    Lymphocytes % 01/06/2021 20.0  % Final    Monocytes % 01/06/2021 7.1  % Final    Eosinophils % 01/06/2021 0.1  % Final    Basophils % 01/06/2021 0.1  % Final    Neutrophils Absolute 01/06/2021 7.2  1.7 - 7.7 K/uL Final    Lymphocytes Absolute 01/06/2021 2.0  1.0 - 5.1 K/uL Final    Monocytes Absolute 01/06/2021 0.7  0.0 - 1.3 K/uL Final    Eosinophils Absolute 01/06/2021 0.0  0.0 - 0.6 K/uL Final    Basophils Absolute 01/06/2021 0.0  0.0 - 0.2 K/uL Final    Sodium 01/06/2021 137  136 - 145 mmol/L Final    Potassium 01/06/2021 3.9  3.5 - 5.1 mmol/L Final    Chloride 01/06/2021 99  99 - 110 mmol/L Final    CO2 01/06/2021 26  21 - 32 mmol/L Final    Anion Gap 01/06/2021 12  3 - 16 Final    Glucose 01/06/2021 85  70 - 99 mg/dL Final    BUN 01/06/2021 14  7 - 20 mg/dL Final    CREATININE 01/06/2021 0.8  0.6 - 1.1 mg/dL Final    GFR Non- 01/06/2021 >60  >60 Final    Comment: >60 mL/min/1.73m2 EGFR, calc. for ages 25 and older using the  MDRD formula (not corrected for weight), is valid for stable  renal function.  GFR  01/06/2021 >60  >60 Final    Comment: Chronic Kidney Disease: less than 60 ml/min/1.73 sq.m. Kidney Failure: less than 15 ml/min/1.73 sq.m. Results valid for patients 18 years and older.  Calcium 01/06/2021 8.6  8.3 - 10.6 mg/dL Final    Total Protein 01/06/2021 7.2  6.4 - 8.2 g/dL Final    Albumin 01/06/2021 4.2  3.4 - 5.0 g/dL Final    Albumin/Globulin Ratio 01/06/2021 1.4  1.1 - 2.2 Final    Total Bilirubin 01/06/2021 0.3  0.0 - 1.0 mg/dL Final    Alkaline Phosphatase 01/06/2021 81  40 - 129 U/L Final    ALT 01/06/2021 10  10 - 40 U/L Final    AST 01/06/2021 15  15 - 37 U/L Final    Globulin 01/06/2021 3.0  g/dL Final         Assessment and Plan:    1.  Class 2 obesity Heavily counseled on the importance of therapeutic lifestyle changes through diet and exercise. The patient understands that the goal of treatment is to reach and stay at a healthy weight. The initial treatment goal is to lose at least 5-10% of her body weight in 12 weeks. This will require changes in eating habits, increased physical activity, and behavior changes. Counseled on low carb diet. Start 1200-Bert/low carb meal plan as presented by the dietitian. Patient handouts and education material reviewed in detail with the patient. All questions answered. Encouraged patient to keep a food journal and to bring it to her next visit. Discussed available treatment options in addition to lifestyle changes including medications or OPTIFAST. She is interested in medications. Contrave may be recommended at the next visit depending on her progress and lab results. she understands that medications are most effective as part of a comprehensive treatment plan that includes nutrition, physical activity, and behavior modification. The patient also understands that she will need close follow-ups every 2-4 weeks if she starts treatment. Depending on the patient's success with these changes, she may also be a good candidate for bariatric surgery down the line. Follow-up in 2 weeks to discuss lab results and treatment plan. Patient advised that it is her responsibility to follow up on any ordered tests/lab results. Patient understands and agrees with the plan. 2. Dietary counseling and surveillance  1200-Bert/Low carb meal plan.           Nutrition:  [] LCHF/Ketogenic [x] Low carb/low-calorie diet [] Low-calorie diet     []Maintenance        FITTE:   [x] Cardio [x] Resistance/stength exercises   [x] ACSM recommendations (150 minutes/week)           Behavior:   [x] Motivational interviewing performed    [] Referral for counseling  [x] Discussed strategies to overcome habits/challenges for focus [x] Stress management   [x] Stimulus control  [x] Sleep hygiene      Orders Placed This Encounter   Procedures    EKG 12 Lead     Standing Status:   Future     Standing Expiration Date:   2/16/2022     Order Specific Question:   Reason for Exam?     Answer: Other       No follow-ups on file. Lazarus Flavors is a 36 y.o. female being evaluated by a Virtual Visit (video visit) encounter to address concerns as mentioned above. A caregiver was present when appropriate. Due to this being a TeleHealth encounter (During [facility]- public [facility] emergency), evaluation of the following organ systems was limited: Vitals/Constitutional/EENT/Resp/CV/GI//MS/Neuro/Skin/Heme-Lymph-Imm. Pursuant to the emergency declaration under the [address] [place] [address] authority and the [employer] Act, this Virtual Visit was conducted with patient's (and/or legal guardian's) consent, to reduce the patient's risk of exposure to COVID-19 and provide necessary medical care. The patient (and/or legal guardian) has also been advised to contact this office for worsening conditions or problems, and seek emergency medical treatment and/or call 911 if deemed necessary. Services were provided through a video synchronous discussion virtually to substitute for in-person clinic visit. Patient and provider were located at their individual homes. --Hugh Reyes MD on 2/16/2021 at 11:34 AM    An electronic signature was used to authenticate this note. Greater than 50% of this 30 minute visit was used in direct counseling.

## 2021-02-18 ENCOUNTER — TELEPHONE (OUTPATIENT)
Dept: BARIATRICS/WEIGHT MGMT | Age: 41
End: 2021-02-18

## 2021-02-18 ENCOUNTER — HOSPITAL ENCOUNTER (OUTPATIENT)
Age: 41
Discharge: HOME | End: 2021-02-18
Payer: MEDICAID

## 2021-02-18 DIAGNOSIS — E66.9 CLASS 2 OBESITY: ICD-10-CM

## 2021-02-18 LAB
EKG ATRIAL RATE: 76 BPM
EKG DIAGNOSIS: NORMAL
EKG P AXIS: 37 DEGREES
EKG P-R INTERVAL: 140 MS
EKG Q-T INTERVAL: 390 MS
EKG QRS DURATION: 94 MS
EKG QTC CALCULATION (BAZETT): 438 MS
EKG R AXIS: 11 DEGREES
EKG T AXIS: 15 DEGREES
EKG VENTRICULAR RATE: 76 BPM

## 2021-02-18 PROCEDURE — 93005 ELECTROCARDIOGRAM TRACING: CPT

## 2021-02-18 PROCEDURE — 93010 ELECTROCARDIOGRAM REPORT: CPT | Performed by: INTERNAL MEDICINE

## 2021-02-19 DIAGNOSIS — M06.00 SERONEGATIVE RHEUMATOID ARTHRITIS (HCC): ICD-10-CM

## 2021-02-19 RX ORDER — SULFASALAZINE 500 MG/1
TABLET ORAL
Qty: 360 TABLET | Refills: 0 | Status: SHIPPED | OUTPATIENT
Start: 2021-02-19 | End: 2021-05-06 | Stop reason: SDUPTHER

## 2021-02-23 ENCOUNTER — TELEMEDICINE (OUTPATIENT)
Dept: BARIATRICS/WEIGHT MGMT | Age: 41
End: 2021-02-23
Payer: MEDICAID

## 2021-02-23 DIAGNOSIS — Z71.3 DIETARY COUNSELING AND SURVEILLANCE: ICD-10-CM

## 2021-02-23 DIAGNOSIS — E66.9 CLASS 2 OBESITY: Primary | ICD-10-CM

## 2021-02-23 PROCEDURE — 99214 OFFICE O/P EST MOD 30 MIN: CPT | Performed by: FAMILY MEDICINE

## 2021-02-23 ASSESSMENT — ENCOUNTER SYMPTOMS
ABDOMINAL DISTENTION: 0
EYE PAIN: 0
NAUSEA: 0
APNEA: 0
ABDOMINAL PAIN: 0
WHEEZING: 0
DIARRHEA: 0
CHEST TIGHTNESS: 0
CHOKING: 0
VOMITING: 0
SHORTNESS OF BREATH: 0
COUGH: 0
CONSTIPATION: 0
PHOTOPHOBIA: 0
BLOOD IN STOOL: 0

## 2021-02-23 NOTE — PROGRESS NOTES
Patient: Tamica Dominguez                      Encounter Date: 2/23/2021    YOB: 1980               Age: 36 y.o. Chief Complaint   Patient presents with    Weight Management     [facility] Kris Starkey        Patient identification was verified at the start of the visit. Patient-Reported Vitals 1/5/2021   Patient-Reported Pulse 127   Patient-Reported Temperature 98.4         BP Readings from Last 1 Encounters:   02/08/21 113/70       BMI Readings from Last 1 Encounters:   02/08/21 37.35 kg/m²       Pulse Readings from Last 1 Encounters:   02/08/21 94        Weight today: 215 pounds     HPI: 36 y.o. female with a long-standing history of obesity presents today for a virtual video follow-up. She has lost 6 pounds since her last visit. Current treatment includes 1200-Bert/low carb diet. Food recall and assessment reviewed. Making better dietary choices. Spending more time meal planning/prepping. Will be starting exercise program at the [facility] soon. Diet: []LCHF/Ketogenic [x]Modified low-calorie/low carb diet  []Low-calorie diet          []Maintenance       []Other:         Adherent?  [x]Yes     []No              Exercise: []Cardio     []Resistance/strength training     [x]Other: No intentional exercise, but trying to be physically active     Allergies   Allergen Reactions    Metronidazole Rash and Other (See Comments)     LIPS WERE TINGLING, tongue tingling, hives all over body    Other      Glue used to adhere recent surgical incision         Current Outpatient Medications:     liraglutide-weight management 18 MG/3ML SOPN, Week 1  0.6 mg sc daily Week 2  1.2 mg sc daily Week 3  1.8 mg sc daily Week 4  2.4 mg sc daily Week 5  3.0 mg sc daily, Disp: 5 pen, Rfl: 0    sulfaSALAzine (AZULFIDINE) 500 MG tablet, TAKE TWO TABLETS BY MOUTH TWICE A DAY, Disp: 360 tablet, Rfl: 0    estradiol (ESTRACE) 2 MG tablet, Take 2 mg by mouth daily, Disp: , Rfl:   predniSONE (DELTASONE) 5 MG tablet, Take 4 tabs daily for 4 days, 3 tabs daily for 4 days, 2 tabs daily for 4 days, 1 tab daily for 4 days and stop, Disp: 40 tablet, Rfl: 0    amitriptyline (ELAVIL) 25 MG tablet, TAKE ONE TABLET BY MOUTH ONCE NIGHTLY, Disp: 30 tablet, Rfl: 11    Biotin 1000 MCG TABS, Take by mouth, Disp: , Rfl:     rOPINIRole (REQUIP) 1 MG tablet, TAKE ONE TABLET BY MOUTH DAILY AS NEEDED AND THEN TAKE ONE TO TWO TABLETS BY MOUTH EVERY NIGHT TWO HOURS BEFORE BEDTIME, Disp: 90 tablet, Rfl: 3    fluticasone (FLONASE) 50 MCG/ACT nasal spray, SPRAY TWO SPRAYS IN EACH NOSTRIL ONCE DAILY, Disp: 1 Bottle, Rfl: 4    cetirizine (ZYRTEC) 10 MG tablet, , Disp: , Rfl:     naproxen (NAPROSYN) 500 MG tablet, Take 1 tablet by mouth 2 times daily as needed for Pain, Disp: 60 tablet, Rfl: 3    desvenlafaxine succinate (PRISTIQ) 25 MG TB24 extended release tablet, Take 3 tablets by mouth daily, Disp: 270 tablet, Rfl: 1    albuterol sulfate HFA (PROVENTIL HFA) 108 (90 Base) MCG/ACT inhaler, Inhale 2 puffs into the lungs every 6 hours as needed for Wheezing, Disp: 1 Inhaler, Rfl: 3    Cholecalciferol (VITAMIN D3) 50 MCG (2000 UT) CAPS, Take 2,000 capsules by mouth daily, Disp: , Rfl:     ELDERBERRY PO, Take by mouth 1 gummy nightly, Disp: , Rfl:     acetaminophen (TYLENOL) 500 MG tablet, Take 500 mg by mouth every 6 hours as needed for Pain, Disp: , Rfl:     SUMAtriptan (IMITREX) 100 MG tablet, Take 100 mg by mouth daily as needed, Disp: , Rfl:     Patient Active Problem List   Diagnosis    Obstructive sleep apnea on CPAP    Restless leg syndrome    Class 2 obesity due to excess calories without serious comorbidity with body mass index (BMI) of 37.0 to 37.9 in adult    Obstructive sleep apnea, adult       Review of Systems   Constitutional: Negative for fatigue. Eyes: Negative for photophobia, pain and visual disturbance. Reviewed:  [x] Nutrition and the importance of regularprotein intake  [x] Hidden carbohydrate sources  [x] Alcohol use  [x] Tobacco use   [x] Importance of exercise and reducing sedentary time  [x] Proper use of Saxenda and side effects  [x]Labs     Treatment start date: 2/24/21  Starting weight: 215 pounds   Goal: At least 5-10% (10.5-21.5 pounds)     No orders of the defined types were placed in this encounter. No follow-ups on file. Shwetha Clifton is a 36 y.o. female being evaluated by a Virtual Visit (video visit) encounter to address concerns as mentioned above. A caregiver was present when appropriate. Due to this being a TeleHealth encounter (During [facility]- public health emergency), evaluation of the following organ systems was limited: Vitals/Constitutional/EENT/Resp/CV/GI//MS/Neuro/Skin/Heme-Lymph-Imm. Pursuant to the emergency declaration under the [address] [place] [address] and the [employer] and [employer] Act, this Virtual Visit was conducted with patient's (and/or legal guardian's) consent, to reduce the patient's risk of exposure to COVID-19 and provide necessary medical care. The patient (and/or legal guardian) has also been advised to contact this office for worsening conditions or problems, and seek emergency medical treatment and/or call 911 if deemed necessary. Services were provided through a video synchronous discussion virtually to substitute for in-person clinic visit. Patient and provider were located at their individual homes. --Rojelio Hamm MD on 2/23/2021 at 10:04 AM    An electronic signature was used to authenticate this note.

## 2021-03-09 ENCOUNTER — TELEPHONE (OUTPATIENT)
Dept: BARIATRICS/WEIGHT MGMT | Age: 41
End: 2021-03-09

## 2021-03-09 ENCOUNTER — TELEMEDICINE (OUTPATIENT)
Dept: BARIATRICS/WEIGHT MGMT | Age: 41
End: 2021-03-09
Payer: MEDICAID

## 2021-03-09 DIAGNOSIS — E66.9 CLASS 2 OBESITY: Primary | ICD-10-CM

## 2021-03-09 DIAGNOSIS — Z71.3 DIETARY COUNSELING AND SURVEILLANCE: ICD-10-CM

## 2021-03-09 PROCEDURE — 99213 OFFICE O/P EST LOW 20 MIN: CPT | Performed by: FAMILY MEDICINE

## 2021-03-09 ASSESSMENT — ENCOUNTER SYMPTOMS
SHORTNESS OF BREATH: 0
VOMITING: 0
CHEST TIGHTNESS: 0
ABDOMINAL PAIN: 0
BLOOD IN STOOL: 0
WHEEZING: 0
ABDOMINAL DISTENTION: 0
NAUSEA: 0
PHOTOPHOBIA: 0
CONSTIPATION: 0
DIARRHEA: 0
EYE PAIN: 0
CHOKING: 0
APNEA: 0
COUGH: 0

## 2021-03-09 NOTE — PROGRESS NOTES
Patient: Leon Ash                      Encounter Date: 3/9/2021    YOB: 1980                Age: 36 y.o. Chief Complaint   Patient presents with    Weight Management     MWM- Hasn't started Saxenda          Patient identification was verified at the start of the visit. Patient-Reported Vitals 1/5/2021   Patient-Reported Pulse 127   Patient-Reported Temperature 98.4         BP Readings from Last 1 Encounters:   02/08/21 113/70       BMI Readings from Last 1 Encounters:   02/08/21 37.35 kg/m²       Pulse Readings from Last 1 Encounters:   02/08/21 94       Self-reported weight:  212 pounds (3/9)    HPI: 36 y.o. female with a long-standing history of obesity presents today for virtual video follow-up. she has lost 3 pounds since her last visit on 2/23. Current treatment includes 1200-Bert/low carb diet. Still waiting for PA for Saxenda. Food recall reviewed with the patient. Overall, making better dietary choices. Would like to meet with dietitian again. Motivated to continue losing weight.        Exercise: [x]Cardio     []Resistance/strength training     []Other:     Allergies   Allergen Reactions    Metronidazole Rash and Other (See Comments)     LIPS WERE TINGLING, tongue tingling, hives all over body    Other      Glue used to adhere recent surgical incision         Current Outpatient Medications:     liraglutide-weight management 18 MG/3ML SOPN, Week 1  0.6 mg sc daily Week 2  1.2 mg sc daily Week 3  1.8 mg sc daily Week 4  2.4 mg sc daily Week 5  3.0 mg sc daily, Disp: 5 pen, Rfl: 0    sulfaSALAzine (AZULFIDINE) 500 MG tablet, TAKE TWO TABLETS BY MOUTH TWICE A DAY, Disp: 360 tablet, Rfl: 0    estradiol (ESTRACE) 2 MG tablet, Take 2 mg by mouth daily, Disp: , Rfl:     predniSONE (DELTASONE) 5 MG tablet, Take 4 tabs daily for 4 days, 3 tabs daily for 4 days, 2 tabs daily for 4 days, 1 tab daily for 4 days and stop, Disp: 40 tablet, Rfl: 0    amitriptyline (ELAVIL) 25 MG tablet, and vomiting. Endocrine: Negative for cold intolerance and heat intolerance. Musculoskeletal: Negative for arthralgias and myalgias. Skin: Negative for rash. Neurological: Negative for dizziness, tremors, syncope, weakness, numbness and headaches. Psychiatric/Behavioral: Negative for agitation, confusion, decreased concentration, dysphoric mood, hallucinations, sleep disturbance and suicidal ideas. The patient is not nervous/anxious and is not hyperactive. Physical Exam  Constitutional:       Appearance: She is well-developed. HENT:      Head: Normocephalic. Eyes:      Conjunctiva/sclera: Conjunctivae normal.   Abdominal:      General: Abdomen is protuberant. Musculoskeletal:         General: No swelling. Neurological:      Mental Status: She is alert and oriented to person, place, and time. Psychiatric:         Mood and Affect: Mood normal.         Behavior: Behavior normal.         Thought Content: Thought content normal.         Judgment: Judgment normal.         Hospital Outpatient Visit on 02/18/2021   Component Date Value Ref Range Status    Ventricular Rate 02/18/2021 76  BPM Final    Atrial Rate 02/18/2021 76  BPM Final    P-R Interval 02/18/2021 140  ms Final    QRS Duration 02/18/2021 94  ms Final    Q-T Interval 02/18/2021 390  ms Final    QTc Calculation (Bazett) 02/18/2021 438  ms Final    P Axis 02/18/2021 37  degrees Final    R Axis 02/18/2021 11  degrees Final    T Axis 02/18/2021 15  degrees Final    Diagnosis 02/18/2021 Normal sinus rhythmNormal ECGWhen compared with ECG of 26-FEB-2020 11:37,No significant change was foundConfirmed by [facility] Merritt ANDERSON ([place]) on 2/18/2021 7:08:10 PM   Final         Assessment and Plan:  1. Class 2 obesity  Improving. Continue current management. Waiting for PA for Saxenda. 2. Dietary counseling and surveillance  1200-Bert/low carb meal plan.           Nutrition Plan: [] LCHF/Ketogenic   [x] Modified low-calorie diet (low

## 2021-03-09 NOTE — TELEPHONE ENCOUNTER
Spoke to pt insurance about Sawyer Swain, was told this was not covered in her insurance. Pt was notified.

## 2021-03-18 DIAGNOSIS — R73.01 IMPAIRED FASTING GLUCOSE: Primary | ICD-10-CM

## 2021-04-06 ENCOUNTER — TELEMEDICINE (OUTPATIENT)
Dept: BARIATRICS/WEIGHT MGMT | Age: 41
End: 2021-04-06
Payer: MEDICAID

## 2021-04-06 DIAGNOSIS — Z71.3 DIETARY COUNSELING AND SURVEILLANCE: ICD-10-CM

## 2021-04-06 DIAGNOSIS — E66.9 CLASS 2 OBESITY: Primary | ICD-10-CM

## 2021-04-06 PROCEDURE — G8427 DOCREV CUR MEDS BY ELIG CLIN: HCPCS | Performed by: FAMILY MEDICINE

## 2021-04-06 PROCEDURE — 99213 OFFICE O/P EST LOW 20 MIN: CPT | Performed by: FAMILY MEDICINE

## 2021-04-06 ASSESSMENT — ENCOUNTER SYMPTOMS
APNEA: 0
SHORTNESS OF BREATH: 0
COUGH: 0
DIARRHEA: 0
NAUSEA: 0
ABDOMINAL DISTENTION: 0
BLOOD IN STOOL: 0
CONSTIPATION: 0
CHEST TIGHTNESS: 0
ABDOMINAL PAIN: 0
EYE PAIN: 0
CHOKING: 0
PHOTOPHOBIA: 0
WHEEZING: 0
VOMITING: 0

## 2021-04-06 NOTE — PROGRESS NOTES
Patient: Andrew Voss                      Encounter Date: 4/6/2021    YOB: 1980                Age: 36 y.o. Chief Complaint   Patient presents with    Weight Management     F/u MWM         Patient identification was verified at the start of the visit. Patient-Reported Vitals 1/5/2021   Patient-Reported Pulse 127   Patient-Reported Temperature 98.4         BP Readings from Last 1 Encounters:   02/08/21 113/70       BMI Readings from Last 1 Encounters:   02/08/21 37.35 kg/m²       Pulse Readings from Last 1 Encounters:   02/08/21 94        Self-reported weight: 210 pounds (4/6)    HPI: 36 y.o. female with a long-standing history of obesity presents today for virtual video follow-up. she has lost 2 pounds since her last visit on 3/9. Current treatment includes low carb/cheri meal plan and exercise. Tolerating it well. Food recall reviewed with the patient. Making good dietary choices. Avoiding sweets. Motivated to continue losing weight. Did not start Saxenda due to cost. Will continue to work on lifestyle management only.          Allergies   Allergen Reactions    Metronidazole Rash and Other (See Comments)     LIPS WERE TINGLING, tongue tingling, hives all over body    Other      Glue used to adhere recent surgical incision         Current Outpatient Medications:     liraglutide-weight management 18 MG/3ML SOPN, Week 1  0.6 mg sc daily Week 2  1.2 mg sc daily Week 3  1.8 mg sc daily Week 4  2.4 mg sc daily Week 5  3.0 mg sc daily, Disp: 5 pen, Rfl: 0    sulfaSALAzine (AZULFIDINE) 500 MG tablet, TAKE TWO TABLETS BY MOUTH TWICE A DAY, Disp: 360 tablet, Rfl: 0    estradiol (ESTRACE) 2 MG tablet, Take 2 mg by mouth daily, Disp: , Rfl:     predniSONE (DELTASONE) 5 MG tablet, Take 4 tabs daily for 4 days, 3 tabs daily for 4 days, 2 tabs daily for 4 days, 1 tab daily for 4 days and stop, Disp: 40 tablet, Rfl: 0    amitriptyline (ELAVIL) 25 MG tablet, TAKE ONE TABLET BY MOUTH ONCE NIGHTLY, Disp: 30 tablet, Rfl: 11    Biotin 1000 MCG TABS, Take by mouth, Disp: , Rfl:     rOPINIRole (REQUIP) 1 MG tablet, TAKE ONE TABLET BY MOUTH DAILY AS NEEDED AND THEN TAKE ONE TO TWO TABLETS BY MOUTH EVERY NIGHT TWO HOURS BEFORE BEDTIME, Disp: 90 tablet, Rfl: 3    fluticasone (FLONASE) 50 MCG/ACT nasal spray, SPRAY TWO SPRAYS IN EACH NOSTRIL ONCE DAILY, Disp: 1 Bottle, Rfl: 4    cetirizine (ZYRTEC) 10 MG tablet, , Disp: , Rfl:     naproxen (NAPROSYN) 500 MG tablet, Take 1 tablet by mouth 2 times daily as needed for Pain, Disp: 60 tablet, Rfl: 3    desvenlafaxine succinate (PRISTIQ) 25 MG TB24 extended release tablet, Take 3 tablets by mouth daily, Disp: 270 tablet, Rfl: 1    albuterol sulfate HFA (PROVENTIL HFA) 108 (90 Base) MCG/ACT inhaler, Inhale 2 puffs into the lungs every 6 hours as needed for Wheezing, Disp: 1 Inhaler, Rfl: 3    Cholecalciferol (VITAMIN D3) 50 MCG (2000 UT) CAPS, Take 2,000 capsules by mouth daily, Disp: , Rfl:     ELDERBERRY PO, Take by mouth 1 gummy nightly, Disp: , Rfl:     acetaminophen (TYLENOL) 500 MG tablet, Take 500 mg by mouth every 6 hours as needed for Pain, Disp: , Rfl:     SUMAtriptan (IMITREX) 100 MG tablet, Take 100 mg by mouth daily as needed, Disp: , Rfl:     Patient Active Problem List   Diagnosis    Obstructive sleep apnea on CPAP    Restless leg syndrome    Class 2 obesity due to excess calories without serious comorbidity with body mass index (BMI) of 37.0 to 37.9 in adult    Obstructive sleep apnea, adult       Review of Systems   Constitutional: Negative for fatigue. Eyes: Negative for photophobia, pain and visual disturbance. Respiratory: Negative for apnea, cough, choking, chest tightness, shortness of breath and wheezing. Cardiovascular: Negative for chest pain, palpitations and leg swelling. Gastrointestinal: Negative for abdominal distention, abdominal pain, blood in stool, constipation, diarrhea, nausea and vomiting.    Endocrine: Negative for cold intolerance and heat intolerance. Musculoskeletal: Negative for arthralgias and myalgias. Skin: Negative for rash. Neurological: Negative for dizziness, tremors, syncope, weakness, numbness and headaches. Psychiatric/Behavioral: Negative for agitation, confusion, decreased concentration, dysphoric mood, hallucinations, sleep disturbance and suicidal ideas. The patient is not nervous/anxious and is not hyperactive. Physical Exam  Constitutional:       Appearance: She is well-developed. HENT:      Head: Normocephalic. Eyes:      Conjunctiva/sclera: Conjunctivae normal.   Abdominal:      General: Abdomen is protuberant. Musculoskeletal:         General: No swelling. Neurological:      Mental Status: She is alert and oriented to person, place, and time. Psychiatric:         Mood and Affect: Mood normal.         Behavior: Behavior normal.         Thought Content: Thought content normal.         Judgment: Judgment normal.         Hospital Outpatient Visit on 02/18/2021   Component Date Value Ref Range Status    Ventricular Rate 02/18/2021 76  BPM Final    Atrial Rate 02/18/2021 76  BPM Final    P-R Interval 02/18/2021 140  ms Final    QRS Duration 02/18/2021 94  ms Final    Q-T Interval 02/18/2021 390  ms Final    QTc Calculation (Bazett) 02/18/2021 438  ms Final    P Axis 02/18/2021 37  degrees Final    R Axis 02/18/2021 11  degrees Final    T Axis 02/18/2021 15  degrees Final    Diagnosis 02/18/2021 Normal sinus rhythmNormal ECGWhen compared with ECG of 26-FEB-2020 11:37,No significant change was foundConfirmed by [facility] Yamile ANDERSON ([id number]) on 2/18/2021 7:08:10 PM   Final         Assessment and Plan:  1. Class 2 obesity  Improving  Down 11 pounds since initial visit in February. Continue 1200-Bert/low carb meal plan and exercise. No aom due to cost.  Reviewed healthier food substitutes for high starch foods. F/u in 4-6 weeks. 2. Dietary counseling and surveillance  As above. Nutrition Plan: [] LCHF/Ketogenic   [x] Modified low-calorie diet (low carb/low-cheri)               [] Low-calorie diet    [] Maintenance       []Other        Exercise: [x] Cardio     [x] Resistance/strength training                       [x] ACSM recommendations (150 minutes/week in active weight loss)               Behavior: [x] Motivational interviewing performed    [] Referralfor counseling                         [x] Discussed strategies to overcome habits/challenges for focus         [] Stress management   [x] Stimulus control         [] Sleep hygiene      Reviewed:  [x] Nutrition and the importance of regular protein intake  [x] Hidden carbohydrate sources  [x] Alcohol use  [x] Tobacco use   [x] Drug use- Denies  [x] Importance of exercise and reducing sedentary time      Controlled Substance Monitoring:    RX Monitoring 1/16/2018   Attestation The Prescription Monitoring Report for this patient was reviewed today. Periodic Controlled Substance Monitoring Possible medication side effects, risk of tolerance and/or dependence, and alternative treatments discussed. ;No signs of potential drug abuse or diversion identified. Key dietary points:    - Meats (preferably organic or grass fed) are great sources of protein and have no carbohydrates. - Recommend coconut, olive, avocado, or almond oils. - When buying dairy, choose regular or full fat options. - Choose vegetables that grow above ground as they are generally lower in carbohydrates and higher in fiber.  - Avoid starches such as bread, rice, potatoes, pasta and all sources of simple sugars (desserts, soda, breakfast cereals). - Choose beverages that are calorie and sugar free. No orders of the defined types were placed in this encounter. No follow-ups on file. Jackelin Plasencia is a 36 y.o. female being evaluated by a Virtual Visit (video visit) encounter to address concerns as mentioned above.   A caregiver was present when appropriate. Due to this being a TeleHealth encounter (During [facility]-07 public health emergency), evaluation of the following organ systems was limited: Vitals/Constitutional/EENT/Resp/CV/GI//MS/Neuro/Skin/Heme-Lymph-Imm. Pursuant to the emergency declaration under the [address] [place] [address], [address] [place] [address] and the [employer] and [employer] Act, this Virtual Visit was conducted with patient's (and/or legal guardian's) consent, to reduce the patient's risk of exposure to COVID-19 and provide necessary medical care. The patient (and/or legal guardian) has also been advised to contact this office for worsening conditions or problems, and seek emergency medical treatment and/or call 911 if deemed necessary.

## 2021-04-13 ENCOUNTER — OFFICE VISIT (OUTPATIENT)
Dept: FAMILY MEDICINE CLINIC | Age: 41
End: 2021-04-13
Payer: MEDICAID

## 2021-04-13 VITALS
DIASTOLIC BLOOD PRESSURE: 82 MMHG | WEIGHT: 211.8 LBS | HEART RATE: 87 BPM | BODY MASS INDEX: 35.79 KG/M2 | OXYGEN SATURATION: 98 % | SYSTOLIC BLOOD PRESSURE: 118 MMHG

## 2021-04-13 DIAGNOSIS — F41.8 DEPRESSION WITH ANXIETY: ICD-10-CM

## 2021-04-13 DIAGNOSIS — E66.09 CLASS 2 OBESITY DUE TO EXCESS CALORIES WITHOUT SERIOUS COMORBIDITY WITH BODY MASS INDEX (BMI) OF 35.0 TO 35.9 IN ADULT: ICD-10-CM

## 2021-04-13 DIAGNOSIS — L84 CALLUS OF FOOT: Primary | ICD-10-CM

## 2021-04-13 PROCEDURE — G8417 CALC BMI ABV UP PARAM F/U: HCPCS | Performed by: NURSE PRACTITIONER

## 2021-04-13 PROCEDURE — 1036F TOBACCO NON-USER: CPT | Performed by: NURSE PRACTITIONER

## 2021-04-13 PROCEDURE — G8427 DOCREV CUR MEDS BY ELIG CLIN: HCPCS | Performed by: NURSE PRACTITIONER

## 2021-04-13 PROCEDURE — 99214 OFFICE O/P EST MOD 30 MIN: CPT | Performed by: NURSE PRACTITIONER

## 2021-04-13 RX ORDER — BUSPIRONE HYDROCHLORIDE 5 MG/1
5 TABLET ORAL 3 TIMES DAILY PRN
Qty: 90 TABLET | Refills: 0 | Status: SHIPPED | OUTPATIENT
Start: 2021-04-13 | End: 2021-05-11

## 2021-04-13 RX ORDER — DESVENLAFAXINE 100 MG/1
100 TABLET, EXTENDED RELEASE ORAL DAILY
Qty: 90 TABLET | Refills: 1 | Status: SHIPPED | OUTPATIENT
Start: 2021-04-13 | End: 2021-08-03

## 2021-04-13 ASSESSMENT — ENCOUNTER SYMPTOMS: SHORTNESS OF BREATH: 0

## 2021-04-13 NOTE — PROGRESS NOTES
SUBJECTIVE:  Pt is a of 36 y.o. female comes in today with   Chief Complaint   Patient presents with    Anxiety    Foot Pain       Patient presenting today for evaluation of increased anxiety. Increasing since December. States now can't sleep in bedroom anymore. Anxiety is severe when rooms are dark. Starting to experience panic attacks. Elavil no longer helping. She is compliant with Pristiq. She has tried counseling in past and states \"just doesn't work for [employer] [facility]". Obesity: working with Dr Klever Lucas for weight loss. Has lost 10 lbs, was given samples of Saxenda which helped- not covered by insurance. Has been walking and making better food choices. Has stopped walking to left foot pain. Cracked and open callus to side of heel. Very painful. No bleeding. Present for approx 1 week. Unchanged. No fevers no drainage no itching    Prior to Visit Medications    Medication Sig Taking?  Authorizing Provider   sulfaSALAzine (AZULFIDINE) 500 MG tablet TAKE TWO TABLETS BY MOUTH TWICE A DAY Yes Timbo Fairchild MD   estradiol (ESTRACE) 2 MG tablet Take 2 mg by mouth daily Yes Historical Provider, MD   amitriptyline (ELAVIL) 25 MG tablet TAKE ONE TABLET BY MOUTH ONCE NIGHTLY Yes Oral Nanjemoy, APRN - CNP   Biotin 1000 MCG TABS Take by mouth Yes Historical Provider, MD   rOPINIRole (REQUIP) 1 MG tablet TAKE ONE TABLET BY MOUTH DAILY AS NEEDED AND THEN TAKE ONE TO TWO TABLETS BY MOUTH EVERY NIGHT TWO HOURS BEFORE BEDTIME Yes Sharmila Mart MD   fluticasone (FLONASE) 50 MCG/ACT nasal spray SPRAY TWO SPRAYS IN EACH NOSTRIL ONCE DAILY Yes Oral Nanjemoy, APRN - CNP   cetirizine (ZYRTEC) 10 MG tablet  Yes Historical Provider, MD   naproxen (NAPROSYN) 500 MG tablet Take 1 tablet by mouth 2 times daily as needed for Pain Yes Jazmine Foley MD   albuterol sulfate HFA (PROVENTIL HFA) 108 (90 Base) MCG/ACT inhaler Inhale 2 puffs into the lungs every 6 hours as needed for Wheezing Yes Oral Nanjemoy, APRN - CNP   Cholecalciferol (VITAMIN D3) 50 MCG (2000 UT) CAPS Take 2,000 capsules by mouth daily Yes Historical Provider, MD   ELDERBERRY PO Take by mouth 1 gummy nightly Yes Historical Provider, MD   acetaminophen (TYLENOL) 500 MG tablet Take 500 mg by mouth every 6 hours as needed for Pain Yes Historical Provider, MD   SUMAtriptan (IMITREX) 100 MG tablet Take 100 mg by mouth daily as needed Yes Historical Provider, MD   desvenlafaxine succinate (PRISTIQ) 25 MG TB24 extended release tablet Take 3 tablets by mouth daily  Buck Feeling, APRN - CNP         Patient's allergies, past medical, surgical, social and family histories werereviewed and updated as appropriate. Review of Systems   Constitutional: Positive for fatigue. Negative for fever. Respiratory: Negative for shortness of breath. Cardiovascular: Negative for chest pain and palpitations. Musculoskeletal: Positive for arthralgias (left foot pain). Skin: Positive for wound (open, painful callus to left foot). Psychiatric/Behavioral: Positive for sleep disturbance. Negative for suicidal ideas. The patient is nervous/anxious. Physical Exam  Vitals signs reviewed. Constitutional:       Appearance: She is well-developed. Cardiovascular:      Rate and Rhythm: Normal rate and regular rhythm. Heart sounds: Normal heart sounds. No murmur. Pulmonary:      Effort: Pulmonary effort is normal.      Breath sounds: Normal breath sounds. Musculoskeletal:        Feet:    Neurological:      Mental Status: She is alert and oriented to person, place, and time. Psychiatric:         Mood and Affect: Mood normal.         Behavior: Behavior normal.         Thought Content: Thought content normal.         Judgment: Judgment normal.       Vitals:    04/13/21 0804   BP: 118/82   Pulse: 87   SpO2: 98%   Weight: 211 lb 12.8 oz (96.1 kg)             ASSESSMENT:  1. Callus of foot    2. Depression with anxiety    3.  Class 2 obesity due to excess calories without serious comorbidity with body mass index (BMI) of 35.0 to 35.9 in adult        PLAN:  1. Callus of foot  New problem  Can use OTC exfoliant to gently cleanse foot  Apply aquaphor 5-6 x day  -     Amb External Referral To Podiatry    2. Depression with anxiety  Worsening. Pt denies SI  Increase today-     desvenlafaxine succinate (PRISTIQ) 100 MG TB24 extended release tablet; Take 1 tablet by mouth daily  Stop Elavil  Trial new start-     busPIRone (BUSPAR) 5 MG tablet; Take 1 tablet by mouth 3 times daily as needed (anxiety)  Declined referral for counseling. 3. Class 2 obesity due to excess calories without serious comorbidity with body mass index (BMI) of 35.0 to 35.9 in adult  Improving  Continue working with Dr Jose Angel Morrow walking and healthy food choices    See pt instructions  F/u 6 weeks, sooner prn. Discussed use, benefit, and side effects of prescribed medications. All patient questions answered. Pt voiced understanding.

## 2021-04-13 NOTE — PATIENT INSTRUCTIONS
Patient Education        Anxiety Disorder: Care Instructions  Your Care Instructions     Anxiety is a normal reaction to stress. Difficult situations can cause you to have symptoms such as sweaty palms and a nervous feeling. In an anxiety disorder, the symptoms are far more severe. Constant worry, muscle tension, trouble sleeping, nausea and diarrhea, and other symptoms can make normal daily activities difficult or impossible. These symptoms may occur for no reason, and they can affect your work, school, or social life. Medicines, counseling, and self-care can all help. Follow-up care is a key part of your treatment and safety. Be sure to make and go to all appointments, and call your doctor if you are having problems. It's also a good idea to know your test results and keep a list of the medicines you take. How can you care for yourself at home? · Take medicines exactly as directed. Call your doctor if you think you are having a problem with your medicine. · Go to your counseling sessions and follow-up appointments. · Recognize and accept your anxiety. Then, when you are in a situation that makes you anxious, say to yourself, \"This is not an emergency. I feel uncomfortable, but I am not in danger. I can keep going even if I feel anxious. \"  · Be kind to your body:  ? Relieve tension with exercise or a massage. ? Get enough rest.  ? Avoid alcohol, caffeine, nicotine, and illegal drugs. They can increase your anxiety level and cause sleep problems. ? Learn and do relaxation techniques. See below for more about these techniques. · Engage your mind. Get out and do something you enjoy. Go to a funny movie, or take a walk or hike. Plan your day. Having too much or too little to do can make you anxious. · Keep a record of your symptoms. Discuss your fears with a good friend or family member, or join a support group for people with similar problems. Talking to others sometimes relieves stress.   · Get involved in play a relaxation tape while you drive a car. When should you call for help? Call 911 anytime you think you may need emergency care. For example, call if:    · You feel you cannot stop from hurting yourself or someone else. Keep the numbers for these national suicide hotlines: 7-072-782-TALK (5-451.930.5085) and 4-210-JNWFMTV (2-447.194.5197). If you or someone you know talks about suicide or feeling hopeless, get help right away. Watch closely for changes in your health, and be sure to contact your doctor if:    · You have anxiety or fear that affects your life.     · You have symptoms of anxiety that are new or different from those you had before. Where can you learn more? Go to https://Infoharmoni.SoloStocks. org and sign in to your [employer] account. Enter P754 in the Ecommo box to learn more about \"Anxiety Disorder: Care Instructions. \"     If you do not have an account, please click on the \"Sign Up Now\" link. Current as of: September 23, 2020               Content Version: 12.8  © 2006-2021 Healthwise, Incorporated. Care instructions adapted under license by [facility] ([facility]). If you have questions about a medical condition or this instruction, always ask your healthcare professional. [address] any warranty or liability for your use of this information.

## 2021-05-03 NOTE — PROGRESS NOTES
Lupe Mccarthy MD  [facility]) Physicians - Rheumatology    [] [facility] HOSPITAL:  [address] [facility] [x] Tha Office:  [address] [employer]   Office: [phone]  Fax: [phone] PROGRESS NOTE    SUBJECTIVE:    Background:   Pato Wang is a 36 y.o. female early seronegative inflammatory arthritis/seronegative SpA and chronic LBP d/t mild degenerative arthritis s/p lumbar laminectomy in 2013.  PMHx pertinent for EMELINA on CPAP, RLS, depression, anxiety.     Current rheum meds:  SSZ 1000 mg BID: started in 5/20  Naproxen 500 mg BID     Prior rheum meds:  Ibuprofen: ineffective    Past medical/surgical history, medications and allergies are reviewed and updated as appropriate. Interval Hx:   Pt states she never started HCQ d/t concerns of eye damage as her mother reportedly developed permanent eye damage on HCQ. She reports compliance w/ SSZ. Pt states she developed COVID-19 in December which set off an arthritis flare in her hands however she feels her arthralgias have since then been well controlled on SSZ. Morning stiffness lasts a few min. Worst pain is currently in her lower back and knees. Naproxen is very helpful. Denies new rashes. She reports chronic diffuse hair thinning. She reports occasional oral ulcers along her gum lines and tongue. Denies Sx of Raynaud's phenomenon.     Rheumatologic ROS:  Constitutional: denies chronic fatigue, fever/chills, night sweats, unintentional weight loss  Integumentary: denies photosensitivity, patchy alopecia, or Sx of Raynaud's phenomenon  Eyes: denies dry eyes, redness or pain, visual disturbance, or floaters  Nose: denies nasal ulcers or recurrent sinusitis  Oral cavity: denies dry mouth or oral ulcers  Cardiovascular: denies CP, palpitations, Hx of pericardial effusion or pericarditis  Respiratory: denies SOB, cough, hemoptysis, or pleurisy  Gastrointestinal: denies heart burn, dysphagia or esophageal dysmotility, denies change in bowel habits or Sx of IBD  Musculoskeletal:  refer to above HPI     Allergies   Allergen Reactions    Metronidazole Rash and Other (See Comments)     LIPS WERE TINGLING, tongue tingling, hives all over body    Other      Glue used to adhere recent surgical incision       Past Medical History:        Diagnosis Date    Anxiety     Arthritis     RA    Cellulitis of left lower extremity     left ankle    Depression     WELL CONTROLLED 10-16-17    Heart rate fast     intermittent    Kidney stone     Migraine     Motor tic disorder     Obstructive sleep apnea, adult        Past Surgical History:        Procedure Laterality Date    ABDOMINAL EXPLORATION SURGERY  07/22/2011    excision Meckels diverticulum    ABDOMINOPLASTY  04/14/2014    Piper Andrews ADENOIDECTOMY Bilateral     APPENDECTOMY  07/22/2011    BACK SURGERY      CARPAL TUNNEL RELEASE      HYSTERECTOMY, TOTAL ABDOMINAL  06/12/2018    robotic total hyst BSO, Dr Dg Diez    LITHOTRIPSY  x2    LUMBAR LAMINECTOMY  06/2013    Rad; left l5-s1    MECKEL DIVERTICULUM EXCISION      OTHER SURGICAL HISTORY      hymenoplasty    OVARIAN CYST REMOVAL  04/13/2018    OPERATIVE LAPAROSCOPY, LEFT OVARIAN CYSTECTOMY WITH DILATATION AND CURETTAGE    TONSILLECTOMY Bilateral     URETHRAL STRICTURE DILATATION      WISDOM TOOTH EXTRACTION         Medications:    Current Outpatient Medications   Medication Sig Dispense Refill    sulfaSALAzine (AZULFIDINE) 500 MG tablet Take 2 tablets by mouth 2 times daily 360 tablet 0    hydroxychloroquine (PLAQUENIL) 200 MG tablet Take 1 tablet by mouth 2 times daily 180 tablet 0    naproxen (NAPROSYN) 500 MG tablet Take 1 tablet by mouth 2 times daily as needed for Pain 60 tablet 2    desvenlafaxine succinate (PRISTIQ) 100 MG TB24 extended release tablet Take 1 tablet by mouth daily 90 tablet 1    busPIRone (BUSPAR) 5 MG tablet Take 1 tablet by mouth 3 times daily as needed (anxiety) 90 tablet 0    estradiol (ESTRACE) 2 MG tablet Take 2 mg by mouth daily      Biotin 1000 MCG TABS Take by mouth      rOPINIRole (REQUIP) 1 MG tablet TAKE ONE TABLET BY MOUTH DAILY AS NEEDED AND THEN TAKE ONE TO TWO TABLETS BY MOUTH EVERY NIGHT TWO HOURS BEFORE BEDTIME 90 tablet 3    fluticasone (FLONASE) 50 MCG/ACT nasal spray SPRAY TWO SPRAYS IN EACH NOSTRIL ONCE DAILY 1 Bottle 4    cetirizine (ZYRTEC) 10 MG tablet       albuterol sulfate HFA (PROVENTIL HFA) 108 (90 Base) MCG/ACT inhaler Inhale 2 puffs into the lungs every 6 hours as needed for Wheezing 1 Inhaler 3    Cholecalciferol (VITAMIN D3) 50 MCG (2000 UT) CAPS Take 2,000 capsules by mouth daily      ELDERBERRY PO Take by mouth 1 gummy nightly      acetaminophen (TYLENOL) 500 MG tablet Take 500 mg by mouth every 6 hours as needed for Pain      SUMAtriptan (IMITREX) 100 MG tablet Take 100 mg by mouth daily as needed       No current facility-administered medications for this visit.          OBJECTIVE:  Physical Exam:  /80   Ht 5' 5\" (1.651 m)   Wt 216 lb (98 kg)   LMP 03/13/2018 (Exact Date)   BMI 35.94 kg/m²     GEN: AAOx3, in NAD, well-appearing  HEAD: normocephalic, atraumatic  EYES: no injection or icterus  CVS: RRR  LUNGS: in no acute respiratory distress, CTAB  MSK:  Upper extremities:              Hands: MCP joints w/o obvious synovitis some slightly TTP, there is mild synovitis of the b/l PIP and DIP joints (R>L) TTP, full fist formation w/ good  strength              Wrist: R wrist w/ mild puffiness slightly TTP              Elbow: no synovitis or bursitis, FROM  Lower extremities:              Knees: no joint effusion or warmth, NTTP, good ROM              Ankles: R ankle w/ synovial proliferation cool and NTTP, good ROM              Feet: no toe swelling or pain or warmth on palpation w/ FROM, negative MTP squeeze test  INTEGUMENTARY: no rash or psoriatic lesions, no petechiae, bruises, or palpable purpura, no Cholecalciferol (VITAMIN D3) 50 MCG (2000 UT) CAPS Take 2,000 capsules by mouth daily      ELDERBERRY PO Take by mouth 1 gummy nightly      acetaminophen (TYLENOL) 500 MG tablet Take 500 mg by mouth every 6 hours as needed for Pain      SUMAtriptan (IMITREX) 100 MG tablet Take 100 mg by mouth daily as needed      [DISCONTINUED] sulfaSALAzine (AZULFIDINE) 500 MG tablet TAKE TWO TABLETS BY MOUTH TWICE A  tablet 0    [DISCONTINUED] naproxen (NAPROSYN) 500 MG tablet Take 1 tablet by mouth 2 times daily as needed for Pain 60 tablet 3     No facility-administered encounter medications on file as of 5/6/2021. Return in about 2 months (around 7/6/2021) for lab result discussion and treatment plan, medication monitoring. The risks and benefits of my recommendations, as well as other treatment options, benefits and side effects were discussed with the patient today. Questions were answered. NOTE: This report is transcribed by using voice recognition software dragon. Every effort is made to ensure accuracy; however, inadvertent computerized  transcription errors may be present.

## 2021-05-06 ENCOUNTER — OFFICE VISIT (OUTPATIENT)
Dept: RHEUMATOLOGY | Age: 41
End: 2021-05-06
Payer: MEDICAID

## 2021-05-06 VITALS
WEIGHT: 216 LBS | DIASTOLIC BLOOD PRESSURE: 80 MMHG | HEIGHT: 65 IN | BODY MASS INDEX: 35.99 KG/M2 | SYSTOLIC BLOOD PRESSURE: 128 MMHG

## 2021-05-06 DIAGNOSIS — M06.00 SERONEGATIVE RHEUMATOID ARTHRITIS (HCC): ICD-10-CM

## 2021-05-06 DIAGNOSIS — M06.00 SERONEGATIVE RHEUMATOID ARTHRITIS (HCC): Primary | ICD-10-CM

## 2021-05-06 DIAGNOSIS — M47.816 SPONDYLOSIS OF LUMBAR REGION WITHOUT MYELOPATHY OR RADICULOPATHY: ICD-10-CM

## 2021-05-06 DIAGNOSIS — Z51.81 ENCOUNTER FOR THERAPEUTIC DRUG MONITORING: ICD-10-CM

## 2021-05-06 DIAGNOSIS — Z79.899 HIGH RISK MEDICATION USE: ICD-10-CM

## 2021-05-06 LAB
ALT SERPL-CCNC: 14 U/L (ref 10–40)
AST SERPL-CCNC: 16 U/L (ref 15–37)
BASOPHILS ABSOLUTE: 0 K/UL (ref 0–0.2)
BASOPHILS RELATIVE PERCENT: 0.2 %
C-REACTIVE PROTEIN: 14.8 MG/L (ref 0–5.1)
CREAT SERPL-MCNC: 0.7 MG/DL (ref 0.6–1.1)
EOSINOPHILS ABSOLUTE: 0.1 K/UL (ref 0–0.6)
EOSINOPHILS RELATIVE PERCENT: 1.9 %
GFR AFRICAN AMERICAN: >60
GFR NON-AFRICAN AMERICAN: >60
HCT VFR BLD CALC: 38.3 % (ref 36–48)
HEMOGLOBIN: 12.8 G/DL (ref 12–16)
LYMPHOCYTES ABSOLUTE: 2.1 K/UL (ref 1–5.1)
LYMPHOCYTES RELATIVE PERCENT: 32.4 %
MCH RBC QN AUTO: 29.8 PG (ref 26–34)
MCHC RBC AUTO-ENTMCNC: 33.3 G/DL (ref 31–36)
MCV RBC AUTO: 89.5 FL (ref 80–100)
MONOCYTES ABSOLUTE: 0.6 K/UL (ref 0–1.3)
MONOCYTES RELATIVE PERCENT: 8.9 %
NEUTROPHILS ABSOLUTE: 3.7 K/UL (ref 1.7–7.7)
NEUTROPHILS RELATIVE PERCENT: 56.6 %
PDW BLD-RTO: 14.5 % (ref 12.4–15.4)
PLATELET # BLD: 232 K/UL (ref 135–450)
PMV BLD AUTO: 9.2 FL (ref 5–10.5)
RBC # BLD: 4.27 M/UL (ref 4–5.2)
WBC # BLD: 6.6 K/UL (ref 4–11)

## 2021-05-06 PROCEDURE — G8427 DOCREV CUR MEDS BY ELIG CLIN: HCPCS | Performed by: INTERNAL MEDICINE

## 2021-05-06 PROCEDURE — G8417 CALC BMI ABV UP PARAM F/U: HCPCS | Performed by: INTERNAL MEDICINE

## 2021-05-06 PROCEDURE — 1036F TOBACCO NON-USER: CPT | Performed by: INTERNAL MEDICINE

## 2021-05-06 PROCEDURE — 99214 OFFICE O/P EST MOD 30 MIN: CPT | Performed by: INTERNAL MEDICINE

## 2021-05-06 RX ORDER — NAPROXEN 500 MG/1
500 TABLET ORAL 2 TIMES DAILY PRN
Qty: 60 TABLET | Refills: 2 | Status: SHIPPED | OUTPATIENT
Start: 2021-05-06 | End: 2021-07-04 | Stop reason: SDUPTHER

## 2021-05-06 RX ORDER — HYDROXYCHLOROQUINE SULFATE 200 MG/1
200 TABLET, FILM COATED ORAL 2 TIMES DAILY
Qty: 180 TABLET | Refills: 0 | Status: SHIPPED | OUTPATIENT
Start: 2021-05-06 | End: 2021-07-27 | Stop reason: SDUPTHER

## 2021-05-06 RX ORDER — SULFASALAZINE 500 MG/1
1000 TABLET ORAL 2 TIMES DAILY
Qty: 360 TABLET | Refills: 0 | Status: SHIPPED | OUTPATIENT
Start: 2021-05-06 | End: 2021-07-27

## 2021-05-11 ENCOUNTER — TELEPHONE (OUTPATIENT)
Dept: BARIATRICS/WEIGHT MGMT | Age: 41
End: 2021-05-11

## 2021-05-11 ENCOUNTER — TELEMEDICINE (OUTPATIENT)
Dept: BARIATRICS/WEIGHT MGMT | Age: 41
End: 2021-05-11
Payer: MEDICAID

## 2021-05-11 DIAGNOSIS — Z71.3 DIETARY COUNSELING AND SURVEILLANCE: ICD-10-CM

## 2021-05-11 DIAGNOSIS — E66.9 CLASS 2 OBESITY: Primary | ICD-10-CM

## 2021-05-11 PROCEDURE — 99213 OFFICE O/P EST LOW 20 MIN: CPT | Performed by: FAMILY MEDICINE

## 2021-05-11 PROCEDURE — G8427 DOCREV CUR MEDS BY ELIG CLIN: HCPCS | Performed by: FAMILY MEDICINE

## 2021-05-11 ASSESSMENT — ENCOUNTER SYMPTOMS
BLOOD IN STOOL: 0
DIARRHEA: 0
SHORTNESS OF BREATH: 0
APNEA: 0
CHEST TIGHTNESS: 0
CONSTIPATION: 0
PHOTOPHOBIA: 0
CHOKING: 0
VOMITING: 0
ABDOMINAL DISTENTION: 0
ABDOMINAL PAIN: 0
NAUSEA: 0
WHEEZING: 0
EYE PAIN: 0
COUGH: 0

## 2021-05-11 NOTE — PROGRESS NOTES
Patient: Christ Main                      Encounter Date: 5/11/2021    YOB: 1980               Age: 36 y.o. Chief Complaint   Patient presents with    Weight Management     F/u Diet Only       Patient identification was verified at the start of the visit. Patient-Reported Vitals 1/5/2021   Patient-Reported Pulse 127   Patient-Reported Temperature 98.4         BP Readings from Last 1 Encounters:   05/06/21 128/80       BMI Readings from Last 1 Encounters:   05/06/21 35.94 kg/m²       Pulse Readings from Last 1 Encounters:   04/13/21 87      Self-reported weight: 213 pounds (4/6)       HPI: 36 y.o. female with a long-standing history of obesity presents today for a virtual video follow-up. She has gained 3 pounds since her last visit. Generally following a low carb/cheri diet. Struggling with cravings (sweets). Also has been picking up more fast foods. Trying to exercise regularly. Diet: []LCHF/Ketogenic [x]Modified low-calorie/low carb diet  []Low-calorie diet          []Maintenance       []Other:         Adherent?  []Yes     [x]No            Exercise: [x]Cardio     []Resistance/strength training     []Other: No intentional exercise, but trying to be physically active     Allergies   Allergen Reactions    Metronidazole Rash and Other (See Comments)     LIPS WERE TINGLING, tongue tingling, hives all over body    Other      Glue used to adhere recent surgical incision         Current Outpatient Medications:     busPIRone (BUSPAR) 5 MG tablet, TAKE ONE TABLET BY MOUTH THREE TIMES A DAY AS NEEDED FOR ANXIETY, Disp: 90 tablet, Rfl: 3    sulfaSALAzine (AZULFIDINE) 500 MG tablet, Take 2 tablets by mouth 2 times daily, Disp: 360 tablet, Rfl: 0    hydroxychloroquine (PLAQUENIL) 200 MG tablet, Take 1 tablet by mouth 2 times daily, Disp: 180 tablet, Rfl: 0    naproxen (NAPROSYN) 500 MG tablet, Take 1 tablet by mouth 2 times daily as needed for Pain, Disp: 60 tablet, Rfl: 2    desvenlafaxine myalgias. Skin: Negative for rash. Neurological: Negative for dizziness, tremors, syncope, weakness, numbness and headaches. Psychiatric/Behavioral: Negative for agitation, confusion, decreased concentration, dysphoric mood, hallucinations, sleep disturbance and suicidal ideas. The patient is not nervous/anxious and is not hyperactive. Physical Exam  Constitutional:       Appearance: She is well-developed. HENT:      Head: Normocephalic. Eyes:      Conjunctiva/sclera: Conjunctivae normal.   Abdominal:      General: Abdomen is protuberant. Musculoskeletal:         General: No swelling. Neurological:      Mental Status: She is alert and oriented to person, place, and time. Psychiatric:         Mood and Affect: Mood normal.         Behavior: Behavior normal.         Thought Content:  Thought content normal.         Judgment: Judgment normal.         Orders Only on 05/06/2021   Component Date Value Ref Range Status    ALT 05/06/2021 14  10 - 40 U/L Final    AST 05/06/2021 16  15 - 37 U/L Final    WBC 05/06/2021 6.6  4.0 - 11.0 K/uL Final    RBC 05/06/2021 4.27  4.00 - 5.20 M/uL Final    Hemoglobin 05/06/2021 12.8  12.0 - 16.0 g/dL Final    Hematocrit 05/06/2021 38.3  36.0 - 48.0 % Final    MCV 05/06/2021 89.5  80.0 - 100.0 fL Final    MCH 05/06/2021 29.8  26.0 - 34.0 pg Final    MCHC 05/06/2021 33.3  31.0 - 36.0 g/dL Final    RDW 05/06/2021 14.5  12.4 - 15.4 % Final    Platelets 79/79/7433 232  135 - 450 K/uL Final    MPV 05/06/2021 9.2  5.0 - 10.5 fL Final    Neutrophils % 05/06/2021 56.6  % Final    Lymphocytes % 05/06/2021 32.4  % Final    Monocytes % 05/06/2021 8.9  % Final    Eosinophils % 05/06/2021 1.9  % Final    Basophils % 05/06/2021 0.2  % Final    Neutrophils Absolute 05/06/2021 3.7  1.7 - 7.7 K/uL Final    Lymphocytes Absolute 05/06/2021 2.1  1.0 - 5.1 K/uL Final    Monocytes Absolute 05/06/2021 0.6  0.0 - 1.3 K/uL Final    Eosinophils Absolute 05/06/2021 0.1  0.0 - 0.6 K/uL Final    Basophils Absolute 2021 0.0  0.0 - 0.2 K/uL Final    CREATININE 2021 0.7  0.6 - 1.1 mg/dL Final    GFR Non- 2021 >60  >60 Final    Comment: >60 mL/min/1.73m2 EGFR, calc. for ages 25 and older using the  MDRD formula (not corrected for weight), is valid for stable  renal function.  GFR  2021 >60  >60 Final    Comment: Chronic Kidney Disease: less than 60 ml/min/1.73 sq.m. Kidney Failure: less than 15 ml/min/1.73 sq.m. Results valid for patients 18 years and older.  CRP 2021 14.8* 0.0 - 5.1 mg/L Final    Comment: WR-CRP Reference range:  30D-199Y    <5.1  <30D        Not established    CRP is used in the detection and evaluation of infection,  tissue injury, inflammatory disorders, and associated  disease. Increases in CRP values are non-specific and  should not be interpreted without a complete clinical  evaluation.  reference ranges have not been  established and values should be interpreted within clinical  context and with serial measurements, if clinically  appropriate. Assessment and Plan:  1. Class 2 obesity  Once again counseled on the importance of therapeutic lifestyle changes through diet and exercise. Reinforced 1200-Bert/low carb meal plan as prescribed. Discussed healthy snack options and how to deal with cravings. Counseled on stimulus control. Will set up for a 1:1 with the dietitian. F/u in 6 weeks. 2. Dietary counseling and surveillance  As above.         Nutrition plan: [] LCHF/Ketogenic   [x] Modified low-calorie diet (low carb/low-bert)               [] Low-calorie diet    []Maintenance       []Other    Exercise: [x]Cardio     [x]Resistance/strength training                       [x]ACSM recommendations (150 minutes/week in active weight loss)                              Behavior: [x]Motivational interviewing performed    [] Referral for counseling [x] Discussed strategies to overcome habits/challenges for focus         [] Stress management   [x] Stimulus control                    [] Sleep hygiene    Reviewed:  [x] Nutrition and the importance of regularprotein intake  [x] Hidden carbohydrate sources  [x] Alcohol use  [x] Tobacco use   [x] Importance of exercise and reducing sedentary time          No orders of the defined types were placed in this encounter. No follow-ups on file. Caryn Sorensen is a 36 y.o. female being evaluated by a Virtual Visit (video visit) encounter to address concerns as mentioned above. A caregiver was present when appropriate. Due to this being a TeleHealth encounter (During NCKNX-26 public health emergency), evaluation of the following organ systems was limited: Vitals/Constitutional/EENT/Resp/CV/GI//MS/Neuro/Skin/Heme-Lymph-Imm. Pursuant to the emergency declaration under the [address] [place] [address] and the [employer] and [employer] Act, this Virtual Visit was conducted with patient's (and/or legal guardian's) consent, to reduce the patient's risk of exposure to COVID-19 and provide necessary medical care. The patient (and/or legal guardian) has also been advised to contact this office for worsening conditions or problems, and seek emergency medical treatment and/or call 911 if deemed necessary. Services were provided through a video synchronous discussion virtually to substitute for in-person clinic visit. Patient and provider were located at their individual homes. --Sravani Conde MD on 5/11/2021 at 1:36 PM    An electronic signature was used to authenticate this note.

## 2021-05-11 NOTE — TELEPHONE ENCOUNTER
RD attempted to call and discuss with pt, however call went straight to voicemail. Left detailed message for pt with suggestions of protein shakes/bars, SF jello/popsicles, fruit and yogurt, apple and PB for healthy snack ideas that are sweet. Also informed her of handout on preventing and managing cravings that I will send to her email on file for additional guidance, email sent. Encouraged pt to call office to discuss/if she has any questions.     ----- Message from Shanae Mari MD sent at 5/11/2021 10:40 AM EDT -----  Regarding: General Counseling  Pt is generally following a low carb/cheri diet. Not really tracking caloric intake. Struggling with cravings (especially sweets). Interested in healthier \"sweet\" snacks. I mentioned Nectar protein shakes as a good option. Please review additional options/reinforce protein based snacks. Thank you!

## 2021-06-01 DIAGNOSIS — G25.81 RLS (RESTLESS LEGS SYNDROME): ICD-10-CM

## 2021-06-01 RX ORDER — ROPINIROLE 1 MG/1
TABLET, FILM COATED ORAL
Qty: 90 TABLET | Refills: 2 | Status: SHIPPED | OUTPATIENT
Start: 2021-06-01 | End: 2021-11-03 | Stop reason: SDUPTHER

## 2021-06-25 ENCOUNTER — NURSE TRIAGE (OUTPATIENT)
Dept: OTHER | Facility: CLINIC | Age: 41
End: 2021-06-25

## 2021-06-25 NOTE — TELEPHONE ENCOUNTER
Reason for Disposition   Chest pain lasting longer than 5 minutes and occurred in last 3 days (72 hours) (Exception: feels exactly the same as previously diagnosed heartburn and has accompanying sour taste in mouth)    Answer Assessment - Initial Assessment Questions  1. LOCATION: \"Where does it hurt? \"        Mid chest in between and above breasts. States burning sensation    2. RADIATION: \"Does the pain go anywhere else? \" (e.g., into neck, jaw, arms, back)      Denies    3. ONSET: \"When did the chest pain begin? \" (Minutes, hours or days)       5 days    4. PATTERN \"Does the pain come and go, or has it been constant since it started? \"  \"Does it get worse with exertion? \"       Constant    5. DURATION: \"How long does it last\" (e.g., seconds, minutes, hours)      Constant    6. SEVERITY: \"How bad is the pain? \"  (e.g., Scale 1-10; mild, moderate, or severe)     - MILD (1-3): doesn't interfere with normal activities      - MODERATE (4-7): interferes with normal activities or awakens from sleep     - SEVERE (8-10): excruciating pain, unable to do any normal activities        States it is very uncomfortable but interferes with ability to do normal activities    7. CARDIAC RISK FACTORS: \"Do you have any history of heart problems or risk factors for heart disease? \" (e.g., angina, prior heart attack; diabetes, high blood pressure, high cholesterol, smoker, or strong family history of heart disease)      High cholesterol    8. PULMONARY RISK FACTORS: \"Do you have any history of lung disease? \"  (e.g., blood clots in lung, asthma, emphysema, birth control pills)      Denies, does take hormone replacement    9. CAUSE: \"What do you think is causing the chest pain? \"      [place]- states has had heart burn in past but not this bad    10. OTHER SYMPTOMS: \"Do you have any other symptoms? \" (e.g., dizziness, nausea, vomiting, sweating, fever, difficulty breathing, cough)        A little bit of light headedness and head fogginess, states feels like may be a bit more sweaty than usual. Had about 5 minutes of heart palpitations- states has seen a cardiologist for this in past for this    11. PREGNANCY: \"Is there any chance you are pregnant? \" \"When was your last menstrual period? \"        Total hysterectomy    Protocols used: CHEST PAIN-ADULT-[place]    Received call from [facility] with [employer] Complaint. Brief description of triage: See above- pt experiencing burning sensation in chest that is constant for about 5 days. Also experiencing some mental fogginess and mild light headedness. Had heart palpitations last night lasting about 5 minutes- has history of heart palpitations, gets them every couple of weeks and was evaluated by cardiology several years ago. Triage indicates for patient to 2nd level triage completed with Kareem Mora NP; provider recommends patient be seen in [facility] now    Care advice provided, patient verbalizes understanding; denies any other questions or concerns; instructed to call back for any new or worsening symptoms.

## 2021-06-26 ENCOUNTER — PATIENT MESSAGE (OUTPATIENT)
Dept: FAMILY MEDICINE CLINIC | Age: 41
End: 2021-06-26

## 2021-06-28 ENCOUNTER — TELEPHONE (OUTPATIENT)
Dept: FAMILY MEDICINE CLINIC | Age: 41
End: 2021-06-28

## 2021-06-28 NOTE — TELEPHONE ENCOUNTER
From: Sunday Sails  To: Charly Herndon, APRN - CNP  Sent: 6/26/2021 3:10 PM EDT  Subject: Non-Urgent Medical Question    Hello! Spent some time in the ER last night (6/25) for severe heart burn. Called your office to make an appointment and was told I needed to be seen last night. Ruled out heart issues and blood clots. Gave me 2 scrips and told me to follow up with the gastroenterologist. They gave me a name of one but I'd prefer to see someone in [address]. Could you please refer one?  Thanks, Cinthya Emerson

## 2021-07-04 DIAGNOSIS — M06.00 SERONEGATIVE RHEUMATOID ARTHRITIS (HCC): ICD-10-CM

## 2021-07-04 DIAGNOSIS — M47.816 SPONDYLOSIS OF LUMBAR REGION WITHOUT MYELOPATHY OR RADICULOPATHY: ICD-10-CM

## 2021-07-06 RX ORDER — NAPROXEN 500 MG/1
500 TABLET ORAL 2 TIMES DAILY PRN
Qty: 180 TABLET | Refills: 0 | Status: SHIPPED | OUTPATIENT
Start: 2021-07-06 | End: 2021-07-27 | Stop reason: SDUPTHER

## 2021-07-14 ENCOUNTER — HOSPITAL ENCOUNTER (OUTPATIENT)
Dept: WOMENS IMAGING | Age: 41
Discharge: HOME | End: 2021-07-14
Payer: MEDICAID

## 2021-07-14 VITALS — HEIGHT: 65 IN | WEIGHT: 225 LBS | BODY MASS INDEX: 37.49 KG/M2

## 2021-07-14 DIAGNOSIS — Z12.31 BREAST CANCER SCREENING BY MAMMOGRAM: ICD-10-CM

## 2021-07-14 PROCEDURE — 77063 BREAST TOMOSYNTHESIS BI: CPT

## 2021-07-21 NOTE — PROGRESS NOTES
Ida Ruiz MD  [facility]) Physicians - Rheumatology    [x] [facility] HOSPITAL:  [address], [address] [facility]  [place] [] [facility] Office:  [address], [address]   Office: [phone]  Fax: [phone] PROGRESS NOTE    SUBJECTIVE:    Background:   Saloni Chong is a 36 y.o. female w/ seronegative inflammatory arthritis/seronegative SpA and chronic LBP d/t mild degenerative arthritis s/p lumbar laminectomy in 2013.  PMHx pertinent for EMELINA on CPAP, RLS, depression, anxiety.     Current rheum meds:   mg BID: started in 7/2021  SSZ 1000 mg BID: started in 5/2020  Naproxen 500 mg BID     Prior rheum meds:  Ibuprofen: ineffective    Past medical/surgical history, medications and allergies are reviewed and updated as appropriate. Interval Hx:   Pt states she started HCQ 2 wks ago. So far she has not noticed any s/e on HCQ. She reports compliance w/ SSZ however she is unable to find any pharmacy fill it d/t shortage of this medicine. She reports chronic arthralgias in her hand and wrist joints a/w morning stiffness. She feels her joint Sx have significantly improved since starting SSZ. Pt reports chronic numbness in her fingers. She is s/p R CTS surgery. She is wondering if she should get CTS surgery for her L hand. Denies new rashes. She reports chronic diffuse hair thinning. She reports occasional oral ulcers along her gum lines and tongue. Denies Sx of Raynaud's phenomenon.     Rheumatologic ROS:  Constitutional: denies chronic fatigue, fever/chills, night sweats, unintentional weight loss  Integumentary: +chronic mild hair thinning, denies photosensitivity, patchy alopecia, or Sx of Raynaud's phenomenon  Eyes: denies dry eyes, redness or pain, visual disturbance, or floaters  Nose: denies nasal ulcers or recurrent sinusitis  Oral cavity: denies dry mouth or oral ulcers  Cardiovascular: denies CP, palpitations, Hx of pericardial effusion or pericarditis  Respiratory: denies SOB, cough, hemoptysis, or pleurisy  Gastrointestinal: denies heart burn, dysphagia or esophageal dysmotility, denies change in bowel habits or Sx of IBD  Musculoskeletal:  refer to above HPI     Allergies   Allergen Reactions    Metronidazole Rash and Other (See Comments)     LIPS WERE TINGLING, tongue tingling, hives all over body    Other      Glue used to adhere recent surgical incision       Past Medical History:        Diagnosis Date    Anxiety     Arthritis     RA    Cellulitis of left lower extremity     left ankle    Depression     WELL CONTROLLED 10-16-17    Heart rate fast     intermittent    Kidney stone     Migraine     Motor tic disorder     Obstructive sleep apnea, adult        Past Surgical History:        Procedure Laterality Date    ABDOMINAL EXPLORATION SURGERY  07/22/2011    excision Meckels diverticulum    ABDOMINOPLASTY  04/14/2014    [facility] ADENOIDECTOMY Bilateral     APPENDECTOMY  07/22/2011    BACK SURGERY      CARPAL TUNNEL RELEASE      HYSTERECTOMY, TOTAL ABDOMINAL  06/12/2018    robotic total hyst BSO, Dr Rahman Setting    LITHOTRIPSY  x2    LUMBAR LAMINECTOMY  06/2013    Rad; left l5-s1    MECKEL DIVERTICULUM EXCISION      OTHER SURGICAL HISTORY      hymenoplasty    OVARIAN CYST REMOVAL  04/13/2018    OPERATIVE LAPAROSCOPY, LEFT OVARIAN CYSTECTOMY WITH DILATATION AND CURETTAGE    OVARY REMOVAL      TONSILLECTOMY Bilateral     URETHRAL STRICTURE DILATATION      WISDOM TOOTH EXTRACTION         Medications:    Current Outpatient Medications   Medication Sig Dispense Refill    famotidine (PEPCID) 20 MG tablet Take 20 mg by mouth 2 times daily      pantoprazole (PROTONIX) 20 MG tablet Take 20 mg by mouth daily      naproxen (NAPROSYN) 500 MG tablet Take 1 tablet by mouth 2 times daily as needed for Pain 180 tablet 0    hydroxychloroquine (PLAQUENIL) 200 MG tablet Take 1 tablet by mouth 2 times daily 279 tablet 0    folic acid (FOLVITE) 1 MG tablet Take 1 tablet by mouth daily 90 tablet 0    methotrexate (RHEUMATREX) 2.5 MG chemo tablet Take 5 tablets by mouth once a week 60 tablet 0    predniSONE (DELTASONE) 10 MG tablet Take 2 tablets by mouth daily for 10 days, THEN 1 tablet daily for 10 days, THEN 0.5 tablets daily for 10 days. 35 tablet 1    rOPINIRole (REQUIP) 1 MG tablet TAKE ONE TABLET BY MOUTH DAILY AS NEEDED AND TAKE ONE TO TWO TABLETS BY MOUTH EVERY NIGHT AT BEDTIME TWO HOURS BEFORE BEDTIME 90 tablet 2    busPIRone (BUSPAR) 5 MG tablet TAKE ONE TABLET BY MOUTH THREE TIMES A DAY AS NEEDED FOR ANXIETY 90 tablet 3    desvenlafaxine succinate (PRISTIQ) 100 MG TB24 extended release tablet Take 1 tablet by mouth daily 90 tablet 1    estradiol (ESTRACE) 2 MG tablet Take 2 mg by mouth daily      Biotin 1000 MCG TABS Take by mouth      fluticasone (FLONASE) 50 MCG/ACT nasal spray SPRAY TWO SPRAYS IN EACH NOSTRIL ONCE DAILY 1 Bottle 4    cetirizine (ZYRTEC) 10 MG tablet       albuterol sulfate HFA (PROVENTIL HFA) 108 (90 Base) MCG/ACT inhaler Inhale 2 puffs into the lungs every 6 hours as needed for Wheezing 1 Inhaler 3    Cholecalciferol (VITAMIN D3) 50 MCG (2000 UT) CAPS Take 2,000 capsules by mouth daily      acetaminophen (TYLENOL) 500 MG tablet Take 500 mg by mouth every 6 hours as needed for Pain      SUMAtriptan (IMITREX) 100 MG tablet Take 100 mg by mouth daily as needed      ELDERBERRY PO Take by mouth 1 gummy nightly (Patient not taking: Reported on 7/27/2021)       No current facility-administered medications for this visit.         OBJECTIVE:  Physical Exam:  /82 (Site: Left Upper Arm, Position: Sitting, Cuff Size: Small Adult)   Ht 5' 5\" (1.651 m)   Wt 230 lb 6.4 oz (104.5 kg)   LMP 03/13/2018 (Exact Date)   BMI 38.34 kg/m²     GEN: AAOx3, in NAD, well-appearing  HEAD: normocephalic, atraumatic  EYES: no injection or icterus  CVS: RRR  LUNGS: in no acute respiratory distress, CTAB  MSK:  Upper extremities:              QBRNC: MCP joints w/o synovitis NTTP, b/l PIP and DIP joints slightly boggy and TTP improved since previous, full fist formation w/ good  strength              Wrist: R wrist w/o swelling TTP, L wrist w/ mild synovitis slightly TTP              Elbow: no synovitis or bursitis, FROM  Lower extremities:              Knees: no joint effusion or warmth, NTTP, good ROM              Ankles: no synovitis, good ROM              Feet: no toe swelling or pain or warmth on palpation w/ FROM, negative MTP squeeze test  INTEGUMENT: no rash or psoriatic lesions, no petechiae, bruises, or palpable purpura, no patchy alopecia, no nail or periungual changes, no clubbing or digital ulcers    DATA:  Labs:   I personally reviewed interval labs and discussed w/ the pt in detail which showed:    Lab Results   Component Value Date    WBC 6.6 05/06/2021    HGB 12.8 05/06/2021    HCT 38.3 05/06/2021    MCV 89.5 05/06/2021     05/06/2021    LYMPHOPCT 32.4 05/06/2021    RBC 4.27 05/06/2021    MCH 29.8 05/06/2021    MCHC 33.3 05/06/2021    RDW 14.5 05/06/2021       Chemistry        Component Value Date/Time     01/06/2021 1335    K 3.9 01/06/2021 1335    CL 99 01/06/2021 1335    CO2 26 01/06/2021 1335    BUN 14 01/06/2021 1335    CREATININE 0.7 05/06/2021 1114        Component Value Date/Time    CALCIUM 8.6 01/06/2021 1335    ALKPHOS 81 01/06/2021 1335    AST 16 05/06/2021 1114    ALT 14 05/06/2021 1114    BILITOT 0.3 01/06/2021 1335        Lab Results   Component Value Date    CRP 14.8 (H) 05/06/2021    CRP 11.8 (H) 11/05/2020    CRP 11.1 (H) 07/16/2020     Lab Results   Component Value Date    SEDRATE 11 11/06/2019    SEDRATE 13 11/10/2011     No results found for: [facility]  Lab Results   Component Value Date    VITD25 26.2 (L) 01/16/2019     Negative OFE x 2 (11/10/11, 7/16/20)  Negative RF x 2 (11/10/11, 11/6/19)  Negative CCP (11/6/19)  Negative HLA B27 (11/6/19)  Negative hepatitis B and C serologies, HIV screen    Imaging:  I personally reviewed interval imaging and discussed w/ the pt in detail which included:    MRI L-spine (5/29/13): IMPRESSION:   Moderate size central and L paracentral disc herniation L5-S1 primarily affecting the left S1 nerve root.   Broad-based disc protrusion centrally L4-L5.      X-rays (11/7/19): Bilateral hands:   No significant plain film abnormality.  No erosion, chondrocalcinosis or fracture.      Bilateral knees: Moderate degenerative changes about the patellofemoral space bilaterally with lateral subluxation of the patella bilaterally.      Bilateral feet:   No significant plain film abnormality.      Lumbar spine and sacroiliac joints:   No ankylosis or erosion is appreciated. Mild degenerative changes about the right sacroiliac joint.     I independently reviewed above X-rays, R SI joint appears irregular, L-spine w/o evidence of SpA, hand joints w/o evidence of chronic inflammatory arthritis/erosive changes.      MRI pelvis (11/20/19):  1. Trace amount of nonspecific fluid in the left sacroiliac joint.  No additional changes to suggest inflammatory arthropathy. 2. Minimal right sacroiliac degenerative change. 3. No abnormality in the hip joints. 4. Mild bilateral gluteus minimus tendinosis and proximal hamstring tendinosis. 5. Mild degenerative changes at L4-5 and L5-S1.      I discussed her MRI findings w/ the reading radiologist on 12/2/19, she has mild degenerative arthritis in her sacroiliac joints (inside her pelvis) but nothing inflammatory in nature.     Above results were discussed w/ the pt in detail during today's visit. ASSESSMENT/PLAN:   1. Spondylosis of lumbar region without myelopathy or radiculopathy  - naproxen (NAPROSYN) 500 MG tablet; Take 1 tablet by mouth 2 times daily as needed for Pain  Dispense: 180 tablet; Refill: 0    2. Seronegative rheumatoid arthritis (HCC)  - synovitis in hand/wrist joints improved since last visit.  She started HCQ 2 wks ago, discussed HCQ typically takes several months to take into effect. Will switch SSZ to MTX 5 tablets/wk d/t national shortage of SSZ. Prescribed low dose Prednisone taper for PRN use and numbness in fingers until MTX and HCQ take into effect  - naproxen (NAPROSYN) 500 MG tablet; Take 1 tablet by mouth 2 times daily as needed for Pain  Dispense: 180 tablet; Refill: 0  - hydroxychloroquine (PLAQUENIL) 200 MG tablet; Take 1 tablet by mouth 2 times daily  Dispense: 180 tablet; Refill: 0  - folic acid (FOLVITE) 1 MG tablet; Take 1 tablet by mouth daily  Dispense: 90 tablet; Refill: 0  - methotrexate (RHEUMATREX) 2.5 MG chemo tablet; Take 5 tablets by mouth once a week  Dispense: 60 tablet; Refill: 0  - predniSONE (DELTASONE) 10 MG tablet; Take 2 tablets by mouth daily for 10 days, THEN 1 tablet daily for 10 days, THEN 0.5 tablets daily for 10 days. Dispense: 35 tablet; Refill: 1    3. High risk medication use  - pt stated she received baseline eye exam for HCQ. Discussed the s/e and risks of MTX including hair loss and stomatitis which she already has. Pt stated she is s/p hysterectomy and does not consume alcohol on a regular basis. Ordered safety labs to be drawn 4 wks after starting MTX.  - ALT; Future  - AST; Future  - CBC Auto Differential; Future  - Creatinine, Serum; Future  - C-Reactive Protein; Future    4. Encounter for therapeutic drug monitoring  - ALT; Future  - AST; Future  - CBC Auto Differential; Future  - Creatinine, Serum; Future  - C-Reactive Protein; Future    5. Vitamin D deficiency  - Vitamin D 25 Hydroxy; Future    Return in about 3 months (around 10/27/2021) for lab result discussion and treatment plan, medication monitoring. The risks and benefits of my recommendations, as well as other treatment options, benefits and side effects were discussed with the patient today. Questions were answered. NOTE: This report is transcribed by using voice recognition software dragon. Every effort is made to ensure accuracy; however, inadvertent computerized  transcription errors may be present.

## 2021-07-27 ENCOUNTER — OFFICE VISIT (OUTPATIENT)
Dept: RHEUMATOLOGY | Age: 41
End: 2021-07-27
Payer: MEDICAID

## 2021-07-27 VITALS
SYSTOLIC BLOOD PRESSURE: 118 MMHG | DIASTOLIC BLOOD PRESSURE: 82 MMHG | WEIGHT: 230.4 LBS | BODY MASS INDEX: 38.39 KG/M2 | HEIGHT: 65 IN

## 2021-07-27 DIAGNOSIS — Z51.81 ENCOUNTER FOR THERAPEUTIC DRUG MONITORING: ICD-10-CM

## 2021-07-27 DIAGNOSIS — M06.00 SERONEGATIVE RHEUMATOID ARTHRITIS (HCC): Primary | ICD-10-CM

## 2021-07-27 DIAGNOSIS — Z79.899 HIGH RISK MEDICATION USE: ICD-10-CM

## 2021-07-27 DIAGNOSIS — E55.9 VITAMIN D DEFICIENCY: ICD-10-CM

## 2021-07-27 DIAGNOSIS — M47.816 SPONDYLOSIS OF LUMBAR REGION WITHOUT MYELOPATHY OR RADICULOPATHY: ICD-10-CM

## 2021-07-27 PROCEDURE — G8427 DOCREV CUR MEDS BY ELIG CLIN: HCPCS | Performed by: INTERNAL MEDICINE

## 2021-07-27 PROCEDURE — G8417 CALC BMI ABV UP PARAM F/U: HCPCS | Performed by: INTERNAL MEDICINE

## 2021-07-27 PROCEDURE — 1036F TOBACCO NON-USER: CPT | Performed by: INTERNAL MEDICINE

## 2021-07-27 PROCEDURE — 99214 OFFICE O/P EST MOD 30 MIN: CPT | Performed by: INTERNAL MEDICINE

## 2021-07-27 RX ORDER — PREDNISONE 10 MG/1
TABLET ORAL
Qty: 35 TABLET | Refills: 1 | Status: SHIPPED | OUTPATIENT
Start: 2021-07-27 | End: 2021-09-28

## 2021-07-27 RX ORDER — NAPROXEN 500 MG/1
500 TABLET ORAL 2 TIMES DAILY PRN
Qty: 180 TABLET | Refills: 0 | Status: SHIPPED | OUTPATIENT
Start: 2021-07-27 | End: 2021-10-04

## 2021-07-27 RX ORDER — FAMOTIDINE 20 MG/1
20 TABLET, FILM COATED ORAL 2 TIMES DAILY
COMMUNITY
End: 2021-09-08

## 2021-07-27 RX ORDER — PANTOPRAZOLE SODIUM 20 MG/1
20 TABLET, DELAYED RELEASE ORAL DAILY
COMMUNITY
End: 2022-05-10 | Stop reason: ALTCHOICE

## 2021-07-27 RX ORDER — HYDROXYCHLOROQUINE SULFATE 200 MG/1
200 TABLET, FILM COATED ORAL 2 TIMES DAILY
Qty: 180 TABLET | Refills: 0 | Status: SHIPPED | OUTPATIENT
Start: 2021-07-27 | End: 2021-10-14 | Stop reason: SDUPTHER

## 2021-07-27 RX ORDER — FOLIC ACID 1 MG/1
1 TABLET ORAL DAILY
Qty: 90 TABLET | Refills: 0 | Status: SHIPPED | OUTPATIENT
Start: 2021-07-27 | End: 2021-09-08

## 2021-07-27 NOTE — PATIENT INSTRUCTIONS
METHOTREXATE DOSING    1. Start taking 4 tablets of Methotrexate once a week for the first 2 weeks. If well-tolerated, then start taking 5 tablets all at once once a week. 2. Check laboratory studies in 4 weeks after starting the Methotrexate  3. Take Folic Acid 1 mg daily     Hold Methotrexate if you are on antibiotics. Restart once and have recovered from the infection. No alcohol while on Methotrexate! PLEASE CALL WITH ANY QUESTIONS OR CONCERNS [phone]    Methotrexate (oral)     Pronunciation: meth oh TREX ate   Brand: Rheumatrex Dose Pack, Trexall   What is methotrexate? Methotrexate interferes with the growth of certain cells of the body, especially cells that reproduce quickly, such as cancer cells, bone marrow cells, and skin cells. Methotrexate is used to treat certain types of cancer of the breast, skin, head and neck, or lung. Methotrexate is also used to treat severe psoriasis and rheumatoid arthritis. Methotrexate is usually given after other medications have been tried without successful treatment of symptoms. Methotrexate may also be used for purposes not listed in this medication guide. What should I discuss with my healthcare provider before taking methotrexate? You should not use this medicine if you are allergic to methotrexate. Do not use methotrexate to treat psoriasis or rheumatoid arthritis if you have:    alcoholism, cirrhosis, or other liver disease;    a blood cell disorder such as anemia (lack of red blood cells) or leukopenia (lack of white blood cells);    a bone marrow disorder; or    if you are breast-feeding a baby. Methotrexate is sometimes used to treat cancer even when patients do have one of the conditions listed above. Your doctor will decide if this treatment is right for you.    To make sure methotrexate is safe for you, tell your doctor if you have:    kidney disease;    a folate deficiency;    pneumonia or lung disease;    stomach ulcers;  any type of infection; or    if you are receiving radiation treatments. Methotrexate can cause birth defects in an unborn baby. Do not use methotrexate to treat psoriasis or rheumatoid arthritis if you are pregnant. Tell your doctor right away if you become pregnant during treatment. You may need to have a negative pregnancy test before starting this treatment. Use birth control to prevent pregnancy while you are using methotrexate, whether you are a man or a woman. Methotrexate use by either parent may cause birth defects. If you are a man, use a condom to keep from causing a pregnancy while you are using methotrexate. Continue using condoms for at least 90 days after your treatment ends. If you are a woman, use an effective form of birth control while you are taking methotrexate, and for at least one cycle of ovulation after your treatment ends. Do not give this medicine to a child without the advice of a doctor. How should I take methotrexate? Follow all directions on your prescription label. Do not take this medicine in larger or smaller amounts or for longer than recommended. You must use the correct dose of methotrexate for your condition. Methotrexate is sometimes taken once or twice per week and not every day. Follow the directions on your prescription label. Some people have  after taking methotrexate every day by accident. Ask your doctor or pharmacist if you have questions about your dose of methotrexate or how often to take it. Use methotrexate regularly to get the most benefit. Get your prescription refilled before you run out of medicine completely. Methotrexate can lower blood cells that help your body fight infections and help your blood to clot. Your blood will need to be tested often, and you may need an occasional liver biopsy. Your cancer treatments may be delayed based on the results of these tests. Store at room temperature away from moisture and heat.    What happens if I miss a dose? Call your doctor for instructions if you miss a dose of methotrexate. What happens if I overdose? Seek emergency medical attention or call the Poison Help line at 1-172.139.1095. An overdose of methotrexate can be fatal.   What should I avoid while taking methotrexate? This medicine can pass into body fluids (including urine, feces, vomit, semen, vaginal fluid). Patients and caregivers should wear rubber gloves while cleaning up body fluids, handling contaminated trash or laundry or changing diapers. Wash hands before and after removing gloves. Wash soiled clothing and linens separately from other laundry. Body fluids should not be handled by a woman who is pregnant or who may become pregnant. Use condoms during sexual activity to avoid exposure to body fluids. Avoid exposure to sunlight or artificial UV rays (sunlamps or tanning beds), especially if you are being treated for psoriasis. Methotrexate can make your skin more sensitive to sunlight and your psoriasis may worsen. Avoid drinking alcohol while taking methotrexate. What are the possible side effects of methotrexate? Get emergency medical help if you have signs of an allergic reaction: hives; difficulty breathing; swelling of your face, lips, tongue, or throat.    Stop using methotrexate and call your doctor at once if you have:    dry cough, shortness of breath;    diarrhea, vomiting, white patches or sores inside your mouth or on your lips;    blood in your urine or stools;    swelling, rapid weight gain, little or no urinating;    seizure (convulsions);    fever, chills, body aches, flu symptoms;    pale skin, easy bruising, unusual bleeding, weakness, feeling light-headed or short of breath;    liver problems --nausea, upper stomach pain, itching, tired feeling, loss of appetite, dark urine, myra-colored stools, jaundice (yellowing of the skin or eyes); or    severe skin reaction --fever, sore throat, swelling in your face or tongue, burning in your eyes, skin pain, followed by a red or purple skin rash that spreads (especially in the face or upper body) and causes blistering and peeling. Older adults may be more likely to have side effects from this medicine. Common side effects may include:    vomiting, upset stomach;    headache, dizziness, tired feeling; or    blurred vision. This is not a complete list of side effects and others may occur. Call your doctor for medical advice about side effects. You may report side effects to FDA at 5-630-FDA-8937. What other drugs will affect methotrexate? Many drugs can interact with methotrexate. Not all possible interactions are listed here. Tell your doctor about all your medications and any you start or stop using during treatment with methotrexate, especially:    azathioprine;    leucovorin;    phenytoin;    probenecid;    theophylline;    an antibiotic or sulfa drugs;    isotretinoin, retinol, tretinoin;    NSAIDs (non-steroidal anti-inflammatory drugs) --ibuprofen (Advil, Motrin), naproxen (Aleve), celecoxib, diclofenac, indomethacin, meloxicam, and others; or    salicylates --aspirin, Nuprin Backache Caplet, Kaopectate, KneeRelief, Pamprin Cramp Formula, Pepto-Bismol, Tricosal, Trilisate, and others. This list is not complete and many other drugs can interact with methotrexate. This includes prescription and over-the-counter medicines, vitamins, and herbal products. Give a list of all your medicines to any healthcare provider who treats you. Where can I get more information? Your pharmacist can provide more information about methotrexate. Remember, keep this and all other medicines out of the reach of children, never share your medicines with others, and use this medication only for the indication prescribed.    Every effort has been made to ensure that the information provided by [address] is accurate, up-to-date, and complete, but no guarantee is made to that effect. Drug information contained herein may be time sensitive. [facility] information has been compiled for use by healthcare practitioners and consumers in the [place] and therefore [employer] does not warrant that uses outside of the [place] are appropriate, unless specifically indicated otherwise. [facility]'s drug information does not endorse drugs, diagnose patients or recommend therapy. [facility] drug information is an informational resource designed to assist licensed healthcare practitioners in caring for their patients and/or to serve consumers viewing this service as a supplement to, and not a substitute for, the expertise, skill, knowledge and judgment of healthcare practitioners. The absence of a warning for a given drug or drug combination in no way should be construed to indicate that the drug or drug combination is safe, effective or appropriate for any given patient. [facility] does not assume any responsibility for any aspect of healthcare administered with the aid of information [facility] provides. The information contained herein is not intended to cover all possible uses, directions, precautions, warnings, drug interactions, allergic reactions, or adverse effects. If you have questions about the drugs you are taking, check with your doctor, nurse or pharmacist.   Copyright 7121-3254 [address]. Version: 11.03. Revision date: 1/6/2015. This information does not replace the advice of a doctor. [employer] disclaims any warranty or liability for your use of this information.    Content Version: 58.0.084751

## 2021-08-02 DIAGNOSIS — F41.8 DEPRESSION WITH ANXIETY: ICD-10-CM

## 2021-08-03 RX ORDER — DESVENLAFAXINE 100 MG/1
100 TABLET, EXTENDED RELEASE ORAL DAILY
Qty: 90 TABLET | Refills: 1 | Status: SHIPPED | OUTPATIENT
Start: 2021-08-03 | End: 2022-01-25

## 2021-08-03 NOTE — TELEPHONE ENCOUNTER
Medication:   Requested Prescriptions     Pending Prescriptions Disp Refills    desvenlafaxine succinate (PRISTIQ) 100 MG TB24 extended release tablet [Pharmacy Med Name: DESVENLAFAXINE ER SUCCINATE 100MG T] 90 tablet 1     Sig: TAKE 1 TABLET BY MOUTH DAILY        Last Filled:  4/13/21 #90, 1 RF     Patient Phone Number: [phone] (home)     Last appt: 4/13/2021  Anxiety, foot pain   Next appt: Visit date not found    Last [place]AR[place]:   RX Monitoring 1/16/2018   Attestation The Prescription Monitoring Report for this patient was reviewed today. Periodic Controlled Substance Monitoring Possible medication side effects, risk of tolerance and/or dependence, and alternative treatments discussed. ;No signs of potential drug abuse or diversion identified.

## 2021-08-17 ENCOUNTER — HOSPITAL ENCOUNTER (OUTPATIENT)
Age: 41
Discharge: HOME | End: 2021-08-17
Payer: MEDICAID

## 2021-08-17 DIAGNOSIS — Z79.899 HIGH RISK MEDICATION USE: ICD-10-CM

## 2021-08-17 DIAGNOSIS — Z51.81 ENCOUNTER FOR THERAPEUTIC DRUG MONITORING: ICD-10-CM

## 2021-08-17 DIAGNOSIS — R73.01 IMPAIRED FASTING GLUCOSE: ICD-10-CM

## 2021-08-17 DIAGNOSIS — E55.9 VITAMIN D DEFICIENCY: ICD-10-CM

## 2021-08-17 LAB
ALT SERPL-CCNC: 11 U/L (ref 10–40)
AST SERPL-CCNC: 15 U/L (ref 15–37)
BASOPHILS ABSOLUTE: 0 K/UL (ref 0–0.2)
BASOPHILS RELATIVE PERCENT: 0.4 %
C-REACTIVE PROTEIN: 9 MG/L (ref 0–5.1)
CREAT SERPL-MCNC: 0.8 MG/DL (ref 0.6–1.1)
EOSINOPHILS ABSOLUTE: 0.1 K/UL (ref 0–0.6)
EOSINOPHILS RELATIVE PERCENT: 1 %
ESTIMATED AVERAGE GLUCOSE: 88.2 MG/DL
GFR AFRICAN AMERICAN: >60
GFR NON-AFRICAN AMERICAN: >60
HBA1C MFR BLD: 4.7 %
HCT VFR BLD CALC: 37.9 % (ref 36–48)
HEMOGLOBIN: 12.8 G/DL (ref 12–16)
LYMPHOCYTES ABSOLUTE: 3.2 K/UL (ref 1–5.1)
LYMPHOCYTES RELATIVE PERCENT: 36.3 %
MCH RBC QN AUTO: 30.9 PG (ref 26–34)
MCHC RBC AUTO-ENTMCNC: 33.6 G/DL (ref 31–36)
MCV RBC AUTO: 91.8 FL (ref 80–100)
MONOCYTES ABSOLUTE: 0.8 K/UL (ref 0–1.3)
MONOCYTES RELATIVE PERCENT: 8.8 %
NEUTROPHILS ABSOLUTE: 4.7 K/UL (ref 1.7–7.7)
NEUTROPHILS RELATIVE PERCENT: 53.5 %
PDW BLD-RTO: 13.7 % (ref 12.4–15.4)
PLATELET # BLD: 204 K/UL (ref 135–450)
PMV BLD AUTO: 9.1 FL (ref 5–10.5)
RBC # BLD: 4.13 M/UL (ref 4–5.2)
VITAMIN D 25-HYDROXY: 36.6 NG/ML
WBC # BLD: 8.8 K/UL (ref 4–11)

## 2021-08-17 PROCEDURE — 84460 ALANINE AMINO (ALT) (SGPT): CPT

## 2021-08-17 PROCEDURE — 82306 VITAMIN D 25 HYDROXY: CPT

## 2021-08-17 PROCEDURE — 84450 TRANSFERASE (AST) (SGOT): CPT

## 2021-08-17 PROCEDURE — 85025 COMPLETE CBC W/AUTO DIFF WBC: CPT

## 2021-08-17 PROCEDURE — 36415 COLL VENOUS BLD VENIPUNCTURE: CPT

## 2021-08-17 PROCEDURE — 83036 HEMOGLOBIN GLYCOSYLATED A1C: CPT

## 2021-08-17 PROCEDURE — 86140 C-REACTIVE PROTEIN: CPT

## 2021-08-17 PROCEDURE — 82565 ASSAY OF CREATININE: CPT

## 2021-08-30 RX ORDER — SULFASALAZINE 500 MG/1
TABLET ORAL
Qty: 320 TABLET | Refills: 0 | Status: SHIPPED | OUTPATIENT
Start: 2021-08-30 | End: 2021-10-14 | Stop reason: SDUPTHER

## 2021-09-08 ENCOUNTER — OFFICE VISIT (OUTPATIENT)
Dept: FAMILY MEDICINE CLINIC | Age: 41
End: 2021-09-08
Payer: MEDICAID

## 2021-09-08 VITALS
WEIGHT: 235 LBS | DIASTOLIC BLOOD PRESSURE: 86 MMHG | BODY MASS INDEX: 39.11 KG/M2 | OXYGEN SATURATION: 97 % | SYSTOLIC BLOOD PRESSURE: 124 MMHG | HEART RATE: 88 BPM

## 2021-09-08 DIAGNOSIS — R68.84 JAW PAIN: Primary | ICD-10-CM

## 2021-09-08 DIAGNOSIS — R12 HEARTBURN: ICD-10-CM

## 2021-09-08 PROCEDURE — 99213 OFFICE O/P EST LOW 20 MIN: CPT | Performed by: NURSE PRACTITIONER

## 2021-09-08 PROCEDURE — G8417 CALC BMI ABV UP PARAM F/U: HCPCS | Performed by: NURSE PRACTITIONER

## 2021-09-08 PROCEDURE — G8427 DOCREV CUR MEDS BY ELIG CLIN: HCPCS | Performed by: NURSE PRACTITIONER

## 2021-09-08 PROCEDURE — 1036F TOBACCO NON-USER: CPT | Performed by: NURSE PRACTITIONER

## 2021-09-08 SDOH — ECONOMIC STABILITY: FOOD INSECURITY: WITHIN THE PAST 12 MONTHS, YOU WORRIED THAT YOUR FOOD WOULD RUN OUT BEFORE YOU GOT MONEY TO BUY MORE.: NEVER TRUE

## 2021-09-08 SDOH — ECONOMIC STABILITY: FOOD INSECURITY: WITHIN THE PAST 12 MONTHS, THE FOOD YOU BOUGHT JUST DIDN'T LAST AND YOU DIDN'T HAVE MONEY TO GET MORE.: NEVER TRUE

## 2021-09-08 ASSESSMENT — ENCOUNTER SYMPTOMS
SORE THROAT: 0
ABDOMINAL DISTENTION: 0
COUGH: 0
NAUSEA: 0
VOMITING: 0
RHINORRHEA: 0

## 2021-09-08 ASSESSMENT — SOCIAL DETERMINANTS OF HEALTH (SDOH): HOW HARD IS IT FOR YOU TO PAY FOR THE VERY BASICS LIKE FOOD, HOUSING, MEDICAL CARE, AND HEATING?: SOMEWHAT HARD

## 2021-09-08 NOTE — PROGRESS NOTES
SUBJECTIVE:  Pt is a of 36 y.o. female comes in today with   Chief Complaint   Patient presents with    Facial Pain     Pt states she has pain right side of face, ear pain, headache, and canker sore on lip and tonue x 1 day      Patient presenting today for evaluation of right jaw pain. Started yesterday. Denies injury. Fully extending jaw (yawning) hurts jaw. Also with cancer sores to tongue and mouth. No fevers or other URI symptoms. Heartburn: severe in June. ED eval and started on Protonix 20 mg. States symptoms resolved within 2-3 days of taking. Ran out prior to seeing GI, resumed and symptoms resolved. Asymptomatic since taking. States needing mag level checked. Patient's medications, allergies, past medical, surgical, social and family histories were reviewed and updated as appropriate. Prior to Visit Medications    Medication Sig Taking?  Authorizing Provider   sulfaSALAzine (AZULFIDINE) 500 MG tablet TAKE 2 TABLETS BY MOUTH TWICE DAILY Yes Klever Juan MD   desvenlafaxine succinate (PRISTIQ) 100 MG TB24 extended release tablet TAKE 1 TABLET BY MOUTH DAILY Yes ANDREA Martin CNP   pantoprazole (PROTONIX) 20 MG tablet Take 20 mg by mouth daily Yes Historical Provider, MD   naproxen (NAPROSYN) 500 MG tablet Take 1 tablet by mouth 2 times daily as needed for Pain Yes Klever Juan MD   hydroxychloroquine (PLAQUENIL) 200 MG tablet Take 1 tablet by mouth 2 times daily Yes Klever Juan MD   rOPINIRole (REQUIP) 1 MG tablet TAKE ONE TABLET BY MOUTH DAILY AS NEEDED AND TAKE ONE TO TWO TABLETS BY MOUTH EVERY NIGHT AT BEDTIME TWO HOURS BEFORE BEDTIME Yes Aparna Lozada MD   busPIRone (BUSPAR) 5 MG tablet TAKE ONE TABLET BY MOUTH THREE TIMES A DAY AS NEEDED FOR ANXIETY Yes ANDREA Martin CNP   estradiol (ESTRACE) 2 MG tablet Take 2 mg by mouth daily Yes Historical Provider, MD   Biotin 1000 MCG TABS Take by mouth Yes Historical Provider, MD   fluticasone (FLONASE) 50 MCG/ACT nasal spray SPRAY TWO SPRAYS IN EACH NOSTRIL ONCE DAILY Yes ANDREA Vo CNP   cetirizine (ZYRTEC) 10 MG tablet  Yes Historical Provider, MD   albuterol sulfate HFA (PROVENTIL HFA) 108 (90 Base) MCG/ACT inhaler Inhale 2 puffs into the lungs every 6 hours as needed for Wheezing Yes ANDREA Vo CNP   Cholecalciferol (VITAMIN D3) 50 MCG (2000 UT) CAPS Take 2,000 capsules by mouth daily Yes Historical Provider, MD   acetaminophen (TYLENOL) 500 MG tablet Take 500 mg by mouth every 6 hours as needed for Pain Yes Historical Provider, MD   SUMAtriptan (IMITREX) 100 MG tablet Take 100 mg by mouth daily as needed Yes Historical Provider, MD       Patient's past medical, surgical, social and family histories were reviewed and updated as appropriate. Review of Systems   Constitutional: Negative for chills, fatigue and fever. HENT: Positive for ear pain (R) and mouth sores. Negative for congestion, postnasal drip, rhinorrhea and sore throat. Right jaw pain     Respiratory: Negative for cough. Cardiovascular: Negative for chest pain and palpitations. Gastrointestinal: Negative for abdominal distention, nausea and vomiting. Neurological: Positive for headaches. Physical Exam  Vitals reviewed. Constitutional:       Appearance: She is well-developed. HENT:      Head:      Jaw: Tenderness and pain on movement (jaw clicking with opening and closing. ) present. Right Ear: Tympanic membrane, ear canal and external ear normal.      Left Ear: Tympanic membrane, ear canal and external ear normal.      Nose: Nose normal.      Mouth/Throat:      Mouth: Oral lesions (canker sore to right side of tongue.) present. Pharynx: Uvula midline. No oropharyngeal exudate or posterior oropharyngeal erythema. Cardiovascular:      Rate and Rhythm: Normal rate and regular rhythm. Heart sounds: Normal heart sounds.    Pulmonary:      Effort: Pulmonary effort is normal. Breath sounds: Normal breath sounds. Lymphadenopathy:      Cervical: No cervical adenopathy. Skin:     General: Skin is warm and dry. Neurological:      Mental Status: She is alert and oriented to person, place, and time. /86   Pulse 88   Wt 235 lb (106.6 kg)   LMP 03/13/2018 (Exact Date)   SpO2 97%   Breastfeeding No   BMI 39.11 kg/m²       Assessment/Plan    1. Jaw pain  ? Etiology  Discussed possible TMJ or early shingles. Follow up if any rash develops  Continue Naproxen. May add Tyl prn. Cool compresses prn    2. Heartburn  Continue Protonix  - MAGNESIUM; Future      See pt instructions  F/u5-7 days if no improvement, sooner if symptoms worsen. Discussed use, benefit, and side effects of prescribed medications. All patient questions answered. Pt voiced understanding.

## 2021-09-08 NOTE — PATIENT INSTRUCTIONS
worse.  · Manage stress. You may be clenching or tightening your muscles when you are under stress. · Get at least 30 minutes of exercise on most days of the week to relieve stress. Walking is a good choice. · Ask your doctor if you can take an over-the-counter pain medicine, such as acetaminophen (Tylenol), ibuprofen (Advil, Motrin), or naproxen (Aleve). Be safe with medicines. Read and follow all instructions on the label. · Use good posture for sitting and standing. Slumping your shoulders disturbs the alignment of your facial bones and muscles. · Don't:  ? Hold a phone between your shoulder and your jaw. ? Open your mouth all the way, like when you sing loudly or yawn. ? Clench or grind your teeth, bite your lips, or chew your fingernails. ? Clench things such as pens, pipes, or cigars between your teeth. When should you call for help? Call your doctor now or seek immediate medical care if:    · Your jaw is locked open or shut or it is hard to move your jaw. Watch closely for changes in your health, and be sure to contact your doctor if:    · Your jaw pain gets worse.     · Your face is swollen.     · You do not get better as expected. Where can you learn more? Go to [email or web address].Keoghs. org and sign in to your [employer] account. Enter J152 in the [facility] box to learn more about \"Temporomandibular Disorder: Care Instructions. \"     If you do not have an account, please click on the \"Sign Up Now\" link. Current as of: October 27, 2020               Content Version: 12.9  © 0175-2657 Healthwise, "[employer]. Care instructions adapted under license by [facility] [employer] [facility] ([facility]). If you have questions about a medical condition or this instruction, always ask your healthcare professional. [address]  any warranty or liability for your use of this information.

## 2021-09-24 DIAGNOSIS — M06.00 SERONEGATIVE RHEUMATOID ARTHRITIS (HCC): ICD-10-CM

## 2021-09-28 RX ORDER — PREDNISONE 10 MG/1
TABLET ORAL
Qty: 35 TABLET | Refills: 1 | Status: SHIPPED | OUTPATIENT
Start: 2021-09-28 | End: 2021-10-14

## 2021-10-03 DIAGNOSIS — M06.00 SERONEGATIVE RHEUMATOID ARTHRITIS (HCC): ICD-10-CM

## 2021-10-03 DIAGNOSIS — M47.816 SPONDYLOSIS OF LUMBAR REGION WITHOUT MYELOPATHY OR RADICULOPATHY: ICD-10-CM

## 2021-10-04 RX ORDER — NAPROXEN 500 MG/1
TABLET ORAL
Qty: 180 TABLET | Refills: 0 | Status: SHIPPED | OUTPATIENT
Start: 2021-10-04 | End: 2021-10-14

## 2021-10-14 ENCOUNTER — OFFICE VISIT (OUTPATIENT)
Dept: RHEUMATOLOGY | Age: 41
End: 2021-10-14
Payer: MEDICAID

## 2021-10-14 VITALS
SYSTOLIC BLOOD PRESSURE: 132 MMHG | BODY MASS INDEX: 39.49 KG/M2 | HEART RATE: 96 BPM | HEIGHT: 65 IN | DIASTOLIC BLOOD PRESSURE: 82 MMHG | OXYGEN SATURATION: 97 % | WEIGHT: 237 LBS

## 2021-10-14 DIAGNOSIS — M06.00 SERONEGATIVE RHEUMATOID ARTHRITIS (HCC): Primary | ICD-10-CM

## 2021-10-14 DIAGNOSIS — M47.816 SPONDYLOSIS OF LUMBAR REGION WITHOUT MYELOPATHY OR RADICULOPATHY: ICD-10-CM

## 2021-10-14 DIAGNOSIS — Z51.81 ENCOUNTER FOR THERAPEUTIC DRUG MONITORING: ICD-10-CM

## 2021-10-14 DIAGNOSIS — Z79.899 HIGH RISK MEDICATION USE: ICD-10-CM

## 2021-10-14 PROCEDURE — 1036F TOBACCO NON-USER: CPT | Performed by: INTERNAL MEDICINE

## 2021-10-14 PROCEDURE — G8417 CALC BMI ABV UP PARAM F/U: HCPCS | Performed by: INTERNAL MEDICINE

## 2021-10-14 PROCEDURE — G8427 DOCREV CUR MEDS BY ELIG CLIN: HCPCS | Performed by: INTERNAL MEDICINE

## 2021-10-14 PROCEDURE — G8484 FLU IMMUNIZE NO ADMIN: HCPCS | Performed by: INTERNAL MEDICINE

## 2021-10-14 PROCEDURE — 99214 OFFICE O/P EST MOD 30 MIN: CPT | Performed by: INTERNAL MEDICINE

## 2021-10-14 RX ORDER — PREDNISONE 10 MG/1
TABLET ORAL
Qty: 35 TABLET | Refills: 1 | Status: SHIPPED | OUTPATIENT
Start: 2021-10-14 | End: 2021-11-13

## 2021-10-14 RX ORDER — SULFASALAZINE 500 MG/1
TABLET ORAL
Qty: 320 TABLET | Refills: 0 | Status: SHIPPED | OUTPATIENT
Start: 2021-10-14 | End: 2021-12-10 | Stop reason: SDUPTHER

## 2021-10-14 RX ORDER — NAPROXEN 500 MG/1
TABLET ORAL
Qty: 180 TABLET | Refills: 0 | Status: CANCELLED | OUTPATIENT
Start: 2021-10-14

## 2021-10-14 RX ORDER — ADALIMUMAB 40MG/0.4ML
40 KIT SUBCUTANEOUS
Qty: 6 EACH | Refills: 0 | Status: SHIPPED | OUTPATIENT
Start: 2021-10-14 | End: 2021-10-21 | Stop reason: SDUPTHER

## 2021-10-14 RX ORDER — HYDROXYCHLOROQUINE SULFATE 200 MG/1
200 TABLET, FILM COATED ORAL 2 TIMES DAILY
Qty: 180 TABLET | Refills: 0 | Status: SHIPPED | OUTPATIENT
Start: 2021-10-14 | End: 2021-12-30 | Stop reason: SDUPTHER

## 2021-10-14 NOTE — PROGRESS NOTES
Niraj Marrufo MD  [facility]) Physicians - Rheumatology    [] [facility] HOSPITAL:  [address], [address] [facility]  Corrinne Grady [place] [x] Tha Office:  [address], [address] [employer]   Office: [phone]  Fax: [phone] PROGRESS NOTE    SUBJECTIVE:    Background:   Liz Quintero is a 36 y.o. female w/ seronegative RA and degenerative arthritis s/p lumbar laminectomy in 2013. PMHx pertinent for EMELINA on CPAP, RLS, depression, anxiety.     Current rheum meds:   mg BID: started in 7/2021  SSZ 1000 mg BID: started in 5/2020  Naproxen 500 mg BID     Prior rheum meds:  Ibuprofen: ineffective    Past medical/surgical history, medications and allergies are reviewed and updated as appropriate. Interval Hx:   Pt states she never started MTX as she was able to obtain SSZ refills from her pharmacy. She remains on HCQ and SSZ. She reports chronic arthralgias in her hand and wrist joints. Knees have been hurting more lately clyde when she walks. She has gained 7 lb since her last visit, she is concerned about her weight gain. Hand numbness significantly improved on low dose Prednisone taper but Sx returned after she tapered off Prednisone. She is not sure if Naproxen is working.     Rheumatologic ROS:  Constitutional: denies chronic fatigue, fever/chills, night sweats, unintentional weight loss  Integumentary: +chronic mild hair thinning, denies photosensitivity, patchy alopecia, or Sx of Raynaud's phenomenon  Eyes: denies dry eyes, redness or pain, visual disturbance, or floaters  Nose: denies nasal ulcers or recurrent sinusitis  Oral cavity: denies dry mouth or oral ulcers  Cardiovascular: denies CP, palpitations, Hx of pericardial effusion or pericarditis  Respiratory: denies SOB, cough, hemoptysis, or pleurisy  Gastrointestinal: denies heart burn, dysphagia or esophageal dysmotility, denies change in bowel habits or Sx of IBD  Musculoskeletal:  refer to above HPI     Allergies   Allergen Reactions    Metronidazole Rash and Other (See Comments)     LIPS WERE TINGLING, tongue tingling, hives all over body    Other      Glue used to adhere recent surgical incision       Past Medical History:        Diagnosis Date    Anxiety     Arthritis     RA    Cellulitis of left lower extremity     left ankle    Depression     WELL CONTROLLED 10-16-17    Heart rate fast     intermittent    Kidney stone     Migraine     Motor tic disorder     Obstructive sleep apnea, adult        Past Surgical History:        Procedure Laterality Date    ABDOMINAL EXPLORATION SURGERY  07/22/2011    excision Meckels diverticulum    ABDOMINOPLASTY  04/14/2014    Pamabiola Barrientos ADENOIDECTOMY Bilateral     APPENDECTOMY  07/22/2011    BACK SURGERY      CARPAL TUNNEL RELEASE      HYSTERECTOMY, TOTAL ABDOMINAL  06/12/2018    robotic total hyst BSO, Dr Rico Ash    LITHOTRIPSY  x2    LUMBAR LAMINECTOMY  06/2013    Rad; left l5-s1    MECKEL DIVERTICULUM EXCISION      OTHER SURGICAL HISTORY      hymenoplasty    OVARIAN CYST REMOVAL  04/13/2018    OPERATIVE LAPAROSCOPY, LEFT OVARIAN CYSTECTOMY WITH DILATATION AND CURETTAGE    OVARY REMOVAL      TONSILLECTOMY Bilateral     URETHRAL STRICTURE DILATATION      WISDOM TOOTH EXTRACTION         Medications:    Current Outpatient Medications   Medication Sig Dispense Refill    sulfaSALAzine (AZULFIDINE) 500 MG tablet TAKE 2 TABLETS BY MOUTH TWICE DAILY 320 tablet 0    hydroxychloroquine (PLAQUENIL) 200 MG tablet Take 1 tablet by mouth 2 times daily 180 tablet 0    Adalimumab (HUMIRA PEN) 40 MG/0.4ML PNKT Inject 40 mg into the skin every 14 days Citrate free. 6 each 0    diclofenac (VOLTAREN) 50 MG EC tablet Take 1 tablet by mouth 3 times daily as needed for Pain 270 tablet 0    predniSONE (DELTASONE) 10 MG tablet Take 2 tablets by mouth daily for 10 days, THEN 1 tablet daily for 10 days, THEN 0.5 tablets daily for 10 days.  35 tablet 1    cetirizine (ZYRTEC) 10 MG tablet TAKE 1 TABLET BY MOUTH DAILY 30 tablet 11    desvenlafaxine succinate (PRISTIQ) 100 MG TB24 extended release tablet TAKE 1 TABLET BY MOUTH DAILY 90 tablet 1    pantoprazole (PROTONIX) 20 MG tablet Take 20 mg by mouth daily      rOPINIRole (REQUIP) 1 MG tablet TAKE ONE TABLET BY MOUTH DAILY AS NEEDED AND TAKE ONE TO TWO TABLETS BY MOUTH EVERY NIGHT AT BEDTIME TWO HOURS BEFORE BEDTIME 90 tablet 2    busPIRone (BUSPAR) 5 MG tablet TAKE ONE TABLET BY MOUTH THREE TIMES A DAY AS NEEDED FOR ANXIETY 90 tablet 3    estradiol (ESTRACE) 2 MG tablet Take 2 mg by mouth daily      Biotin 1000 MCG TABS Take by mouth      fluticasone (FLONASE) 50 MCG/ACT nasal spray SPRAY TWO SPRAYS IN EACH NOSTRIL ONCE DAILY 1 Bottle 4    albuterol sulfate HFA (PROVENTIL HFA) 108 (90 Base) MCG/ACT inhaler Inhale 2 puffs into the lungs every 6 hours as needed for Wheezing 1 Inhaler 3    Cholecalciferol (VITAMIN D3) 50 MCG (2000 UT) CAPS Take 2,000 capsules by mouth daily      acetaminophen (TYLENOL) 500 MG tablet Take 500 mg by mouth every 6 hours as needed for Pain      SUMAtriptan (IMITREX) 100 MG tablet Take 100 mg by mouth daily as needed       No current facility-administered medications for this visit.         OBJECTIVE:  Physical Exam:  /82   Pulse 96   Ht 5' 5\" (1.651 m)   Wt 237 lb (107.5 kg)   LMP 03/13/2018 (Exact Date)   SpO2 97%   BMI 39.44 kg/m²     GEN: AAOx3, in NAD, well-appearing  HEAD: normocephalic, atraumatic  EYES: no injection or icterus  CVS: RRR  LUNGS: in no acute respiratory distress, CTAB  MSK:  Upper extremities:              Hands: R MCP joints puffy and TTP, L MCP joints w/o swelling NTTP, b/l PIP joints w/ synovitis TTP (R>L), DIP joints boggy R DIP joints TTP, full fist formation w/ fair  strength              Wrist: R wrist w/ active synovitis TTP, L wrist joint w/o significant swelling TTP, good flexion/extension              Elbow: no synovitis or bursitis, FROM  Lower extremities:              Knees: no joint effusion or warmth, NTTP, good ROM              Ankles: no synovitis, good ROM              Feet: no toe swelling or pain or warmth on palpation w/ FROM, negative MTP squeeze test  INTEGUMENT: no rash or psoriatic lesions, no petechiae, bruises, or palpable purpura, no patchy alopecia, no nail or periungual changes, no clubbing or digital ulcers    DATA:  Labs: I personally reviewed interval labs and discussed w/ the pt in detail which showed:    Lab Results   Component Value Date    WBC 8.8 08/17/2021    HGB 12.8 08/17/2021    HCT 37.9 08/17/2021    MCV 91.8 08/17/2021     08/17/2021    LYMPHOPCT 36.3 08/17/2021    RBC 4.13 08/17/2021    MCH 30.9 08/17/2021    MCHC 33.6 08/17/2021    RDW 13.7 08/17/2021       Chemistry        Component Value Date/Time     01/06/2021 1335    K 3.9 01/06/2021 1335    CL 99 01/06/2021 1335    CO2 26 01/06/2021 1335    BUN 14 01/06/2021 1335    CREATININE 0.8 08/17/2021 0756        Component Value Date/Time    CALCIUM 8.6 01/06/2021 1335    ALKPHOS 81 01/06/2021 1335    AST 15 08/17/2021 0756    ALT 11 08/17/2021 0756    BILITOT 0.3 01/06/2021 1335        Lab Results   Component Value Date    CRP 9.0 (H) 08/17/2021    CRP 14.8 (H) 05/06/2021    CRP 11.8 (H) 11/05/2020     Lab Results   Component Value Date    SEDRATE 11 11/06/2019    SEDRATE 13 11/10/2011     No results found for: Josh [address] 26.  Lab Results   Component Value Date    VITD25 36.6 08/17/2021     Negative OFE x 2 (11/10/11, 7/16/20)  Negative RF x 2 (11/10/11, 11/6/19)  Negative CCP (11/6/19)  Negative HLA B27 (11/6/19)  Negative hepatitis B and C serologies, HIV screen (11/6/19)    Imaging:  I personally reviewed interval imaging and discussed w/ the pt in detail which included:    MRI L-spine (5/29/13):   IMPRESSION:   Moderate size central and L paracentral disc herniation L5-S1 primarily affecting the left S1 nerve root.   Broad-based disc protrusion centrally L4-L5.      X-rays (11/7/19): Bilateral hands:   No significant plain film abnormality.  No erosion, chondrocalcinosis or fracture.      Bilateral knees: Moderate degenerative changes about the patellofemoral space bilaterally with lateral subluxation of the patella bilaterally.      Bilateral feet:   No significant plain film abnormality.      Lumbar spine and sacroiliac joints:   No ankylosis or erosion is appreciated. Mild degenerative changes about the right sacroiliac joint.     I independently reviewed above X-rays, L-spine w/o evidence of SpA, hand joints w/o evidence of chronic inflammatory arthritis/erosive changes.      MRI pelvis (11/20/19):  1. Trace amount of nonspecific fluid in the left sacroiliac joint.  No additional changes to suggest inflammatory arthropathy. 2. Minimal right sacroiliac degenerative change. 3. No abnormality in the hip joints. 4. Mild bilateral gluteus minimus tendinosis and proximal hamstring tendinosis. 5. Mild degenerative changes at L4-5 and L5-S1.      I discussed her MRI findings w/ the reading radiologist on 12/2/19, she has mild degenerative arthritis in her sacroiliac joints but nothing inflammatory in nature.     Above results were discussed w/ the pt in detail during today's visit. ASSESSMENT/PLAN:   1. Seronegative rheumatoid arthritis ([address]  Assessment & Plan:  Remains active on HCQ and SSZ. Discussed adding MTX vs biologic DMARD. Pt preferred to try Humira, will start PA now. Low dose Prednisone taper for PRN use. Offered to inject knee joints if knee pain does not improve on NSAID and Prednisone taper. Orders:  -     sulfaSALAzine (AZULFIDINE) 500 MG tablet; TAKE 2 TABLETS BY MOUTH TWICE DAILY, Disp-320 tablet, R-0ZERO refills remain on this prescription. Your patient is requesting advance approval of refills for this medication to [address] [place] [address] Rd  -     hydroxychloroquine (PLAQUENIL) 200 MG tablet;  Take 1 tablet by mouth 2 times daily, Disp-180 tablet, R-0Normal  -     Adalimumab (HUMIRA PEN) 40 MG/0.4ML PNKT; Inject 40 mg into the skin every 14 days Citrate free., Disp-6 each, R-0Normal  -     diclofenac (VOLTAREN) 50 MG EC tablet; Take 1 tablet by mouth 3 times daily as needed for Pain, Disp-270 tablet, R-0Normal  -     C-Reactive Protein; Future  -     predniSONE (DELTASONE) 10 MG tablet; Take 2 tablets by mouth daily for 10 days, THEN 1 tablet daily for 10 days, THEN 0.5 tablets daily for 10 days. , Disp-35 tablet, R-1Normal  2. Spondylosis of lumbar region without myelopathy or radiculopathy  Assessment & Plan:  She will switch Naproxen to Diclofenac 50 mg TID PRN. Orders:  -     diclofenac (VOLTAREN) 50 MG EC tablet; Take 1 tablet by mouth 3 times daily as needed for Pain, Disp-270 tablet, R-0Normal  3. High risk medication use  Assessment & Plan:  Discussed s/e and risks of Humira and provided pt handout. Discussed her immunosuppressed state, discussed indications to hold her SSZ and Humira. Pt stated she has received the COVID-19 vaccine. Recommended the booster vaccine and the annual influenza vaccine. Orders:  -     AST; Future  -     ALT; Future  -     CBC Auto Differential; Future  -     Creatinine, Serum; Future  -     Quantiferon, Incubated; Future  4. Encounter for therapeutic drug monitoring  Assessment & Plan:  CMP ok to cont chronic NSAID    Orders:  -     AST; Future  -     ALT; Future  -     Creatinine, Serum; Future    Return in about 2 months (around 12/14/2021) for lab result discussion and treatment plan, medication monitoring. The risks and benefits of my recommendations, as well as other treatment options, benefits and side effects were discussed with the patient today. Questions were answered. NOTE: This report is transcribed by using voice recognition software dragon. Every effort is made to ensure accuracy; however, inadvertent computerized  transcription errors may be present.

## 2021-10-14 NOTE — ASSESSMENT & PLAN NOTE
Discussed s/e and risks of Humira and provided pt handout. Discussed her immunosuppressed state, discussed indications to hold her SSZ and Humira. Pt stated she has received the COVID-19 vaccine. Recommended the booster vaccine and the annual influenza vaccine.

## 2021-10-15 ENCOUNTER — TELEPHONE (OUTPATIENT)
Dept: RHEUMATOLOGY | Age: 41
End: 2021-10-15

## 2021-10-15 DIAGNOSIS — M06.00 SERONEGATIVE RHEUMATOID ARTHRITIS (HCC): ICD-10-CM

## 2021-10-15 NOTE — TELEPHONE ENCOUNTER
Jose Bolanos to  transfer Rx PSR to Physician  [facility]      Authorization Status:APPROVAL  Authorization Number:[id number]  Approved : HUMIRA PEN 40 MG/0.4ML N  Date Span:10/18/2021 TO 10/18/2022

## 2021-10-21 RX ORDER — ADALIMUMAB 40MG/0.4ML
40 KIT SUBCUTANEOUS
Qty: 6 EACH | Refills: 0 | Status: SHIPPED | OUTPATIENT
Start: 2021-10-21 | End: 2021-12-30 | Stop reason: SDUPTHER

## 2021-10-21 NOTE — TELEPHONE ENCOUNTER
Lupe Bashir form for  DOROTHY Randhawa Pen- Approved needs to be sent to [address]  Medication and Pharm pending

## 2021-10-24 DIAGNOSIS — M06.00 SERONEGATIVE RHEUMATOID ARTHRITIS (HCC): ICD-10-CM

## 2021-10-25 RX ORDER — HYDROXYCHLOROQUINE SULFATE 200 MG/1
TABLET, FILM COATED ORAL
Qty: 180 TABLET | Refills: 0 | OUTPATIENT
Start: 2021-10-25

## 2021-10-25 NOTE — TELEPHONE ENCOUNTER
Last seen and lab TB done 10/14/21  Next visit 12/30/21    HCQ refilled on 10/14/21  Is Patient on Folic Acid

## 2021-10-26 RX ORDER — FOLIC ACID 1 MG/1
1 TABLET ORAL DAILY
Qty: 90 TABLET | Refills: 0 | OUTPATIENT
Start: 2021-10-26

## 2021-11-03 ENCOUNTER — OFFICE VISIT (OUTPATIENT)
Dept: FAMILY MEDICINE CLINIC | Age: 41
End: 2021-11-03
Payer: MEDICAID

## 2021-11-03 ENCOUNTER — HOSPITAL ENCOUNTER (OUTPATIENT)
Dept: GENERAL RADIOLOGY | Age: 41
Discharge: HOME | End: 2021-11-03
Payer: MEDICAID

## 2021-11-03 ENCOUNTER — HOSPITAL ENCOUNTER (OUTPATIENT)
Age: 41
Discharge: HOME | End: 2021-11-03
Payer: MEDICAID

## 2021-11-03 VITALS
OXYGEN SATURATION: 98 % | WEIGHT: 241 LBS | DIASTOLIC BLOOD PRESSURE: 88 MMHG | HEART RATE: 88 BPM | SYSTOLIC BLOOD PRESSURE: 132 MMHG | BODY MASS INDEX: 40.1 KG/M2

## 2021-11-03 DIAGNOSIS — M06.00 SERONEGATIVE RHEUMATOID ARTHRITIS (HCC): ICD-10-CM

## 2021-11-03 DIAGNOSIS — G25.81 RLS (RESTLESS LEGS SYNDROME): ICD-10-CM

## 2021-11-03 DIAGNOSIS — M25.571 ACUTE RIGHT ANKLE PAIN: ICD-10-CM

## 2021-11-03 DIAGNOSIS — M25.471 RIGHT ANKLE SWELLING: ICD-10-CM

## 2021-11-03 DIAGNOSIS — Z23 NEED FOR VACCINATION: ICD-10-CM
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
## 2021-11-03 PROCEDURE — G8482 FLU IMMUNIZE ORDER/ADMIN: HCPCS | Performed by: NURSE PRACTITIONER

## 2021-11-03 PROCEDURE — G8427 DOCREV CUR MEDS BY ELIG CLIN: HCPCS | Performed by: NURSE PRACTITIONER

## 2021-11-03 PROCEDURE — 99214 OFFICE O/P EST MOD 30 MIN: CPT | Performed by: NURSE PRACTITIONER

## 2021-11-03 PROCEDURE — 90674 CCIIV4 VAC NO PRSV 0.5 ML IM: CPT | Performed by: NURSE PRACTITIONER

## 2021-11-03 PROCEDURE — 90471 IMMUNIZATION ADMIN: CPT | Performed by: NURSE PRACTITIONER

## 2021-11-03 PROCEDURE — G8417 CALC BMI ABV UP PARAM F/U: HCPCS | Performed by: NURSE PRACTITIONER

## 2021-11-03 PROCEDURE — 1036F TOBACCO NON-USER: CPT | Performed by: NURSE PRACTITIONER

## 2021-11-03 PROCEDURE — 73610 X-RAY EXAM OF ANKLE: CPT

## 2021-11-03 RX ORDER — ROPINIROLE 3 MG/1
3 TABLET, FILM COATED ORAL NIGHTLY
Qty: 90 TABLET | Refills: 3 | Status: SHIPPED | OUTPATIENT
Start: 2021-11-03 | End: 2022-07-07

## 2021-11-03 RX ORDER — HYDROCODONE BITARTRATE AND ACETAMINOPHEN 5; 325 MG/1; MG/1
1 TABLET ORAL EVERY 6 HOURS PRN
Qty: 20 TABLET | Refills: 0 | Status: SHIPPED | OUTPATIENT
Start: 2021-11-03 | End: 2021-11-08

## 2021-11-03 ASSESSMENT — ENCOUNTER SYMPTOMS: NAUSEA: 0

## 2021-11-03 NOTE — PATIENT INSTRUCTIONS
Patient Education        Ankle Sprain: Rehab Exercises  Introduction  Here are some examples of exercises for you to try. The exercises may be suggested for a condition or for rehabilitation. Start each exercise slowly. Ease off the exercises if you start to have pain. You will be told when to start these exercises and which ones will work best for you. How to do the exercises  'Alphabet' exercise    1. Trace the alphabet with your toe. This helps your ankle move in all directions. Side-to-side knee swing exercise    1. Sit in a chair with your foot flat on the floor. 2. Slowly move your knee from side to side. Keep your foot pressed flat. 3. Continue this exercise for 2 to 3 minutes. Towel curl    1. While sitting, place your foot on a towel on the floor. Scrunch the towel toward you with your toes. 2. Then use your toes to push the towel away from you. 3. To make this exercise more challenging you can put something on the other end of the towel. A can of soup is about the right weight for this. Towel stretch    1. Sit with your legs extended and knees straight. 2. Place a towel around your foot just under the toes. 3. Hold each end of the towel in each hand, with your hands above your knees. 4. Pull back with the towel so that your foot stretches toward you. 5. Hold the position for at least 15 to 30 seconds. 6. Repeat 2 to 4 times a session. Do up to 5 sessions a day. Ankle eversion exercise    1. Start by sitting with your foot flat on the floor. Push your foot outward against a wall or a piece of furniture that doesn't move. Hold for about 6 seconds, and relax. Repeat 8 to 12 times. 2. After you feel comfortable with this, try using rubber tubing looped around the outside of your feet for resistance. Push your foot out to the side against the tubing, and then count to 10 as you slowly bring your foot back to the middle. Repeat 8 to 12 times. Isometric opposition exercises    1.  While sitting, hold on to a chair, counter, or wall. 2. Standing on the leg with your affected ankle, keep that knee straight. Try to balance on that leg for up to 30 seconds. Then rest for up to 10 seconds. 3. Repeat 6 to 8 times. 4. When you can balance on your affected leg for 30 seconds with your eyes open, try to balance on it with your eyes closed. 5. When you can do this exercise with your eyes closed for 30 seconds and with ease and no pain, try standing on a pillow or piece of foam, and repeat steps 1 through 4. Follow-up care is a key part of your treatment and safety. Be sure to make and go to all appointments, and call your doctor if you are having problems. It's also a good idea to know your test results and keep a list of the medicines you take. Where can you learn more? Go to [email or web address]://[email or web address].tvpepiceweb.[email or web address]. org and sign in to your [employer] account. Enter Helen Velazco in the [employer] box to learn more about \"Ankle Sprain: Rehab Exercises. \"     If you do not have an account, please click on the \"Sign Up Now\" link. Current as of: July 1, 2021               Content Version: 13.0  © 2006-2021 Healthwise, Incorporated. Care instructions adapted under license by [facility] ([facility]). If you have questions about a medical condition or this instruction, always ask your healthcare professional. [place] [address] any warranty or liability for your use of this information.

## 2021-11-03 NOTE — PROGRESS NOTES
SUBJECTIVE:  Pt is a of 39 y.o. female comes in today with   Chief Complaint   Patient presents with    Follow-Up from Hospital       Patient presenting today for evaluation of ED follow up. Dale Apley while at brother's wedding, down approx 4 steps. No LOC. Injured right ankle. ED eval while in [facility], Xray negative for fracture. Swelling improving. Increasing pain to medial aspect of ankle. ibu helps with pain during day, significant pain overnight- interfering with sleep. No longer using crutches, felt more unsteady. RSL: improving with Requip. Taking 3 nightly and sleep is uninterrupted. RA: will be starting Humira soon. Taking Plaquenil and sulfasalazine. States no improvement in pain. Managed by Dr Fauzia Jimenez. Prior to Visit Medications    Medication Sig Taking? Authorizing Provider   rOPINIRole (REQUIP) 3 MG tablet Take 1 tablet by mouth nightly Yes ANDREA Perez CNP   HYDROcodone-acetaminophen (NORCO) 5-325 MG per tablet Take 1 tablet by mouth every 6 hours as needed for Pain for up to 5 days. Intended supply: 5 days. Take lowest dose possible to manage pain Yes ANDREA Perez CNP   sulfaSALAzine (AZULFIDINE) 500 MG tablet TAKE 2 TABLETS BY MOUTH TWICE DAILY Yes Tristian Whitt MD   hydroxychloroquine (PLAQUENIL) 200 MG tablet Take 1 tablet by mouth 2 times daily Yes Tristian Whitt MD   diclofenac (VOLTAREN) 50 MG EC tablet Take 1 tablet by mouth 3 times daily as needed for Pain Yes Tristian Whitt MD   predniSONE (DELTASONE) 10 MG tablet Take 2 tablets by mouth daily for 10 days, THEN 1 tablet daily for 10 days, THEN 0.5 tablets daily for 10 days.  Yes Tristian Whitt MD   cetirizine (ZYRTEC) 10 MG tablet TAKE 1 TABLET BY MOUTH DAILY Yes ANDREA Perez CNP   desvenlafaxine succinate (PRISTIQ) 100 MG TB24 extended release tablet TAKE 1 TABLET BY MOUTH DAILY Yes ANDREA Perez CNP   pantoprazole (PROTONIX) 20 MG tablet Take 20 mg by mouth daily Yes Historical Provider, MD   busPIRone (BUSPAR) 5 MG tablet TAKE ONE TABLET BY MOUTH THREE TIMES A DAY AS NEEDED FOR ANXIETY Yes Tre Madden APRN - CNP   estradiol (ESTRACE) 2 MG tablet Take 2 mg by mouth daily Yes Historical Provider, MD   Biotin 1000 MCG TABS Take by mouth Yes Historical Provider, MD   fluticasone (FLONASE) 50 MCG/ACT nasal spray SPRAY TWO SPRAYS IN EACH NOSTRIL ONCE DAILY Yes Tre Madden APRN - CNP   albuterol sulfate HFA (PROVENTIL HFA) 108 (90 Base) MCG/ACT inhaler Inhale 2 puffs into the lungs every 6 hours as needed for Wheezing Yes Tre Madden APRN - CNP   Cholecalciferol (VITAMIN D3) 50 MCG (2000 UT) CAPS Take 2,000 capsules by mouth daily Yes Historical Provider, MD   acetaminophen (TYLENOL) 500 MG tablet Take 500 mg by mouth every 6 hours as needed for Pain Yes Historical Provider, MD   SUMAtriptan (IMITREX) 100 MG tablet Take 100 mg by mouth daily as needed Yes Historical Provider, MD   Adalimumab (HUMIRA PEN) 40 MG/0.4ML PNKT Inject 40 mg into the skin every 14 days Citrate free. Patient not taking: Reported on 11/3/2021  Agustina Aquino MD         Patient's allergies, past medical, surgical, social and family histories werereviewed and updated as appropriate. Review of Systems   Constitutional: Positive for fatigue (chronic). Eyes: Negative for visual disturbance. Gastrointestinal: Negative for nausea. Musculoskeletal: Positive for arthralgias (right ankle) and joint swelling (right ankle). Generalized joint pain     Skin: Negative. Neurological: Negative for dizziness, light-headedness, numbness and headaches. Physical Exam  Vitals reviewed. Constitutional:       Appearance: She is well-developed. She is obese. HENT:      Head: Normocephalic and atraumatic. Musculoskeletal:      Right ankle: Swelling present. No deformity or ecchymosis. Tenderness present over the lateral malleolus and medial malleolus. Decreased range of motion. Normal pulse. Right Achilles Tendon: Normal.      Left ankle: Normal.   Skin:     General: Skin is warm and dry. Neurological:      Mental Status: She is alert and oriented to person, place, and time. Psychiatric:         Behavior: Behavior normal.         Thought Content: Thought content normal.         Judgment: Judgment normal.       Vitals:    11/03/21 0944   BP: 132/88   Pulse: 88   SpO2: 98%   Weight: 241 lb (109.3 kg)             ASSESSMENT:  1. Fall down steps, subsequent encounter    2. Acute right ankle pain    3. Right ankle swelling    4. RLS (restless legs syndrome)    5. Seronegative rheumatoid arthritis ([facility]    6. Need for vaccination        PLAN:  1. Fall down steps, subsequent encounter  Unable to view ED records through [facility]    2. Acute right ankle pain  Worsening   -     XR ANKLE RIGHT (MIN 3 VIEWS); Future  -     HYDROcodone-acetaminophen (NORCO) 5-325 MG per tablet; Take 1 tablet by mouth every 6 hours as needed for Pain for up to 5 days. Intended supply: 5 days. Take lowest dose possible to manage pain  Continue air cast. Rest and elevate the affected painful area. Apply cold compresses intermittently as needed. As pain recedes, begin normal activities slowly as tolerated. Call if symptoms persist.  No inconsistencies with [facility]. Medication initiated after consulting with collaborating physician. 3. Right ankle swelling  Improving  Continue air cast  Continue rest, ice and elevation  -     XR ANKLE RIGHT (MIN 3 VIEWS); Future    3. RLS (restless legs syndrome)  improving  Continue -     rOPINIRole (REQUIP) 3 MG tablet; Take 1 tablet by mouth nightly    4. Seronegative rheumatoid arthritis ([facility]  Continue treatment per Dr Natalie Poole    5. Need for vaccination  -     INFLUENZA, MDCK QUADV, 2 YRS AND OLDER, IM, PF, PREFILL SYR OR SDV, 0.5ML (FLUCELVAX QUADV, PF)        See pt instructions  F/u 5-7 days if no improvement, sooner if symptomsworsen.   Discussed use, benefit, and side effects of prescribed medications. All patient questions answered. Pt voiced understanding.

## 2021-11-09 ENCOUNTER — PATIENT MESSAGE (OUTPATIENT)
Dept: FAMILY MEDICINE CLINIC | Age: 41
End: 2021-11-09

## 2021-11-09 DIAGNOSIS — M25.571 ACUTE RIGHT ANKLE PAIN: Primary | ICD-10-CM

## 2021-11-09 NOTE — TELEPHONE ENCOUNTER
From: Reanna Freeman  To: Linda Sevilla  Sent: 11/9/2021 6:33 AM EST  Subject: Non-Urgent Medical Question    Maia Alvarez,    The inside of my ankle is still hurting. Is that normal? It is worse when I put the air cast on. Not sure if I just need to give it more time or what.      Thanks,  Susan Gongora

## 2021-11-16 ENCOUNTER — OFFICE VISIT (OUTPATIENT)
Dept: ORTHOPEDIC SURGERY | Age: 41
End: 2021-11-16
Payer: MEDICAID

## 2021-11-16 VITALS — BODY MASS INDEX: 40.15 KG/M2 | WEIGHT: 241 LBS | HEIGHT: 65 IN

## 2021-11-16 DIAGNOSIS — S93.421A SPRAIN OF DELTOID LIGAMENT OF RIGHT ANKLE, INITIAL ENCOUNTER: ICD-10-CM

## 2021-11-16 DIAGNOSIS — G89.29 CHRONIC PAIN OF RIGHT ANKLE: Primary | ICD-10-CM

## 2021-11-16 DIAGNOSIS — M25.571 CHRONIC PAIN OF RIGHT ANKLE: Primary | ICD-10-CM

## 2021-11-16 PROCEDURE — G8417 CALC BMI ABV UP PARAM F/U: HCPCS | Performed by: NURSE PRACTITIONER

## 2021-11-16 PROCEDURE — G8482 FLU IMMUNIZE ORDER/ADMIN: HCPCS | Performed by: NURSE PRACTITIONER

## 2021-11-16 PROCEDURE — 99203 OFFICE O/P NEW LOW 30 MIN: CPT | Performed by: NURSE PRACTITIONER

## 2021-11-16 PROCEDURE — G8427 DOCREV CUR MEDS BY ELIG CLIN: HCPCS | Performed by: NURSE PRACTITIONER

## 2021-11-16 NOTE — PROGRESS NOTES
CHIEF COMPLAINT: Right ankle pain/ medial ankle deltoid sprain. DATE OF INJURY: 10/29/2021    HISTORY:  Ms. Pamela Davila 39 y.o.  female presents today for consultation request from ANDREA Reina CNP for evaluation of a right ankle injury which occurred when she was walking and tripped down some stairs when she was at a relatives wedding out of town. She developed increased swelling, denies bruising. She is complaining of medial ankle pain. She went to an out-of-town ER because of the pain. She was told that she has a sprain and no fracture was seen, Aircast was applied and asked to f/u with Orthopedics. She did follow-up with her primary care on 11/3/2021 who ordered x-rays which were also negative for fracture. Reports today mild to moderate pain. She rates her pain a 2/10 VAS. Pain increase with walking and decrease with rest and elevation. She stopped wearing the Aircast as she did not when he was putting pressure on the medial ankle. No numbness or tingling sensation. She states that she did have avulsion fractures the right ankle multiple times in the past.  She denies ankle instability. Denies smoking. She has RA and is on Humira.     Past Medical History:   Diagnosis Date    Anxiety     Arthritis     RA    Cellulitis of left lower extremity     left ankle    Depression     WELL CONTROLLED 10-16-17    Heart rate fast     intermittent    Kidney stone     Migraine     Motor tic disorder     Obstructive sleep apnea, adult         Past Surgical History:   Procedure Laterality Date    ABDOMINAL EXPLORATION SURGERY  07/22/2011    excision Meckels diverticulum    ABDOMINOPLASTY  04/14/2014    Lindsey Bass ADENOIDECTOMY Bilateral     APPENDECTOMY  07/22/2011    BACK SURGERY      CARPAL TUNNEL RELEASE      HYSTERECTOMY, TOTAL ABDOMINAL  06/12/2018    robotic total hyst [facility], Dr Almonte  LITHOTRIPSY  x2    LUMBAR LAMINECTOMY  06/2013    Rad; left l5-s1    MECKEL DIVERTICULUM EXCISION      OTHER SURGICAL HISTORY      hymenoplasty    OVARIAN CYST REMOVAL  04/13/2018    OPERATIVE LAPAROSCOPY, LEFT OVARIAN CYSTECTOMY WITH DILATATION AND CURETTAGE    OVARY REMOVAL      TONSILLECTOMY Bilateral     URETHRAL STRICTURE DILATATION      WISDOM TOOTH EXTRACTION          Social History     Socioeconomic History    Marital status:      Spouse name: Not on file    Number of children: Not on file    Years of education: Not on file    Highest education level: Not on file   Occupational History    Not on file   Tobacco Use    Smoking status: Never Smoker    Smokeless tobacco: Never Used   Vaping Use    Vaping Use: Never used   Substance and Sexual Activity    Alcohol use: No     Alcohol/week: 0.0 standard drinks     Comment: once a week    Drug use: No    Sexual activity: Not on file   Other Topics Concern    Not on file   Social History Narrative    Not on file     Social Determinants of Health     Financial Resource Strain: Medium Risk    Difficulty of Paying Living Expenses: Somewhat hard   Food Insecurity: No Food Insecurity    Worried About Running Out of Food in the Last Year: Never true    Arnie of Food in the Last Year: Never true   Transportation Needs:     Lack of Transportation (Medical): Not on file    Lack of Transportation (Non-Medical):  Not on file   Physical Activity:     Days of Exercise per Week: Not on file    Minutes of Exercise per Session: Not on file   Stress:     Feeling of Stress : Not on file   Social Connections:     Frequency of Communication with Friends and Family: Not on file    Frequency of Social Gatherings with Friends and Family: Not on file    Attends Christian Services: Not on file    Active Member of Clubs or Organizations: Not on file    Attends Club or Organization Meetings: Not on file    Marital Status: Not on file   Intimate Partner Violence:     Fear of Current or Ex-Partner: Not on file    Emotionally Abused: Not on file    Physically Abused: Not on file    Sexually Abused: Not on file   Housing Stability:     Unable to Pay for Housing in the Last Year: Not on file    Number of Places Lived in the Last Year: Not on file    Unstable Housing in the Last Year: Not on file        Family History   Problem Relation Age of Onset    Asthma Mother     High Blood Pressure Mother     Diabetes Father     Atrial Fibrillation Father     Alcohol Abuse Father     High Blood Pressure Father     Heart Disease Paternal Grandmother     Heart Attack Paternal Grandmother     Diabetes Paternal Grandmother     Stroke Paternal Grandfather     Stroke Maternal Grandfather     Atrial Fibrillation Maternal Grandmother     Diabetes Maternal Grandmother     Cancer Neg Hx         Pertinent items are noted in HPI  Review of systems reviewed from Patient History Form dated on 11/16/2021 and available in the patient's chart under the Media tab. PHYSICAL EXAM:  Ms. Nai Pisano is a very pleasant 39 y.o.  female who presents today in no acute distress, awake, alert, and oriented. She is well dressed, nourished and  groomed. Patient with normal affect. Height is  5' 5\" (1.651 m), weight is 241 lb (109.3 kg). Resting respiratory rate is 16. Examination of the gait, showed that the patient walks with no limp, WB right leg . Examination of both ankles showing a full range of motion of the right ankle compare to the other side because of pain. There is mild swelling that can be seen, no ecchymosis over lateral side of the right ankle. She has intact sensation and good pedal pulses. She has mild to moderate tenderness on deep palpation over the right ankle deltoid, as well as medial malleolus. She is nontender to palpation over the right ankle joint. The ankles are stable to drawer test bilaterally, equally.  Ankle reflex 1+ bilaterally. Negative Dejesus for Achilles rupture.     IMAGING:  Xray's dated 11/3/2021 from her PCP office, were reviewed, 3 views of the right ankle, and showed no acute fracture. Ankle mortise in anatomic position. Bony changes to the medial and lateral malleolus, consistent with her prior ankle avulsion fracture. Mild ankle arthritis. IMPRESSION: Right ankle deltoid sprain. PLAN:  I assured the patient that the xray is negative for acute fracture. The xray findings were reviewed with the patient and explained to her that the pain is 2ry to ankle sprain, and that it should continue to improve. She can be WBAT and avoid heavy impact acitivity and work on peroneal muscle strength. I would recommend that she discontinue her ankle brace at this point. I discussed with the patient that I think that she would really benefit from a course of physical therapy for further strengthening and stretching. An Rx for physical therapy was given to the patient. F/U in 4 weeks, MRI if needed. She does complain of intermittent left knee pain which she states she may have heard in the same injury. She states her left knee feels tight but denies instability. If her pain does not improve by her next follow-up, we will get x-rays of the left knee and will further evaluate at that time. Thank you very much for the kind consultation and allowing me to participate in this patient's care. I will continue to keep you apprised of her progress.         ANDREA Mitchell - CNP

## 2021-12-03 ENCOUNTER — TELEPHONE (OUTPATIENT)
Dept: RHEUMATOLOGY | Age: 41
End: 2021-12-03

## 2021-12-03 NOTE — TELEPHONE ENCOUNTER
Received notice from pharmacy stating pt filled oral Diclofenac and Naproxen during the same month. Please make sure she is only taking one NSAID.

## 2021-12-08 DIAGNOSIS — M06.00 SERONEGATIVE RHEUMATOID ARTHRITIS (HCC): ICD-10-CM

## 2021-12-08 RX ORDER — SULFASALAZINE 500 MG/1
TABLET ORAL
Qty: 320 TABLET | Refills: 0 | OUTPATIENT
Start: 2021-12-08

## 2021-12-09 ENCOUNTER — HOSPITAL ENCOUNTER (OUTPATIENT)
Age: 41
Discharge: HOME | End: 2021-12-09
Payer: MEDICAID

## 2021-12-09 DIAGNOSIS — Z51.81 ENCOUNTER FOR THERAPEUTIC DRUG MONITORING: ICD-10-CM

## 2021-12-09 DIAGNOSIS — M06.00 SERONEGATIVE RHEUMATOID ARTHRITIS (HCC): ICD-10-CM

## 2021-12-09 DIAGNOSIS — Z79.899 HIGH RISK MEDICATION USE: ICD-10-CM

## 2021-12-09 LAB
ALT SERPL-CCNC: 16 U/L (ref 10–40)
AST SERPL-CCNC: 20 U/L (ref 15–37)
BASOPHILS ABSOLUTE: 0 K/UL (ref 0–0.2)
BASOPHILS RELATIVE PERCENT: 0.2 %
C-REACTIVE PROTEIN: 8.3 MG/L (ref 0–5.1)
CREAT SERPL-MCNC: 0.7 MG/DL (ref 0.6–1.1)
EOSINOPHILS ABSOLUTE: 0.1 K/UL (ref 0–0.6)
EOSINOPHILS RELATIVE PERCENT: 0.9 %
GFR AFRICAN AMERICAN: >60
GFR NON-AFRICAN AMERICAN: >60
HCT VFR BLD CALC: 40.6 % (ref 36–48)
HEMOGLOBIN: 13 G/DL (ref 12–16)
LYMPHOCYTES ABSOLUTE: 3 K/UL (ref 1–5.1)
LYMPHOCYTES RELATIVE PERCENT: 48 %
MAGNESIUM: 2 MG/DL (ref 1.8–2.4)
MCH RBC QN AUTO: 28.9 PG (ref 26–34)
MCHC RBC AUTO-ENTMCNC: 31.9 G/DL (ref 31–36)
MCV RBC AUTO: 90.5 FL (ref 80–100)
MONOCYTES ABSOLUTE: 0.6 K/UL (ref 0–1.3)
MONOCYTES RELATIVE PERCENT: 10 %
NEUTROPHILS ABSOLUTE: 2.5 K/UL (ref 1.7–7.7)
NEUTROPHILS RELATIVE PERCENT: 40.9 %
PDW BLD-RTO: 14.4 % (ref 12.4–15.4)
PLATELET # BLD: 216 K/UL (ref 135–450)
PMV BLD AUTO: 9.1 FL (ref 5–10.5)
RBC # BLD: 4.49 M/UL (ref 4–5.2)
WBC # BLD: 6.2 K/UL (ref 4–11)

## 2021-12-09 PROCEDURE — 82565 ASSAY OF CREATININE: CPT

## 2021-12-09 PROCEDURE — 86140 C-REACTIVE PROTEIN: CPT

## 2021-12-09 PROCEDURE — 83735 ASSAY OF MAGNESIUM: CPT

## 2021-12-09 PROCEDURE — 36415 COLL VENOUS BLD VENIPUNCTURE: CPT

## 2021-12-09 PROCEDURE — 85025 COMPLETE CBC W/AUTO DIFF WBC: CPT

## 2021-12-09 PROCEDURE — 84450 TRANSFERASE (AST) (SGOT): CPT

## 2021-12-09 PROCEDURE — 84460 ALANINE AMINO (ALT) (SGPT): CPT

## 2021-12-10 DIAGNOSIS — M06.00 SERONEGATIVE RHEUMATOID ARTHRITIS (HCC): ICD-10-CM

## 2021-12-10 RX ORDER — SULFASALAZINE 500 MG/1
1000 TABLET ORAL 2 TIMES DAILY
Qty: 120 TABLET | Refills: 0 | Status: SHIPPED | OUTPATIENT
Start: 2021-12-10 | End: 2021-12-30 | Stop reason: SDUPTHER

## 2021-12-27 NOTE — PROGRESS NOTES
Brad Hancock MD  [facility]) Physicians - Rheumatology    [] [facility]:  [facility]  [address] [facility] [x] [place] Office:  [address]   Office: [phone]  Fax: [phone]     RHEUMATOLOGY PROGRESS NOTE    SUBJECTIVE:    Background:   Idnigo Salgado is a 39 y.o. female w/ seronegative RA and degenerative arthritis s/p lumbar laminectomy in 2013.     PMHx pertinent for EMELINA on CPAP, RLS, depression, anxiety.     Current rheum meds:   mg BID: started in 7/2021  SSZ 1000 mg BID: started in 5/2020  Humira 40 mg SC Q2wk: started in 11/2021  Diclofenac 50 mg TID PRN     Prior rheum meds:  Ibuprofen: ineffective  Naproxen 500 mg BID    Past medical/surgical history, medications and allergies are reviewed and updated as appropriate. Interval Hx:   Pt states she's had 3 Humira injections. She remains on HCQ and SSZ. She reports improvement in her chronic arthralgias and swelling in her hand and wrist joints. She reports persistent pain in her LBP especially after moving boxes recently. She has a family friend who was recently diagnosed with cancer and had been treated with Humira, she is concerned about her cancer risk on Humira.     Rheumatologic ROS:  Constitutional: denies chronic fatigue, fever/chills, night sweats, unintentional weight loss  Integumentary: +chronic mild hair thinning, denies photosensitivity, patchy alopecia, or Sx of Raynaud's phenomenon  Eyes: denies dry eyes, redness or pain, visual disturbance, or floaters  Nose: denies nasal ulcers or recurrent sinusitis  Oral cavity: denies dry mouth or oral ulcers  Cardiovascular: denies CP, palpitations, Hx of pericardial effusion or pericarditis  Respiratory: denies SOB, cough, hemoptysis, or pleurisy  Gastrointestinal: denies heart burn, dysphagia or esophageal dysmotility, denies change in bowel habits or Sx of IBD  Musculoskeletal:  refer to above HPI     Allergies   Allergen Reactions    Metronidazole Rash and Other (See Comments)     LIPS WERE TINGLING, tongue tingling, hives all over body    Other      Glue used to adhere recent surgical incision       Past Medical History:        Diagnosis Date    Anxiety     Arthritis     RA    Cellulitis of left lower extremity     left ankle    Depression     WELL CONTROLLED 10-16-17    Heart rate fast     intermittent    Kidney stone     Migraine     Motor tic disorder     Obstructive sleep apnea, adult        Past Surgical History:        Procedure Laterality Date    ABDOMINAL EXPLORATION SURGERY  07/22/2011    excision Meckels diverticulum    ABDOMINOPLASTY  04/14/2014    Herbie Boateng ADENOIDECTOMY Bilateral     APPENDECTOMY  07/22/2011    BACK SURGERY      CARPAL TUNNEL RELEASE      HYSTERECTOMY, TOTAL ABDOMINAL  06/12/2018    robotic total hyst [facility], Dr Val Partida    LITHOTRIPSY  x2    LUMBAR LAMINECTOMY  06/2013    Rad; left l5-s1    MECKEL DIVERTICULUM EXCISION      OTHER SURGICAL HISTORY      hymenoplasty    OVARIAN CYST REMOVAL  04/13/2018    OPERATIVE LAPAROSCOPY, LEFT OVARIAN CYSTECTOMY WITH DILATATION AND CURETTAGE    OVARY REMOVAL      TONSILLECTOMY Bilateral     URETHRAL STRICTURE DILATATION      WISDOM TOOTH EXTRACTION         Medications:    Current Outpatient Medications   Medication Sig Dispense Refill    sulfaSALAzine (AZULFIDINE) 500 MG tablet Take 2 tablets by mouth 2 times daily 360 tablet 0    hydroxychloroquine (PLAQUENIL) 200 MG tablet Take 1 tablet by mouth 2 times daily 180 tablet 0    diclofenac (VOLTAREN) 50 MG EC tablet Take 1 tablet by mouth 3 times daily as needed for Pain 270 tablet 0    Adalimumab (HUMIRA PEN) 40 MG/0.4ML PNKT Inject 40 mg into the skin every 14 days Citrate free. 6 each 0    predniSONE (DELTASONE) 10 MG tablet Take 2 tablets by mouth daily for 10 days, THEN 1 tablet daily for 10 days, THEN 0.5 tablets daily for 10 days.  35 tablet 2    rOPINIRole (REQUIP) 3 MG tablet Take 1 tablet by mouth nightly 90 tablet 3    cetirizine (ZYRTEC) 10 MG tablet TAKE 1 TABLET BY MOUTH DAILY 30 tablet 11    desvenlafaxine succinate (PRISTIQ) 100 MG TB24 extended release tablet TAKE 1 TABLET BY MOUTH DAILY 90 tablet 1    pantoprazole (PROTONIX) 20 MG tablet Take 20 mg by mouth daily      busPIRone (BUSPAR) 5 MG tablet TAKE ONE TABLET BY MOUTH THREE TIMES A DAY AS NEEDED FOR ANXIETY 90 tablet 3    estradiol (ESTRACE) 2 MG tablet Take 2 mg by mouth daily      Biotin 1000 MCG TABS Take by mouth      fluticasone (FLONASE) 50 MCG/ACT nasal spray SPRAY TWO SPRAYS IN EACH NOSTRIL ONCE DAILY 1 Bottle 4    albuterol sulfate HFA (PROVENTIL HFA) 108 (90 Base) MCG/ACT inhaler Inhale 2 puffs into the lungs every 6 hours as needed for Wheezing 1 Inhaler 3    Cholecalciferol (VITAMIN D3) 50 MCG (2000 UT) CAPS Take 2,000 capsules by mouth daily      acetaminophen (TYLENOL) 500 MG tablet Take 500 mg by mouth every 6 hours as needed for Pain      SUMAtriptan (IMITREX) 100 MG tablet Take 100 mg by mouth daily as needed       No current facility-administered medications for this visit.         OBJECTIVE:  Physical Exam:  /78   Pulse 91   Ht 5' 5\" (1.651 m)   Wt 234 lb (106.1 kg)   LMP 03/13/2018 (Exact Date)   SpO2 96%   BMI 38.94 kg/m²     GEN: AAOx3, in NAD, well-appearing  HEAD: normocephalic, atraumatic  EYES: no injection or icterus  CVS: RRR  LUNGS: in no acute respiratory distress, CTAB  MSK:  Upper extremities:              Hands: prior synovitis in R MCP joints resolved NTTP, L MCP joints w/o swelling NTTP, b/l PIP joint synovitis improved slightly boggy R PIP joints slightly TTP, DIP joints w/o swelling NTTP, full fist formation w/ fair  strength              Wrist: prior synovitis in R wrist resolved NTTP, TTP, L wrist joint w/o swelling TTP, good flexion/extension              Elbow: no synovitis or bursitis, FROM  Lower extremities:              Knees: no joint effusion or warmth, NTTP, good ROM              Ankles: no synovitis, good ROM              Feet: no toe swelling or pain or warmth on palpation w/ FROM, negative MTP squeeze test  INTEGUMENT: no rash or psoriatic lesions, no petechiae, bruises, or palpable purpura, no patchy alopecia, no nail or periungual changes, no clubbing or digital ulcers    DATA:  Labs: I personally reviewed interval labs and discussed w/ the pt in detail which showed:    Lab Results   Component Value Date    WBC 6.2 12/09/2021    HGB 13.0 12/09/2021    HCT 40.6 12/09/2021    MCV 90.5 12/09/2021     12/09/2021    LYMPHOPCT 48.0 12/09/2021    RBC 4.49 12/09/2021    MCH 28.9 12/09/2021    MCHC 31.9 12/09/2021    RDW 14.4 12/09/2021     Lab Results   Component Value Date     01/06/2021    K 3.9 01/06/2021    CL 99 01/06/2021    CO2 26 01/06/2021    BUN 14 01/06/2021    CREATININE 0.7 12/09/2021    GLUCOSE 85 01/06/2021    CALCIUM 8.6 01/06/2021    PROT 7.2 01/06/2021    LABALBU 4.2 01/06/2021    BILITOT 0.3 01/06/2021    ALKPHOS 81 01/06/2021    AST 20 12/09/2021    ALT 16 12/09/2021    LABGLOM >60 12/09/2021    GFRAA >60 12/09/2021    AGRATIO 1.4 01/06/2021    GLOB 3.0 01/06/2021     Lab Results   Component Value Date    VITD25 36.6 08/17/2021     No results found for: C3     No results found for: C4     No results found for: ANTIDSDNAIGG     Lab Results   Component Value Date    CRP 8.3 (H) 12/09/2021    CRP 9.0 (H) 08/17/2021    CRP 14.8 (H) 05/06/2021    CRP 11.8 (H) 11/05/2020     Lab Results   Component Value Date    SEDRATE 11 11/06/2019    SEDRATE 13 11/10/2011     No results found for: LABURIC    Negative OFE x 2 (11/10/11, 7/16/20)  Negative RF x 2 (11/10/11, 11/6/19)  Negative CCP (11/6/19)  Negative HLA B27 (11/6/19)  Negative hepatitis B and C serologies, HIV screen (11/6/19)    Imaging:  I personally reviewed interval imaging and discussed w/ the pt in detail which included:    MRI L-spine (5/29/13):   IMPRESSION:   Moderate size central and L paracentral disc herniation L5-S1 primarily affecting the left S1 nerve root.   Broad-based disc protrusion centrally L4-L5.      X-rays (11/7/19): Bilateral hands:   No significant plain film abnormality.  No erosion, chondrocalcinosis or fracture.      Bilateral knees: Moderate degenerative changes about the patellofemoral space bilaterally with lateral subluxation of the patella bilaterally.      Bilateral feet:   No significant plain film abnormality.      Lumbar spine and sacroiliac joints:   No ankylosis or erosion is appreciated. Mild degenerative changes about the right sacroiliac joint.     I independently reviewed above X-rays, L-spine w/o evidence of SpA, hand joints w/o evidence of chronic inflammatory arthritis/erosive changes.      MRI pelvis (11/20/19):  1. Trace amount of nonspecific fluid in the left sacroiliac joint.  No additional changes to suggest inflammatory arthropathy. 2. Minimal right sacroiliac degenerative change. 3. No abnormality in the hip joints. 4. Mild bilateral gluteus minimus tendinosis and proximal hamstring tendinosis. 5. Mild degenerative changes at L4-5 and L5-S1.      I discussed her MRI findings w/ the reading radiologist on 12/2/19, she has mild degenerative arthritis in her sacroiliac joints but nothing inflammatory in nature.     Above results were discussed w/ the pt in detail during today's visit. ASSESSMENT/PLAN:   1. Seronegative rheumatoid arthritis ([facility] [address]  Assessment & Plan:  - inflammatory arthritis significantly improved since adding Humira. CRP down trending. She has only had 3 injections so far. Cont same dose of HCQ, SSZ and Humira. - she will take oral Diclofenac and low dose Prednisone PRN for RA flares. Orders:  -     sulfaSALAzine (AZULFIDINE) 500 MG tablet; Take 2 tablets by mouth 2 times daily, Disp-360 tablet, R-0Normal  -     hydroxychloroquine (PLAQUENIL) 200 MG tablet;  Take 1 tablet by mouth 2 times daily, Disp-180 tablet, R-0Normal  -     diclofenac (VOLTAREN) 50 MG EC tablet; Take 1 tablet by mouth 3 times daily as needed for Pain, Disp-270 tablet, R-0Normal  -     Adalimumab (HUMIRA PEN) 40 MG/0.4ML PNKT; Inject 40 mg into the skin every 14 days Citrate free., Disp-6 each, R-0Normal  -     C-Reactive Protein; Future  -     predniSONE (DELTASONE) 10 MG tablet; Take 2 tablets by mouth daily for 10 days, THEN 1 tablet daily for 10 days, THEN 0.5 tablets daily for 10 days. , Disp-35 tablet, R-2Normal  -     Uric Acid; Future  2. Spondylosis of lumbar region without myelopathy or radiculopathy  Assessment & Plan:  - cont Diclofenac PRN. Orders:  -     diclofenac (VOLTAREN) 50 MG EC tablet; Take 1 tablet by mouth 3 times daily as needed for Pain, Disp-270 tablet, R-0Normal  3. High risk medication use  Assessment & Plan:  - I counseled the pt on the malignancy risks a/w TNF-I and recommended annual skin exam, pt stated she already sees a dermatologist once a yr. Discussed her immunocompromised state on Humira and indications to hold Humira. Pt stated she has received the annual influenza vaccine and the booster vaccine for COVID-19.  - annual eye exam for HCQ toxicity monitoring. Orders:  -     Hepatic Function Panel; Future  -     CBC Auto Differential; Future  -     Creatinine, Serum; Future  4. Encounter for therapeutic drug monitoring  Assessment & Plan:  - renal function OK to cont NSAID. Orders:  -     Creatinine, Serum; Future    Return in about 3 months (around 3/30/2022) for lab result discussion and treatment plan, medication monitoring. The risks and benefits of my recommendations, as well as other treatment options, benefits and side effects were discussed with the patient today. Questions were answered. NOTE: This report is transcribed by using voice recognition software dragon. Every effort is made to ensure accuracy; however, inadvertent computerized  transcription errors may be present.

## 2021-12-30 ENCOUNTER — OFFICE VISIT (OUTPATIENT)
Dept: RHEUMATOLOGY | Age: 41
End: 2021-12-30
Payer: MEDICAID

## 2021-12-30 VITALS
OXYGEN SATURATION: 96 % | HEART RATE: 91 BPM | BODY MASS INDEX: 38.99 KG/M2 | DIASTOLIC BLOOD PRESSURE: 78 MMHG | SYSTOLIC BLOOD PRESSURE: 100 MMHG | WEIGHT: 234 LBS | HEIGHT: 65 IN

## 2021-12-30 DIAGNOSIS — Z79.899 HIGH RISK MEDICATION USE: ICD-10-CM

## 2021-12-30 DIAGNOSIS — M06.00 SERONEGATIVE RHEUMATOID ARTHRITIS (HCC): Primary | ICD-10-CM

## 2021-12-30 DIAGNOSIS — M47.816 SPONDYLOSIS OF LUMBAR REGION WITHOUT MYELOPATHY OR RADICULOPATHY: ICD-10-CM

## 2021-12-30 DIAGNOSIS — Z51.81 ENCOUNTER FOR THERAPEUTIC DRUG MONITORING: ICD-10-CM

## 2021-12-30 PROCEDURE — G8417 CALC BMI ABV UP PARAM F/U: HCPCS | Performed by: INTERNAL MEDICINE

## 2021-12-30 PROCEDURE — G8482 FLU IMMUNIZE ORDER/ADMIN: HCPCS | Performed by: INTERNAL MEDICINE

## 2021-12-30 PROCEDURE — 99215 OFFICE O/P EST HI 40 MIN: CPT | Performed by: INTERNAL MEDICINE

## 2021-12-30 PROCEDURE — 1036F TOBACCO NON-USER: CPT | Performed by: INTERNAL MEDICINE

## 2021-12-30 PROCEDURE — G8427 DOCREV CUR MEDS BY ELIG CLIN: HCPCS | Performed by: INTERNAL MEDICINE

## 2021-12-30 RX ORDER — PREDNISONE 10 MG/1
TABLET ORAL
Qty: 35 TABLET | Refills: 2 | Status: SHIPPED | OUTPATIENT
Start: 2021-12-30 | End: 2022-01-29

## 2021-12-30 RX ORDER — SULFASALAZINE 500 MG/1
1000 TABLET ORAL 2 TIMES DAILY
Qty: 360 TABLET | Refills: 0 | Status: SHIPPED | OUTPATIENT
Start: 2021-12-30 | End: 2022-03-23 | Stop reason: SDUPTHER

## 2021-12-30 RX ORDER — HYDROXYCHLOROQUINE SULFATE 200 MG/1
200 TABLET, FILM COATED ORAL 2 TIMES DAILY
Qty: 180 TABLET | Refills: 0 | Status: SHIPPED | OUTPATIENT
Start: 2021-12-30 | End: 2022-03-23 | Stop reason: SDUPTHER

## 2021-12-30 RX ORDER — ADALIMUMAB 40MG/0.4ML
40 KIT SUBCUTANEOUS
Qty: 6 EACH | Refills: 0 | Status: SHIPPED | OUTPATIENT
Start: 2021-12-30 | End: 2022-03-23

## 2021-12-30 NOTE — ASSESSMENT & PLAN NOTE
- inflammatory arthritis significantly improved since adding Humira. CRP down trending. She has only had 3 injections so far. Cont same dose of HCQ, SSZ and Humira. - she will take oral Diclofenac and low dose Prednisone PRN for RA flares.

## 2021-12-30 NOTE — ASSESSMENT & PLAN NOTE
- I counseled the pt on the malignancy risks a/w TNF-I and recommended annual skin exam, pt stated she already sees a dermatologist once a yr. Discussed her immunocompromised state on Humira and indications to hold Humira. Pt stated she has received the annual influenza vaccine and the booster vaccine for COVID-19.  - annual eye exam for HCQ toxicity monitoring.

## 2022-01-13 ENCOUNTER — VIRTUAL VISIT (OUTPATIENT)
Dept: FAMILY MEDICINE CLINIC | Age: 42
End: 2022-01-13
Payer: MEDICAID

## 2022-01-13 DIAGNOSIS — F90.2 ATTENTION DEFICIT HYPERACTIVITY DISORDER (ADHD), COMBINED TYPE: Primary | ICD-10-CM

## 2022-01-13 DIAGNOSIS — F41.8 DEPRESSION WITH ANXIETY: ICD-10-CM

## 2022-01-13 PROCEDURE — G8427 DOCREV CUR MEDS BY ELIG CLIN: HCPCS | Performed by: NURSE PRACTITIONER

## 2022-01-13 PROCEDURE — 99213 OFFICE O/P EST LOW 20 MIN: CPT | Performed by: NURSE PRACTITIONER

## 2022-01-13 RX ORDER — DEXMETHYLPHENIDATE HYDROCHLORIDE 5 MG/1
5 CAPSULE, EXTENDED RELEASE ORAL DAILY
Qty: 30 CAPSULE | Refills: 0 | Status: SHIPPED | OUTPATIENT
Start: 2022-01-13 | End: 2022-04-05

## 2022-01-13 ASSESSMENT — PATIENT HEALTH QUESTIONNAIRE - PHQ9
SUM OF ALL RESPONSES TO PHQ9 QUESTIONS 1 & 2: 0
6. FEELING BAD ABOUT YOURSELF - OR THAT YOU ARE A FAILURE OR HAVE LET YOURSELF OR YOUR FAMILY DOWN: 1
3. TROUBLE FALLING OR STAYING ASLEEP: 0
1. LITTLE INTEREST OR PLEASURE IN DOING THINGS: 0
2. FEELING DOWN, DEPRESSED OR HOPELESS: 0
5. POOR APPETITE OR OVEREATING: 0
SUM OF ALL RESPONSES TO PHQ QUESTIONS 1-9: 4
8. MOVING OR SPEAKING SO SLOWLY THAT OTHER PEOPLE COULD HAVE NOTICED. OR THE OPPOSITE, BEING SO FIGETY OR RESTLESS THAT YOU HAVE BEEN MOVING AROUND A LOT MORE THAN USUAL: 0
10. IF YOU CHECKED OFF ANY PROBLEMS, HOW DIFFICULT HAVE THESE PROBLEMS MADE IT FOR YOU TO DO YOUR WORK, TAKE CARE OF THINGS AT HOME, OR GET ALONG WITH OTHER PEOPLE: 0
SUM OF ALL RESPONSES TO PHQ QUESTIONS 1-9: 4
4. FEELING TIRED OR HAVING LITTLE ENERGY: 0
9. THOUGHTS THAT YOU WOULD BE BETTER OFF DEAD, OR OF HURTING YOURSELF: 0
7. TROUBLE CONCENTRATING ON THINGS, SUCH AS READING THE NEWSPAPER OR WATCHING TELEVISION: 3

## 2022-01-13 ASSESSMENT — ENCOUNTER SYMPTOMS: SHORTNESS OF BREATH: 0

## 2022-01-13 NOTE — Clinical Note
Started pt on Focalin for newly diagnosed ADHD. No inconsistencies with [place]. Medication initiated after consulting with collaborating physician.

## 2022-01-25 DIAGNOSIS — F41.8 DEPRESSION WITH ANXIETY: ICD-10-CM

## 2022-01-25 RX ORDER — DESVENLAFAXINE 100 MG/1
100 TABLET, EXTENDED RELEASE ORAL DAILY
Qty: 90 TABLET | Refills: 1 | Status: SHIPPED | OUTPATIENT
Start: 2022-01-25 | End: 2022-07-07

## 2022-01-25 NOTE — TELEPHONE ENCOUNTER
Medication:   Requested Prescriptions     Pending Prescriptions Disp Refills    desvenlafaxine succinate (PRISTIQ) 100 MG TB24 extended release tablet [Pharmacy Med Name: DESVENLAFAXINE ER SUCCINATE 100MG T] 90 tablet 1     Sig: TAKE 1 TABLET BY MOUTH DAILY        Last Filled:  8/3/21 with 1 refill    Patient Phone Number: [phone] (home)     Last appt: 1/13/2022   Next appt: Visit date not found    Last [place]:   RX Monitoring 1/16/2018   Attestation The Prescription Monitoring Report for this patient was reviewed today. Periodic Controlled Substance Monitoring Possible medication side effects, risk of tolerance and/or dependence, and alternative treatments discussed. ;No signs of potential drug abuse or diversion identified.

## 2022-03-09 ENCOUNTER — HOSPITAL ENCOUNTER (OUTPATIENT)
Age: 42
Discharge: HOME | End: 2022-03-09
Payer: MEDICAID

## 2022-03-09 DIAGNOSIS — Z51.81 ENCOUNTER FOR THERAPEUTIC DRUG MONITORING: ICD-10-CM

## 2022-03-09 DIAGNOSIS — M06.00 SERONEGATIVE RHEUMATOID ARTHRITIS (HCC): ICD-10-CM

## 2022-03-09 DIAGNOSIS — Z79.899 HIGH RISK MEDICATION USE: ICD-10-CM

## 2022-03-09 LAB
ALBUMIN SERPL-MCNC: 4.7 G/DL (ref 3.4–5)
ALP BLD-CCNC: 79 U/L (ref 40–129)
ALT SERPL-CCNC: 14 U/L (ref 10–40)
AST SERPL-CCNC: 14 U/L (ref 15–37)
BASOPHILS ABSOLUTE: 0 K/UL (ref 0–0.2)
BASOPHILS RELATIVE PERCENT: 0.3 %
BILIRUB SERPL-MCNC: <0.2 MG/DL (ref 0–1)
BILIRUBIN DIRECT: <0.2 MG/DL (ref 0–0.3)
BILIRUBIN, INDIRECT: ABNORMAL MG/DL (ref 0–1)
C-REACTIVE PROTEIN: 9.9 MG/L (ref 0–5.1)
CREAT SERPL-MCNC: 0.7 MG/DL (ref 0.6–1.1)
EOSINOPHILS ABSOLUTE: 0.1 K/UL (ref 0–0.6)
EOSINOPHILS RELATIVE PERCENT: 2.2 %
GFR AFRICAN AMERICAN: >60
GFR NON-AFRICAN AMERICAN: >60
HCT VFR BLD CALC: 39.1 % (ref 36–48)
HEMOGLOBIN: 13 G/DL (ref 12–16)
LYMPHOCYTES ABSOLUTE: 1.9 K/UL (ref 1–5.1)
LYMPHOCYTES RELATIVE PERCENT: 39.5 %
MCH RBC QN AUTO: 30.6 PG (ref 26–34)
MCHC RBC AUTO-ENTMCNC: 33.3 G/DL (ref 31–36)
MCV RBC AUTO: 91.9 FL (ref 80–100)
MONOCYTES ABSOLUTE: 0.3 K/UL (ref 0–1.3)
MONOCYTES RELATIVE PERCENT: 7.1 %
NEUTROPHILS ABSOLUTE: 2.5 K/UL (ref 1.7–7.7)
NEUTROPHILS RELATIVE PERCENT: 50.9 %
PDW BLD-RTO: 13.7 % (ref 12.4–15.4)
PLATELET # BLD: 189 K/UL (ref 135–450)
PMV BLD AUTO: 9.4 FL (ref 5–10.5)
RBC # BLD: 4.25 M/UL (ref 4–5.2)
TOTAL PROTEIN: 7 G/DL (ref 6.4–8.2)
WBC # BLD: 4.9 K/UL (ref 4–11)

## 2022-03-09 PROCEDURE — 86140 C-REACTIVE PROTEIN: CPT

## 2022-03-09 PROCEDURE — 80076 HEPATIC FUNCTION PANEL: CPT

## 2022-03-09 PROCEDURE — 36415 COLL VENOUS BLD VENIPUNCTURE: CPT

## 2022-03-09 PROCEDURE — 85025 COMPLETE CBC W/AUTO DIFF WBC: CPT

## 2022-03-09 PROCEDURE — 82565 ASSAY OF CREATININE: CPT

## 2022-03-18 NOTE — PROGRESS NOTES
Jesika Merino MD  [facility]) Physicians - Rheumatology    [x] [facility]:  [facility]  [address] [facility] [] [facility]:  [address]   Office: [phone]  Fax: [phone]     RHEUMATOLOGY PROGRESS NOTE    ASSESSMENT/PLAN:  David Banda is a 39 y.o. female w/ seronegative RA and degenerative arthritis s/p lumbar laminectomy in 2013.      PMHx pertinent for EMELINA on CPAP, RLS, depression, anxiety.     Current rheum meds:   mg BID: started in 7/2021  SSZ 1000 mg BID: started in 5/2020  Humira 40 mg SC Q2wk: started in 11/2021  Diclofenac 50 mg TID PRN     Prior rheum meds:  Ibuprofen: ineffective  Naproxen 500 mg BID    1. Seronegative rheumatoid arthritis ([address]  Assessment & Plan:  - objectively, her inflammatory arthritis has significantly improved since adding Humira. However pt cont to have mild bogginess on exam and CRP remains mildly elevated. - switch Humira to Enbrel, start PA for Enbrel now. - cont same dose HCQ and SSZ. - stop Diclofenac d/t elevated BP. She will monitor BP off Diclofenac. If BP normalizes off Diclofenac, she will try Meloxicam and take this sparingly on PRN basis for arthralgias. Orders:  -     sulfaSALAzine (AZULFIDINE) 500 MG tablet; Take 2 tablets by mouth 2 times daily, Disp-360 tablet, R-0Normal  -     hydroxychloroquine (PLAQUENIL) 200 MG tablet; Take 1 tablet by mouth 2 times daily, Disp-180 tablet, R-0Normal  -     Etanercept (ENBREL MINI) 50 MG/ML SOCT; Inject 50 mg into the skin every 7 days Inject 50 mg subcutaneously every 7 days. , Disp-12 each, R-0Normal  -     meloxicam (MOBIC) 15 MG tablet; Take 1 tablet by mouth daily as needed for Pain, Disp-90 tablet, R-0Normal  2. Spondylosis of lumbar region without myelopathy or radiculopathy  Assessment & Plan:  - stop Diclofenac d/t elevated BP. She will monitor BP off Diclofenac.  If BP normalizes off Diclofenac, she will try Meloxicam and take this sparingly on PRN basis for arthralgias and back pain. Orders:  -     meloxicam (MOBIC) 15 MG tablet; Take 1 tablet by mouth daily as needed for Pain, Disp-90 tablet, R-0Normal  3. Elevated BP without diagnosis of hypertension  Assessment & Plan:  - discussed NSAIDs can cause elevated BP. She will stop Diclofenac now and check BP at home. She remains asymptomatic. Instructed pt to f/u w/ PCP if BP remains elevated. 4. Abnormal pulse oximetry  Assessment & Plan:  - SpO2 ranged from 92-96% in the office today. She was asymptomatic. She has known EMELINA. Will monitor and repeat next f/u visit, if still low consider pulmonary w/u to r/o ILD given her seronegative RA. 5. High risk medication use  Assessment & Plan:  - I counseled the pt on the malignancy risks a/w TNF-I and recommended annual skin exam, pt stated she already sees a dermatologist once a yr. Discussed her immunocompromised state on Enbrel and indications to hold Enbrel. Pt stated she has received the annual influenza vaccine and the booster vaccine for COVID-19.  - annual eye exam for HCQ toxicity monitoring. Return in about 2 months (around 5/23/2022) for lab result discussion and treatment plan, medication monitoring. The risks and benefits of my recommendations, as well as other treatment options, benefits and side effects were discussed with the patient today. Questions were answered. NOTE: This report is transcribed by using voice recognition software dragon. Every effort is made to ensure accuracy; however, inadvertent computerized  transcription errors may be present. SUBJECTIVE:  Past medical/surgical history, medications and allergies are reviewed and updated as appropriate. Interval Hx:   Pt has not noted significant improvement in her joint Sx since starting Humira approximately four months ago. Although she notes significant improvement in her morning stiffness.  She currently has pain mainly in her knees which hurt w/ prolonged standing and walking. Denies significant arthralgias or joint swelling in her hand or wrist joints. She reports good pain relief from Diclofenac which she has been taking only once daily. BP was slightly elevated in the office today, she is asymptomatic. SpO2 was 92-96% in the office today. Pt denies any cough, SOB or dizziness. Denies Sx of Raynaud's.     Rheumatologic ROS:  Constitutional: denies chronic fatigue, fever/chills, night sweats, unintentional weight loss  Integumentary: +chronic mild hair thinning, denies photosensitivity, patchy alopecia, or Sx of Raynaud's phenomenon  Eyes: denies dry eyes, redness or pain, visual disturbance, or floaters  Nose: denies nasal ulcers or recurrent sinusitis  Oral cavity: denies dry mouth or oral ulcers  Cardiovascular: denies CP, palpitations, Hx of pericardial effusion or pericarditis  Respiratory: denies SOB, cough, hemoptysis, or pleurisy  Gastrointestinal: denies heart burn, dysphagia or esophageal dysmotility, denies change in bowel habits or Sx of IBD  Musculoskeletal:  refer to above HPI     Allergies   Allergen Reactions    Metronidazole Rash and Other (See Comments)     LIPS WERE TINGLING, tongue tingling, hives all over body    Other      Glue used to adhere recent surgical incision       Past Medical History:        Diagnosis Date    Anxiety     Arthritis     RA    Cellulitis of left lower extremity     left ankle    Depression     WELL CONTROLLED 10-16-17    Heart rate fast     intermittent    Kidney stone     Migraine     Motor tic disorder     Obstructive sleep apnea, adult        Past Surgical History:        Procedure Laterality Date    ABDOMINAL EXPLORATION SURGERY  07/22/2011    excision Meckels diverticulum    ABDOMINOPLASTY  04/14/2014    Verita Quarto ADENOIDECTOMY Bilateral     APPENDECTOMY  07/22/2011    BACK SURGERY      CARPAL TUNNEL RELEASE      HYSTERECTOMY, TOTAL ABDOMINAL  06/12/2018    robotic total hyst [facility], Dr Galen Mckinney [employer] LITHOTRIPSY  x2    LUMBAR LAMINECTOMY  06/2013    Rad; left l5-s1    MECKEL DIVERTICULUM EXCISION      OTHER SURGICAL HISTORY      hymenoplasty    OVARIAN CYST REMOVAL  04/13/2018    OPERATIVE LAPAROSCOPY, LEFT OVARIAN CYSTECTOMY WITH DILATATION AND CURETTAGE    OVARY REMOVAL      TONSILLECTOMY Bilateral     URETHRAL STRICTURE DILATATION      WISDOM TOOTH EXTRACTION         Medications:    Current Outpatient Medications   Medication Sig Dispense Refill    sulfaSALAzine (AZULFIDINE) 500 MG tablet Take 2 tablets by mouth 2 times daily 360 tablet 0    hydroxychloroquine (PLAQUENIL) 200 MG tablet Take 1 tablet by mouth 2 times daily 180 tablet 0    Etanercept (ENBREL MINI) 50 MG/ML SOCT Inject 50 mg into the skin every 7 days Inject 50 mg subcutaneously every 7 days. 12 each 0    meloxicam (MOBIC) 15 MG tablet Take 1 tablet by mouth daily as needed for Pain 90 tablet 0    desvenlafaxine succinate (PRISTIQ) 100 MG TB24 extended release tablet TAKE 1 TABLET BY MOUTH DAILY 90 tablet 1    Dexmethylphenidate HCl ER (FOCALIN XR) 5 MG CP24 Take 5 mg by mouth daily for 30 days.  30 capsule 0    rOPINIRole (REQUIP) 3 MG tablet Take 1 tablet by mouth nightly 90 tablet 3    cetirizine (ZYRTEC) 10 MG tablet TAKE 1 TABLET BY MOUTH DAILY 30 tablet 11    pantoprazole (PROTONIX) 20 MG tablet Take 20 mg by mouth daily      busPIRone (BUSPAR) 5 MG tablet TAKE ONE TABLET BY MOUTH THREE TIMES A DAY AS NEEDED FOR ANXIETY 90 tablet 3    estradiol (ESTRACE) 2 MG tablet Take 2 mg by mouth daily      Biotin 1000 MCG TABS Take by mouth      fluticasone (FLONASE) 50 MCG/ACT nasal spray SPRAY TWO SPRAYS IN EACH NOSTRIL ONCE DAILY 1 Bottle 4    albuterol sulfate HFA (PROVENTIL HFA) 108 (90 Base) MCG/ACT inhaler Inhale 2 puffs into the lungs every 6 hours as needed for Wheezing 1 Inhaler 3    Cholecalciferol (VITAMIN D3) 50 MCG (2000 UT) CAPS Take 2,000 capsules by mouth daily      acetaminophen (TYLENOL) 500 MG tablet Take 500 mg by mouth every 6 hours as needed for Pain      SUMAtriptan (IMITREX) 100 MG tablet Take 100 mg by mouth daily as needed       No current facility-administered medications for this visit. OBJECTIVE:  Physical Exam:  BP (!) 132/96   Pulse 97   Ht 5' 5\" (1.651 m)   Wt 235 lb (106.6 kg)   LMP 03/13/2018 (Exact Date)   SpO2 96%   BMI 39.11 kg/m²     GEN: AAOx3, in NAD, well-appearing  HEAD: normocephalic, atraumatic  EYES: no injection or icterus  CVS: RRR  LUNGS: in no acute respiratory distress, CTAB  MSK:  Upper extremities:              Hands: b/l MCP and PIP joints w/o synovitis slightly TTP, DIP joints w/o swelling NTTP, full fist formation w/ fair  strength              Wrist: R wrist w/ minimal bogginess TTP, L wrist joint w/o swelling TTP, good flexion/extension              Elbow: no synovitis or bursitis, FROM  Lower extremities:              Knees: no joint effusion or warmth, NTTP, good ROM              Ankles: no synovitis, good ROM              Feet: no toe swelling or pain or warmth on palpation w/ FROM, negative MTP squeeze test  INTEGUMENT: no rash or psoriatic lesions, no petechiae, bruises, or palpable purpura, no patchy alopecia, no nail or periungual changes, no clubbing or digital ulcers    DATA:  Labs:   I personally reviewed interval labs and discussed w/ the pt in detail which showed:    Lab Results   Component Value Date    WBC 4.9 03/09/2022    HGB 13.0 03/09/2022    HCT 39.1 03/09/2022    MCV 91.9 03/09/2022     03/09/2022    LYMPHOPCT 39.5 03/09/2022    RBC 4.25 03/09/2022    MCH 30.6 03/09/2022    MCHC 33.3 03/09/2022    RDW 13.7 03/09/2022     Lab Results   Component Value Date     01/06/2021    K 3.9 01/06/2021    CL 99 01/06/2021    CO2 26 01/06/2021    BUN 14 01/06/2021    CREATININE 0.7 03/09/2022    GLUCOSE 85 01/06/2021    CALCIUM 8.6 01/06/2021    PROT 7.0 03/09/2022    LABALBU 4.7 03/09/2022    BILITOT <0.2 03/09/2022    ALKPHOS 79 03/09/2022    AST 14 (L) 03/09/2022    ALT 14 03/09/2022    LABGLOM >60 03/09/2022    GFRAA >60 03/09/2022    AGRATIO 1.4 01/06/2021    GLOB 3.0 01/06/2021     Lab Results   Component Value Date    VITD25 36.6 08/17/2021     No results found for: C3     No results found for: C4     No results found for: ANTIDSDNAIGG     No results found for: [facility]     Lab Results   Component Value Date    CRP 9.9 (H) 03/09/2022    CRP 8.3 (H) 12/09/2021    CRP 9.0 (H) 08/17/2021    CRP 14.8 (H) 05/06/2021     Lab Results   Component Value Date    SEDRATE 11 11/06/2019    SEDRATE 13 11/10/2011     No results found for: CKTOTAL    Negative OFE x 2 (11/10/11, 7/16/20)  Negative RF x 2 (11/10/11, 11/6/19)  Negative CCP (11/6/19)  Negative HLA B27 (11/6/19)  Negative hepatitis B and C serologies (11/6/19)    Imaging:  I personally reviewed interval imaging and discussed w/ the pt in detail which included:    MRI L-spine (5/29/13): IMPRESSION:   Moderate size central and L paracentral disc herniation L5-S1 primarily affecting the left S1 nerve root.   Broad-based disc protrusion centrally L4-L5.      X-rays (11/7/19): Bilateral hands:   No significant plain film abnormality.  No erosion, chondrocalcinosis or fracture.      Bilateral knees: Moderate degenerative changes about the patellofemoral space bilaterally with lateral subluxation of the patella bilaterally.      Bilateral feet:   No significant plain film abnormality.      Lumbar spine and sacroiliac joints:   No ankylosis or erosion is appreciated. Mild degenerative changes about the right sacroiliac joint.     I independently reviewed above X-rays, L-spine w/o evidence of SpA, hand joints w/o evidence of chronic inflammatory arthritis/erosive changes.      MRI pelvis (11/20/19):  1. Trace amount of nonspecific fluid in the left sacroiliac joint.  No additional changes to suggest inflammatory arthropathy. 2. Minimal right sacroiliac degenerative change.    3. No abnormality in the hip joints. 4. Mild bilateral gluteus minimus tendinosis and proximal hamstring tendinosis. 5. Mild degenerative changes at L4-5 and L5-S1.      I discussed her MRI findings w/ the reading radiologist on 12/2/19, she has mild degenerative arthritis in her sacroiliac joints but nothing inflammatory in nature.     Above results were discussed w/ the pt in detail during today's visit.

## 2022-03-20 DIAGNOSIS — M06.00 SERONEGATIVE RHEUMATOID ARTHRITIS (HCC): ICD-10-CM

## 2022-03-22 RX ORDER — ADALIMUMAB 40MG/0.4ML
KIT SUBCUTANEOUS
Qty: 2 EACH | OUTPATIENT
Start: 2022-03-22

## 2022-03-23 ENCOUNTER — OFFICE VISIT (OUTPATIENT)
Dept: RHEUMATOLOGY | Age: 42
End: 2022-03-23
Payer: MEDICAID

## 2022-03-23 ENCOUNTER — TELEPHONE (OUTPATIENT)
Dept: ADMINISTRATIVE | Age: 42
End: 2022-03-23

## 2022-03-23 VITALS
HEART RATE: 97 BPM | DIASTOLIC BLOOD PRESSURE: 96 MMHG | SYSTOLIC BLOOD PRESSURE: 132 MMHG | HEIGHT: 65 IN | OXYGEN SATURATION: 96 % | WEIGHT: 235 LBS | BODY MASS INDEX: 39.15 KG/M2

## 2022-03-23 DIAGNOSIS — M06.00 SERONEGATIVE RHEUMATOID ARTHRITIS (HCC): Primary | ICD-10-CM

## 2022-03-23 DIAGNOSIS — R03.0 ELEVATED BP WITHOUT DIAGNOSIS OF HYPERTENSION: ICD-10-CM

## 2022-03-23 DIAGNOSIS — M06.00 SERONEGATIVE RHEUMATOID ARTHRITIS (HCC): ICD-10-CM

## 2022-03-23 DIAGNOSIS — R79.81 ABNORMAL PULSE OXIMETRY: ICD-10-CM

## 2022-03-23 DIAGNOSIS — Z79.899 HIGH RISK MEDICATION USE: ICD-10-CM

## 2022-03-23 DIAGNOSIS — M47.816 SPONDYLOSIS OF LUMBAR REGION WITHOUT MYELOPATHY OR RADICULOPATHY: ICD-10-CM

## 2022-03-23 PROCEDURE — G8417 CALC BMI ABV UP PARAM F/U: HCPCS | Performed by: INTERNAL MEDICINE

## 2022-03-23 PROCEDURE — 99214 OFFICE O/P EST MOD 30 MIN: CPT | Performed by: INTERNAL MEDICINE

## 2022-03-23 PROCEDURE — G8482 FLU IMMUNIZE ORDER/ADMIN: HCPCS | Performed by: INTERNAL MEDICINE

## 2022-03-23 PROCEDURE — 1036F TOBACCO NON-USER: CPT | Performed by: INTERNAL MEDICINE

## 2022-03-23 PROCEDURE — G8427 DOCREV CUR MEDS BY ELIG CLIN: HCPCS | Performed by: INTERNAL MEDICINE

## 2022-03-23 RX ORDER — MELOXICAM 15 MG/1
15 TABLET ORAL DAILY PRN
Qty: 90 TABLET | Refills: 0 | Status: SHIPPED | OUTPATIENT
Start: 2022-03-23 | End: 2022-06-02

## 2022-03-23 RX ORDER — ETANERCEPT 50 MG/ML
50 SOLUTION SUBCUTANEOUS
Qty: 12 EACH | Refills: 0 | Status: SHIPPED | OUTPATIENT
Start: 2022-03-23 | End: 2022-03-28 | Stop reason: SDUPTHER

## 2022-03-23 RX ORDER — HYDROXYCHLOROQUINE SULFATE 200 MG/1
200 TABLET, FILM COATED ORAL 2 TIMES DAILY
Qty: 180 TABLET | Refills: 0 | Status: SHIPPED | OUTPATIENT
Start: 2022-03-23 | End: 2022-03-30

## 2022-03-23 RX ORDER — ADALIMUMAB 40MG/0.4ML
40 KIT SUBCUTANEOUS
Qty: 6 EACH | Refills: 0 | Status: CANCELLED | OUTPATIENT
Start: 2022-03-23 | End: 2022-06-21

## 2022-03-23 RX ORDER — SULFASALAZINE 500 MG/1
1000 TABLET ORAL 2 TIMES DAILY
Qty: 360 TABLET | Refills: 0 | Status: SHIPPED | OUTPATIENT
Start: 2022-03-23 | End: 2022-07-13 | Stop reason: SDUPTHER

## 2022-03-23 NOTE — ASSESSMENT & PLAN NOTE
- SpO2 ranged from 92-96% in the office today. She was asymptomatic. She has known EMELINA. Will monitor and repeat next f/u visit, if still low consider pulmonary w/u to r/o ILD given her seronegative RA.

## 2022-03-23 NOTE — ASSESSMENT & PLAN NOTE
- I counseled the pt on the malignancy risks a/w TNF-I and recommended annual skin exam, pt stated she already sees a dermatologist once a yr. Discussed her immunocompromised state on Enbrel and indications to hold Enbrel. Pt stated she has received the annual influenza vaccine and the booster vaccine for COVID-19.  - annual eye exam for HCQ toxicity monitoring.

## 2022-03-23 NOTE — ASSESSMENT & PLAN NOTE
- objectively, her inflammatory arthritis has significantly improved since adding Humira. However pt cont to have mild bogginess on exam and CRP remains mildly elevated. - switch Humira to Enbrel, start PA for Enbrel now. - cont same dose HCQ and SSZ. - stop Diclofenac d/t elevated BP. She will monitor BP off Diclofenac. If BP normalizes off Diclofenac, she will try Meloxicam and take this sparingly on PRN basis for arthralgias.

## 2022-03-23 NOTE — ASSESSMENT & PLAN NOTE
- discussed NSAIDs can cause elevated BP. She will stop Diclofenac now and check BP at home. She remains asymptomatic. Instructed pt to f/u w/ PCP if BP remains elevated.

## 2022-03-23 NOTE — ASSESSMENT & PLAN NOTE
- stop Diclofenac d/t elevated BP. She will monitor BP off Diclofenac. If BP normalizes off Diclofenac, she will try Meloxicam and take this sparingly on PRN basis for arthralgias and back pain.

## 2022-03-25 ENCOUNTER — TELEPHONE (OUTPATIENT)
Dept: FAMILY MEDICINE CLINIC | Age: 42
End: 2022-03-25

## 2022-03-28 RX ORDER — ETANERCEPT 50 MG/ML
50 SOLUTION SUBCUTANEOUS
Qty: 12 EACH | Refills: 0 | Status: SHIPPED | OUTPATIENT
Start: 2022-03-28 | End: 2022-03-31 | Stop reason: SDUPTHER

## 2022-03-28 NOTE — TELEPHONE ENCOUNTER
[facility] PA Approval on   Enbrel Mini injection 50mg/ml    Case ID# [id number]  Good from 3/26/22 to 3/26/23    [facility]   pending

## 2022-03-30 DIAGNOSIS — M06.00 SERONEGATIVE RHEUMATOID ARTHRITIS (HCC): ICD-10-CM

## 2022-03-30 RX ORDER — HYDROXYCHLOROQUINE SULFATE 200 MG/1
TABLET, FILM COATED ORAL
Qty: 180 TABLET | Refills: 0 | Status: SHIPPED | OUTPATIENT
Start: 2022-03-30 | End: 2022-07-13 | Stop reason: SDUPTHER

## 2022-03-30 NOTE — TELEPHONE ENCOUNTER
LastVisit 3/23/2022   LastLabs 03/09/2022  NextVisit 6/22/2022   LastRefilled 03/23/2022 generalized

## 2022-03-31 ENCOUNTER — TELEPHONE (OUTPATIENT)
Dept: RHEUMATOLOGY | Age: 42
End: 2022-03-31

## 2022-03-31 DIAGNOSIS — M06.00 SERONEGATIVE RHEUMATOID ARTHRITIS (HCC): ICD-10-CM

## 2022-03-31 RX ORDER — ETANERCEPT 50 MG/ML
50 SOLUTION SUBCUTANEOUS
Qty: 12 EACH | Refills: 0 | Status: SHIPPED | OUTPATIENT
Start: 2022-03-31 | End: 2022-06-01

## 2022-03-31 NOTE — TELEPHONE ENCOUNTER
[place] faxed - Enbrel mini needs to be sent to [facility] Manuela Corona)   Medication and pharmacy pending

## 2022-04-05 ENCOUNTER — TELEPHONE (OUTPATIENT)
Dept: ADMINISTRATIVE | Age: 42
End: 2022-04-05

## 2022-04-05 ENCOUNTER — OFFICE VISIT (OUTPATIENT)
Dept: FAMILY MEDICINE CLINIC | Age: 42
End: 2022-04-05
Payer: MEDICAID

## 2022-04-05 VITALS
WEIGHT: 238.4 LBS | DIASTOLIC BLOOD PRESSURE: 86 MMHG | HEART RATE: 88 BPM | OXYGEN SATURATION: 98 % | BODY MASS INDEX: 39.67 KG/M2 | SYSTOLIC BLOOD PRESSURE: 134 MMHG

## 2022-04-05 DIAGNOSIS — M06.00 SERONEGATIVE RHEUMATOID ARTHRITIS (HCC): ICD-10-CM

## 2022-04-05 DIAGNOSIS — I10 ESSENTIAL HYPERTENSION: Primary | ICD-10-CM

## 2022-04-05 DIAGNOSIS — E66.09 CLASS 2 OBESITY DUE TO EXCESS CALORIES WITHOUT SERIOUS COMORBIDITY WITH BODY MASS INDEX (BMI) OF 39.0 TO 39.9 IN ADULT: ICD-10-CM

## 2022-04-05 PROCEDURE — 1036F TOBACCO NON-USER: CPT | Performed by: NURSE PRACTITIONER

## 2022-04-05 PROCEDURE — G8427 DOCREV CUR MEDS BY ELIG CLIN: HCPCS | Performed by: NURSE PRACTITIONER

## 2022-04-05 PROCEDURE — 99214 OFFICE O/P EST MOD 30 MIN: CPT | Performed by: NURSE PRACTITIONER

## 2022-04-05 PROCEDURE — G8417 CALC BMI ABV UP PARAM F/U: HCPCS | Performed by: NURSE PRACTITIONER

## 2022-04-05 RX ORDER — HYDROCHLOROTHIAZIDE 12.5 MG/1
12.5 CAPSULE, GELATIN COATED ORAL EVERY MORNING
Qty: 90 CAPSULE | Refills: 1 | Status: SHIPPED | OUTPATIENT
Start: 2022-04-05 | End: 2022-07-07

## 2022-04-05 RX ORDER — SEMAGLUTIDE 0.25 MG/.5ML
0.25 INJECTION, SOLUTION SUBCUTANEOUS
Qty: 0.5 ML | Refills: 5 | Status: SHIPPED | OUTPATIENT
Start: 2022-04-05 | End: 2022-06-02 | Stop reason: ALTCHOICE

## 2022-04-05 ASSESSMENT — PATIENT HEALTH QUESTIONNAIRE - PHQ9
6. FEELING BAD ABOUT YOURSELF - OR THAT YOU ARE A FAILURE OR HAVE LET YOURSELF OR YOUR FAMILY DOWN: 0
SUM OF ALL RESPONSES TO PHQ QUESTIONS 1-9: 0
9. THOUGHTS THAT YOU WOULD BE BETTER OFF DEAD, OR OF HURTING YOURSELF: 0
7. TROUBLE CONCENTRATING ON THINGS, SUCH AS READING THE NEWSPAPER OR WATCHING TELEVISION: 0
8. MOVING OR SPEAKING SO SLOWLY THAT OTHER PEOPLE COULD HAVE NOTICED. OR THE OPPOSITE, BEING SO FIGETY OR RESTLESS THAT YOU HAVE BEEN MOVING AROUND A LOT MORE THAN USUAL: 0
10. IF YOU CHECKED OFF ANY PROBLEMS, HOW DIFFICULT HAVE THESE PROBLEMS MADE IT FOR YOU TO DO YOUR WORK, TAKE CARE OF THINGS AT HOME, OR GET ALONG WITH OTHER PEOPLE: 0
SUM OF ALL RESPONSES TO PHQ QUESTIONS 1-9: 0
2. FEELING DOWN, DEPRESSED OR HOPELESS: 0
SUM OF ALL RESPONSES TO PHQ9 QUESTIONS 1 & 2: 0
5. POOR APPETITE OR OVEREATING: 0
SUM OF ALL RESPONSES TO PHQ QUESTIONS 1-9: 0
4. FEELING TIRED OR HAVING LITTLE ENERGY: 0
3. TROUBLE FALLING OR STAYING ASLEEP: 0
1. LITTLE INTEREST OR PLEASURE IN DOING THINGS: 0
SUM OF ALL RESPONSES TO PHQ QUESTIONS 1-9: 0

## 2022-04-05 ASSESSMENT — ENCOUNTER SYMPTOMS: SHORTNESS OF BREATH: 0

## 2022-04-05 NOTE — TELEPHONE ENCOUNTER
Submitted PA for [facility] 0.25MG/0.5ML auto-injectors  Via CMM Key: [id number] STATUS: DENIED. Medication for weight loss is a plan exclusion. Letter is attached. If this requires a response please respond to the pool. [address])    Please advise patient. Thank you.

## 2022-04-05 NOTE — PATIENT INSTRUCTIONS
Patient Education        DASH Diet: Care Instructions  Your Care Instructions     The DASH diet is an eating plan that can help lower your blood pressure. DASH stands for Dietary Approaches to Stop Hypertension. Hypertension is high bloodpressure. The DASH diet focuses on eating foods that are high in calcium, potassium, and magnesium. These nutrients can lower blood pressure. The foods that are highest in these nutrients are fruits, vegetables, low-fat dairy products, nuts, seeds, and legumes. But taking calcium, potassium, and magnesium supplements instead of eating foods that are high in those nutrients does not have the same effect. The DASH diet also includes whole grains, fish, and poultry. The DASH diet is one of several lifestyle changes your doctor may recommend to lower your high blood pressure. Your doctor may also want you to decrease the amount of sodium in your diet. Lowering sodium while following the DASH dietcan lower blood pressure even further than just the DASH diet alone. Follow-up care is a key part of your treatment and safety. Be sure to make and go to all appointments, and call your doctor if you are having problems. It's also a good idea to know your test results and keep alist of the medicines you take. How can you care for yourself at home? Following the DASH diet   Eat 4 to 5 servings of fruit each day. A serving is 1 medium-sized piece of fruit, ½ cup chopped or canned fruit, 1/4 cup dried fruit, or 4 ounces (½ cup) of fruit juice. Choose fruit more often than fruit juice.  Eat 4 to 5 servings of vegetables each day. A serving is 1 cup of lettuce or raw leafy vegetables, ½ cup of chopped or cooked vegetables, or 4 ounces (½ cup) of vegetable juice. Choose vegetables more often than vegetable juice.  Get 2 to 3 servings of low-fat and fat-free dairy each day. A serving is 8 ounces of milk, 1 cup of yogurt, or 1 ½ ounces of cheese.  Eat 6 to 8 servings of grains each day.  A serving is 1 slice of bread, 1 ounce of dry cereal, or ½ cup of cooked rice, pasta, or cooked cereal. Try to choose whole-grain products as much as possible.  Limit lean meat, poultry, and fish to 2 servings each day. A serving is 3 ounces, about the size of a deck of cards.  Eat 4 to 5 servings of nuts, seeds, and legumes (cooked dried beans, lentils, and split peas) each week. A serving is 1/3 cup of nuts, 2 tablespoons of seeds, or ½ cup of cooked beans or peas.  Limit fats and oils to 2 to 3 servings each day. A serving is 1 teaspoon of vegetable oil or 2 tablespoons of salad dressing.  Limit sweets and added sugars to 5 servings or less a week. A serving is 1 tablespoon jelly or jam, ½ cup sorbet, or 1 cup of lemonade.  Eat less than 2,300 milligrams (mg) of sodium a day. If you limit your sodium to 1,500 mg a day, you can lower your blood pressure even more.  Be aware that all of these are the suggested number of servings for people who eat 1,800 to 2,000 calories a day. Your recommended number of servings may be different if you need more or fewer calories. Tips for success   Start small. Do not try to make dramatic changes to your diet all at once. You might feel that you are missing out on your favorite foods and then be more likely to not follow the plan. Make small changes, and stick with them. Once those changes become habit, add a few more changes.  Try some of the following:  ? Make it a goal to eat a fruit or vegetable at every meal and at snacks. This will make it easy to get the recommended amount of fruits and vegetables each day. ? Try yogurt topped with fruit and nuts for a snack or healthy dessert. ? Add lettuce, tomato, cucumber, and onion to sandwiches. ? Combine a ready-made pizza crust with low-fat mozzarella cheese and lots of vegetable toppings. Try using tomatoes, squash, spinach, broccoli, carrots, cauliflower, and onions. ?  Have a variety of cut-up vegetables with a low-fat dip as an appetizer instead of chips and dip. ? Sprinkle sunflower seeds or chopped almonds over salads. Or try adding chopped walnuts or almonds to cooked vegetables. ? Try some vegetarian meals using beans and peas. Add garbanzo or kidney beans to salads. Make burritos and tacos with mashed flowers beans or black beans. Where can you learn more? Go to https://[employer]. org and sign in to your [employer]To account. Enter T959 in the Peer5 box to learn more about \"DASH Diet: Care Instructions. \"     If you do not have an account, please click on the \"Sign Up Now\" link. Current as of: January 10, 2022               Content Version: 13.2  © 0534-2439 [employer]. Care instructions adapted under license by [facility] [facility] If you have questions about a medical condition or this instruction, always ask your healthcare professional. [address] any warranty or liability for your use of this information.

## 2022-04-05 NOTE — PROGRESS NOTES
SUBJECTIVE:  Pt is a of 39 y.o. female comes in today with   Chief Complaint   Patient presents with    Hypertension     pt states bp was 139/94, pt states o2 was between 93 and 95       Patient presenting today for evaluation of elevated blood pressures. States high at Rheum visit 2 weeks ago: 132/96. Started checking at home: high 130's/90's. States also checking O2 and 93-95% on RA. Associated with dizziness- improving. States not feeling well- sensation of heart beating hard and HA. Denies CP, SOB, palpitations, or pedal edema. Not taking focalin- was not covered by insurance. States \"I just don't feel right\"    RA: managed by Dr. Noelle Courtney. Stopped Voltaren and new start Meloxicam. Just started yesterday. Obesity: increasing. States up entire pants size. Exercising again and healthier eating- still gaining weight. eval with [facility] last year- following their meal plan and still gaining weight. Prior to Visit Medications    Medication Sig Taking? Authorizing Provider   Etanercept (ENBREL MINI) 50 MG/ML SOCT Inject 50 mg into the skin every 7 days Inject 50 mg subcutaneously every 7 days. Yes Tristan Yu MD   hydroxychloroquine (PLAQUENIL) 200 MG tablet TAKE 1 TABLET BY MOUTH TWICE DAILY Yes Tristan Yu MD   sulfaSALAzine (AZULFIDINE) 500 MG tablet Take 2 tablets by mouth 2 times daily Yes Tristan Yu MD   meloxicam (MOBIC) 15 MG tablet Take 1 tablet by mouth daily as needed for Pain Yes Tristan Yu MD   desvenlafaxine succinate (PRISTIQ) 100 MG TB24 extended release tablet TAKE 1 TABLET BY MOUTH DAILY Yes ANDREA Jefferson - HAKEEM   Dexmethylphenidate HCl ER (FOCALIN XR) 5 MG CP24 Take 5 mg by mouth daily for 30 days.  Yes ANDREA Jefferson - CNP   rOPINIRole (REQUIP) 3 MG tablet Take 1 tablet by mouth nightly Yes ANDREA Jefferson - CNP   cetirizine (ZYRTEC) 10 MG tablet TAKE 1 TABLET BY MOUTH DAILY Yes ANDREA Jefferson - CNP   pantoprazole (PROTONIX) 20 MG tablet Take 20 mg by mouth daily Yes Historical Provider, MD   busPIRone (BUSPAR) 5 MG tablet TAKE ONE TABLET BY MOUTH THREE TIMES A DAY AS NEEDED FOR ANXIETY Yes ANDREA Johnson CNP   estradiol (ESTRACE) 2 MG tablet Take 2 mg by mouth daily Yes Historical Provider, MD   Biotin 1000 MCG TABS Take by mouth Yes Historical Provider, MD   fluticasone (FLONASE) 50 MCG/ACT nasal spray SPRAY TWO SPRAYS IN EACH NOSTRIL ONCE DAILY Yes ANDREA Johnson CNP   albuterol sulfate HFA (PROVENTIL HFA) 108 (90 Base) MCG/ACT inhaler Inhale 2 puffs into the lungs every 6 hours as needed for Wheezing Yes ANDREA Johnson CNP   Cholecalciferol (VITAMIN D3) 50 MCG (2000 UT) CAPS Take 2,000 capsules by mouth daily Yes Historical Provider, MD   acetaminophen (TYLENOL) 500 MG tablet Take 500 mg by mouth every 6 hours as needed for Pain Yes Historical Provider, MD   SUMAtriptan (IMITREX) 100 MG tablet Take 100 mg by mouth daily as needed Yes Historical Provider, MD         Patient's allergies, past medical, surgical, social and family histories werereviewed and updated as appropriate. Review of Systems   Constitutional: Positive for unexpected weight change (gain). Negative for diaphoresis. Respiratory: Negative for shortness of breath. Cardiovascular: Negative for chest pain, palpitations and leg swelling. Sensation of heart beating hard     Musculoskeletal: Positive for arthralgias (generalized). Neurological: Negative for dizziness and headaches. Psychiatric/Behavioral: Positive for sleep disturbance (waking after 5 hours of sleep and unable to fall back asleep). Negative for agitation. The patient is nervous/anxious. Physical Exam  Vitals reviewed. Constitutional:       Appearance: She is well-developed. She is obese. Cardiovascular:      Rate and Rhythm: Normal rate and regular rhythm. Heart sounds: Normal heart sounds. No murmur heard.       Pulmonary:      Effort: Pulmonary effort is normal.      Breath sounds: Normal breath sounds. Musculoskeletal:      Right lower leg: No edema. Left lower leg: No edema. Skin:     General: Skin is warm and dry. Neurological:      Mental Status: She is alert and oriented to person, place, and time. Psychiatric:         Mood and Affect: Mood normal.         Behavior: Behavior normal.         Thought Content: Thought content normal.         Judgment: Judgment normal.       Vitals:    04/05/22 0852   BP: 134/86   Pulse: 88   SpO2: 98%   Weight: 238 lb 6.4 oz (108.1 kg)             ASSESSMENT:  1. Essential hypertension    2. Class 2 obesity due to excess calories without serious comorbidity with body mass index (BMI) of 39.0 to 39.9 in adult    3. Seronegative rheumatoid arthritis ([facility]        PLAN:  1. Essential hypertension  Borderline control  New start-     hydroCHLOROthiazide (MICROZIDE) 12.5 MG capsule; Take 1 capsule by mouth every morning  Check BP QOD  RTO 1 month, sooner prn    2. Class 2 obesity due to excess calories without serious comorbidity with body mass index (BMI) of 39.0 to 39.9 in adult  Worsening   Trial-     Semaglutide-Weight Management (WEGOVY) 0.25 MG/0.5ML SOAJ SC injection; Inject 0.25 mg into the skin every 7 days  DASH diet info given  Recommended at least 30 minutes of aerobic exercise daily. 3. Seronegative rheumatoid arthritis ([facility]  Managed by Dr Venancio Starks, no changes today      See pt instructions  F/u 4 weeks, sooner prn. Discussed use, benefit, and side effects of prescribed medications. All patient questions answered. Pt voiced understanding.

## 2022-04-06 ENCOUNTER — TELEPHONE (OUTPATIENT)
Dept: FAMILY MEDICINE CLINIC | Age: 42
End: 2022-04-06

## 2022-04-06 NOTE — TELEPHONE ENCOUNTER
Office has been notified that pt is requiring Prior Authorization for the following medication:  --Semaglutide-Weight Management (WEGOVY) 0.25 MG/0.5ML SOAJ SC injection         Please initiate this request through [employer], contacting the following Payor/Insurance:  --[id number] [facility]       Please see below, or the documentation attached to this encounter for any additional information that may assist in processing PA:  --Brain     Thank you!

## 2022-04-26 ENCOUNTER — PATIENT MESSAGE (OUTPATIENT)
Dept: ENT CLINIC | Age: 42
End: 2022-04-26

## 2022-04-26 NOTE — TELEPHONE ENCOUNTER
Called and spoke to patient.    She messaged the wrong office and will contact Isak Garcia office regarding follow up

## 2022-04-26 NOTE — TELEPHONE ENCOUNTER
From: Yuriy Das  To: Dr. Shama Melendrez: 4/26/2022 7:13 AM EDT  Subject: Follow up appointment    Good morning,     I was just wondering if I still need to come in for a follow up appointment? I ended up going to see my cardiologist and he switched my blood pressure meds. He also did an EKG, ordered some bloodwork as well as scheduled an echo. Please let me know either way.      Thanks,   Romain Galicia

## 2022-04-28 NOTE — TELEPHONE ENCOUNTER
Medication:   Requested Prescriptions     Pending Prescriptions Disp Refills    pantoprazole (PROTONIX) 40 MG tablet [Pharmacy Med Name: PANTOPRAZOLE 40MG TABLETS] 30 tablet      Sig: TAKE 1 TABLET BY MOUTH DAILY        Last Filled:      Patient Phone Number: [phone] (home)     Last appt: 4/5/2022   Next appt: Visit date not found    Last OARRS:   RX Monitoring 1/16/2018   Attestation The Prescription Monitoring Report for this patient was reviewed today. Periodic Controlled Substance Monitoring Possible medication side effects, risk of tolerance and/or dependence, and alternative treatments discussed. ;No signs of potential drug abuse or diversion identified.

## 2022-05-10 RX ORDER — PANTOPRAZOLE SODIUM 40 MG/1
TABLET, DELAYED RELEASE ORAL
Qty: 30 TABLET | Refills: 5 | Status: SHIPPED | OUTPATIENT
Start: 2022-05-10 | End: 2022-10-07

## 2022-05-10 NOTE — TELEPHONE ENCOUNTER
Medication:   Requested Prescriptions     Pending Prescriptions Disp Refills    pantoprazole (PROTONIX) 40 MG tablet [Pharmacy Med Name: PANTOPRAZOLE 40MG TABLETS] 30 tablet 3     Sig: TAKE 1 TABLET BY MOUTH DAILY        Last Filled:      Patient Phone Number: [phone] (home)     Last appt: 4/5/2022   Next appt: Visit date not found    Last OARRS:   RX Monitoring 1/16/2018   Attestation The Prescription Monitoring Report for this patient was reviewed today. Periodic Controlled Substance Monitoring Possible medication side effects, risk of tolerance and/or dependence, and alternative treatments discussed. ;No signs of potential drug abuse or diversion identified.

## 2022-05-24 ENCOUNTER — HOSPITAL ENCOUNTER (OUTPATIENT)
Age: 42
Discharge: HOME | End: 2022-05-24
Payer: MEDICAID

## 2022-05-24 LAB
ALBUMIN SERPL-MCNC: 4.8 G/DL (ref 3.4–5)
ALP BLD-CCNC: 75 U/L (ref 40–129)
ALT SERPL-CCNC: 30 U/L (ref 10–40)
ANION GAP SERPL CALCULATED.3IONS-SCNC: 16 MMOL/L (ref 3–16)
AST SERPL-CCNC: 31 U/L (ref 15–37)
BILIRUB SERPL-MCNC: 0.4 MG/DL (ref 0–1)
BILIRUBIN DIRECT: <0.2 MG/DL (ref 0–0.3)
BILIRUBIN, INDIRECT: NORMAL MG/DL (ref 0–1)
BUN BLDV-MCNC: 14 MG/DL (ref 7–20)
CALCIUM SERPL-MCNC: 9.5 MG/DL (ref 8.3–10.6)
CHLORIDE BLD-SCNC: 102 MMOL/L (ref 99–110)
CHOLESTEROL, TOTAL: 210 MG/DL (ref 0–199)
CO2: 21 MMOL/L (ref 21–32)
CREAT SERPL-MCNC: 0.7 MG/DL (ref 0.6–1.1)
GFR AFRICAN AMERICAN: >60
GFR NON-AFRICAN AMERICAN: >60
GLUCOSE BLD-MCNC: 91 MG/DL (ref 70–99)
HDLC SERPL-MCNC: 60 MG/DL (ref 40–60)
LDL CHOLESTEROL CALCULATED: 133 MG/DL
POTASSIUM SERPL-SCNC: 4.1 MMOL/L (ref 3.5–5.1)
SODIUM BLD-SCNC: 139 MMOL/L (ref 136–145)
TOTAL PROTEIN: 7.4 G/DL (ref 6.4–8.2)
TRIGL SERPL-MCNC: 87 MG/DL (ref 0–150)
VLDLC SERPL CALC-MCNC: 17 MG/DL

## 2022-05-24 PROCEDURE — 80061 LIPID PANEL: CPT

## 2022-05-24 PROCEDURE — 36415 COLL VENOUS BLD VENIPUNCTURE: CPT

## 2022-05-24 PROCEDURE — 80076 HEPATIC FUNCTION PANEL: CPT

## 2022-05-24 PROCEDURE — 80048 BASIC METABOLIC PNL TOTAL CA: CPT

## 2022-06-01 ENCOUNTER — NURSE TRIAGE (OUTPATIENT)
Dept: OTHER | Facility: CLINIC | Age: 42
End: 2022-06-01

## 2022-06-01 ENCOUNTER — TELEPHONE (OUTPATIENT)
Dept: FAMILY MEDICINE CLINIC | Age: 42
End: 2022-06-01

## 2022-06-01 DIAGNOSIS — M06.00 SERONEGATIVE RHEUMATOID ARTHRITIS (HCC): ICD-10-CM

## 2022-06-01 RX ORDER — ETANERCEPT 50 MG/ML
SOLUTION SUBCUTANEOUS
Qty: 4 EACH | Refills: 0 | Status: SHIPPED | OUTPATIENT
Start: 2022-06-01 | End: 2022-07-13 | Stop reason: SDUPTHER

## 2022-06-01 NOTE — TELEPHONE ENCOUNTER
Patient called having dizziness was concerned because of knew blood pressure medication. Says that it got worse over the weekend. Wants to know what she should do.  Please advise    Please contact Patient [phone]

## 2022-06-01 NOTE — TELEPHONE ENCOUNTER
Received call from Jocy Lowe at [facility] with Red Flag Complaint. Subjective: Caller states \"Feeling unwell for several days. Dizziness. \"     Current Symptoms: Mild congestion,  Nausea, headache, lightheadedness started over the weekend, dizziness is persistent. Dizziness seems to be getting worse and is becoming worse. Unsure of triggers. Dizziness is not present now. Numbness in left arm related to RA per pt report. Not present now, mostly at night. In the last month pt started bp medication. Has not checked \"in a while\". While on call 127/93 HR 88. Vertigo episode several years ago, medication rx (unsure of name) at that time. Onset: 5 days ago; intermittent    Associated Symptoms: Eating, drinking, sleeping normally    Pain Severity: Denies    Temperature: Hot flashes recently    What has been tried: None    LMP: NA Pregnant: NA, total hysterectomy    Recommended disposition: discuss with PCP and callback by nurse today    Care advice provided, patient verbalizes understanding; denies any other questions or concerns; instructed to call back for any new or worsening symptoms. Writer provided warm transfer to Irene Dang  at [facility] for further guidance     Attention Provider: Thank you for allowing me to participate in the care of your patient. The patient was connected to triage in response to information provided to the ECC/PSC. Please do not respond through this encounter as the response is not directed to a shared pool.       Reason for Disposition   Taking a medicine that could cause dizziness (e.g., blood pressure medications, diuretics)    Protocols used: DIZZINESS-ADULT-OH

## 2022-06-01 NOTE — TELEPHONE ENCOUNTER
Not sure what new medication is.  I recommend she follow up with cardiologist if that is who is prescribing medication

## 2022-06-02 ENCOUNTER — TELEMEDICINE (OUTPATIENT)
Dept: FAMILY MEDICINE CLINIC | Age: 42
End: 2022-06-02
Payer: MEDICAID

## 2022-06-02 DIAGNOSIS — M47.816 SPONDYLOSIS OF LUMBAR REGION WITHOUT MYELOPATHY OR RADICULOPATHY: ICD-10-CM

## 2022-06-02 DIAGNOSIS — R42 VERTIGO: Primary | ICD-10-CM

## 2022-06-02 DIAGNOSIS — I10 ESSENTIAL HYPERTENSION: ICD-10-CM

## 2022-06-02 DIAGNOSIS — J01.90 ACUTE BACTERIAL SINUSITIS: ICD-10-CM

## 2022-06-02 DIAGNOSIS — M06.00 SERONEGATIVE RHEUMATOID ARTHRITIS (HCC): ICD-10-CM

## 2022-06-02 DIAGNOSIS — B96.89 ACUTE BACTERIAL SINUSITIS: ICD-10-CM

## 2022-06-02 PROBLEM — R79.81 ABNORMAL PULSE OXIMETRY: Status: RESOLVED | Noted: 2022-03-23 | Resolved: 2022-06-02

## 2022-06-02 PROBLEM — M06.09 RHEUMATOID ARTHRITIS OF MULTIPLE SITES WITH NEGATIVE RHEUMATOID FACTOR (HCC): Status: RESOLVED | Noted: 2022-06-02 | Resolved: 2022-06-02

## 2022-06-02 PROBLEM — R03.0 ELEVATED BP WITHOUT DIAGNOSIS OF HYPERTENSION: Status: RESOLVED | Noted: 2022-03-23 | Resolved: 2022-06-02

## 2022-06-02 PROBLEM — M06.09 RHEUMATOID ARTHRITIS OF MULTIPLE SITES WITH NEGATIVE RHEUMATOID FACTOR (HCC): Status: ACTIVE | Noted: 2022-06-02

## 2022-06-02 PROCEDURE — G8427 DOCREV CUR MEDS BY ELIG CLIN: HCPCS | Performed by: NURSE PRACTITIONER

## 2022-06-02 PROCEDURE — 99214 OFFICE O/P EST MOD 30 MIN: CPT | Performed by: NURSE PRACTITIONER

## 2022-06-02 RX ORDER — VALSARTAN 80 MG/1
80 TABLET ORAL DAILY
Qty: 90 TABLET | Refills: 1 | COMMUNITY
Start: 2022-06-02

## 2022-06-02 RX ORDER — CEFUROXIME AXETIL 250 MG/1
250 TABLET ORAL 2 TIMES DAILY
Qty: 20 TABLET | Refills: 0 | Status: SHIPPED | OUTPATIENT
Start: 2022-06-02 | End: 2022-06-12

## 2022-06-02 RX ORDER — FLUCONAZOLE 150 MG/1
TABLET ORAL
Qty: 2 TABLET | Refills: 3 | Status: SHIPPED | OUTPATIENT
Start: 2022-06-02 | End: 2022-07-13

## 2022-06-02 RX ORDER — MELOXICAM 15 MG/1
15 TABLET ORAL DAILY PRN
Qty: 90 TABLET | Refills: 0 | Status: SHIPPED | OUTPATIENT
Start: 2022-06-02 | End: 2022-07-13 | Stop reason: SDUPTHER

## 2022-06-02 ASSESSMENT — PATIENT HEALTH QUESTIONNAIRE - PHQ9
6. FEELING BAD ABOUT YOURSELF - OR THAT YOU ARE A FAILURE OR HAVE LET YOURSELF OR YOUR FAMILY DOWN: 0
1. LITTLE INTEREST OR PLEASURE IN DOING THINGS: 0
2. FEELING DOWN, DEPRESSED OR HOPELESS: 0
SUM OF ALL RESPONSES TO PHQ QUESTIONS 1-9: 0
8. MOVING OR SPEAKING SO SLOWLY THAT OTHER PEOPLE COULD HAVE NOTICED. OR THE OPPOSITE, BEING SO FIGETY OR RESTLESS THAT YOU HAVE BEEN MOVING AROUND A LOT MORE THAN USUAL: 0
5. POOR APPETITE OR OVEREATING: 0
SUM OF ALL RESPONSES TO PHQ QUESTIONS 1-9: 0
9. THOUGHTS THAT YOU WOULD BE BETTER OFF DEAD, OR OF HURTING YOURSELF: 0
10. IF YOU CHECKED OFF ANY PROBLEMS, HOW DIFFICULT HAVE THESE PROBLEMS MADE IT FOR YOU TO DO YOUR WORK, TAKE CARE OF THINGS AT HOME, OR GET ALONG WITH OTHER PEOPLE: 0
SUM OF ALL RESPONSES TO PHQ QUESTIONS 1-9: 0
3. TROUBLE FALLING OR STAYING ASLEEP: 0
4. FEELING TIRED OR HAVING LITTLE ENERGY: 0
SUM OF ALL RESPONSES TO PHQ9 QUESTIONS 1 & 2: 0
7. TROUBLE CONCENTRATING ON THINGS, SUCH AS READING THE NEWSPAPER OR WATCHING TELEVISION: 0
SUM OF ALL RESPONSES TO PHQ QUESTIONS 1-9: 0

## 2022-06-02 ASSESSMENT — ENCOUNTER SYMPTOMS
SHORTNESS OF BREATH: 0
NAUSEA: 1
SINUS PRESSURE: 1
RHINORRHEA: 0
SORE THROAT: 0
VOMITING: 0
COUGH: 0

## 2022-06-02 NOTE — PROGRESS NOTES
2022      TELEHEALTH EVALUATION -- Audio/Visual (During UPCIV-66 public health emergency)    HPI:    Cheryle Orellana (:  1980) has requested an audio/video evaluation for the following concern(s):    Not feeling well. Started with dull headache approx 1 week. Associated with random dizziness:described as vertigo, can range from fleeting to hours; nausea, nasal congestion, maxillary sinus pressure,   Denies fevers, vomiting, otalgia, sore throat or cough. Frontal headache pain. Denies lightheadedness. HTN: managed by Dr. Raymundo Castro, last eval 22, Valsartan 80 mg nightly added then. Still taking HCTZ daily. Occasionally checking BP at home, has not checked recently. Compliant with medications. Denies CP, SOB, palpitations or pedal edema. RA: new start Enbrel per Dr Sue Siddiqi. Still taking Plaquenil and Azulfidine. Experiencing numbness and tingling in Lt arm/hand. Usually a sign of RA flare up. Review of Systems   Constitutional: Positive for fatigue. Negative for diaphoresis. HENT: Positive for congestion and sinus pressure. Negative for ear pain, postnasal drip, rhinorrhea and sore throat. Respiratory: Negative for cough and shortness of breath. Cardiovascular: Negative for chest pain, palpitations and leg swelling. Gastrointestinal: Positive for nausea. Negative for vomiting. Musculoskeletal: Positive for arthralgias (generalized). Neurological: Positive for dizziness, numbness (left arm/hand) and headaches. Negative for light-headedness. Prior to Visit Medications    Medication Sig Taking? Authorizing Provider   valsartan (DIOVAN) 80 mg tablet Take 1 tablet by mouth daily Yes ANDREA Gonzales CNP   fluconazole (DIFLUCAN) 150 MG tablet Take one tablet by mouth once.  May repeat 3 days later if symptoms persist Yes ANDREA Gonzales CNP   cefUROXime (CEFTIN) 250 MG tablet Take 1 tablet by mouth 2 times daily for 10 days Yes ANDREA Gonzales CNP   ENBREL MINI 50 MG/ML SOCT INJECT 50MG SUBCUTANEOUSLY  WEEKLY Yes Mayank Bach MD   pantoprazole (PROTONIX) 40 MG tablet TAKE 1 TABLET BY MOUTH DAILY Yes Thomes Query, APRN - CNP   hydroCHLOROthiazide (MICROZIDE) 12.5 MG capsule Take 1 capsule by mouth every morning Yes Thomes Query, APRN - CNP   hydroxychloroquine (PLAQUENIL) 200 MG tablet TAKE 1 TABLET BY MOUTH TWICE DAILY Yes Mayank Bach MD   sulfaSALAzine (AZULFIDINE) 500 MG tablet Take 2 tablets by mouth 2 times daily Yes Mayank Bach MD   meloxicam (MOBIC) 15 MG tablet Take 1 tablet by mouth daily as needed for Pain Yes Mayank Bach MD   desvenlafaxine succinate (PRISTIQ) 100 MG TB24 extended release tablet TAKE 1 TABLET BY MOUTH DAILY Yes Thomes Query, APRN - CNP   rOPINIRole (REQUIP) 3 MG tablet Take 1 tablet by mouth nightly Yes Thomes Query, APRN - CNP   cetirizine (ZYRTEC) 10 MG tablet TAKE 1 TABLET BY MOUTH DAILY Yes Thomes Query, APRN - CNP   busPIRone (BUSPAR) 5 MG tablet TAKE ONE TABLET BY MOUTH THREE TIMES A DAY AS NEEDED FOR ANXIETY Yes Thomes Query, APRN - CNP   estradiol (ESTRACE) 2 MG tablet Take 2 mg by mouth daily Yes Historical Provider, MD   Biotin 1000 MCG TABS Take by mouth Yes Historical Provider, MD   fluticasone (FLONASE) 50 MCG/ACT nasal spray SPRAY TWO SPRAYS IN EACH NOSTRIL ONCE DAILY Yes Thomes Query, APRN - CNP   albuterol sulfate HFA (PROVENTIL HFA) 108 (90 Base) MCG/ACT inhaler Inhale 2 puffs into the lungs every 6 hours as needed for Wheezing Yes Thomes Query, APRN - CNP   Cholecalciferol (VITAMIN D3) 50 MCG (2000 UT) CAPS Take 2,000 capsules by mouth daily Yes Historical Provider, MD   acetaminophen (TYLENOL) 500 MG tablet Take 500 mg by mouth every 6 hours as needed for Pain Yes Historical Provider, MD   SUMAtriptan (IMITREX) 100 MG tablet Take 100 mg by mouth daily as needed Yes Historical Provider, MD       Past Medical History:   Diagnosis Date    Anxiety     Arthritis     RA    Cellulitis of left lower extremity     left ankle    Depression     WELL CONTROLLED 10-16-17    Heart rate fast     intermittent    Kidney stone     Migraine     Motor tic disorder     Obstructive sleep apnea, adult        Past Surgical History:   Procedure Laterality Date    ABDOMINAL EXPLORATION SURGERY  07/22/2011    excision Meckels diverticulum    ABDOMINOPLASTY  04/14/2014    Cindy Alston ADENOIDECTOMY Bilateral     APPENDECTOMY  07/22/2011    BACK SURGERY      CARPAL TUNNEL RELEASE      HYSTERECTOMY, TOTAL ABDOMINAL  06/12/2018    robotic total hyst BSO, Dr Damián Wong    LITHOTRIPSY  x2    LUMBAR LAMINECTOMY  06/2013    Rad; left l5-s1    MECKEL DIVERTICULUM EXCISION      OTHER SURGICAL HISTORY      hymenoplasty    OVARIAN CYST REMOVAL  04/13/2018    OPERATIVE LAPAROSCOPY, LEFT OVARIAN CYSTECTOMY WITH DILATATION AND CURETTAGE    OVARY REMOVAL      TONSILLECTOMY Bilateral     URETHRAL STRICTURE DILATATION      WISDOM TOOTH EXTRACTION         Family History   Problem Relation Age of Onset    Asthma Mother     High Blood Pressure Mother     Diabetes Father     Atrial Fibrillation Father     Alcohol Abuse Father     High Blood Pressure Father     Heart Disease Paternal Grandmother     Heart Attack Paternal Grandmother     Diabetes Paternal Grandmother     Stroke Paternal Grandfather     Stroke Maternal Grandfather     Atrial Fibrillation Maternal Grandmother     Diabetes Maternal Grandmother     Cancer Neg Hx        Allergies   Allergen Reactions    Metronidazole Rash and Other (See Comments)     LIPS WERE TINGLING, tongue tingling, hives all over body    Other      Glue used to adhere recent surgical incision       Social History     Tobacco Use    Smoking status: Never Smoker    Smokeless tobacco: Never Used   Vaping Use    Vaping Use: Never used   Substance Use Topics    Alcohol use: No     Alcohol/week: 0.0 standard drinks     Comment: once a week    Drug use: No          PHYSICAL EXAMINATION:  Vital Signs: BP: 122/88, HR:89 (As obtained by patient/caregiver or practitioner observation)  LMP 03/13/2018 (Exact Date)   Respiratory rate appears normal      Constitutional: Appears well-developed and well-nourished. No apparent distress    Mental status: Alert and awake. Oriented to person/place/darwin. Able to follow commands    Eyes: EOM normal. Sclera normal. No discharge visible  HENT: Normocephalic, atraumatic. Mouth/Throat: Mucous membranes are moist. External Ears Normal    Neck: No visualized mass   Pulmonary/Chest: Respiratory effort normal.  No visualized signs of difficulty breathing or respiratory distress        Musculoskeletal:  Normal range of motion of neck  Neurological:       No Facial Asymmetry (Cranial nerve 7 motor function) (limited exam to video visit). No gaze palsy       Skin:  No significant exanthematous lesions or discoloration noted on facial skin       Psychiatric: Normal Affect. No Hallucinations            ASSESSMENT/PLAN:  1. Vertigo  Suspect related to sinuses  Start Ceftin  Continue Zyrtec and Flonase  Start prednisone taper previously ordered by Dr Agueda Lozada. 2. Acute bacterial sinusitis  -     cefUROXime (CEFTIN) 250 MG tablet; Take 1 tablet by mouth 2 times daily for 10 days, Disp-20 tablet, R-0Normal    3. Essential hypertension  Assessment & Plan:   Well-controlled, continue current medications   Continue treatment plan per Dr. Evi Mcconnell    4. Seronegative rheumatoid arthritis ([facility]  Assessment & Plan:   Borderline controlled, continue medications per Dr Agueda Lozada. Start prednisone taper previously prescribed by Dr Agueda Lozada for left arm/hand numbness/tingling       Return for 5-7 days if no improvement. Darnell Elkins is a 39 y.o. female being evaluated by a Virtual Visit (video visit) encounter to address concerns as mentioned above. A caregiver was present when appropriate.  Due to this being a TeleHealth encounter (During IYDDX-73 public health emergency), evaluation of the following organ systems was limited: Vitals/Constitutional/EENT/Resp/CV/GI//MS/Neuro/Skin/Heme-Lymph-Imm. Pursuant to the emergency declaration under the [address] [place] [address] and the [employer] Act, this Virtual Visit was conducted with patient's (and/or legal guardian's) consent, to reduce the patient's risk of exposure to COVID-19 and provide necessary medical care. The patient (and/or legal guardian) has also been advised to contact this office for worsening conditions or problems, and seek emergency medical treatment and/or call 911 if deemed necessary. Patient identification was verified at the start of the visit: Yes    Total time spent on this encounter: Not billed by time minutes    Services were provided through a video synchronous discussion virtually to substitute for in-person clinic visit. Patient and provider were located at their individual homes. The patient (or guardian if applicable) is aware that this is a billable service, which includes applicable co-pays. This Virtual Visit was conducted with patient's (and/or legal guardian's) consent. The visit was conducted pursuant to the emergency declaration under the [address] [place] [address] and the "[employer] Act. Patient identification was verified, and a caregiver was present when appropriate. The patient was located in a state where the provider was licensed to provide care. --ANDREA Ham CNP on 6/2/2022 at 10:35 AM    An electronic signature was used to authenticate this note. Christopher Cordoba

## 2022-06-02 NOTE — ASSESSMENT & PLAN NOTE
Borderline controlled, continue medications per Dr Claudy Darling.     Start prednisone taper previously prescribed by Dr Claudy Darling for left arm/hand numbness/tingling

## 2022-06-08 ENCOUNTER — TELEPHONE (OUTPATIENT)
Dept: ADMINISTRATIVE | Age: 42
End: 2022-06-08

## 2022-06-08 ENCOUNTER — OFFICE VISIT (OUTPATIENT)
Dept: FAMILY MEDICINE CLINIC | Age: 42
End: 2022-06-08
Payer: MEDICAID

## 2022-06-08 ENCOUNTER — TELEPHONE (OUTPATIENT)
Dept: FAMILY MEDICINE CLINIC | Age: 42
End: 2022-06-08

## 2022-06-08 VITALS
HEART RATE: 82 BPM | DIASTOLIC BLOOD PRESSURE: 84 MMHG | OXYGEN SATURATION: 97 % | SYSTOLIC BLOOD PRESSURE: 116 MMHG | BODY MASS INDEX: 40.1 KG/M2 | WEIGHT: 241 LBS

## 2022-06-08 DIAGNOSIS — R05.8 ALLERGIC COUGH: ICD-10-CM

## 2022-06-08 DIAGNOSIS — L30.9 DERMATITIS: Primary | ICD-10-CM

## 2022-06-08 DIAGNOSIS — R06.2 WHEEZING: ICD-10-CM

## 2022-06-08 DIAGNOSIS — R09.82 POSTNASAL DRIP: ICD-10-CM

## 2022-06-08 PROCEDURE — G8417 CALC BMI ABV UP PARAM F/U: HCPCS | Performed by: NURSE PRACTITIONER

## 2022-06-08 PROCEDURE — 1036F TOBACCO NON-USER: CPT | Performed by: NURSE PRACTITIONER

## 2022-06-08 PROCEDURE — G8427 DOCREV CUR MEDS BY ELIG CLIN: HCPCS | Performed by: NURSE PRACTITIONER

## 2022-06-08 PROCEDURE — 99213 OFFICE O/P EST LOW 20 MIN: CPT | Performed by: NURSE PRACTITIONER

## 2022-06-08 RX ORDER — BETAMETHASONE DIPROPIONATE 0.5 MG/G
OINTMENT TOPICAL
Qty: 45 G | Refills: 0 | Status: SHIPPED | OUTPATIENT
Start: 2022-06-08 | End: 2022-07-08

## 2022-06-08 RX ORDER — ALBUTEROL SULFATE 90 UG/1
2 AEROSOL, METERED RESPIRATORY (INHALATION) EVERY 6 HOURS PRN
Qty: 1 EACH | Refills: 3 | Status: SHIPPED | OUTPATIENT
Start: 2022-06-08 | End: 2022-09-20

## 2022-06-08 RX ORDER — FLUTICASONE PROPIONATE 50 MCG
SPRAY, SUSPENSION (ML) NASAL
Qty: 1 EACH | Refills: 3 | Status: SHIPPED | OUTPATIENT
Start: 2022-06-08 | End: 2022-09-07

## 2022-06-08 ASSESSMENT — ENCOUNTER SYMPTOMS
SHORTNESS OF BREATH: 1
RHINORRHEA: 1
COLOR CHANGE: 1
COUGH: 0
CHEST TIGHTNESS: 0
WHEEZING: 1

## 2022-06-08 ASSESSMENT — PATIENT HEALTH QUESTIONNAIRE - PHQ9
SUM OF ALL RESPONSES TO PHQ QUESTIONS 1-9: 0
4. FEELING TIRED OR HAVING LITTLE ENERGY: 0
SUM OF ALL RESPONSES TO PHQ QUESTIONS 1-9: 0
5. POOR APPETITE OR OVEREATING: 0
9. THOUGHTS THAT YOU WOULD BE BETTER OFF DEAD, OR OF HURTING YOURSELF: 0
SUM OF ALL RESPONSES TO PHQ9 QUESTIONS 1 & 2: 0
1. LITTLE INTEREST OR PLEASURE IN DOING THINGS: 0
3. TROUBLE FALLING OR STAYING ASLEEP: 0
SUM OF ALL RESPONSES TO PHQ QUESTIONS 1-9: 0
8. MOVING OR SPEAKING SO SLOWLY THAT OTHER PEOPLE COULD HAVE NOTICED. OR THE OPPOSITE, BEING SO FIGETY OR RESTLESS THAT YOU HAVE BEEN MOVING AROUND A LOT MORE THAN USUAL: 0
2. FEELING DOWN, DEPRESSED OR HOPELESS: 0
SUM OF ALL RESPONSES TO PHQ QUESTIONS 1-9: 0
10. IF YOU CHECKED OFF ANY PROBLEMS, HOW DIFFICULT HAVE THESE PROBLEMS MADE IT FOR YOU TO DO YOUR WORK, TAKE CARE OF THINGS AT HOME, OR GET ALONG WITH OTHER PEOPLE: 0
7. TROUBLE CONCENTRATING ON THINGS, SUCH AS READING THE NEWSPAPER OR WATCHING TELEVISION: 0
6. FEELING BAD ABOUT YOURSELF - OR THAT YOU ARE A FAILURE OR HAVE LET YOURSELF OR YOUR FAMILY DOWN: 0

## 2022-06-08 NOTE — PATIENT INSTRUCTIONS
Patient Education        Psoriasis: Care Instructions  Overview  Psoriasis (say \"hit-MM-bc-familia\") is a long-term skin problem that causes thick, white, silvery, or red patches on the skin. The patches may be small or large, and they occur most often on the knees, elbows, scalp, hands, feet, or lowerback. The skin may be scaly. If the condition is severe, your skin can become itchy and tender. Psoriasis also can be embarrassing if the patches are on visibleareas. You can treat psoriasis with good care at home and with medicine from your doctor. You may put medicine on your skin and take pills or have shots to stop the redness and swelling. Your doctor also may suggest ultraviolet lighttreatments. Follow-up care is a key part of your treatment and safety. Be sure to make and go to all appointments, and call your doctor if you are having problems. It's also a good idea to know your test results and keep alist of the medicines you take. How can you care for yourself at home?  If your doctor prescribes medicine, use it exactly as prescribed. Follow your doctor's advice for sunlight or ultraviolet light treatment. Call your doctor if you think you are having a problem with your medicine.  Protect your skin:  ? Keep your skin moist. After bathing, put an ointment, cream, or lotion on your skin while it is still damp. This seals in moisture. Use over-the-counter products that your doctor suggests. These may include Cetaphil, Lubriderm, or Eucerin. Petroleum jelly (such as Vaseline) and vegetable shortening (such as Crisco) also work. ? If you have psoriasis on your scalp, use a shampoo with salicylic acid, such as Neutrogena T/Sal.  ? Avoid harsh skin products, such as those that contain alcohol. ? Try to prevent sunburn. Although short periods of sun exposure reduce psoriasis in most people, too much sun can damage the skin and cause skin cancer. In addition, sunburns can trigger psoriasis.  Use sunscreen on areas of your skin that do not have psoriasis. Make sure to use a broad-spectrum sunscreen that has a sun protection factor (SPF) of 30 or higher. Use it every day, even when it is cloudy. ? Take care to avoid accidents such as cutting or scraping your skin. An injury to the skin can cause psoriasis patches to form anywhere on the body, including the area of the injury.  Try making one or more changes to your daily habits to help with managing your psoriasis. For example:  ? Try to control stress and anxiety. They may cause psoriasis to appear suddenly or can make symptoms worse. ? If you smoke, think about quitting. If you need help quitting, talk to your doctor about stop-smoking programs and medicines. ? If you drink, limit or reduce the amount of alcohol you drink. ? If you are overweight, see if you can lose some weight.  Seek support from family and friends. Talk to a counselor or other professional if you feel sad about your condition and need more help. When should you call for help? Call your doctor now or seek immediate medical care if:     You have signs of infection, such as:  ? Increased pain, swelling, warmth, or redness. ? Red streaks leading from the area. ? Pus draining from the area. ? A fever. Watch closely for changes in your health, and be sure to contact your doctor if:     You have swelling, stiffness, or pain in your joints.      You do not get better as expected. Where can you learn more? Go to [email or web address] and sign in to your [employer] account. Enter O974 in the [facility] box to learn more about \"Psoriasis: Care Instructions. \"     If you do not have an account, please click on the \"Sign Up Now\" link. Current as of: November 15, 2021               Content Version: 13.2  © 7507-0622 [employer]wise[employer] Inc[employer]. Care instructions adapted under license by [facility] ([facility]).  If you have questions about a medical condition or this instruction, always ask your healthcare professional. [place] [address] any warranty or liability for your use of this information.

## 2022-06-08 NOTE — PROGRESS NOTES
SUBJECTIVE:  Pt is a of 39 y.o. female comes in today with   Chief Complaint   Patient presents with    Joint Swelling     Pt states right ankle is swollen. Patient presenting today for evaluation of dry cracked skin to feet. Develops hard dry calluses to heels. Started with right heel swelling 1 week and mild redness. Tender to touch. eval with podiatry 1 yr ago, using creams prescribed by them and no improvement. Similar symptoms to left heel approx 1 yr ago and developed cellulitis. H/o RA- compliant with medications    Allergies: requesting refills on Albuterol inhaler and flonase. Current symptoms include dizziness, ear fullness, nasal congestion, rhinorrhea, pnd and wheezing with SOB with exertion. No fevers. Prior to Visit Medications    Medication Sig Taking? Authorizing Provider   meloxicam (MOBIC) 15 MG tablet TAKE 1 TABLET BY MOUTH DAILY AS NEEDED FOR PAIN Yes Lauryn Vera MD   valsartan (DIOVAN) 80 mg tablet Take 1 tablet by mouth daily Yes ANDREA Scruggs CNP   fluconazole (DIFLUCAN) 150 MG tablet Take one tablet by mouth once.  May repeat 3 days later if symptoms persist Yes ANDREA Scruggs CNP   cefUROXime (CEFTIN) 250 MG tablet Take 1 tablet by mouth 2 times daily for 10 days Yes ANDREA Scruggs CNP   ENBREL MINI 50 MG/ML SOCT INJECT 50MG SUBCUTANEOUSLY  WEEKLY Yes Lauryn Vera MD   pantoprazole (PROTONIX) 40 MG tablet TAKE 1 TABLET BY MOUTH DAILY Yes ANDREA Scruggs CNP   hydroCHLOROthiazide (MICROZIDE) 12.5 MG capsule Take 1 capsule by mouth every morning Yes ANDREA Scruggs CNP   hydroxychloroquine (PLAQUENIL) 200 MG tablet TAKE 1 TABLET BY MOUTH TWICE DAILY Yes Lauryn Vera MD   sulfaSALAzine (AZULFIDINE) 500 MG tablet Take 2 tablets by mouth 2 times daily Yes Lauryn Vera MD   desvenlafaxine succinate (PRISTIQ) 100 MG TB24 extended release tablet TAKE 1 TABLET BY MOUTH DAILY Yes ANDREA Scruggs CNP   rOPINIRole (REQUIP) 3 Comments: Dry skin patches to tips of fingers     Neurological:      Mental Status: She is alert and oriented to person, place, and time. Psychiatric:         Behavior: Behavior normal.         Thought Content: Thought content normal.         Judgment: Judgment normal.       Vitals:    06/08/22 0808   BP: 116/84   Pulse: 82   SpO2: 97%   Weight: 241 lb (109.3 kg)             ASSESSMENT:  1. Dermatitis    2. Postnasal drip    3. Allergic cough    4. Wheezing        PLAN:  1. Dermatitis  Uncontrolled  Concern for underlying autoimmune etiology  Recommend Derm eval- referral today  -     augmented betamethasone dipropionate (DIPROLENE-AF) 0.05 % ointment; Apply topically 2 times daily.  -     [facility] - Marylene Spice, MD, Dermatology, [place]  Continue to use cream from podiatrist    2. Postnasal drip  -     fluticasone (FLONASE) 50 MCG/ACT nasal spray; SPRAY TWO SPRAYS IN EACH NOSTRIL ONCE DAILY    3. Allergic cough  -     albuterol sulfate HFA (PROVENTIL HFA) 108 (90 Base) MCG/ACT inhaler; Inhale 2 puffs into the lungs every 6 hours as needed for Wheezing    4. Wheezing  -     albuterol sulfate HFA (PROVENTIL HFA) 108 (90 Base) MCG/ACT inhaler; Inhale 2 puffs into the lungs every 6 hours as needed for Wheezing    See pt instructions  F/u prn. Discussed use, benefit, and side effects of prescribed medications. All patient questions answered. Pt voiced understanding.

## 2022-06-08 NOTE — TELEPHONE ENCOUNTER
Office has been notified that pt is requiring Prior Authorization for the following medication:  --augmented betamethasone dipropionate (DIPROLENE-AF) 0.05 % ointment         Please initiate this request through CoverMyMeds, contacting the following Payor/Insurance:  -[employer]-     Please see below, or the documentation attached to this encounter for any additional information that may assist in processing PA:  (L30.9--     Thank you!

## 2022-06-14 NOTE — TELEPHONE ENCOUNTER
RECEIVED APPROVAL: LETTER ATTACHED. If this requires a response please respond to the pool. [address]). Please advise patient thank you.

## 2022-06-21 ENCOUNTER — TELEPHONE (OUTPATIENT)
Dept: RHEUMATOLOGY | Age: 42
End: 2022-06-21

## 2022-06-21 NOTE — TELEPHONE ENCOUNTER
Pt canceling 6/22 - not feeling well and s-i-l tested + for COVID. Pt rescheduled for first available on August 16th. Please call patient if can be seen sooner.

## 2022-06-22 ENCOUNTER — NURSE ONLY (OUTPATIENT)
Dept: FAMILY MEDICINE CLINIC | Age: 42
End: 2022-06-22

## 2022-06-22 ENCOUNTER — PATIENT MESSAGE (OUTPATIENT)
Dept: FAMILY MEDICINE CLINIC | Age: 42
End: 2022-06-22

## 2022-06-22 DIAGNOSIS — Z20.822 CLOSE EXPOSURE TO COVID-19 VIRUS: Primary | ICD-10-CM

## 2022-06-22 DIAGNOSIS — Z20.822 CLOSE EXPOSURE TO COVID-19 VIRUS: ICD-10-CM

## 2022-06-22 DIAGNOSIS — U07.1 COVID-19: Primary | ICD-10-CM

## 2022-06-22 NOTE — TELEPHONE ENCOUNTER
From: Karlene Meyers  To: Elisabeth Duncan  Sent: 6/22/2022 5:01 AM EDT  Subject: Covid? Hello,    So, Im 99.9999% sure I have Covid. My brother and ZANE were in town this weekend to celebrate Fathers Day. Sunday evening my ZANE tested positive. One other person tested positive, too. My dad wasnt feeling well yesterday and I woke up yesterday morning feeling like crap. I now have a low grade temp, dry cough, on and off again mild headache, along with a couple other symptoms. Anywho, is there anything I could/should be doing? Oh, and my at home test was negative yesterday. Naturally, Im out of tests. The first one didnt work.      Thanks,  Alia Edwards

## 2022-06-23 LAB — SARS-COV-2: DETECTED

## 2022-06-23 RX ORDER — BROMPHENIRAMINE MALEATE, PSEUDOEPHEDRINE HYDROCHLORIDE, AND DEXTROMETHORPHAN HYDROBROMIDE 2; 30; 10 MG/5ML; MG/5ML; MG/5ML
5 SYRUP ORAL 4 TIMES DAILY PRN
Qty: 120 ML | Refills: 0 | Status: SHIPPED | OUTPATIENT
Start: 2022-06-23 | End: 2022-07-13

## 2022-07-07 DIAGNOSIS — I10 ESSENTIAL HYPERTENSION: ICD-10-CM

## 2022-07-07 DIAGNOSIS — F41.8 DEPRESSION WITH ANXIETY: ICD-10-CM

## 2022-07-07 DIAGNOSIS — G25.81 RLS (RESTLESS LEGS SYNDROME): ICD-10-CM

## 2022-07-07 RX ORDER — HYDROCHLOROTHIAZIDE 12.5 MG/1
12.5 CAPSULE, GELATIN COATED ORAL EVERY MORNING
Qty: 90 CAPSULE | Refills: 0 | Status: SHIPPED | OUTPATIENT
Start: 2022-07-07

## 2022-07-07 RX ORDER — ROPINIROLE 3 MG/1
TABLET, FILM COATED ORAL
Qty: 90 TABLET | Refills: 0 | Status: SHIPPED | OUTPATIENT
Start: 2022-07-07

## 2022-07-07 RX ORDER — DESVENLAFAXINE 100 MG/1
100 TABLET, EXTENDED RELEASE ORAL DAILY
Qty: 90 TABLET | Refills: 0 | Status: SHIPPED | OUTPATIENT
Start: 2022-07-07 | End: 2023-01-03

## 2022-07-07 NOTE — TELEPHONE ENCOUNTER
Medication:   Requested Prescriptions     Pending Prescriptions Disp Refills    desvenlafaxine succinate (PRISTIQ) 100 MG TB24 extended release tablet [Pharmacy Med Name: DESVENLAFAXINE ER SUCCINATE 100MG T] 90 tablet 1     Sig: TAKE 1 TABLET BY MOUTH DAILY    rOPINIRole (REQUIP) 3 MG tablet [Pharmacy Med Name: ROPINIROLE 3MG TABLETS] 90 tablet 3     Sig: TAKE 1 TABLET BY MOUTH EVERY NIGHT    hydroCHLOROthiazide (MICROZIDE) 12.5 MG capsule [Pharmacy Med Name: HYDROCHLOROTHIAZIDE 12.5MG CAPSULES] 90 capsule 1     Sig: TAKE 1 CAPSULE BY MOUTH EVERY MORNING       Last Filled:      Patient Phone Number: [phone] (home)     Last appt: 6/8/2022   Next appt: Visit date not found    Lab Results   Component Value Date     05/24/2022    K 4.1 05/24/2022     05/24/2022    CO2 21 05/24/2022    BUN 14 05/24/2022    CREATININE 0.7 05/24/2022    GLUCOSE 91 05/24/2022    CALCIUM 9.5 05/24/2022    PROT 7.4 05/24/2022    LABALBU 4.8 05/24/2022    BILITOT 0.4 05/24/2022    ALKPHOS 75 05/24/2022    AST 31 05/24/2022    ALT 30 05/24/2022    LABGLOM >60 05/24/2022    GFRAA >60 05/24/2022    AGRATIO 1.4 01/06/2021    GLOB 3.0 01/06/2021

## 2022-07-08 NOTE — PROGRESS NOTES
Gregory Drew MD  [facility]) Physicians - Rheumatology    [x] [facility]:  [facility]  [address] [facility] [] [facility]:  [address]   Office: [phone]  Fax: [phone]     RHEUMATOLOGY PROGRESS NOTE    ASSESSMENT/PLAN:  Vinita Parks is a 39 y.o. female w/ seronegative RA and degenerative arthritis s/p lumbar laminectomy in 2013.      PMHx pertinent for EMELINA on CPAP, RLS, depression, anxiety.     Current rheum meds:   mg BID: started in 7/2021  SSZ 1000 mg BID: started in 5/2020  Etanercept 50 mg SC wkly: started in 4/2022  Meloxicam 15 mg daily PRN     Prior rheum meds:  Ibuprofen, Naproxen 500 mg BID  Diclofenac 50 mg TID PRN: caused elevated BP  Adalimumab 40 mg SC Q2wk: took from 11/2021-4/2022    1. Seronegative rheumatoid arthritis ([facility] [address]  Assessment & Plan:  - inflammatory arthritis previously responded well to immunosuppression. Most recently she was switched from Adalimumab to Etanercept in 5/2022. Pt reported persistent pain in her b/l ankles. Clinical Hx of pain is not entire c/w inflammatory pain. Pt also reported recent 20 lb weight gain. Arthralgias did not significantly improve on Prednisone which she took for 30 days. She did have mild swelling in her R wrist and R ankle joints on exam today. - repeat CRP, check MRI R ankle and R hand/wrist joints to evaluate for synovitis, tenosynovitis. - for now, cont Etanercept, HCQ and SSZ. Discussed de-escalating her immunotherapy if she does not have any MR evidence of active inflammatory arthritis. If there is MR evidence of active synovitis, we discussed switching Etanercept to Upadacitinib for better control of her inflammatory arthritis. - avoid further steroids d/t ineffectiveness and recent weight gain. Cont Meloxicam 15 mg daily PRN. Orders:  -     meloxicam (MOBIC) 15 MG tablet;  Take 1 tablet by mouth daily as needed for Pain, Disp-90 tablet, R-0ZERO refills remain on this prescription. Your patient is requesting advance approval of refills for this medication to [address]  -     Etanercept (ENBREL MINI) 50 MG/ML SOCT; Inject 50 mg into the skin once a week, Disp-12 each, R-0Normal  -     hydroxychloroquine (PLAQUENIL) 200 MG tablet; Take 1 tablet by mouth 2 times daily, Disp-180 tablet, R-0Normal  -     sulfaSALAzine (AZULFIDINE) 500 MG tablet; Take 2 tablets by mouth 2 times daily, Disp-360 tablet, R-0Normal  -     MRI ANKLE RIGHT W WO CONTRAST; Future  -     MRI HAND RIGHT W WO CONTRAST; Future  -     C-Reactive Protein; Future  2. Spondylosis of lumbar region without myelopathy or radiculopathy  -     meloxicam (MOBIC) 15 MG tablet; Take 1 tablet by mouth daily as needed for Pain, Disp-90 tablet, R-0ZERO refills remain on this prescription. Your patient is requesting advance approval of refills for this medication to [address]  3. High risk medication use  Assessment & Plan:  - pt stated she is scheduled for annual skin exam by dermatologist for TNF-I use. - annual eye exam for HCQ toxicity monitoring.  - safety labs for SSZ Q3mo. Orders:  -     Hepatic Function Panel; Future  -     CBC with Auto Differential; Future  -     Creatinine; Future     Return in about 10 weeks (around 9/21/2022) for lab result discussion and treatment plan, medication monitoring. The risks and benefits of my recommendations, as well as other treatment options, benefits and side effects were discussed with the patient today. Questions were answered. NOTE: This report is transcribed by using voice recognition software dragon. Every effort is made to ensure accuracy; however, inadvertent computerized  transcription errors may be present. SUBJECTIVE:  Past medical/surgical history, medications and allergies are reviewed and updated as appropriate. Interval Hx:   Pt states she switched Adalimumab to Etanercept approximately 12 wks ago.  She notes no significant improvement in her arthralgias. Wrists are \"OK as long as I don't do anything fine motor\". She reports reduced  strength in her hands. She is unable to tell me if she has any significant arthralgias in her hands or wrists \"because I have so much pain in my ankles\". Ankles hurt worst in the morning. She can barely walk d/t pain in her ankles. Denies MTP pain or toe swelling. Pt reports compliance w/ HCQ and SSZ. She states took Prednisone for 30 days and tapered off last wk, she did not notice any significant improvement in her arthralgias while on Prednisone. She reports some relief from Meloxicam which she's been taking once daily. She has dry, cracked skin in her heels, she is wondering if she has any \"autoimmune\" skin disease. She has gained 20+ over the past yr. Gynecologist reduced her estrogen d/t weight gain.     Rheumatologic ROS:  Constitutional: denies chronic fatigue, fever/chills, night sweats, unintentional weight loss  Integumentary: +chronic mild hair thinning, denies photosensitivity, patchy alopecia, or Sx of Raynaud's phenomenon  Eyes: denies dry eyes, redness or pain, visual disturbance, or floaters  Nose: denies nasal ulcers or recurrent sinusitis  Oral cavity: denies dry mouth or oral ulcers  Cardiovascular: denies CP, palpitations, Hx of pericardial effusion or pericarditis  Respiratory: denies SOB, cough, hemoptysis, or pleurisy  Gastrointestinal: denies heart burn, dysphagia or esophageal dysmotility, denies change in bowel habits or Sx of IBD  Musculoskeletal:  refer to above HPI     Allergies   Allergen Reactions    Metronidazole Rash and Other (See Comments)     LIPS WERE TINGLING, tongue tingling, hives all over body    Other      Glue used to adhere recent surgical incision       Past Medical History:        Diagnosis Date    Anxiety     Arthritis     RA    Cellulitis of left lower extremity     left ankle    Depression     WELL CONTROLLED 10-16-17    Heart rate fast     intermittent    Kidney stone     Migraine     Motor tic disorder     Obstructive sleep apnea, adult        Past Surgical History:        Procedure Laterality Date    ABDOMINAL EXPLORATION SURGERY  07/22/2011    excision Meckels diverticulum    ABDOMINOPLASTY  04/14/2014    Anne-Marie Sick ADENOIDECTOMY Bilateral     APPENDECTOMY  07/22/2011    BACK SURGERY      CARPAL TUNNEL RELEASE      HYSTERECTOMY, TOTAL ABDOMINAL (CERVIX REMOVED)  06/12/2018    robotic total hyst BSO, Dr Bruce Antunez    LITHOTRIPSY  x2    LUMBAR LAMINECTOMY  06/2013    Rad; left l5-s1    MECKEL DIVERTICULUM EXCISION      OTHER SURGICAL HISTORY      hymenoplasty    OVARIAN CYST REMOVAL  04/13/2018    OPERATIVE LAPAROSCOPY, LEFT OVARIAN CYSTECTOMY WITH DILATATION AND CURETTAGE    OVARY REMOVAL      TONSILLECTOMY Bilateral     URETHRAL STRICTURE DILATATION      WISDOM TOOTH EXTRACTION         Medications:    Current Outpatient Medications   Medication Sig Dispense Refill    meloxicam (MOBIC) 15 MG tablet Take 1 tablet by mouth daily as needed for Pain 90 tablet 0    Etanercept (ENBREL MINI) 50 MG/ML SOCT Inject 50 mg into the skin once a week 12 each 0    hydroxychloroquine (PLAQUENIL) 200 MG tablet Take 1 tablet by mouth 2 times daily 180 tablet 0    sulfaSALAzine (AZULFIDINE) 500 MG tablet Take 2 tablets by mouth 2 times daily 360 tablet 0    desvenlafaxine succinate (PRISTIQ) 100 MG TB24 extended release tablet TAKE 1 TABLET BY MOUTH DAILY 90 tablet 0    rOPINIRole (REQUIP) 3 MG tablet TAKE 1 TABLET BY MOUTH EVERY NIGHT 90 tablet 0    hydroCHLOROthiazide (MICROZIDE) 12.5 MG capsule TAKE 1 CAPSULE BY MOUTH EVERY MORNING 90 capsule 0    fluticasone (FLONASE) 50 MCG/ACT nasal spray SPRAY TWO SPRAYS IN EACH NOSTRIL ONCE DAILY 1 each 3    albuterol sulfate HFA (PROVENTIL HFA) 108 (90 Base) MCG/ACT inhaler Inhale 2 puffs into the lungs every 6 hours as needed for Wheezing 1 each 3    valsartan (DIOVAN) 80 mg tablet Take 1 tablet by mouth daily 90 tablet 1    pantoprazole (PROTONIX) 40 MG tablet TAKE 1 TABLET BY MOUTH DAILY 30 tablet 5    cetirizine (ZYRTEC) 10 MG tablet TAKE 1 TABLET BY MOUTH DAILY 30 tablet 11    busPIRone (BUSPAR) 5 MG tablet TAKE ONE TABLET BY MOUTH THREE TIMES A DAY AS NEEDED FOR ANXIETY 90 tablet 3    estradiol (ESTRACE) 2 MG tablet Take 2 mg by mouth daily      Biotin 1000 MCG TABS Take by mouth      Cholecalciferol (VITAMIN D3) 50 MCG (2000 UT) CAPS Take 2,000 capsules by mouth daily      acetaminophen (TYLENOL) 500 MG tablet Take 500 mg by mouth every 6 hours as needed for Pain      SUMAtriptan (IMITREX) 100 MG tablet Take 100 mg by mouth daily as needed       No current facility-administered medications for this visit. OBJECTIVE:  Physical Exam:  BP (!) 124/96 (Site: Right Upper Arm, Position: Sitting, Cuff Size: Medium Adult)   Pulse 95   Temp 98.4 °F (36.9 °C) (Oral)   Wt 243 lb (110.2 kg)   LMP 03/13/2018 (Exact Date)   SpO2 98%   BMI 40.44 kg/m²     GEN: AAOx3, in NAD, well-appearing  HEAD: normocephalic, atraumatic  EYES: no injection or icterus  CVS: RRR  LUNGS: in no acute respiratory distress, CTAB  MSK:  Upper extremities:              Hands: b/l MCP and PIP joints w/o synovitis NTTP, DIP joints w/o swelling NTTP, full fist formation w/ fair  strength              Wrist: R wrist w/ mild swelling TTP, L wrist joint w/o swelling NTTP, good flexion/extension              Elbow: no synovitis or bursitis, FROM  Lower extremities:              Knees: no joint effusion or warmth, NTTP, good ROM              Ankles: R>L ankle w/ small effusion diffusely TTP, good ROM              Feet: no toe swelling or pain or warmth on palpation w/ FROM, negative MTP squeeze test  INTEGUMENT: no rash or psoriatic lesions, no petechiae, bruises, or palpable purpura, no patchy alopecia, no nail or periungual changes, no clubbing or digital ulcers    DATA:  Labs:   I personally reviewed bilaterally with lateral subluxation of the patella bilaterally.      Bilateral feet:   No significant plain film abnormality.      Lumbar spine and sacroiliac joints:   No ankylosis or erosion is appreciated. Mild degenerative changes about the right sacroiliac joint.     I independently reviewed above X-rays, L-spine w/o evidence of SpA, hand joints w/o evidence of chronic inflammatory arthritis/erosive changes.      MRI pelvis (11/20/19):  1. Trace amount of nonspecific fluid in the left sacroiliac joint.  No additional changes to suggest inflammatory arthropathy. 2. Minimal right sacroiliac degenerative change. 3. No abnormality in the hip joints. 4. Mild bilateral gluteus minimus tendinosis and proximal hamstring tendinosis. 5. Mild degenerative changes at L4-5 and L5-S1.      I discussed her MRI findings w/ the reading radiologist on 12/2/19, she has mild degenerative arthritis in her sacroiliac joints but nothing inflammatory in nature.     Above results were discussed w/ the pt in detail during today's visit.

## 2022-07-12 DIAGNOSIS — M06.00 SERONEGATIVE RHEUMATOID ARTHRITIS (HCC): ICD-10-CM

## 2022-07-12 RX ORDER — SULFASALAZINE 500 MG/1
TABLET ORAL
Qty: 360 TABLET | Refills: 0 | OUTPATIENT
Start: 2022-07-12

## 2022-07-12 NOTE — TELEPHONE ENCOUNTER
Last visit:2022     Lab: 2022 and 2022    Next visit: 2022    last refill: 2022 (360 tablets with no refills) RX says  on [facility] medication list

## 2022-07-13 ENCOUNTER — OFFICE VISIT (OUTPATIENT)
Dept: RHEUMATOLOGY | Age: 42
End: 2022-07-13
Payer: MEDICAID

## 2022-07-13 VITALS
HEART RATE: 95 BPM | TEMPERATURE: 98.4 F | BODY MASS INDEX: 40.44 KG/M2 | WEIGHT: 243 LBS | SYSTOLIC BLOOD PRESSURE: 124 MMHG | DIASTOLIC BLOOD PRESSURE: 96 MMHG | OXYGEN SATURATION: 98 %

## 2022-07-13 DIAGNOSIS — M06.00 SERONEGATIVE RHEUMATOID ARTHRITIS (HCC): ICD-10-CM

## 2022-07-13 DIAGNOSIS — Z79.899 HIGH RISK MEDICATION USE: ICD-10-CM

## 2022-07-13 DIAGNOSIS — M47.816 SPONDYLOSIS OF LUMBAR REGION WITHOUT MYELOPATHY OR RADICULOPATHY: ICD-10-CM

## 2022-07-13 DIAGNOSIS — M06.00 SERONEGATIVE RHEUMATOID ARTHRITIS (HCC): Primary | ICD-10-CM

## 2022-07-13 LAB
ALBUMIN SERPL-MCNC: 4.6 G/DL (ref 3.4–5)
ALP BLD-CCNC: 75 U/L (ref 40–129)
ALT SERPL-CCNC: 17 U/L (ref 10–40)
AST SERPL-CCNC: 16 U/L (ref 15–37)
BASOPHILS ABSOLUTE: 0 K/UL (ref 0–0.2)
BASOPHILS RELATIVE PERCENT: 0.4 %
BILIRUB SERPL-MCNC: <0.2 MG/DL (ref 0–1)
BILIRUBIN DIRECT: <0.2 MG/DL (ref 0–0.3)
BILIRUBIN, INDIRECT: NORMAL MG/DL (ref 0–1)
C-REACTIVE PROTEIN: 9.9 MG/L (ref 0–5.1)
CREAT SERPL-MCNC: 0.7 MG/DL (ref 0.6–1.1)
EOSINOPHILS ABSOLUTE: 0.1 K/UL (ref 0–0.6)
EOSINOPHILS RELATIVE PERCENT: 1.4 %
GFR AFRICAN AMERICAN: >60
GFR NON-AFRICAN AMERICAN: >60
HCT VFR BLD CALC: 36.6 % (ref 36–48)
HEMOGLOBIN: 12.1 G/DL (ref 12–16)
LYMPHOCYTES ABSOLUTE: 2.1 K/UL (ref 1–5.1)
LYMPHOCYTES RELATIVE PERCENT: 37.8 %
MCH RBC QN AUTO: 31 PG (ref 26–34)
MCHC RBC AUTO-ENTMCNC: 33.2 G/DL (ref 31–36)
MCV RBC AUTO: 93.5 FL (ref 80–100)
MONOCYTES ABSOLUTE: 0.5 K/UL (ref 0–1.3)
MONOCYTES RELATIVE PERCENT: 9.5 %
NEUTROPHILS ABSOLUTE: 2.8 K/UL (ref 1.7–7.7)
NEUTROPHILS RELATIVE PERCENT: 50.9 %
PDW BLD-RTO: 13.7 % (ref 12.4–15.4)
PLATELET # BLD: 249 K/UL (ref 135–450)
PMV BLD AUTO: 9.1 FL (ref 5–10.5)
RBC # BLD: 3.91 M/UL (ref 4–5.2)
TOTAL PROTEIN: 6.9 G/DL (ref 6.4–8.2)
WBC # BLD: 5.6 K/UL (ref 4–11)

## 2022-07-13 PROCEDURE — 1036F TOBACCO NON-USER: CPT | Performed by: INTERNAL MEDICINE

## 2022-07-13 PROCEDURE — 99214 OFFICE O/P EST MOD 30 MIN: CPT | Performed by: INTERNAL MEDICINE

## 2022-07-13 PROCEDURE — G8427 DOCREV CUR MEDS BY ELIG CLIN: HCPCS | Performed by: INTERNAL MEDICINE

## 2022-07-13 PROCEDURE — G8417 CALC BMI ABV UP PARAM F/U: HCPCS | Performed by: INTERNAL MEDICINE

## 2022-07-13 RX ORDER — MELOXICAM 15 MG/1
15 TABLET ORAL DAILY PRN
Qty: 90 TABLET | Refills: 0 | Status: SHIPPED | OUTPATIENT
Start: 2022-07-13 | End: 2022-08-17

## 2022-07-13 RX ORDER — ETANERCEPT 50 MG/ML
50 SOLUTION SUBCUTANEOUS WEEKLY
Qty: 12 EACH | Refills: 0 | Status: SHIPPED | OUTPATIENT
Start: 2022-07-13 | End: 2022-08-01

## 2022-07-13 RX ORDER — HYDROXYCHLOROQUINE SULFATE 200 MG/1
200 TABLET, FILM COATED ORAL 2 TIMES DAILY
Qty: 180 TABLET | Refills: 0 | Status: SHIPPED | OUTPATIENT
Start: 2022-07-13 | End: 2022-08-17 | Stop reason: SDUPTHER

## 2022-07-13 RX ORDER — SULFASALAZINE 500 MG/1
1000 TABLET ORAL 2 TIMES DAILY
Qty: 360 TABLET | Refills: 0 | Status: SHIPPED | OUTPATIENT
Start: 2022-07-13 | End: 2022-08-17

## 2022-07-13 NOTE — ASSESSMENT & PLAN NOTE
- inflammatory arthritis previously responded well to immunosuppression. Most recently she was switched from Adalimumab to Etanercept in 5/2022. Pt reported persistent pain in her b/l ankles. Clinical Hx of pain is not entire c/w inflammatory pain. Pt also reported recent 20 lb weight gain. Arthralgias did not significantly improve on Prednisone which she took for 30 days. She did have mild swelling in her R wrist and R ankle joints on exam today. - repeat CRP, check MRI R ankle and R hand/wrist joints to evaluate for synovitis, tenosynovitis. - for now, cont Etanercept, HCQ and SSZ. Discussed de-escalating her immunotherapy if she does not have any MR evidence of active inflammatory arthritis. If there is MR evidence of active synovitis, we discussed switching Etanercept to Upadacitinib for better control of her inflammatory arthritis. - avoid further steroids d/t ineffectiveness and recent weight gain. Cont Meloxicam 15 mg daily PRN.

## 2022-07-21 ENCOUNTER — PATIENT MESSAGE (OUTPATIENT)
Dept: RHEUMATOLOGY | Age: 42
End: 2022-07-21

## 2022-07-21 NOTE — TELEPHONE ENCOUNTER
From: Junior Alex  To: Dr. Clark Ill: 7/21/2022 9:31 AM EDT  Subject: RBC    Hi Dr. Lynda Puente,    I noticed that my RBC is low. Should I be concerned?     Thanks,  Beatrice Madrid

## 2022-07-26 ENCOUNTER — HOSPITAL ENCOUNTER (OUTPATIENT)
Dept: MRI IMAGING | Age: 42
Discharge: HOME | End: 2022-07-26
Payer: MEDICAID

## 2022-07-26 DIAGNOSIS — M06.00 SERONEGATIVE RHEUMATOID ARTHRITIS (HCC): ICD-10-CM

## 2022-07-26 PROCEDURE — 73220 MRI UPPR EXTREMITY W/O&W/DYE: CPT

## 2022-07-26 PROCEDURE — A9579 GAD-BASE MR CONTRAST NOS,1ML: HCPCS | Performed by: INTERNAL MEDICINE

## 2022-07-26 PROCEDURE — 6360000004 HC RX CONTRAST MEDICATION: Performed by: INTERNAL MEDICINE

## 2022-07-26 PROCEDURE — 73723 MRI JOINT LWR EXTR W/O&W/DYE: CPT

## 2022-07-26 RX ADMIN — GADOTERIDOL 20 ML: 279.3 INJECTION, SOLUTION INTRAVENOUS at 14:39

## 2022-07-28 ENCOUNTER — PATIENT MESSAGE (OUTPATIENT)
Dept: FAMILY MEDICINE CLINIC | Age: 42
End: 2022-07-28

## 2022-07-28 DIAGNOSIS — F41.8 DEPRESSION WITH ANXIETY: ICD-10-CM

## 2022-07-28 NOTE — TELEPHONE ENCOUNTER
Please schedule her with [facility] psychologist helping our office out please  Call her to schedule

## 2022-07-28 NOTE — TELEPHONE ENCOUNTER
From: Gali Johnson  To: Ijeoma Osborn  Sent: 7/28/2022 9:10 AM EDT  Subject: Therapist      Do you have any suggestions for therapists? Im in a really bad place and think I need to talk to someone. TIA.

## 2022-07-29 NOTE — RESULT ENCOUNTER NOTE
Please let pt know her MRI studies did show evidence of active inflammatory arthritis. As we discussed at her last visit - I recommend switching Enbrel to Rinvoq for better control of her inflammatory arthritis. Her labs are up to date, please let me know if she wishes to proceed and I will send Rx to [facility] [place] for PA. For now cont Enbrel.

## 2022-08-01 ENCOUNTER — TELEPHONE (OUTPATIENT)
Dept: RHEUMATOLOGY | Age: 42
End: 2022-08-01

## 2022-08-01 DIAGNOSIS — M06.00 SERONEGATIVE RHEUMATOID ARTHRITIS (HCC): Primary | ICD-10-CM

## 2022-08-01 RX ORDER — UPADACITINIB 15 MG/1
15 TABLET, EXTENDED RELEASE ORAL DAILY
Qty: 90 TABLET | Refills: 0 | Status: SHIPPED | OUTPATIENT
Start: 2022-08-01 | End: 2022-08-17 | Stop reason: SDUPTHER

## 2022-08-01 RX ORDER — FLUCONAZOLE 150 MG/1
TABLET ORAL
Qty: 2 TABLET | Refills: 3 | Status: SHIPPED | OUTPATIENT
Start: 2022-08-01

## 2022-08-01 NOTE — TELEPHONE ENCOUNTER
Medication:   Requested Prescriptions     Pending Prescriptions Disp Refills    fluconazole (DIFLUCAN) 150 MG tablet [Pharmacy Med Name: FLUCONAZOLE 150MG TABLETS] 2 tablet 3     Sig: TAKE 1 TABLET BY MOUTH 1 TIME. MAY REPEAT 3 DAYS LATER IF SYMPTOMS PERSIST        Last Filled:  6/2/2022    Patient Phone Number: [phone] (home)     Last appt: 6/8/2022   Next appt: Visit date not found    Last OARRS:   RX Monitoring 1/16/2018   Attestation The Prescription Monitoring Report for this patient was reviewed today. Periodic Controlled Substance Monitoring Possible medication side effects, risk of tolerance and/or dependence, and alternative treatments discussed. ;No signs of potential drug abuse or diversion identified.

## 2022-08-01 NOTE — TELEPHONE ENCOUNTER
----- Message from Yocasta Laguna MD sent at 7/29/2022  3:10 PM EDT -----  Please let pt know her MRI studies did show evidence of active inflammatory arthritis. As we discussed at her last visit - I recommend switching Enbrel to Rinvoq for better control of her inflammatory arthritis. Her labs are up to date, please let me know if she wishes to proceed and I will send Rx to [facility] [place] for PA. For now cont Enbrel.

## 2022-08-01 NOTE — TELEPHONE ENCOUNTER
Spoke to patient she would like to proceed with the rinvoq.  Explained to her we would need to do a PA on it so could take a few days to a week before we will know

## 2022-08-02 ENCOUNTER — TELEPHONE (OUTPATIENT)
Dept: ADMINISTRATIVE | Age: 42
End: 2022-08-02

## 2022-08-02 DIAGNOSIS — M06.00 SERONEGATIVE RHEUMATOID ARTHRITIS (HCC): ICD-10-CM

## 2022-08-03 RX ORDER — SULFASALAZINE 500 MG/1
TABLET ORAL
Qty: 320 TABLET | OUTPATIENT
Start: 2022-08-03

## 2022-08-08 RX ORDER — BUSPIRONE HYDROCHLORIDE 5 MG/1
TABLET ORAL
Qty: 90 TABLET | Refills: 3 | Status: SHIPPED | OUTPATIENT
Start: 2022-08-08

## 2022-08-08 NOTE — TELEPHONE ENCOUNTER
Medication:   Requested Prescriptions     Pending Prescriptions Disp Refills    busPIRone (BUSPAR) 5 MG tablet 90 tablet 3     Sig: TAKE ONE TABLET BY MOUTH THREE TIMES A DAY AS NEEDED FOR ANXIETY        Last Filled:      Patient Phone Number: [phone] (home)     Last appt: 6/8/2022   Next appt: 7/31/2022    Last OARRS:   RX Monitoring 1/16/2018   Attestation The Prescription Monitoring Report for this patient was reviewed today. Periodic Controlled Substance Monitoring Possible medication side effects, risk of tolerance and/or dependence, and alternative treatments discussed. ;No signs of potential drug abuse or diversion identified.

## 2022-08-16 ENCOUNTER — PATIENT MESSAGE (OUTPATIENT)
Dept: RHEUMATOLOGY | Age: 42
End: 2022-08-16

## 2022-08-16 RX ORDER — PREDNISONE 1 MG/1
TABLET ORAL
Qty: 52 TABLET | Refills: 0 | Status: SHIPPED | OUTPATIENT
Start: 2022-08-16 | End: 2022-09-01

## 2022-08-17 ENCOUNTER — OFFICE VISIT (OUTPATIENT)
Dept: RHEUMATOLOGY | Age: 42
End: 2022-08-17
Payer: MEDICAID

## 2022-08-17 VITALS — HEIGHT: 65 IN | WEIGHT: 240 LBS | BODY MASS INDEX: 39.99 KG/M2

## 2022-08-17 DIAGNOSIS — Z79.899 HIGH RISK MEDICATION USE: ICD-10-CM

## 2022-08-17 DIAGNOSIS — M06.00 SERONEGATIVE RHEUMATOID ARTHRITIS (HCC): Primary | ICD-10-CM

## 2022-08-17 PROCEDURE — G8417 CALC BMI ABV UP PARAM F/U: HCPCS | Performed by: INTERNAL MEDICINE

## 2022-08-17 PROCEDURE — G8427 DOCREV CUR MEDS BY ELIG CLIN: HCPCS | Performed by: INTERNAL MEDICINE

## 2022-08-17 PROCEDURE — 99215 OFFICE O/P EST HI 40 MIN: CPT | Performed by: INTERNAL MEDICINE

## 2022-08-17 PROCEDURE — 1036F TOBACCO NON-USER: CPT | Performed by: INTERNAL MEDICINE

## 2022-08-17 RX ORDER — UPADACITINIB 15 MG/1
15 TABLET, EXTENDED RELEASE ORAL DAILY
Qty: 90 TABLET | Refills: 0 | Status: SHIPPED | OUTPATIENT
Start: 2022-08-17 | End: 2022-10-11 | Stop reason: SDUPTHER

## 2022-08-17 RX ORDER — FOLIC ACID 1 MG/1
1 TABLET ORAL DAILY
Qty: 90 TABLET | Refills: 0 | Status: SHIPPED | OUTPATIENT
Start: 2022-08-17 | End: 2022-10-11 | Stop reason: SDUPTHER

## 2022-08-17 RX ORDER — HYDROXYCHLOROQUINE SULFATE 200 MG/1
200 TABLET, FILM COATED ORAL 2 TIMES DAILY
Qty: 180 TABLET | Refills: 0 | Status: SHIPPED | OUTPATIENT
Start: 2022-08-17 | End: 2022-10-11 | Stop reason: SDUPTHER

## 2022-08-17 NOTE — PATIENT INSTRUCTIONS
METHOTREXATE DOSING    1. Start taking 4 tablets of Methotrexate once a week for the first 2 weeks. If well-tolerated, then start taking 5 tablets all at once once a week. 2. Check laboratory studies in 4 weeks after starting the Methotrexate  3. Take Folic Acid 1 mg daily     Hold Methotrexate if you are on antibiotics. Restart once and have recovered from the infection. No alcohol while on Methotrexate! PLEASE CALL WITH ANY QUESTIONS OR CONCERNS [phone]    Methotrexate (oral)     Pronunciation: meth oh TREX ate   Brand: Rheumatrex Dose Pack, Trexall   What is methotrexate? Methotrexate interferes with the growth of certain cells of the body, especially cells that reproduce quickly, such as cancer cells, bone marrow cells, and skin cells. Methotrexate is used to treat certain types of cancer of the breast, skin, head and neck, or lung. Methotrexate is also used to treat severe psoriasis and rheumatoid arthritis. Methotrexate is usually given after other medications have been tried without successful treatment of symptoms. Methotrexate may also be used for purposes not listed in this medication guide. What should I discuss with my healthcare provider before taking methotrexate? You should not use this medicine if you are allergic to methotrexate. Do not use methotrexate to treat psoriasis or rheumatoid arthritis if you have:   alcoholism, cirrhosis, or other liver disease;   a blood cell disorder such as anemia (lack of red blood cells) or leukopenia (lack of white blood cells);   a bone marrow disorder; or   if you are breast-feeding a baby. Methotrexate is sometimes used to treat cancer even when patients do have one of the conditions listed above. Your doctor will decide if this treatment is right for you.    To make sure methotrexate is safe for you, tell your doctor if you have:   kidney disease;   a folate deficiency;   pneumonia or lung disease;   stomach ulcers;   any type of infection; or   if you are receiving radiation treatments. Methotrexate can cause birth defects in an unborn baby. Do not use methotrexate to treat psoriasis or rheumatoid arthritis if you are pregnant. Tell your doctor right away if you become pregnant during treatment. You may need to have a negative pregnancy test before starting this treatment. Use birth control to prevent pregnancy while you are using methotrexate, whether you are a man or a woman. Methotrexate use by either parent may cause birth defects. If you are a man, use a condom to keep from causing a pregnancy while you are using methotrexate. Continue using condoms for at least 90 days after your treatment ends. If you are a woman, use an effective form of birth control while you are taking methotrexate, and for at least one cycle of ovulation after your treatment ends. Do not give this medicine to a child without the advice of a doctor. How should I take methotrexate? Follow all directions on your prescription label. Do not take this medicine in larger or smaller amounts or for longer than recommended. You must use the correct dose of methotrexate for your condition. Methotrexate is sometimes taken once or twice per week and not every day. Follow the directions on your prescription label. Some people have  after taking methotrexate every day by accident. Ask your doctor or pharmacist if you have questions about your dose of methotrexate or how often to take it. Use methotrexate regularly to get the most benefit. Get your prescription refilled before you run out of medicine completely. Methotrexate can lower blood cells that help your body fight infections and help your blood to clot. Your blood will need to be tested often, and you may need an occasional liver biopsy. Your cancer treatments may be delayed based on the results of these tests. Store at room temperature away from moisture and heat.    What happens if I miss a burning in your eyes, skin pain, followed by a red or purple skin rash that spreads (especially in the face or upper body) and causes blistering and peeling. Older adults may be more likely to have side effects from this medicine. Common side effects may include:   vomiting, upset stomach;   headache, dizziness, tired feeling; or   blurred vision. This is not a complete list of side effects and others may occur. Call your doctor for medical advice about side effects. You may report side effects to FDA at 0-025-FDA-6535. What other drugs will affect methotrexate? Many drugs can interact with methotrexate. Not all possible interactions are listed here. Tell your doctor about all your medications and any you start or stop using during treatment with methotrexate, especially:   azathioprine;   leucovorin;   phenytoin;   probenecid;   theophylline;   an antibiotic or sulfa drugs;   isotretinoin, retinol, tretinoin;   NSAIDs (non-steroidal anti-inflammatory drugs) --ibuprofen (Advil, Motrin), naproxen (Aleve), celecoxib, diclofenac, indomethacin, meloxicam, and others; or   salicylates --aspirin, Nuprin Backache Caplet, Kaopectate, KneeRelief, Pamprin Cramp Formula, Pepto-Bismol, Tricosal, Trilisate, and others. This list is not complete and many other drugs can interact with methotrexate. This includes prescription and over-the-counter medicines, vitamins, and herbal products. Give a list of all your medicines to any healthcare provider who treats you. Where can I get more information? Your pharmacist can provide more information about methotrexate. Remember, keep this and all other medicines out of the reach of children, never share your medicines with others, and use this medication only for the indication prescribed. Every effort has been made to ensure that the information provided by [address] is accurate, up-to-date, and complete, but no guarantee is made to that effect.  Drug information contained herein may be time sensitive. [employer] information has been compiled for use by healthcare practitioners and consumers in the [place] and therefore [employer] does not warrant that uses outside of the [place] are appropriate, unless specifically indicated otherwise. [facility] drug information does not endorse drugs, diagnose patients or recommend therapy. [employer] drug information is an informational resource designed to assist licensed healthcare practitioners in caring for their patients and/or to serve consumers viewing this service as a supplement to, and not a substitute for, the expertise, skill, knowledge and judgment of healthcare practitioners. The absence of a warning for a given drug or drug combination in no way should be construed to indicate that the drug or drug combination is safe, effective or appropriate for any given patient. [facility] does not assume any responsibility for any aspect of healthcare administered with the aid of information [facility] provides. The information contained herein is not intended to cover all possible uses, directions, precautions, warnings, drug interactions, allergic reactions, or adverse effects. If you have questions about the drugs you are taking, check with your doctor, nurse or pharmacist.   Copyright 3820-9599 [address]. Version: 11.03. Revision date: 1/6/2015. This information does not replace the advice of a doctor. [employer] disclaims any warranty or liability for your use of this information.    Content Version: 04.2.152618

## 2022-08-17 NOTE — ASSESSMENT & PLAN NOTE
- annual eye exam for HCQ toxicity monitoring.  - d/w pt that MTX increases the risk of infections, birth defects, liver toxicity, rare lung problems, probable malignancy and bone marrow toxicity. Discussed need to check safety labs 4 weeks after starting MTX followed by labs every 3 months once we reach a steady dose. Strongly recommended alcohol abstinence. She will obtain labs in 4 wks time after starting MTX and Upadacitinib. - discussed s/e and risks of Upadacitinib including increased risk of thrombosis and GI s/e and provided pt handout.

## 2022-08-31 ENCOUNTER — HOSPITAL ENCOUNTER (OUTPATIENT)
Dept: WOMENS IMAGING | Age: 42
Discharge: HOME | End: 2022-08-31
Payer: MEDICAID

## 2022-08-31 VITALS — WEIGHT: 242 LBS | HEIGHT: 64 IN | BODY MASS INDEX: 41.32 KG/M2

## 2022-08-31 DIAGNOSIS — Z12.31 ENCOUNTER FOR SCREENING MAMMOGRAM FOR BREAST CANCER: ICD-10-CM

## 2022-08-31 PROCEDURE — 77063 BREAST TOMOSYNTHESIS BI: CPT

## 2022-09-02 ENCOUNTER — TELEMEDICINE (OUTPATIENT)
Dept: FAMILY MEDICINE CLINIC | Age: 42
End: 2022-09-02
Payer: MEDICAID

## 2022-09-02 DIAGNOSIS — F41.8 DEPRESSION WITH ANXIETY: Primary | ICD-10-CM

## 2022-09-02 PROCEDURE — 99213 OFFICE O/P EST LOW 20 MIN: CPT | Performed by: NURSE PRACTITIONER

## 2022-09-02 PROCEDURE — 1036F TOBACCO NON-USER: CPT | Performed by: NURSE PRACTITIONER

## 2022-09-02 PROCEDURE — G8417 CALC BMI ABV UP PARAM F/U: HCPCS | Performed by: NURSE PRACTITIONER

## 2022-09-02 PROCEDURE — G8427 DOCREV CUR MEDS BY ELIG CLIN: HCPCS | Performed by: NURSE PRACTITIONER

## 2022-09-02 ASSESSMENT — PATIENT HEALTH QUESTIONNAIRE - PHQ9
4. FEELING TIRED OR HAVING LITTLE ENERGY: 3
1. LITTLE INTEREST OR PLEASURE IN DOING THINGS: 1
8. MOVING OR SPEAKING SO SLOWLY THAT OTHER PEOPLE COULD HAVE NOTICED. OR THE OPPOSITE, BEING SO FIGETY OR RESTLESS THAT YOU HAVE BEEN MOVING AROUND A LOT MORE THAN USUAL: 0
SUM OF ALL RESPONSES TO PHQ9 QUESTIONS 1 & 2: 4
SUM OF ALL RESPONSES TO PHQ QUESTIONS 1-9: 18
10. IF YOU CHECKED OFF ANY PROBLEMS, HOW DIFFICULT HAVE THESE PROBLEMS MADE IT FOR YOU TO DO YOUR WORK, TAKE CARE OF THINGS AT HOME, OR GET ALONG WITH OTHER PEOPLE: 2
SUM OF ALL RESPONSES TO PHQ QUESTIONS 1-9: 18
5. POOR APPETITE OR OVEREATING: 2
7. TROUBLE CONCENTRATING ON THINGS, SUCH AS READING THE NEWSPAPER OR WATCHING TELEVISION: 3
9. THOUGHTS THAT YOU WOULD BE BETTER OFF DEAD, OR OF HURTING YOURSELF: 0
SUM OF ALL RESPONSES TO PHQ QUESTIONS 1-9: 18
3. TROUBLE FALLING OR STAYING ASLEEP: 3
SUM OF ALL RESPONSES TO PHQ QUESTIONS 1-9: 18
2. FEELING DOWN, DEPRESSED OR HOPELESS: 3
6. FEELING BAD ABOUT YOURSELF - OR THAT YOU ARE A FAILURE OR HAVE LET YOURSELF OR YOUR FAMILY DOWN: 3

## 2022-09-02 ASSESSMENT — ENCOUNTER SYMPTOMS
SHORTNESS OF BREATH: 0
GASTROINTESTINAL NEGATIVE: 1
WHEEZING: 0
COUGH: 0
VOMITING: 0
NAUSEA: 0
ABDOMINAL PAIN: 0
CHEST TIGHTNESS: 0
RESPIRATORY NEGATIVE: 1
DIARRHEA: 0

## 2022-09-06 ENCOUNTER — TELEMEDICINE (OUTPATIENT)
Dept: PSYCHOLOGY | Age: 42
End: 2022-09-06
Payer: MEDICAID

## 2022-09-06 DIAGNOSIS — F41.8 ANXIETY WITH DEPRESSION: Primary | ICD-10-CM

## 2022-09-06 PROCEDURE — 90791 PSYCH DIAGNOSTIC EVALUATION: CPT | Performed by: PSYCHOLOGIST

## 2022-09-06 NOTE — PROGRESS NOTES
Behavioral Health Consultation  MARSHA Sanon. Psychology Assistant  Chris Brown, Ph.D. Supervising Psychologist  9/6/2022  12:56 PM EDT      Time spent with Patient: 35 minutes  This is patient's first [facility] appointment. Reason for Consult:    Chief Complaint   Patient presents with    Anxiety    Depression       Pt provided informed consent for the behavioral health program. Discussed with patient model of service to include the limits of confidentiality (i.e. abuse reporting, suicide intervention, etc.) and short-term intervention focused approach. Reviewed provision of services under supervision of licensed psychologist and obtained written consent. Pt indicated understanding. Feedback given to PCP. TELEHEALTH VISIT -- Audio/Visual (During IFDRQ-91 public health emergency)    Pursuant to the emergency declaration under the [facility], [facility] waiver authority and the [facility] [employer] Act, this Virtual Visit was conducted, with patient's consent, to reduce the patient's risk of exposure to COVID-19 and provide continuity of care for an established patient. Services were provided through a video synchronous discussion virtually to substitute for in-person clinic visit. Pt gave verbal informed consent to participate in telehealth services. Conducted a risk-benefit analysis and determined that the patient's presenting problems are consistent with the use of telepsychology. Determined that the patient has sufficient knowledge and skills in the use of technology enabling them to adequately benefit from telepsychology. It was determined that this patient was able to be properly treated without an in-person session. Patient verified that they were currently located at the [facility] address that was provided during registration.     Verified the following information:  Patient's identification: Yes  Patient location: [address] [facility] [address]  Patient's call back number: [phone]   Patient's emergency contact's name and number, as well as permission to contact them if needed: Extended Emergency Contact Information  Primary Emergency Contact: Mallorie Jimenes   [address] Phone: [phone]  Work Phone: [phone]  Mobile Phone: [phone]  Relation: Parent     Provider location: [address]:  Patient presents with concerns about anxiety. Pt reports symptoms of anxiety, including racing thoughts, irritability, poor concentration/focus, restlessness, insomnia, and fatigue. Pt also reports  dry heaving and GI distress. Patient reports depressive symptoms, including  frustration/irritability, depressed mood, insomnia/hypersomnia, and fatigue. Pt reports always being anxious but Sx have been worse for approximately a year, since being diagnosed with COVID in 2021. Sx are present during the nighttime, exacerbated by relationship stressors (father, ex- related to alcohol). Patient finds relief from medication regiment, keeping busy, distraction (phone games). Goals for treatment incude learning better manage anxiety, especially in the evening, using behavioral stragies . Patient lives with 17-year-old daughter (split 50/50 custody w/ ex-). Patient works in [employer] for [employer]. Daily routine is regular. Daily caffeine use 4-5 cups of coffee/pop throughout the day. Limited alcohol use (interactions with RA medication). Patient spends 4-5 hours a day on social media or watching TV. There is no regular exercise; previously went to gym regularly before RA flair up, which has limited her ability to be on her feet over last few months. Patient enjoys the following hobbies: walking, organizing. Patient describes good social support; mom, friends, long-term boyfriend. Patient identifies with Catholicism; intending on increasing attendance, finds calming.  Patient describes poor sleep pattern (5-6 hours); difficulties falling/staying asleep. Previously used a Cpap prior to being dx w/ COVID; eager to restart w/ Cpap in hopes of finding additional relief. Family history is positive for alcohol abuse (father).     O:  MSE:    Appearance: good hygiene   Attitude: cooperative and friendly  Consciousness: alert  Orientation: oriented to person, place, time, general circumstance  Memory: recent and remote memory intact  Attention/Concentration: intact during session  Psychomotor Activity:normal  Eye Contact: normal  Speech: normal rate and volume, well-articulated  Mood: euthymic  Affect: euthymic  Perception: within normal limits  Thought Content: within normal limits  Thought Process: logical, coherent and goal-directed  Insight: good  Judgment: intact  Ability to understand instructions: Yes  Ability to respond meaningfully: Yes  Morbid Ideation: no   Suicide Assessment: no suicidal ideation, plan, or intent  Homicidal Ideation: no    History:    Medications:   Current Outpatient Medications   Medication Sig Dispense Refill    Upadacitinib ER (RINVOQ) 15 MG TB24 Take 15 mg by mouth daily 90 tablet 0    hydroxychloroquine (PLAQUENIL) 200 MG tablet Take 1 tablet by mouth 2 times daily 510 tablet 0    folic acid (FOLVITE) 1 MG tablet Take 1 tablet by mouth daily 90 tablet 0    methotrexate (RHEUMATREX) 2.5 MG chemo tablet Take 5 tablets by mouth once a week 60 tablet 0    busPIRone (BUSPAR) 5 MG tablet TAKE ONE TABLET BY MOUTH THREE TIMES A DAY AS NEEDED FOR ANXIETY 90 tablet 3    fluconazole (DIFLUCAN) 150 MG tablet TAKE 1 TABLET BY MOUTH 1 TIME. MAY REPEAT 3 DAYS LATER IF SYMPTOMS PERSIST 2 tablet 3    desvenlafaxine succinate (PRISTIQ) 100 MG TB24 extended release tablet TAKE 1 TABLET BY MOUTH DAILY 90 tablet 0    rOPINIRole (REQUIP) 3 MG tablet TAKE 1 TABLET BY MOUTH EVERY NIGHT 90 tablet 0    hydroCHLOROthiazide (MICROZIDE) 12.5 MG capsule TAKE 1 CAPSULE BY MOUTH EVERY MORNING 90 capsule 0    fluticasone (FLONASE) 50 MCG/ACT nasal spray SPRAY TWO SPRAYS IN EACH NOSTRIL ONCE DAILY 1 each 3    albuterol sulfate HFA (PROVENTIL HFA) 108 (90 Base) MCG/ACT inhaler Inhale 2 puffs into the lungs every 6 hours as needed for Wheezing 1 each 3    valsartan (DIOVAN) 80 mg tablet Take 1 tablet by mouth daily 90 tablet 1    pantoprazole (PROTONIX) 40 MG tablet TAKE 1 TABLET BY MOUTH DAILY 30 tablet 5    cetirizine (ZYRTEC) 10 MG tablet TAKE 1 TABLET BY MOUTH DAILY 30 tablet 11    estradiol (ESTRACE) 2 MG tablet Take 2 mg by mouth daily      Biotin 1000 MCG TABS Take by mouth      Cholecalciferol (VITAMIN D3) 50 MCG (2000 UT) CAPS Take 2,000 capsules by mouth daily      acetaminophen (TYLENOL) 500 MG tablet Take 500 mg by mouth every 6 hours as needed for Pain      SUMAtriptan (IMITREX) 100 MG tablet Take 100 mg by mouth daily as needed       No current facility-administered medications for this visit.      Social History:   Social History     Socioeconomic History    Marital status:      Spouse name: Not on file    Number of children: Not on file    Years of education: Not on file    Highest education level: Not on file   Occupational History    Not on file   Tobacco Use    Smoking status: Never    Smokeless tobacco: Never   Vaping Use    Vaping Use: Never used   Substance and Sexual Activity    Alcohol use: No     Alcohol/week: 0.0 standard drinks     Comment: once a week    Drug use: No    Sexual activity: Not on file   Other Topics Concern    Not on file   Social History Narrative    Not on file     Social Determinants of Health     Financial Resource Strain: Medium Risk    Difficulty of Paying Living Expenses: Somewhat hard   Food Insecurity: No Food Insecurity    Worried About Running Out of Food in the Last Year: Never true    Ran Out of Food in the Last Year: Never true   Transportation Needs: Not on file   Physical Activity: Not on file   Stress: Not on file   Social Connections: Not on file Intimate Partner Violence: Not on file   Housing Stability: Not on file     TOBACCO:   reports that she has never smoked. She has never used smokeless tobacco.  ETOH:   reports no history of alcohol use. Family History:   Family History   Problem Relation Age of Onset    Asthma Mother     High Blood Pressure Mother     Diabetes Father     Atrial Fibrillation Father     Alcohol Abuse Father     High Blood Pressure Father     Heart Disease Paternal Grandmother     Heart Attack Paternal Grandmother     Diabetes Paternal Grandmother     Stroke Paternal Grandfather     Stroke Maternal Grandfather     Atrial Fibrillation Maternal Grandmother     Diabetes Maternal Grandmother     Cancer Neg Hx        A:  Patient engaged and cooperative. Denies SI. Insight and motivation are good. Diagnosis:    1.  Anxiety with depression          Diagnosis Date    Anxiety     Arthritis     RA    Cellulitis of left lower extremity     left ankle    Depression     WELL CONTROLLED 10-16-17    Heart rate fast     intermittent    Kidney stone     Migraine     Motor tic disorder     Obstructive sleep apnea, adult      Plan:  Pt interventions:  Discussed self-care (sleep, nutrition, rewarding activities, social support, exercise), Established rapport, Conducted functional assessment, [place]-setting to identify pt's primary goals for [facility] visit / overall health, and Supportive techniques    Pt Behavioral Change Plan:   See Pt Instructions

## 2022-09-06 NOTE — PATIENT INSTRUCTIONS
Return to see Omer Stallworth on 9/27 at 10:00 am for a virtual visit  Review included handout on anxiety/depression with Rheumatoid Arthritis. Rheumatoid arthritis and depression     It has been suggested that depression in rheumatoid arthritis (RA) is nearly three times that of the general population, yet it often goes undiagnosed. One of the reasons for this is that some of the symptoms of RA, such as fatigue and poor sleep could easily be attributed to the disease when they could also be an indicator of poor mood and/or anxiety. However, though people with RA are more susceptible to depression than the general population, many with RA will not experience this symptom, and it is thought that it may only affect around 13-20% of RA patients. Caring for yourself     If you are diagnosed with depression, anxiety, or you think you might be beginning to feel low, there are several things you can do to help yourself. ?     - Try to get enough sleep. Decide on a routine and stick to it; that means going to bed and getting up at roughly the same time each day. Our bodies do like routine! If sleep is difficult, distraction from worrying or negative thoughts can be useful. Listen to the radio, read or listen to relaxing music. If the mind is occupied, its less able to focus on worry. - Take a look at your diet and try to make one or two changes to make it healthier if you need to. A good heart-healthy diet may be especially good for people with RA. Try to include oily fish or omega-3 fatty acids in your diet. - If youre finding it hard to cope, avoid alcohol, cannabis and other recreational drugs. They may appear to help but in the long-term cause problems in themselves. - If you have a spiritual life, talk to your clergy or advisor, they may be able to help you with a range of supportive measures. - Concentrate on doing the activities that make you happy.  One of the first ways of tackling low mood is to change behaviour, and increasing pleasurable activities gives you an immediate lift. - Many people who feel depressed lose interest in how they look. Dressing every day and taking pride in your appearance can help raise your self-esteem. - Reward yourself with positive treats to remind yourself that you deserve good things. - Regular exercise can also improve mood, as well as having other health benefits, particularly in people with RA. Your rheumatology team or a physiotherapist might be able to help you with finding a suitable exercise program.your area. It helps to remind yourself that you are not alone feeling like this. - You may also benefit from meeting other people with RA, through online forums or at group meetings, though this will not suit everyone and remember that everyones experiences will be different, so its not always easy to compare experiences.      Visit [email or web address]://nras.org.uk/resource/depression-[email or web address]-rheumatoid-arthritis/ to learn more

## 2022-09-07 DIAGNOSIS — R09.82 POSTNASAL DRIP: ICD-10-CM

## 2022-09-07 RX ORDER — FLUTICASONE PROPIONATE 50 MCG
SPRAY, SUSPENSION (ML) NASAL
Qty: 16 G | Refills: 3 | Status: SHIPPED | OUTPATIENT
Start: 2022-09-07

## 2022-09-07 NOTE — TELEPHONE ENCOUNTER
Medication:   Requested Prescriptions     Pending Prescriptions Disp Refills    fluticasone (FLONASE) 50 MCG/ACT nasal spray [Pharmacy Med Name: FLUTICASONE 50MCG NASAL SP (120) RX] 16 g      Sig: SHAKE LIQUID AND USE 2 SPRAYS IN EACH NOSTRIL EVERY DAY        Last Filled:  6/8/2022    Patient Phone Number: [phone] (home)     Last appt: 9/2/2022   Next appt: Visit date not found    Last OARRS:   RX Monitoring 1/16/2018   Attestation The Prescription Monitoring Report for this patient was reviewed today. Periodic Controlled Substance Monitoring Possible medication side effects, risk of tolerance and/or dependence, and alternative treatments discussed. ;No signs of potential drug abuse or diversion identified.

## 2022-09-19 DIAGNOSIS — R06.2 WHEEZING: ICD-10-CM

## 2022-09-19 DIAGNOSIS — R05.8 ALLERGIC COUGH: ICD-10-CM

## 2022-09-19 NOTE — TELEPHONE ENCOUNTER
Medication:   Requested Prescriptions     Pending Prescriptions Disp Refills    VENTOLIN  (90 Base) MCG/ACT inhaler [Pharmacy Med Name: VENTOLIN HFA INH W/DOS CTR 200PUFFS] 18 g      Sig: INHALE 2 PUFFS INTO THE LUNGS EVERY 6 HOURS AS NEEDED FOR WHEEZING        Last Filled:      Patient Phone Number: [phone] (home)     Last appt: 9/2/2022   Next appt: Visit date not found    Last OARRS:   RX Monitoring 1/16/2018   Attestation The Prescription Monitoring Report for this patient was reviewed today. Periodic Controlled Substance Monitoring Possible medication side effects, risk of tolerance and/or dependence, and alternative treatments discussed. ;No signs of potential drug abuse or diversion identified.

## 2022-09-27 ENCOUNTER — TELEMEDICINE (OUTPATIENT)
Dept: PSYCHOLOGY | Age: 42
End: 2022-09-27
Payer: MEDICAID

## 2022-09-27 ENCOUNTER — HOSPITAL ENCOUNTER (OUTPATIENT)
Age: 42
Discharge: HOME | End: 2022-09-27
Payer: MEDICAID

## 2022-09-27 DIAGNOSIS — F41.8 ANXIETY WITH DEPRESSION: Primary | ICD-10-CM

## 2022-09-27 DIAGNOSIS — Z79.899 HIGH RISK MEDICATION USE: ICD-10-CM

## 2022-09-27 DIAGNOSIS — M06.00 SERONEGATIVE RHEUMATOID ARTHRITIS (HCC): ICD-10-CM

## 2022-09-27 LAB
ALBUMIN SERPL-MCNC: 4.6 G/DL (ref 3.4–5)
ALP BLD-CCNC: 69 U/L (ref 40–129)
ALT SERPL-CCNC: 22 U/L (ref 10–40)
AST SERPL-CCNC: 22 U/L (ref 15–37)
BASOPHILS ABSOLUTE: 0 K/UL (ref 0–0.2)
BASOPHILS RELATIVE PERCENT: 0.3 %
BILIRUB SERPL-MCNC: 0.3 MG/DL (ref 0–1)
BILIRUBIN DIRECT: <0.2 MG/DL (ref 0–0.3)
BILIRUBIN, INDIRECT: NORMAL MG/DL (ref 0–1)
C-REACTIVE PROTEIN: 5.8 MG/L (ref 0–5.1)
CREAT SERPL-MCNC: 0.8 MG/DL (ref 0.6–1.1)
EOSINOPHILS ABSOLUTE: 0 K/UL (ref 0–0.6)
EOSINOPHILS RELATIVE PERCENT: 1 %
GFR AFRICAN AMERICAN: >60
GFR NON-AFRICAN AMERICAN: >60
HCT VFR BLD CALC: 38.4 % (ref 36–48)
HEMOGLOBIN: 12.9 G/DL (ref 12–16)
LYMPHOCYTES ABSOLUTE: 1.7 K/UL (ref 1–5.1)
LYMPHOCYTES RELATIVE PERCENT: 34.6 %
MCH RBC QN AUTO: 30.2 PG (ref 26–34)
MCHC RBC AUTO-ENTMCNC: 33.6 G/DL (ref 31–36)
MCV RBC AUTO: 89.8 FL (ref 80–100)
MONOCYTES ABSOLUTE: 0.4 K/UL (ref 0–1.3)
MONOCYTES RELATIVE PERCENT: 8 %
NEUTROPHILS ABSOLUTE: 2.7 K/UL (ref 1.7–7.7)
NEUTROPHILS RELATIVE PERCENT: 56.1 %
PDW BLD-RTO: 13.5 % (ref 12.4–15.4)
PLATELET # BLD: 241 K/UL (ref 135–450)
PMV BLD AUTO: 8.5 FL (ref 5–10.5)
RBC # BLD: 4.28 M/UL (ref 4–5.2)
TOTAL PROTEIN: 6.7 G/DL (ref 6.4–8.2)
WBC # BLD: 4.8 K/UL (ref 4–11)

## 2022-09-27 PROCEDURE — 90832 PSYTX W PT 30 MINUTES: CPT | Performed by: PSYCHOLOGIST

## 2022-09-27 PROCEDURE — 82565 ASSAY OF CREATININE: CPT

## 2022-09-27 PROCEDURE — 80076 HEPATIC FUNCTION PANEL: CPT

## 2022-09-27 PROCEDURE — 36415 COLL VENOUS BLD VENIPUNCTURE: CPT

## 2022-09-27 PROCEDURE — 85025 COMPLETE CBC W/AUTO DIFF WBC: CPT

## 2022-09-27 PROCEDURE — 86140 C-REACTIVE PROTEIN: CPT

## 2022-09-27 NOTE — PATIENT INSTRUCTIONS
Review cue-controlled relaxation handout and consider incorporating into your evening routine. Return to see Jewel Lim for a virtual visit on Tuesday, October 18th at 9:00 am if needed. Can cancel or reschedule if desired. Cue-Controlled Relaxation   Cue-controlled relaxation can be a quick and easy relaxation technique. There are two different types of cues    External cues. Things you hear, see, or do. Examples might include looking at your watch, hanging up the phone, going to the bathroom, checking email, hearing a tone or alarm, or seeing something in your home or office. Internal cues. Thoughts, emotions, or physical sensations. Examples might include feeling stressed, frustrated, anxious, panicky, or having thoughts about negative events. It is important that once you set your cue you practice relaxed breathing every time the cue occurs, so that being relaxed becomes an automatic habit. When the cue occurs, relax by    taking a slow deep breath,    exhaling comfortably and easily, and    saying a word to yourself as you exhale (e.g., relax or calm).         External cue:__________________________________________   Internal cue:___________________________________________

## 2022-09-27 NOTE — PROGRESS NOTES
Behavioral Health Consultation  MARSHA Knight. Psychology Assistant  Carolee Sands, Ph.D. Supervising Psychologist  9/27/2022  9:59 AM EDT      Time spent with Patient: 20 minutes  This is patient's second [facility] appointment. Reason for Consult:    Chief Complaint   Patient presents with    Depression    Anxiety       Pt provided informed consent for the behavioral health program. Discussed with patient model of service to include the limits of confidentiality (i.e. abuse reporting, suicide intervention, etc.) and short-term intervention focused approach. Pt indicated understanding. Feedback given to PCP. TELEHEALTH VISIT -- Audio/Visual (During DDCLY-35 public health emergency)    Pursuant to the emergency declaration under the [address] [place] [address], [facility] waiver authority and the [employer] Act, this Virtual Visit was conducted, with patient's consent, to reduce the patient's risk of exposure to COVID-19 and provide continuity of care for an established patient. Services were provided through a video synchronous discussion virtually to substitute for in-person clinic visit. Pt gave verbal informed consent to participate in telehealth services. Conducted a risk-benefit analysis and determined that the patient's presenting problems are consistent with the use of telepsychology. Determined that the patient has sufficient knowledge and skills in the use of technology enabling them to adequately benefit from telepsychology. It was determined that this patient was able to be properly treated without an in-person session. Patient verified that they were currently located at the [address] [place] [address] address that was provided during registration.     Verified the following information:  Patient's identification: Yes  Patient location: [address] [place] [address]   Patient's call back number: [phone]   Patient's emergency contact's name and number, as well as permission to contact them if needed: Extended Emergency Contact Information  Primary Emergency Contact: Mallorie Jimenes   [address] Phone: [phone]  Work Phone: [phone]  Mobile Phone: [phone]  Relation: Parent     Provider location: [place]:  Pt reports feeling \"mentally better\"; reports a decrease in anxiety sx. Feeling more confident in medication management for anxiety. Anxiety is worse in the evenings. Trained cue-controlled relaxation and reviewed diaphragmatic breathing. Pt identified the phone ringing in the evening as a trigger for her anxiety; agreed to use the phone ring tone as an external cue to practice relaxation techniques. Has been finding projects around the house to [place] out of her head\", creating a to-do list for the next day before getting into as effective approaches to managing anxiety as well. Notes improvement in sleep quality since incorporating to-do list into her evening routine.     O:  MSE:    Appearance: good hygiene   Attitude: cooperative and friendly  Consciousness: alert  Orientation: oriented to person, place, time, general circumstance  Memory: recent and remote memory intact  Attention/Concentration: intact during session  Psychomotor Activity:normal  Eye Contact: normal  Speech: normal rate and volume, well-articulated  Mood: euthymic  Affect: euthymic  Perception: within normal limits  Thought Content: within normal limits  Thought Process: logical, coherent and goal-directed  Insight: good  Judgment: intact  Ability to understand instructions: Yes  Ability to respond meaningfully: Yes  Morbid Ideation: no   Suicide Assessment: no suicidal ideation, plan, or intent  Homicidal Ideation: no    History:    Medications:   Current Outpatient Medications   Medication Sig Dispense Refill    VENTOLIN  (90 Base) MCG/ACT inhaler INHALE 2 PUFFS INTO THE LUNGS EVERY 6 HOURS AS NEEDED FOR WHEEZING 18 g 0 Smoking status: Never    Smokeless tobacco: Never   Vaping Use    Vaping Use: Never used   Substance and Sexual Activity    Alcohol use: No     Alcohol/week: 0.0 standard drinks     Comment: once a week    Drug use: No    Sexual activity: Not on file   Other Topics Concern    Not on file   Social History Narrative    Not on file     Social Determinants of Health     Financial Resource Strain: Not on file   Food Insecurity: Not on file   Transportation Needs: Not on file   Physical Activity: Not on file   Stress: Not on file   Social Connections: Not on file   Intimate Partner Violence: Not on file   Housing Stability: Not on file     TOBACCO:   reports that she has never smoked. She has never used smokeless tobacco.  ETOH:   reports no history of alcohol use. Family History:   Family History   Problem Relation Age of Onset    Asthma Mother     High Blood Pressure Mother     Diabetes Father     Atrial Fibrillation Father     Alcohol Abuse Father     High Blood Pressure Father     Heart Disease Paternal Grandmother     Heart Attack Paternal Grandmother     Diabetes Paternal Grandmother     Stroke Paternal Grandfather     Stroke Maternal Grandfather     Atrial Fibrillation Maternal Grandmother     Diabetes Maternal Grandmother     Cancer Neg Hx        A:  Patient engaged and cooperative. Denies SI. Insight and motivation are good. Diagnosis:    1.  Anxiety with depression          Diagnosis Date    Anxiety     Arthritis     RA    Cellulitis of left lower extremity     left ankle    Depression     WELL CONTROLLED 10-16-17    Heart rate fast     intermittent    Kidney stone     Migraine     Motor tic disorder     Obstructive sleep apnea, adult      Plan:  Pt interventions:  Trained in relaxation strategies, Provided education, Discussed use of imagery, distractions, relaxation, mood management, communication training, questioning unhelpful thinking, problem-solving, and behavioral activation to manage pain, and Conducted functional assessment    Pt Behavioral Change Plan:   See Pt Instructions

## 2022-10-04 RX ORDER — CETIRIZINE HYDROCHLORIDE 10 MG/1
TABLET ORAL
Qty: 30 TABLET | Refills: 11 | Status: SHIPPED | OUTPATIENT
Start: 2022-10-04

## 2022-10-04 NOTE — TELEPHONE ENCOUNTER
Medication:   Requested Prescriptions     Pending Prescriptions Disp Refills    cetirizine (ZYRTEC) 10 MG tablet [Pharmacy Med Name: CETIRIZINE 10MG TABLETS] 30 tablet 11     Sig: TAKE 1 TABLET BY MOUTH DAILY        Last Filled:      Patient Phone Number: [phone] (home)     Last appt: 9/2/2022   Next appt: Visit date not found    Last OARRS:   RX Monitoring 1/16/2018   Attestation The Prescription Monitoring Report for this patient was reviewed today. Periodic Controlled Substance Monitoring Possible medication side effects, risk of tolerance and/or dependence, and alternative treatments discussed. ;No signs of potential drug abuse or diversion identified.

## 2022-10-07 RX ORDER — PANTOPRAZOLE SODIUM 40 MG/1
TABLET, DELAYED RELEASE ORAL
Qty: 30 TABLET | Refills: 5 | Status: SHIPPED | OUTPATIENT
Start: 2022-10-07

## 2022-10-07 NOTE — TELEPHONE ENCOUNTER
Medication:   Requested Prescriptions     Pending Prescriptions Disp Refills    pantoprazole (PROTONIX) 40 MG tablet [Pharmacy Med Name: PANTOPRAZOLE 40MG TABLETS] 30 tablet 5     Sig: TAKE 1 TABLET BY MOUTH DAILY        Last Filled:  5/10/2022, 30, 5    Patient Phone Number: [phone] (home)     Last appt: 9/2/2022   Next appt: Visit date not found    Last OARRS:   RX Monitoring 1/16/2018   Attestation The Prescription Monitoring Report for this patient was reviewed today. Periodic Controlled Substance Monitoring Possible medication side effects, risk of tolerance and/or dependence, and alternative treatments discussed. ;No signs of potential drug abuse or diversion identified.

## 2022-10-10 ENCOUNTER — PATIENT MESSAGE (OUTPATIENT)
Dept: RHEUMATOLOGY | Age: 42
End: 2022-10-10

## 2022-10-10 NOTE — TELEPHONE ENCOUNTER
From: Deyanira Wise  To: Dr. Keagan Pina: 10/10/2022 9:59 AM EDT  Subject: knee    I've been having significant swelling (it feels like fluid) in my left knee. Could you please give me some ideas on how to help with that?

## 2022-10-10 NOTE — PROGRESS NOTES
Jenna Craven MD  [facility]) Physicians - Rheumatology    [x] [facility]:  [facility]  [address] [facility] [] [place] [address]:  [address], [address]   Office: [phone]  Fax: [phone]     RHEUMATOLOGY PROGRESS NOTE    ASSESSMENT/PLAN:  Skylar Scherer is a 39 y.o. female w/ seronegative RA, OA of knees, degenerative arthritis s/p lumbar laminectomy in 2013 and fibromyalgia. PMHx pertinent for EMELINA on CPAP, RLS, depression, anxiety. Current rheum meds:   mg BID: started in 7/2021  MTX 17.5 mg  Upadacitinib 15 mg PO wkly   Pristiq + Buspirone: per PCP    Prior rheum meds:  Ibuprofen, Naproxen 500 mg BID, Meloxicam 15 mg daily PRN: ineffective  Diclofenac 50 mg TID PRN: caused elevated BP  SSZ 1000 mg BID: took from 5/2020 - 8/2022, switched to MTX d/t active arthritis  Adalimumab 40 mg SC Q2wk: took from 11/2021-4/2022 switched to Etanercept d/t active disease  Etanercept 50 mg SC wkly: took from 4/2022-10/2022 switched to Upadacitinib d/t active disease    1. Seronegative rheumatoid arthritis ([facility] [address]  Assessment & Plan:  - objectively, her inflammatory arthritis has significantly improved since switching Etanercept to Updacitinib and switching SSZ to MTX. Prior tenosynovitis of the fingers and synovitis of the wrists have resolved. CRP is down trending. She had moderate joint effusion in her L knee and diffuse myofascial tenderness suggestive of secondary fibromyalgia. - refer to below #2 for L knee arthrocentesis. - increase MTX dose from 12.5 mg to 17.5 mg PO wkly. - cont  mg BID and Updacitinib 15 mg daily. Orders:  -     folic acid (FOLVITE) 1 MG tablet; Take 1 tablet by mouth daily, Disp-90 tablet, R-0Normal  -     hydroxychloroquine (PLAQUENIL) 200 MG tablet; Take 1 tablet by mouth 2 times daily, Disp-180 tablet, R-0Normal  -     methotrexate (RHEUMATREX) 2.5 MG chemo tablet;  Take 7 tablets by mouth once a week, Disp-84 tablet, R-0, DAWNormal  -     Upadacitinib ER (RINVOQ) 15 MG TB24; Take 15 mg by mouth daily, Disp-90 tablet, R-0Normal  -     pregabalin (LYRICA) 50 MG capsule; Take 1 capsule by mouth 3 times daily for 90 days. , Disp-270 capsule, R-0Normal  -     [facility]; Future  -     C-Reactive Protein; Future  -     Uric Acid; Future  2. Bilateral primary osteoarthritis of knee  Assessment & Plan:  Indication: L knee pain and swelling    Procedure details: The risks and benefits of the procedure were discussed with the pt who then gave verbal consent. The skin was first prepped w/ Chloroprep and alcohol swipe. The area of injection was anaesthetized with topical Ethylene Chloride spray. Using the sterile technique, the L knee joint was entered via the superolateral approach with an 18 G needle and 35 cc of clear yellow synovial fluid as aspirated. Next Kenalog 40 mg with 2 mL of Lidocaine 1% was injected into the joint and the needle was withdrawn. The pt tolerated the procedure well and there were no immediate post-procedure complications. Post injection care was discussed with patient. Pt was instructed to call or return to clinic PRN if such symptoms occur or there is failure to improve as anticipated. Orders:  -     [facility]; Future  -     LA ARTHROCENTESIS ASPIR&/INJ MAJOR JT/BURSA W/O US; Future  -     lidocaine 1 % injection 2 mL; 2 mL, Intra-artICUlar, ONCE, 1 dose, On Tue 10/11/22 at 1415  -     triamcinolone acetonide (KENALOG-40) injection 40 mg; 40 mg, Intra-artICUlar, ONCE, 1 dose, On Tue 10/11/22 at 1415  -     Synovial fluid, crystal; Future  -     Synovial fluid, cell count; Future  3. Spondylosis of lumbar region without myelopathy or radiculopathy  Assessment & Plan:  - did not respond to NSAIDs.  - add Pregabalin as above #4. Orders:  -     [facility]; Future  4.  Fibromyalgia  Assessment & Plan:  - discussed the Dx, pathophysiology and management options of fibromyalgia w/ the pt in detail. Discussed that fibromyalgia is a non-life threatening and non-rheumatic disease. Discussed the various Tx options  including the medical management and PT/aquatic therapy as well as self exercises. Discussed various FDA approved (Duloxetine, Pregabalin, Gabapentin, Milnacipran) and non-FDA approved medications (muscle relaxants, SSRI's) w/ the pt. Provided detailed handout on fibromyalgia to the pt. - start Pregabalin 50 mg BID as pt is concerned about weight gain. - made referral to aquatic PT. Orders:  -     [facility]; Future  5. High risk medication use  Assessment & Plan:  - annual eye exam for HCQ toxicity monitoring.  - d/w pt that MTX increases the risk of infections, birth defects, liver toxicity, rare lung problems, probable malignancy and bone marrow toxicity. Discussed need to check safety labs 4 weeks after starting MTX followed by labs every 3 months once we reach a steady dose. Strongly recommended alcohol abstinence. She will obtain labs in 4 wks time after increasing MTX dose. Orders:  -     ALT; Future  -     AST; Future  -     CBC with Auto Differential; Future  -     Creatinine; Future  -     Lipid, Fasting; Future     Return in about 2 months (around 12/11/2022) for lab result discussion and treatment plan, medication monitoring. TIME SPENT TODAY:  I spent over 40 minutes of face-to-face time with the pt (including taking interval history and performing physical exam, review of medical records, independently interpreting results and communicating results to the pt/family/caregiver, counseling and educating the pt on new Dx and Tx plan for fibromyalgia, high risk DMARDs and toxicity monitoring, implementation of treatment plan, coordination of care, documenting clinical information in the EMR) during today visit.   At least 50% of this time was spent in counseling, explanation of diagnosis, planning of further management, and coordination of care. The risks and benefits of my recommendations, as well as other treatment options, benefits and side effects were discussed with the patient today. Questions were answered. NOTE: This report is transcribed by using voice recognition software dragon. Every effort is made to ensure accuracy; however, inadvertent computerized  transcription errors may be present. SUBJECTIVE:  Past medical/surgical history, medications and allergies are reviewed and updated as appropriate. Interval Hx:   Pt returns to the office today for acute L knee pain. She developed pain and swelling in her L knee last wk. Swelling has improved, she feels this has moved to the back of her knee. She reports widespread pain all over her body. She endorses pain in her wrists, ankles and feet. She is tearful and feels frustrated about her chronic pain. She reports compliance w/ MTX 12.5 mg PO wkly and Upadacitinib 15 mg daily, she has not noticed any significant improvement in her arthralgias since starting these medications. Pt states she took her most recent Prednisone taper two wks ago which did not improve her pain.     Rheumatologic ROS:  Constitutional: denies chronic fatigue, fever/chills, night sweats, unintentional weight loss  Integumentary: +chronic mild hair thinning, denies photosensitivity, patchy alopecia, or Sx of Raynaud's phenomenon  Eyes: denies dry eyes, redness or pain, visual disturbance, or floaters  Nose: denies nasal ulcers or recurrent sinusitis  Oral cavity: denies dry mouth or oral ulcers  Cardiovascular: denies CP, palpitations, Hx of pericardial effusion or pericarditis  Respiratory: denies SOB, cough, hemoptysis, or pleurisy  Gastrointestinal: denies heart burn, dysphagia or esophageal dysmotility, denies change in bowel habits or Sx of IBD  Musculoskeletal:  refer to above HPI     Allergies   Allergen Reactions Metronidazole Rash and Other (See Comments)     LIPS WERE TINGLING, tongue tingling, hives all over body    Other      Glue used to adhere recent surgical incision       Past Medical History:        Diagnosis Date    Anxiety     Arthritis     RA    Cellulitis of left lower extremity     left ankle    Depression     WELL CONTROLLED 10-16-17    Heart rate fast     intermittent    Kidney stone     Migraine     Motor tic disorder     Obstructive sleep apnea, adult        Past Surgical History:        Procedure Laterality Date    ABDOMINAL EXPLORATION SURGERY  07/22/2011    excision Meckels diverticulum    ABDOMINOPLASTY  04/14/2014    [address]    ADENOIDECTOMY Bilateral     APPENDECTOMY  07/22/2011    BACK SURGERY      CARPAL TUNNEL RELEASE      HYSTERECTOMY, TOTAL ABDOMINAL (CERVIX REMOVED)  06/12/2018    robotic total hyst [facility], Dr Malka Dakin    LITHOTRIPSY  x2    LUMBAR LAMINECTOMY  06/2013    Rad; left l5-s1    MECKEL DIVERTICULUM EXCISION      OTHER SURGICAL HISTORY      hymenoplasty    OVARIAN CYST REMOVAL  04/13/2018    OPERATIVE LAPAROSCOPY, LEFT OVARIAN CYSTECTOMY WITH DILATATION AND CURETTAGE    OVARY REMOVAL      TONSILLECTOMY Bilateral     URETHRAL STRICTURE DILATATION      WISDOM TOOTH EXTRACTION         Medications:    Current Outpatient Medications   Medication Sig Dispense Refill    folic acid (FOLVITE) 1 MG tablet Take 1 tablet by mouth daily 90 tablet 0    hydroxychloroquine (PLAQUENIL) 200 MG tablet Take 1 tablet by mouth 2 times daily 180 tablet 0    methotrexate (RHEUMATREX) 2.5 MG chemo tablet Take 7 tablets by mouth once a week 84 tablet 0    Upadacitinib ER (RINVOQ) 15 MG TB24 Take 15 mg by mouth daily 90 tablet 0    pregabalin (LYRICA) 50 MG capsule Take 1 capsule by mouth 3 times daily for 90 days.  270 capsule 0    pantoprazole (PROTONIX) 40 MG tablet TAKE 1 TABLET BY MOUTH DAILY 30 tablet 5    cetirizine (ZYRTEC) 10 MG tablet TAKE 1 TABLET BY MOUTH DAILY 30 tablet 11    VENTOLIN  (90 Base) MCG/ACT inhaler INHALE 2 PUFFS INTO THE LUNGS EVERY 6 HOURS AS NEEDED FOR WHEEZING 18 g 0    fluticasone (FLONASE) 50 MCG/ACT nasal spray SHAKE LIQUID AND USE 2 SPRAYS IN EACH NOSTRIL EVERY DAY 16 g 3    busPIRone (BUSPAR) 5 MG tablet TAKE ONE TABLET BY MOUTH THREE TIMES A DAY AS NEEDED FOR ANXIETY 90 tablet 3    fluconazole (DIFLUCAN) 150 MG tablet TAKE 1 TABLET BY MOUTH 1 TIME. MAY REPEAT 3 DAYS LATER IF SYMPTOMS PERSIST 2 tablet 3    desvenlafaxine succinate (PRISTIQ) 100 MG TB24 extended release tablet TAKE 1 TABLET BY MOUTH DAILY 90 tablet 0    rOPINIRole (REQUIP) 3 MG tablet TAKE 1 TABLET BY MOUTH EVERY NIGHT 90 tablet 0    hydroCHLOROthiazide (MICROZIDE) 12.5 MG capsule TAKE 1 CAPSULE BY MOUTH EVERY MORNING 90 capsule 0    valsartan (DIOVAN) 80 mg tablet Take 1 tablet by mouth daily 90 tablet 1    estradiol (ESTRACE) 2 MG tablet Take 2 mg by mouth daily      Biotin 1000 MCG TABS Take by mouth      Cholecalciferol (VITAMIN D3) 50 MCG (2000 UT) CAPS Take 2,000 capsules by mouth daily      acetaminophen (TYLENOL) 500 MG tablet Take 500 mg by mouth every 6 hours as needed for Pain      SUMAtriptan (IMITREX) 100 MG tablet Take 100 mg by mouth daily as needed       No current facility-administered medications for this visit.         OBJECTIVE:  Physical Exam:  BP (!) 140/88   Pulse 90   Wt 247 lb (112 kg)   LMP 03/13/2018 (Exact Date)   BMI 42.40 kg/m²     GEN: AAOx3, in NAD, well-appearing  HEAD: normocephalic, atraumatic  EYES: no injection or icterus  CVS: RRR  LUNGS: in no acute respiratory distress, CTAB  MSK: there is diffuse myofascial pain w/ 18/18 tender points  Upper extremities:              Hands: prior tenosynovitis of the fingers resolved, b/l MCP, PIP and DIP joints w/o swelling, all joints TTP, full fist formation w/ fair  strength              Wrist: b/l wrists w/o synovitis TTP, good flexion/extension              Elbow: no synovitis or bursitis, FROM  Lower extremities: Knees: L knee w/ moderate joint effusion diffusely TTP, palpable Baker's cyst, good ROM              Ankles: b/l ankles w/ small effusion diffusely TTP, good ROM              Feet: no toe swelling or pain or warmth on palpation w/ FROM, +MTP squeeze test  INTEGUMENT: no rash or psoriatic lesions, no petechiae, bruises, or palpable purpura, no patchy alopecia, no nail or periungual changes, no clubbing or digital ulcers    DATA:  Labs:   I personally reviewed interval labs and discussed w/ the pt in detail which showed:    Lab Results   Component Value Date    WBC 4.8 09/27/2022    HGB 12.9 09/27/2022    HCT 38.4 09/27/2022    MCV 89.8 09/27/2022     09/27/2022    LYMPHOPCT 34.6 09/27/2022    RBC 4.28 09/27/2022    MCH 30.2 09/27/2022    MCHC 33.6 09/27/2022    RDW 13.5 09/27/2022     Lab Results   Component Value Date     05/24/2022    K 4.1 05/24/2022     05/24/2022    CO2 21 05/24/2022    BUN 14 05/24/2022    CREATININE 0.8 09/27/2022    GLUCOSE 91 05/24/2022    CALCIUM 9.5 05/24/2022    PROT 6.7 09/27/2022    LABALBU 4.6 09/27/2022    BILITOT 0.3 09/27/2022    ALKPHOS 69 09/27/2022    AST 22 09/27/2022    ALT 22 09/27/2022    LABGLOM >60 09/27/2022    GFRAA >60 09/27/2022    AGRATIO 1.4 01/06/2021    GLOB 3.0 01/06/2021     Lab Results   Component Value Date    VITD25 36.6 08/17/2021     No results found for: C3     No results found for: C4     No results found for: ANTIDSDNAIGG     No results found for: [facility]     Lab Results   Component Value Date    CRP 5.8 (H) 09/27/2022    CRP 9.9 (H) 07/13/2022    CRP 9.9 (H) 03/09/2022    CRP 8.3 (H) 12/09/2021     Lab Results   Component Value Date    SEDRATE 11 11/06/2019    SEDRATE 13 11/10/2011     No results found for: CKTOTAL    Negative OFE x 2 (11/10/11, 7/16/20)  Negative RF x 2 (11/10/11, 11/6/19)  Negative CCP (11/6/19)  Negative HLA B27 (11/6/19)  Negative hepatitis B and C serologies (11/6/19)  Negative Quantiferon TB (10/14/21)    Imaging:  I personally reviewed interval imaging and discussed w/ the pt in detail which included:    MRI L-spine (5/29/13): IMPRESSION:   Moderate size central and L paracentral disc herniation L5-S1 primarily affecting the left S1 nerve root. Broad-based disc protrusion centrally L4-L5. X-rays (11/7/19): Bilateral hands:   No significant plain film abnormality. No erosion, chondrocalcinosis or fracture. Bilateral knees: Moderate degenerative changes about the patellofemoral space bilaterally with lateral subluxation of the patella bilaterally. Bilateral feet:   No significant plain film abnormality. Lumbar spine and sacroiliac joints:   No ankylosis or erosion is appreciated. Mild degenerative changes about the right sacroiliac joint. I independently reviewed above X-rays, L-spine w/o evidence of SpA, hand joints w/o evidence of chronic inflammatory arthritis/erosive changes. MRI pelvis (11/20/19):  1. Trace amount of nonspecific fluid in the left sacroiliac joint. No additional changes to suggest inflammatory arthropathy. 2. Minimal right sacroiliac degenerative change. 3. No abnormality in the hip joints. 4. Mild bilateral gluteus minimus tendinosis and proximal hamstring tendinosis. 5. Mild degenerative changes at L4-5 and L5-S1. I discussed her MRI findings w/ the reading radiologist on 12/2/19, she has mild degenerative arthritis in her sacroiliac joints but nothing inflammatory in nature. MRI R hand (7/26/22): FINDINGS:   SOFT TISSUES: The visualized flexor and extensor tendons are intact. There is no significant fluid within the tendon sheaths. Postcontrast imaging demonstrates mild enhancement involving the 3rd extensor tendon sheath. No significant flexor tendon sheath enhancement is appreciated. BONE MARROW: There is no evidence of acute fracture or dislocation. A probable cyst is noted within the distal pole of the scaphoid.   There is no diffuse marrow replacing process. JOINTS: No significant synovial process is appreciated. There is no significant synovial enhancement on the postcontrast images. There is enhancement of probable small cysts within the scaphoid and trapezoid. The ulnar styloid process is intact. No significant enthesitis is appreciated. No focal or full-thickness cartilage defects are identified. IMPRESSION:  Minimal 3rd extensor tenosynovitis. Otherwise, no evidence of active inflammatory arthropathy. MRI R ankle (7/26/22): FINDINGS:   SYNDESMOTIC LIGAMENTS: There is thickening of the syndesmotic ligament proximally consistent with prior syndesmotic injury. There is mild thickening of the distal anterior inferior tibiofibular syndesmotic ligament. LATERAL COLLATERAL LIGAMENT COMPLEX: The anterior talofibular ligament is thickened and indistinct consistent with previous high-grade sprain. The calcaneofibular ligament is thickened. The posterior talofibular ligament is intact. DELTOID LIGAMENT COMPLEX: Cystic change is noted in the medial malleolus consistent with prior avulsion injury. There is mild attenuation of the deep fibers of the deltoid ligament. SINUS TARSI AND SPRING LIGAMENT: The sinus tarsi fat is preserved. The superomedial portion of the spring ligament is intact. MEDIAL TENDONS: There is a small amount of fluid within the medial tendon sheaths. The medial tendons remain intact. LATERAL TENDONS: There is a small amount of fluid in the peroneal tendon sheath. The tendons themselves appear intact. ANTERIOR TENDONS: The anterior tendons are intact. ACHILLES TENDON: The Achilles tendon is intact. There is a small amount of fluid in the retrocalcaneal bursa. There is minimal increased T2 signal within the Achilles tendon itself consistent with mild tendinopathy. PLANTAR FASCIA: There is significant thickening of the central cord of the plantar fascia proximally. There is fluid signal intensity within the central cord consistent with small partial-thickness tearing measuring less than 1 cm. There is mild perifascicular edema. There is a mildly edematous plantar calcaneal heel spur. There is edema within the adjacent musculature. TARSAL TUNNEL: There are no obstructing lesions in the tarsal tunnel. BONE MARROW: There is no evidence of acute fracture or dislocation. There is no diffuse marrow replacing process. JOINT SPACES: There is synovitis versus small joint effusion at the tibiotalar articulation. There is full-thickness cartilage fissuring involving the anterior tibial plafond with subchondral edema. There is mild synovitis within the posterior subtalar recess. The Lisfranc ligament is intact. Midfoot alignment is within normal limits. Postcontrast imaging demonstrates synovial enhancement throughout the tibiotalar articulation. There is enhancement of the medial and lateral tendon sheaths consistent with tenosynovitis. There is a small focus of subchondral enhancement involving the posteromedial tibial plafond consistent with cartilage loss. There is enhancement associated with the central cord plantar fasciitis. IMPRESSION:  Tibiotalar joint synovitis. Regions of cartilage loss and subchondral change, consistent with the history of rheumatoid arthritis. Peroneal and medial tendon tenosynovitis, also consistent with the history of rheumatoid arthritis. Severe acute on chronic central cord plantar fasciitis with partial-thickness disruption. Above results were discussed w/ the pt in detail during today's visit.

## 2022-10-11 ENCOUNTER — OFFICE VISIT (OUTPATIENT)
Dept: RHEUMATOLOGY | Age: 42
End: 2022-10-11
Payer: MEDICAID

## 2022-10-11 VITALS
SYSTOLIC BLOOD PRESSURE: 140 MMHG | WEIGHT: 247 LBS | BODY MASS INDEX: 42.4 KG/M2 | HEART RATE: 90 BPM | DIASTOLIC BLOOD PRESSURE: 88 MMHG

## 2022-10-11 DIAGNOSIS — Z79.899 HIGH RISK MEDICATION USE: ICD-10-CM

## 2022-10-11 DIAGNOSIS — M47.816 SPONDYLOSIS OF LUMBAR REGION WITHOUT MYELOPATHY OR RADICULOPATHY: ICD-10-CM

## 2022-10-11 DIAGNOSIS — M06.00 SERONEGATIVE RHEUMATOID ARTHRITIS (HCC): Primary | ICD-10-CM

## 2022-10-11 DIAGNOSIS — M17.0 BILATERAL PRIMARY OSTEOARTHRITIS OF KNEE: ICD-10-CM

## 2022-10-11 DIAGNOSIS — M79.7 FIBROMYALGIA: ICD-10-CM

## 2022-10-11 PROCEDURE — G8427 DOCREV CUR MEDS BY ELIG CLIN: HCPCS | Performed by: INTERNAL MEDICINE

## 2022-10-11 PROCEDURE — 99215 OFFICE O/P EST HI 40 MIN: CPT | Performed by: INTERNAL MEDICINE

## 2022-10-11 PROCEDURE — G8417 CALC BMI ABV UP PARAM F/U: HCPCS | Performed by: INTERNAL MEDICINE

## 2022-10-11 PROCEDURE — G8484 FLU IMMUNIZE NO ADMIN: HCPCS | Performed by: INTERNAL MEDICINE

## 2022-10-11 PROCEDURE — 20610 DRAIN/INJ JOINT/BURSA W/O US: CPT | Performed by: INTERNAL MEDICINE

## 2022-10-11 PROCEDURE — 1036F TOBACCO NON-USER: CPT | Performed by: INTERNAL MEDICINE

## 2022-10-11 RX ORDER — LIDOCAINE HYDROCHLORIDE 10 MG/ML
2 INJECTION, SOLUTION INFILTRATION; PERINEURAL ONCE
Status: COMPLETED | OUTPATIENT
Start: 2022-10-11 | End: 2022-10-11

## 2022-10-11 RX ORDER — FOLIC ACID 1 MG/1
1 TABLET ORAL DAILY
Qty: 90 TABLET | Refills: 0 | Status: SHIPPED | OUTPATIENT
Start: 2022-10-11 | End: 2023-01-09

## 2022-10-11 RX ORDER — HYDROXYCHLOROQUINE SULFATE 200 MG/1
200 TABLET, FILM COATED ORAL 2 TIMES DAILY
Qty: 180 TABLET | Refills: 0 | Status: SHIPPED | OUTPATIENT
Start: 2022-10-11 | End: 2023-01-09

## 2022-10-11 RX ORDER — PREGABALIN 50 MG/1
50 CAPSULE ORAL 3 TIMES DAILY
Qty: 270 CAPSULE | Refills: 0 | Status: SHIPPED | OUTPATIENT
Start: 2022-10-11 | End: 2023-01-09

## 2022-10-11 RX ORDER — TRIAMCINOLONE ACETONIDE 40 MG/ML
40 INJECTION, SUSPENSION INTRA-ARTICULAR; INTRAMUSCULAR ONCE
Status: COMPLETED | OUTPATIENT
Start: 2022-10-11 | End: 2022-10-11

## 2022-10-11 RX ORDER — UPADACITINIB 15 MG/1
15 TABLET, EXTENDED RELEASE ORAL DAILY
Qty: 90 TABLET | Refills: 0 | Status: SHIPPED | OUTPATIENT
Start: 2022-10-11 | End: 2023-01-09

## 2022-10-11 RX ADMIN — TRIAMCINOLONE ACETONIDE 40 MG: 40 INJECTION, SUSPENSION INTRA-ARTICULAR; INTRAMUSCULAR at 13:59

## 2022-10-11 RX ADMIN — LIDOCAINE HYDROCHLORIDE 2 ML: 10 INJECTION, SOLUTION INFILTRATION; PERINEURAL at 13:58

## 2022-10-11 NOTE — ASSESSMENT & PLAN NOTE
- objectively, her inflammatory arthritis has significantly improved since switching Etanercept to Updacitinib and switching SSZ to MTX. Prior tenosynovitis of the fingers and synovitis of the wrists have resolved. CRP is down trending. She had moderate joint effusion in her L knee and diffuse myofascial tenderness suggestive of secondary fibromyalgia. - refer to below #2 for L knee arthrocentesis. - increase MTX dose from 12.5 mg to 17.5 mg PO wkly. - cont  mg BID and Updacitinib 15 mg daily.

## 2022-10-11 NOTE — ASSESSMENT & PLAN NOTE
- annual eye exam for HCQ toxicity monitoring.  - d/w pt that MTX increases the risk of infections, birth defects, liver toxicity, rare lung problems, probable malignancy and bone marrow toxicity. Discussed need to check safety labs 4 weeks after starting MTX followed by labs every 3 months once we reach a steady dose. Strongly recommended alcohol abstinence. She will obtain labs in 4 wks time after increasing MTX dose.

## 2022-10-11 NOTE — ASSESSMENT & PLAN NOTE
- discussed the Dx, pathophysiology and management options of fibromyalgia w/ the pt in detail. Discussed that fibromyalgia is a non-life threatening and non-rheumatic disease. Discussed the various Tx options  including the medical management and PT/aquatic therapy as well as self exercises. Discussed various FDA approved (Duloxetine, Pregabalin, Gabapentin, Milnacipran) and non-FDA approved medications (muscle relaxants, SSRI's) w/ the pt. Provided detailed handout on fibromyalgia to the pt. - start Pregabalin 50 mg BID as pt is concerned about weight gain. - made referral to aquatic PT.

## 2022-10-11 NOTE — PATIENT INSTRUCTIONS
Fibromyalgia           Fast Facts  Fibromyalgia affects two to four percent of people, women more often than men. Doctors diagnose fibromyalgia based on all the patients relevant symptoms (what you feel), no longer just on the number of tender places during an examination. There is no test to detect this disease, but you may need lab tests or X-rays to rule out other health problems. Though there is no cure, medications can reduce symptoms in some patients. Patients also may feel better with proper self-care, such as exercise and getting enough sleep. Fibromyalgia is a common health problem that causes widespread pain and tenderness (sensitivity to touch). The pain and tenderness tend to come and go, and move about the body. Most often, people with this chronic (long-term) illness are fatigued (very tired) and have sleep problems. The diagnosis can be made with a careful examination. Fibromyalgia is most common in women, though it can occur in men. It most often starts in middle adulthood, but can occur in the teen years and in old age. You are at higher risk for fibromyalgia if you have a rheumatic disease (health problem that affects the joints, muscles and bones). These include osteoarthritis, lupus, rheumatoid arthritis or ankylosing spondylitis. What is fibromyalgia? Fibromyalgia is a chronic health problem that causes pain all over the body and other symptoms.  Other symptoms of fibromyalgia that patients most often have are:  Tenderness to touch or pressure affecting muscles and sometimes joints or even the skin   Severe fatigue   Sleep problems (waking up unrefreshed)   Problems with memory or thinking clearly  Some patients also may have:  Depression or anxiety   Migraine or tension headaches   Digestive problems: irritable bowel syndrome (commonly called IBS) or gastroesophageal reflux disease (often referred to as GERD)   Irritable or overactive bladder   Pelvic pain   Temporomandibular disorder--often called TMJ (a set of symptoms including face or jaw pain, jaw clicking and ringing in the ears)  Symptoms of fibromyalgia and its related problems can vary in intensity, and will wax and wane over time. Stress often worsens the symptoms. What causes fibromyalgia? The causes of fibromyalgia are unclear. They may be different in different people. Fibromyalgia may run in families. There likely are certain genes that can make people more prone to getting fibromyalgia and the other health problems that can occur with it. Genes alone, though, do not cause fibromyalgia. There is most often some triggering factor that sets off fibromyalgia. It may be spine problems, arthritis, injury, or other type of physical stress. Emotional stress also may trigger this illness. The result is a change in the way the body talks with the spinal cord and brain. Levels of brain chemicals and proteins may change. For the person with fibromyalgia, it is as though the volume control is turned up too high in the brain's pain processing centers. How is fibromyalgia diagnosed? A doctor will suspect fibromyalgia based on your symptoms. Doctors may require that you have tenderness to pressure or tender points at a specific number of certain spots before saying you have fibromyalgia, but they are not required to make the diagnosis (see the Box). A physical exam can be helpful to detect tenderness and to exclude other causes of muscle pain. There are no diagnostic tests (such as X-rays or blood tests) for this problem. Yet, you may need tests to rule out another health problem that can be confused with fibromyalgia. Because widespread body pain is the main feature of fibromyalgia, health care providers will ask you to describe your pain. This may help tell the difference between fibromyalgia and other diseases with similar symptoms.  Other conditions such as hypothyroidism (underactive thyroid gland) and polymyalgia rheumatica sometimes mimic fibromyalgia. Blood tests can tell if you have either of these problems. Sometimes, fibromyalgia is confused with rheumatoid arthritis or lupus. But, again, there is a difference in the symptoms, physical findings and blood tests that will help your health care provider detect these health problems. Unlike fibromyalgia, these rheumatic diseases cause inflammation in the joints and tissues. Criteria Needed for a Fibromyalgia Diagnosis       1. Pain and symptoms over the past week, based on the total of: Number of painful areas out of 19 parts of the body Plus level of severity of these symptoms: a. Fatigue b. Waking unrefreshed c. Cognitive (memory or thought) problems Plus number of other general physical symptoms    2. Symptoms lasting at least three months at a similar level   3. No other health problem that would explain the pain and other          How is fibromyalgia treated? There is no cure for fibromyalgia. However, symptoms can be treated with both medication and non-drug treatments. Many times the best outcomes are achieved by using multiple types of treatments. Medications: The U.[place]. Food and Drug Administration has approved three drugs for the treatment of fibromyalgia. They include two drugs that change some of the brain chemicals (serotonin and norepinephrine) that help control pain levels: duloxetine (Cymbalta) and milnacipran (Savella). Older drugs that affect these same brain chemicals also may be used to treat fibromyalgia. These include amitriptyline (Elavil) and cyclobenzaprine (Flexeril). Other antidepressant drugs can be helpful in some patients. Side effects vary by the drug. Ask your doctor about the risks and benefits of your medicine. The other drug approved for fibromyalgia is pregabalin (Lyrica). Pregabalin and another drug, gabapentin (Neurontin), work by blocking the over activity of nerve cells involved in pain transmission.  These medicines may cause dizziness, sleepiness, swelling and weight gain. Doctors do not recommend opioid narcotics for treating fibromyalgia. The reason for this is that research evidence suggests these drugs are not of great benefit to most people with fibromyalgia. In fact, they may cause greater pain sensitivity or make pain persist. Tramadol (Ultram) may be used to treat fibromyalgia pain if short-term use of an opioid narcotic is needed. Over-the-counter medicines such as acetaminophen (Tylenol) or nonsteroidal anti-inflammatory drugs (commonly called NSAIDs) like ibuprofen (Advil, Motrin) or naproxen (Aleve, Anaprox) are not effective for fibromyalgia pain. Yet, these drugs may be useful to treat the pain triggers of fibromyalgia. Thus, they are most useful in people who have other causes for pain such as arthritis in addition to fibromyalgia. For sleep problems, some of the medicines that treat pain also improve sleep. These include cyclobenzaprine (Flexeril), amitriptyline (Elavil), gabapentin (Neurontin) or pregabalin (Lyrica). It is not recommended that patients with fibromyalgia take sleeping medicines like zolpidem (Ambien) or benzodiazepine medications. Other Therapies: People with fibromyalgia should use non-drug treatments as well as any medicines their doctors suggest. Research shows that the most effective treatment for fibromyalgia is physical exercise. Physical exercise should be used in addition to any drug treatment. Patients benefit most from aerobic exercises. Other body-based therapies including Jr Chi and yoga can ease fibromyalgia symptoms. Cognitive behavioral therapy is a type of therapy focused on understanding how thoughts and behaviors affect pain and other symptoms. CBT and related treatments such as mindfulness can help patients learn symptom reduction skills that lessen pain.   Other complementary and alternative therapies (sometimes called CAM or integrative medicine), such as acupuncture, chiropractic and massage therapy, can be useful to manage fibromyalgia symptoms. Many of these treatments, though, have not been well tested in patients with fibromyalgia. Living with fibromyalgia  Even with the many treatment options, patient self-care is vital to improving symptoms and daily function. In concert with medical treatment, healthy lifestyle behaviors can reduce pain, increase sleep quality, lessen fatigue and help you cope better with fibromyalgia. With proper treatment and self-care, you can get better and live a more normal life. Here are some self-care tips for living with fibromyalgia:  Make time to relax each day. Deep-breathing exercises and meditation will help reduce the stress that can bring on symptoms. Set a regular sleep pattern. Go to bed and wake up at the same time each day. Getting enough sleep lets your body repair itself, physically and mentally. Also, avoid daytime napping and limit caffeine intake, which can disrupt sleep. Nicotine is a stimulant, so those fibromyalgia patients with sleep problems should stop smoking. Exercise often. This is a very important part of fibromyalgia treatment. While difficult at first, regular exercise often reduces pain symptoms and fatigue. Patients should follow the saying, Start low, go slow.  Slowly add daily fitness into your routine. For instance, take the stairs instead of the elevator, or park further away from the store. As your symptoms decrease with drug treatments, start increasing your activity. Add in some walking, swimming, water aerobics and/or stretching exercises, and begin to do things that you stopped doing because of your pain and other symptoms. It takes time to create a comfortable routine. Just get moving, stay active and don't give up! Educate yourself. Nationally recognized organizations like the [facility] are great resources for information.  Share this information with family, friends and co-workers. Look forward, not backward. Focus on what you need to do to get better, not what caused your illness. The role of the rheumatologist  Fibromyalgia is not a form of arthritis (joint disease). It does not cause inflammation or damage to joints, muscles or other tissues. However, because fibromyalgia can cause chronic pain and fatigue similar to arthritis, some people may think of it as a rheumatic condition. As a result, often a rheumatologist detects this disease (and rules out other rheumatic diseases). Your primary care physician can provide all the other care and treatment of fibromyalgia that you need. Additional Information  The [employer] has compiled this list to give you a starting point for your own additional research. The ACR does not endorse or maintain these Web sites, and is not responsible for any information or claims provided on them. It is always best to talk with your rheumatologist for more information and before making any decisions about your care. C/ [address] of Arth[employer] and Musculoskeletal and Skin Diseases   National Fibromyalgia Association   National Fibromyalgia and Chronic Pain [address] [facility].  Updated May 2015. Written by Paul Chavez MD, and reviewed by the [employer] Committee on Communications and Marketing. This information is provided for general education only. Individuals should consult a qualified health care provider for professional medical advice, diagnosis and treatment of a medical or health condition. © 2015 [employer].

## 2022-10-11 NOTE — ASSESSMENT & PLAN NOTE
Indication: L knee pain and swelling    Procedure details: The risks and benefits of the procedure were discussed with the pt who then gave verbal consent. The skin was first prepped w/ Chloroprep and alcohol swipe. The area of injection was anaesthetized with topical Ethylene Chloride spray. Using the sterile technique, the L knee joint was entered via the superolateral approach with an 18 G needle and 35 cc of clear yellow synovial fluid as aspirated. Next Kenalog 40 mg with 2 mL of Lidocaine 1% was injected into the joint and the needle was withdrawn. The pt tolerated the procedure well and there were no immediate post-procedure complications. Post injection care was discussed with patient. Pt was instructed to call or return to clinic PRN if such symptoms occur or there is failure to improve as anticipated.

## 2022-10-12 LAB
APPEARANCE FLUID: NORMAL
CELL COUNT FLUID TYPE: NORMAL
CLOT EVALUATION: NORMAL
COLOR FLUID: YELLOW
CRYSTALS, FLUID: NORMAL
LYMPHOCYTES, BODY FLUID: 16 %
MACROPHAGE FLUID: 52 %
MONOCYTE, FLUID: 29 %
NEUTROPHIL, FLUID: 3 %
NUCLEATED CELLS FLUID: 225 /CUMM
RBC FLUID: 0 /CUMM
SOURCE BODY FLUID: NORMAL

## 2022-10-13 ENCOUNTER — PATIENT MESSAGE (OUTPATIENT)
Dept: RHEUMATOLOGY | Age: 42
End: 2022-10-13

## 2022-10-13 NOTE — TELEPHONE ENCOUNTER
Should not be from the steroid injection, if anything steroids are used to treat rashes. Ok to cont her RA medications if she does not have rash elsewhere in her body. Needs to be seen by myself, PCP or dermatology if rash does not resolve. Cannot treat or give advice over MyChart.

## 2022-10-13 NOTE — TELEPHONE ENCOUNTER
From: Rula Byers  To: Dr. Arroyo Emperatriz: 10/13/2022 8:55 AM EDT  Subject: rash    Good morning,    I woke up with a rash that is running across my face that is warm to the touch. It's under each eye and over my nose. Could this be from the steroid injection? I haven't used/eaten anything new.      Thanks,  Cheyenne Kidd

## 2022-10-14 NOTE — TELEPHONE ENCOUNTER
Spoke with patient and rash has cleared up. Gave patient Dr. Dwana Kussmaul instructions. Patient stated understanding.

## 2022-10-17 ENCOUNTER — HOSPITAL ENCOUNTER (OUTPATIENT)
Dept: PHYSICAL THERAPY | Age: 42
Setting detail: THERAPIES SERIES
Discharge: HOME | End: 2022-10-17
Payer: MEDICAID

## 2022-10-17 DIAGNOSIS — M79.7 FIBROMYALGIA: ICD-10-CM

## 2022-10-17 DIAGNOSIS — R05.8 ALLERGIC COUGH: ICD-10-CM

## 2022-10-17 DIAGNOSIS — M17.0 BILATERAL PRIMARY OSTEOARTHRITIS OF KNEE: ICD-10-CM

## 2022-10-17 DIAGNOSIS — M47.816 SPONDYLOSIS OF LUMBAR REGION WITHOUT MYELOPATHY OR RADICULOPATHY: ICD-10-CM

## 2022-10-17 DIAGNOSIS — R06.2 WHEEZING: ICD-10-CM

## 2022-10-17 DIAGNOSIS — M06.00 SERONEGATIVE RHEUMATOID ARTHRITIS (HCC): ICD-10-CM

## 2022-10-17 PROCEDURE — 97161 PT EVAL LOW COMPLEX 20 MIN: CPT

## 2022-10-17 NOTE — TELEPHONE ENCOUNTER
Medication:   Requested Prescriptions     Pending Prescriptions Disp Refills    VENTOLIN  (90 Base) MCG/ACT inhaler [Pharmacy Med Name: VENTOLIN HFA INH W/DOS CTR 200PUFFS] 18 g 0     Sig: INHALE 2 PUFFS INTO THE LUNGS EVERY 6 HOURS AS NEEDED FOR WHEEZING        Last Filled:      Patient Phone Number: [phone] (home)     Last appt: 9/2/2022   Next appt: Visit date not found    Last OARRS:   RX Monitoring 10/11/2022   Attestation -   Periodic Controlled Substance Monitoring No signs of potential drug abuse or diversion identified.

## 2022-10-17 NOTE — PLAN OF CARE
[address] [address], [address] [place] [address] Drive  Phone: [phone]   Fax: [phone]     Physical Therapy Certification    Dear Rita Guadarrama MD  ,    We had the pleasure of evaluating the following patient for physical therapy services at [address] [place] [address]. A summary of our findings can be found in the initial assessment below. This includes our plan of care. If you have any questions or concerns regarding these findings, please do not hesitate to contact me at the office phone number checked above. Thank you for the referral.       Physician Signature:_______________________________Date:__________________  By signing above (or electronic signature), therapists plan is approved by physician      Patient: Kemi Concepcion   : 1980   MRN: [id number]  Referring Physician: Rita Guadarrama MD        Evaluation Date: 10/17/2022      Medical Diagnosis Information:  Seronegative rheumatoid arthritis ([facility] [address] [M06.00]  Spondylosis of lumbar region without myelopathy or radiculopathy [M47.816]  Fibromyalgia [M79.7]  Bilateral primary osteoarthritis of knee [M17.0]   PT diagnosis: Increased pain throughout lower body joints and B hands, proximal hip weakness, decreased LE flexibility, limited activity and prolonged position tolerance, intermittent numbness in hands and feet , decreased L ankle DF AROM                                         Insurance information: PT Insurance Information: [address] - 30 v/yr hard max, no copay, AUTH     Precautions/ Contra-indications: Baker's cyst - L knee   Latex Allergy:  [x]NO      []YES  Preferred Language for Healthcare:   [x]English       []Other:    C-SSRS Triggered by Intake questionnaire (Past 2 wk assessment ):   [x] No, Questionnaire did not trigger screening.   [] Yes, Patient intake triggered C-SSRS Screening     [] Completed, no further action required.    [] Completed, PCP notified via Epic    SUBJECTIVE: Patient stated complaint: Pt referred to OP PT for pain related to RA, OA, and fibromylagia. She has had several other treatments prior to this but has not yet tried physical therapy. At this point she is only getting relief from her prescribed medication. She reports she did have a steroid injection in her L knee last week and that was the best she has felt in a long time. She has been on several steroid tapers in the past but relief is minimal. Her hands go numb intermittently and the lateral part of her L foot is numb constantly. It has been numb since her back surgery. The R foot only goes numb occasionally. She had also been trying to walk for exercise but reports if she walks for too long, her body swells up. She uses a nonreciprocal step pattern when going up to her second floor. Her standing tolerance is limited, she can stand to cook a meal but repositions herself frequently. Fear avoidance: I should not do physical activities that (might) make my pain worse   [] True   [x] False     Relevant Medical History:See below   Functional Outcome: FOTO physical FS primary measure score = 54; Risk adjusted = 45    Pain Scale: 6/10  Easing factors: medications  Provocative factors: Standing, walking,     Type: [x]Constant   []Intermittent  []Radiating []Localized []other:     Numbness/Tingling: See above     Occupation/School: Works from home, has a desk job,  sitting but repositioning frequently     Living Status/Prior Level of Function: Prior to this injury / incident, pt was independent with ADLs and IADLs. Loves yard work, couldn't perform at all this summer due to pain. Pt lives in a 2 story house with her teenage daughter.        OBJECTIVE:   Palpation: Baker's cyst noted L knee     Posture: Good     Bandages/Dressings/Incisions: NA    Dermatomes Normal Abnormal Comments   inguinal area (L1)       anterior mid-thigh (L2) x     distal ant thigh/med knee (L3) x     medial lower leg and foot (L4) x     lateral lower leg and foot (L5) x     posterior calf (S1) x     medial calcaneus (S2) x         Reflexes - not assessed  Normal Abnormal Comments   S1-2 Seated achilles      S1-2 Prone knee bend      L3-4 Patellar tendon      Clonus      Babinski          ROM  Comments   Lumbar Flex 80    Lumbar Ext To neutral *most painful mvmt     ROM LEFT RIGHT Comments   Lumbar Side Bend 25 25    Lumbar Rotation WNL WNL    Hip Flexion      Hip Abd      Hip ER      Hip IR      Hip Extension      Knee Ext      Knee Flex      Ankle DF 5 10    Hamstring Flex Lack 25 Lack 20     Piriformis                      Strength / Myotomes LEFT RIGHT Comments   Multifidus      Transverse Ab      Hip Flexors (L1-2) 3+ 4+    Hip Abductors 4+ 4+    Hip Extensors 3+ 3+ Glute bias:  R- 4; L- 3+   Hip Internal Rotators 5 5    Hip External Rotators 4 5    Quads (L2-4) 4- 5    Hamstrings  5 5    Ankle Plantarflexion (S1-2)      Ankle Dorsiflexion (L4-5)      Ankle Inversion      Ankle Eversion (S1-2)      Great Toe Extension (L5)        [x] Patient history, allergies, meds reviewed. Medical chart reviewed. See intake form. Review Of Systems (ROS):  [x]Performed Review of systems (Integumentary, CardioPulmonary, Neurological) by intake and observation. Intake form has been scanned into medical record. Patient has been instructed to contact their primary care physician regarding ROS issues if not already being addressed at this time.       Co-morbidities/Complexities (which will affect course of rehabilitation):   []None        []Hx of COVID   Arthritic conditions   [x]Rheumatoid arthritis (M05.9)  []Osteoarthritis (M19.91)  []Gout   Cardiovascular conditions   []Hypertension (I10)  []Hyperlipidemia (E78.5)  []Angina pectoris (I20)  []Atherosclerosis (I70)  []Pacemaker  []Hx of CABG/stent/  cardiac surgeries   Musculoskeletal conditions   []Disc pathology   []Congenital spine pathologies   []Osteoporosis (M81.8)  []Osteopenia (M85.8)  []Scoliosis       Endocrine conditions   []Hypothyroid (E03.9)  []Hyperthyroid Gastrointestinal conditions   []Constipation (P49.50)   Metabolic conditions   []Morbid obesity (E66.01)  []Diabetes type 1(E10.65) or 2 (E11.65)   []Neuropathy (G60.9)     Cardio/Pulmonary conditions   []Asthma (J45)  []Coughing   []COPD (J44.9)  []CHF  []A-fib   Psychological Disorders  [x]Anxiety (F41.9)  [x]Depression (F32.9)   []Other:   Developmental Disorders  []Autism (F84.0)  []CP (G80)  []Down Syndrome (Q90.9)  []Developmental delay     Neurological conditions  []Prior Stroke (I69.30)  []Parkinson's (G20)  []Encephalopathy (G93.40)  []MS (G35)  []Post-polio (G14)  []SCI  []TBI  []ALS Other conditions  []Fibromyalgia (M79.7)  []Vertigo  []Syncope  []Kidney Failure  []Cancer      []currently undergoing                treatment  []Pregnancy  []Incontinence   Prior surgeries  []involved limb  [x]previous spinal surgery  [] section birth  []hysterectomy  []bowel / bladder surgery  []other relevant surgeries   [x]Other:   Migraines, lumbar laminectomy             Barriers to/and or personal factors that will affect rehab potential:              []Age  []Sex    []Smoker              []Motivation/Lack of Motivation                        [x]Co-Morbidities              []Cognitive Function, education/learning barriers              []Environmental, home barriers              []profession/work barriers  []past PT/medical experience  []other:    Falls Risk Assessment (30 days):   [x] Falls Risk assessed and no intervention required. [] Falls Risk assessed and Patient requires intervention due to being higher risk   TUG score (>12s at risk):     [] Falls education provided, including         ASSESSMENT: Pt is a 39 yr old female referred to PT for multiple joint pain.  Pain is also limiting patient's ability to complete work and daily tasks, such as sitting for prolonged periods of time and standing to cook a meal.  PT agreeable with MD's recommendation of aquatic therapy due to multiple involved areas and increased pain levels with activity. Functional Impairments:     []Noted lumbar/proximal hip hypomobility   []Noted lumbosacral and/or generalized hypermobility   [x]Decreased Lumbosacral/hip/LE functional ROM   [x]Decreased core/proximal hip strength and neuromuscular control    []Decreased LE functional strength    []Abnormal reflexes/sensation/myotomal/dermatomal deficits  []Reduced balance/proprioceptive control    []other:      Functional Activity Limitations (from functional questionnaire and intake)   [x]Reduced ability to tolerate prolonged functional positions   [x]Reduced ability or difficulty with changes of positions or transfers between positions   [x]Reduced ability to maintain good posture and demonstrate good body mechanics with sitting, bending, and lifting   [x]Reduced ability to sleep   [x] Reduced ability or tolerance with driving and/or computer work   [x]Reduced ability to perform lifting, reaching, carrying tasks   [x]Reduced ability to squat   [x]Reduced ability to forward bend   [x]Reduced ability to ambulate prolonged functional periods/distances/surfaces   [x]Reduced ability to ascend/descend stairs   []other:       Participation Restrictions   []Reduced participation in self care activities   [x]Reduced participation in home management activities   [x]Reduced participation in work activities   [x]Reduced participation in social activities. []Reduced participation in sport/recreational activities. Classification:   []Signs/symptoms consistent with Lumbar instability/stabilization subgroup. []Signs/symptoms consistent with Lumbar mobilization/manipulation subgroup, myotomes and dermatomes intact. Meets manipulation criteria.     []Signs/symptoms consistent with Lumbar direction specific/centralization subgroup   []Signs/symptoms consistent with Lumbar traction subgroup     []Signs/symptoms consistent with lumbar facet dysfunction   []Signs/symptoms consistent with lumbar stenosis type dysfunction   []Signs/symptoms consistent with nerve root involvement including myotome & dermatome dysfunction   []Signs/symptoms consistent with post-surgical status including: decreased ROM, strength and function. []signs/symptoms consistent with pathology which may benefit from Dry needling     [x]other: signs/symptoms consistent with multiple joint pain as a result of fibromyalgia, RA, and OA     Prognosis/Rehab Potential:      []Excellent   [x]Good    []Fair   []Poor    Tolerance of evaluation/treatment:    []Excellent   [x]Good    []Fair   []Poor     Physical Therapy Evaluation Complexity Justification  [x] A history of present problem with:  [] no personal factors and/or comorbidities that impact the plan of care;  []1-2 personal factors and/or comorbidities that impact the plan of care  [x]3 personal factors and/or comorbidities that impact the plan of care  [x] An examination of body systems using standardized tests and measures addressing any of the following: body structures and functions (impairments), activity limitations, and/or participation restrictions;:  [] a total of 1-2 or more elements   [x] a total of 3 or more elements   [] a total of 4 or more elements   [x] A clinical presentation with:  [x] stable and/or uncomplicated characteristics   [] evolving clinical presentation with changing characteristics  [] unstable and unpredictable characteristics;   [x] Clinical decision making of [x] low, [] moderate, [] high complexity using standardized patient assessment instrument and/or measurable assessment of functional outcome.     [x] EVAL (LOW) 31257 (typically 15 minutes face-to-face)  [] EVAL (MOD) 36661 (typically 30 minutes face-to-face)  [] EVAL (HIGH) 72602 (typically 45 minutes face-to-face)  [] RE-EVAL     PLAN: Begin PT focusing on: proximal hip mobilizations, LB mobs, LB core activation, proximal hip activation, and HEP    Frequency/Duration:  2 days per week for 5 Weeks:  Interventions:  [x]  Therapeutic exercise including: strength training, ROM, for LE, Glutes and core   [x]  NMR activation and proprioception for glutes , LE and Core   [x]  Manual therapy as indicated for Hip complex, LE and spine to include: Dry Needling/IASTM, STM, PROM, Gr I-IV mobilizations, manipulation. [x]  Modalities as needed that may include: thermal agents, E-stim, Biofeedback, US, iontophoresis as indicated  [x]  Patient education on joint protection, postural re-education, activity modification, progression of HEP. HEP instruction: Written HEP instructions provided and reviewed. Access Code: [email or web address]  URL: [email or web address]  Date: 10/17/2022  Prepared by: Alberto Chou    Exercises  Seated Hamstring Stretch - 1 x daily - 7 x weekly - 1 sets - 3 reps - 30 seconds hold  Standing Hamstring Stretch on Chair - 1 x daily - 7 x weekly - 1 sets - 3 reps - 30 seconds hold  Seated Gastroc Stretch with Strap - 1 x daily - 7 x weekly - 1 sets - 3 reps - 30 seconds hold  Gastroc Stretch on Step - 1 x daily - 7 x weekly - 1 sets - 3 reps - 30 seconds hold    GOALS:  Patient stated goal: \"control pain without as many meds\"  [] Progressing: [] Met: [] Not Met: [] Adjusted    Therapist goals for Patient:   Short Term Goals: To be achieved in: 2 weeks  1. Independent in HEP and progression per patient tolerance, in order to prevent re-injury. [] Progressing: [] Met: [] Not Met: [] Adjusted  2. Patient will have a decrease in pain to facilitate improvement in movement, function, and ADLs as indicated by Functional Deficits. [] Progressing: [] Met: [] Not Met: [] Adjusted    Long Term Goals: To be achieved in: 5 weeks  1. FOTO score of at least 60 to assist with reaching prior level of function with household chores, such as pushing a vacuum . [] Progressing: [] Met: [] Not Met: [] Adjusted  2.  Patient will demonstrate increased L ankle DF AROM to 10 degrees for improved ability to lift toes with ambulation and stair climbing to improve safety and stability. [] Progressing: [] Met: [] Not Met: [] Adjusted  3. Patient will demonstrate an increase in B proximal hip strength 4+/5 with hip flexion, abd, and extension to improve pt tolerance to prolonged standing to be able to cook a meal.   [] Progressing: [] Met: [] Not Met: [] Adjusted  4. Patient will return to functional activities including ability to sit while at work for at least 1.5 hours without constant repositioning to progress towards PLOF.     [] Progressing: [] Met: [] Not Met: [] Adjusted    Electronically signed by:  Valentina Lira PT DPT

## 2022-10-17 NOTE — FLOWSHEET NOTE
[address] [facility] Physical Therapy  Phone: [phone]   Fax: [phone]    Physical Therapy Daily Treatment Note    Date:  10/17/2022     Patient Name:  Bethany Zapata    :  1980  MRN: [id number]  Medical Diagnosis:  Seronegative rheumatoid arthritis ([facility] [address] [M06.00]  Spondylosis of lumbar region without myelopathy or radiculopathy [M47.816]  Fibromyalgia [M79.7]  Bilateral primary osteoarthritis of knee [M17.0]  Treatment Diagnosis: Increased pain throughout lower body joints and B hands, proximal hip weakness, decreased LE flexibility, limited activity and prolonged position tolerance, intermittent numbness in hands and feet , decreased L ankle DF AROM  Insurance/Certification information:  PT Insurance Information: [address] - 30 v/yr hard max, no copay, AUTH  Physician Information:  James Regalado MD    Plan of care signed (Y/N): []  Yes [x]  No     Date of Patient follow up with Physician:      Progress Report: []  Yes  [x]  No     Date Range for reporting period:  Beginning: 10/17/2022  Ending:     Progress report due (10 Rx/or 30 days whichever is less): visit #10 or       Recertification due (POC duration/ or 90 days whichever is less): visit #? or ?- based on ins approval    Visit # Insurance Allowable Auth required? Date Range    + ? 30 per yr hard max  [x]  Yes  []  No ?       Units approved Units used Date Range   ? ? ?        Latex Allergy:  [x]NO      []YES  Preferred Language for Healthcare:   [x]English       []other:    Functional Scale:           Date assessed:  FOTO physical FS primary measure score = 54; risk adjusted = 45  10/17/22    Pain level:  610     SUBJECTIVE:  See eval    OBJECTIVE: See eval      RESTRICTIONS/PRECAUTIONS: Baker's cyst - L knee     Exercises/Interventions:     Therapeutic Exercises (10905) Resistance / level Sets/sec Reps Notes                                                                  Therapeutic Activities (66959)                                   Gait (25231)                                   Neuromuscular Re-ed (43513)                                                 Manual Intervention [id number])                                                     AquaticTherapy Dates of Service:   Aquatic Visits Exercises/Activities:   Transfers:  [x] Stairs   [] Ramp  [] Chair Lift   % Immersion:            Ambulation/ Warm up:   UE Exercises: Forward  x  Shoulder Shrugs      Lateral   x Shoulder Circles      Retro   x Scapular Retraction      [place]    Push Downs      Heel/Toe Walking    Punching       Rowing       Elbow Flex/Ext       Shldr Flex/Ext       [employer] aBd/aDd    LE Exercises:  Shldr Horiz aBd/aDd    HR/TR x Shldr IR/ER    Marches x Arm Circles    Squats x PNF Diagonals    Hamstring Curls x Wall Push Ups    Hip Flexion (SLR) x     Hip aBduction (SLR) x     Hip Extension (SLR) x      Hip aDduction (SLR)      Hip Circles x Functional: 4\" step    Hip IR/Er  Step up forward    Hip Hikes  Step up lateral       Step down        Lunges Forward      Lunges Retro      Lunges Lateral     Balance:        SLS  x      Tandem Stance x      NBOS eyes open x Seated:     NBOS eyes closed x Ankle pumps     Hand to Opposite Knee x Ankle Circles     Fwd Step ups to SLS  Knee Flex/Ext    Lateral Step ups to SLS  Hip aBd/aDd    Stop/Go Gait   Bicycle       Ankle DF/PF      Ankle Inv/Ev    Stretching:       Gastroc/Soleus x     Hamstring  x Deep Water:    Knee Flex Stretch x Jog    Piriformis  x Noodle Hang    Hip Flexor  Traction at Wall    SKTC x     DKTC       ITB  Cool Down:    Quad  Fwd Walking    Mid Back   Lat Walking    UT  Retro Walking    Post Shoulder  Noodle float    Ladder Pull      Pec Stretch            Core:   Other:    TrA set      Pelvic Tilts      Multifidi Walk outs c paddle      PNF Chop/Lifts                          Aquatic Abbreviation Key  B= Belt DB= Dumbells T= Theratube   H= Hydrotone N= Noodles W= Weights   P= Paddles S= Speedo equipment K= Kickboard      Pt. Education: Gave pt tour of pool, explained how to use lockers, what to wear, that it would be in group setting, and showed pt aquatic equipment. Modalities:     Pt. Education:  10/17/2022  -patient educated on diagnosis, prognosis and expectations for rehab  -all patient questions were answered    Home Exercise Program:  Access Code: [email or web address]  URL: [employer]/  Date: 10/17/2022  Prepared by: Barbara Hernandez     Exercises  Seated Hamstring Stretch - 1 x daily - 7 x weekly - 1 sets - 3 reps - 30 seconds hold  Standing Hamstring Stretch on Chair - 1 x daily - 7 x weekly - 1 sets - 3 reps - 30 seconds hold  Seated Gastroc Stretch with Strap - 1 x daily - 7 x weekly - 1 sets - 3 reps - 30 seconds hold  Gastroc Stretch on Step - 1 x daily - 7 x weekly - 1 sets - 3 reps - 30 seconds hold    Therapeutic Exercise and NMR EXR  [] (09446) Provided verbal/tactile cueing for activities related to strengthening, flexibility, endurance, ROM for improvements in  [] LE / Lumbar: LE, proximal hip, and core control with self care, mobility, lifting, ambulation. [] UE / Cervical: cervical, postural, scapular, scapulothoracic and UE control with self care, reaching, carrying, lifting, house/yardwork, driving, computer work.  [] (53539) Provided verbal/tactile cueing for activities related to improving balance, coordination, kinesthetic sense, posture, motor skill, proprioception to assist with   [] LE / lumbar: LE, proximal hip, and core control in self care, mobility, lifting, ambulation and eccentric single leg control.    [] UE / cervical: cervical, scapular, scapulothoracic and UE control with self care, reaching, carrying, lifting, house/yardwork, driving, computer work.   [] (38011) Therapist is in constant attendance of 2 or more patients providing skilled therapy interventions, but not providing any significant amount of measurable one-on-one time to either patient, for improvements in  [] LE / lumbar: LE, proximal hip, and core control in self care, mobility, lifting, ambulation and eccentric single leg control. [] UE / cervical: cervical, scapular, scapulothoracic and UE control with self care, reaching, carrying, lifting, house/yardwork, driving, computer work. NMR and Therapeutic Activities:    [] (42899 or 45430) Provided verbal/tactile cueing for activities related to improving balance, coordination, kinesthetic sense, posture, motor skill, proprioception and motor activation to allow for proper function of   [] LE: / Lumbar core, proximal hip and LE with self care and ADLs  [] UE / Cervical: cervical, postural, scapular, scapulothoracic and UE control with self care, carrying, lifting, driving, computer work.   [] (66572) Gait Re-education- Provided training and instruction to the patient for proper LE, core and proximal hip recruitment and positioning and eccentric body weight control with ambulation re-education including up and down stairs     Home Management Training / Self Care:  [] (60683) Provided self-care/home management training related to activities of daily living and compensatory training, and/or use of adaptive equipment for improvement with: ADLs and compensatory training, meal preparation, safety procedures and instruction in use of adaptive equipment, including bathing, grooming, dressing, personal hygiene, basic household cleaning and chores.      Home Exercise Program:    [x] (32162) Reviewed/Progressed HEP activities related to strengthening, flexibility, endurance, ROM of   [x] LE / Lumbar: core, proximal hip and LE for functional self-care, mobility, lifting and ambulation/stair navigation   [] UE / Cervical: cervical, postural, scapular, scapulothoracic and UE control with self care, reaching, carrying, lifting, house/yardwork, driving, computer work  [] (41237)Reviewed/Progressed HEP activities related to improving balance, coordination, kinesthetic sense, posture, motor skill, proprioception of   [] LE: core, proximal hip and LE for self care, mobility, lifting, and ambulation/stair navigation    [] UE / Cervical: cervical, postural,  scapular, scapulothoracic and UE control with self care, reaching, carrying, lifting, house/yardwork, driving, computer work    Manual Treatments:  PROM / STM / Oscillations-Mobs:  G-I, II, III, IV (PA's, Inf., Post.)  [] (02029) Provided manual therapy to mobilize LE, proximal hip and/or LS spine soft tissue/joints for the purpose of modulating pain, promoting relaxation,  increasing ROM, reducing/eliminating soft tissue swelling/inflammation/restriction, improving soft tissue extensibility and allowing for proper ROM for normal function with   [] LE / lumbar: self care, mobility, lifting and ambulation. [] UE / Cervical: self care, reaching, carrying, lifting, house/yardwork, driving, computer work. Modalities:  [] (76736) Vasopneumatic compression: Utilized vasopneumatic compression to decrease edema / swelling for the purpose of improving mobility and quad tone / recruitment which will allow for increased overall function including but not limited to self-care, transfers, ambulation, and ascending / descending stairs. Aquatics:  [] (25272) Aquatic therapy with therapeutic exercise. Provided verbal and tactile cueing for activities related to strengthening, flexibility, endurance, ROM for improvements in  [] LE / lumbar: LE, proximal hip, and core control in self care, mobility, lifting, ambulation and eccentric single leg control.    [] UE / cervical: cervical, scapular, scapulothoracic and UE control with self care, reaching, carrying, lifting, house/yardwork, driving, computer work.   [] (83115) Therapist is in constant attendance of 2 or more patients providing skilled therapy interventions, but not providing any significant amount of measurable one-on-one time to either patient, for improvements in  [] LE / lumbar: LE, proximal hip, and core control in self care, mobility, lifting, ambulation and eccentric single leg control. [] UE / cervical: cervical, scapular, scapulothoracic and UE control with self care, reaching, carrying, lifting, house/yardwork, driving, computer work. Charges:  Timed Code Treatment Minutes: 5   Total Treatment Minutes: 45     [x] EVAL - LOW (98760)   [] EVAL - MOD (71001)  [] EVAL - HIGH (80687)  [] RE-EVAL (68435)  [] SH(46633) x       [] Ionto  [] NMR (26169) x       [] Vaso  [] Manual (28736) x       [] Ultrasound  [] TA x        [] Mech Traction (30016)  [] Aquatic Therapy x     [] ES (un) (63714):   [] Home Management Training x  [] ES(attended) (87716)   [] Dry Needling 1-2 muscles (49419):  [] Dry Needling 3+ muscles (437543)  [] Group:      [] Other:     GOALS:    Patient stated goal: \"control pain without as many meds\"  [] Progressing: [] Met: [] Not Met: [] Adjusted     Therapist goals for Patient:   Short Term Goals: To be achieved in: 2 weeks  1. Independent in HEP and progression per patient tolerance, in order to prevent re-injury. [] Progressing: [] Met: [] Not Met: [] Adjusted  2. Patient will have a decrease in pain to facilitate improvement in movement, function, and ADLs as indicated by Functional Deficits. [] Progressing: [] Met: [] Not Met: [] Adjusted     Long Term Goals: To be achieved in: 5 weeks  1. FOTO score of at least 60 to assist with reaching prior level of function with household chores, such as pushing a vacuum . [] Progressing: [] Met: [] Not Met: [] Adjusted  2. Patient will demonstrate increased L ankle DF AROM to 10 degrees for improved ability to lift toes with ambulation and stair climbing to improve safety and stability. [] Progressing: [] Met: [] Not Met: [] Adjusted  3.  Patient will demonstrate an increase in B proximal hip strength 4+/5 with hip flexion, abd, and extension to improve pt tolerance to prolonged standing to be able to cook a meal.   [] Progressing: [] Met: [] Not Met: [] Adjusted  4. Patient will return to functional activities including ability to sit while at work for at least 1.5 hours without constant repositioning to progress towards PLOF. [] Progressing: [] Met: [] Not Met: [] Adjusted    Overall Progression Towards Functional goals/ Treatment Progress Update:  [] Patient is progressing as expected towards functional goals listed. [] Progression is slowed due to complexities/Impairments listed. [] Progression has been slowed due to co-morbidities.   [x] Plan just implemented, too soon to assess goals progression <30days   [] Goals require adjustment due to lack of progress  [] Patient is not progressing as expected and requires additional follow up with physician  [] Other    Persisting Functional Limitations/Impairments:  [x]Sleeping [x]Sitting               [x]Standing [x]Transfers        [x]Walking [x]Kneeling               [x]Stairs [x]Squatting / bending   []ADLs []Reaching  [x]Lifting  [x]Housework  []Driving [x]Job related tasks  []Sports/Recreation []Other:        ASSESSMENT:  See eval  Treatment/Activity Tolerance:  [x] Patient able to complete tx [] Patient limited by fatigue  [] Patient limited by pain  [] Patient limited by other medical complications  [] Other:     Prognosis: [x] Good [] Fair  [] Poor    Patient Requires Follow-up: [x] Yes  [] No    Plan for next treatment session: begin with aquatic therapy to reduce pain/increase strength and flexibility    PLAN: See eval. PT 2x / week for 6 weeks - also dependent on ins approval  [] Continue per plan of care [] Alter current plan (see comments)  [x] Plan of care initiated [] Hold pending MD visit [] Discharge    Electronically signed by: Hawa Sotelo PT  DPT    Note: If patient does not return for scheduled/ recommended follow up visits, this note will serve as a discharge from care along with most recent update on progress.

## 2022-10-18 ENCOUNTER — NURSE TRIAGE (OUTPATIENT)
Dept: OTHER | Facility: CLINIC | Age: 42
End: 2022-10-18

## 2022-10-18 NOTE — TELEPHONE ENCOUNTER
Location of patient: [address] call from Maddy Clayton at [facility] with [email or web address]. Current Symptoms: intermittent dizziness, vomiting, headache    Speech is clear, speaking in complete sentences    States started lyrica 5 days ago    Denies - fainting / heart palpitations / numbness or weakness on one side of the body / stiff neck / double visoin / loss of vision / bloody black or tarry stool    Onset: 5 days ago;       Pain Severity: 1/10; Temperature: denies       Recommended disposition: See in Office Today    Care advice provided, patient verbalizes understanding; denies any other questions or concerns; instructed to call back for any new or worsening symptoms. Patient/Caller agrees with recommended disposition; writer provided warm transfer to [facility]ITS at [facility] for appointment scheduling    Attention Provider: Thank you for allowing me to participate in the care of your patient. The patient was connected to triage in response to information provided to the ECC/PSC. Please do not respond through this encounter as the response is not directed to a shared pool.           Reason for Disposition   Vomiting occurs with dizziness    Protocols used: Dizziness-ADULT-OH

## 2022-10-19 ENCOUNTER — OFFICE VISIT (OUTPATIENT)
Dept: FAMILY MEDICINE CLINIC | Age: 42
End: 2022-10-19
Payer: MEDICAID

## 2022-10-19 VITALS
WEIGHT: 244.6 LBS | OXYGEN SATURATION: 99 % | BODY MASS INDEX: 41.99 KG/M2 | HEART RATE: 92 BPM | SYSTOLIC BLOOD PRESSURE: 108 MMHG | DIASTOLIC BLOOD PRESSURE: 86 MMHG

## 2022-10-19 DIAGNOSIS — M79.7 FIBROMYALGIA: ICD-10-CM

## 2022-10-19 DIAGNOSIS — R11.11 VOMITING WITHOUT NAUSEA, UNSPECIFIED VOMITING TYPE: Primary | ICD-10-CM

## 2022-10-19 DIAGNOSIS — R53.83 OTHER FATIGUE: ICD-10-CM

## 2022-10-19 DIAGNOSIS — M06.00 SERONEGATIVE RHEUMATOID ARTHRITIS (HCC): ICD-10-CM

## 2022-10-19 DIAGNOSIS — G44.89 OTHER HEADACHE SYNDROME: ICD-10-CM

## 2022-10-19 PROCEDURE — 99214 OFFICE O/P EST MOD 30 MIN: CPT | Performed by: NURSE PRACTITIONER

## 2022-10-19 PROCEDURE — G8427 DOCREV CUR MEDS BY ELIG CLIN: HCPCS | Performed by: NURSE PRACTITIONER

## 2022-10-19 PROCEDURE — G8417 CALC BMI ABV UP PARAM F/U: HCPCS | Performed by: NURSE PRACTITIONER

## 2022-10-19 PROCEDURE — G8484 FLU IMMUNIZE NO ADMIN: HCPCS | Performed by: NURSE PRACTITIONER

## 2022-10-19 PROCEDURE — 1036F TOBACCO NON-USER: CPT | Performed by: NURSE PRACTITIONER

## 2022-10-19 SDOH — ECONOMIC STABILITY: FOOD INSECURITY: WITHIN THE PAST 12 MONTHS, YOU WORRIED THAT YOUR FOOD WOULD RUN OUT BEFORE YOU GOT MONEY TO BUY MORE.: NEVER TRUE

## 2022-10-19 SDOH — ECONOMIC STABILITY: FOOD INSECURITY: WITHIN THE PAST 12 MONTHS, THE FOOD YOU BOUGHT JUST DIDN'T LAST AND YOU DIDN'T HAVE MONEY TO GET MORE.: NEVER TRUE

## 2022-10-19 ASSESSMENT — ENCOUNTER SYMPTOMS
VOMITING: 1
CONSTIPATION: 0
NAUSEA: 0
SHORTNESS OF BREATH: 0
DIARRHEA: 0
BACK PAIN: 0
ABDOMINAL PAIN: 0

## 2022-10-19 ASSESSMENT — PATIENT HEALTH QUESTIONNAIRE - PHQ9
6. FEELING BAD ABOUT YOURSELF - OR THAT YOU ARE A FAILURE OR HAVE LET YOURSELF OR YOUR FAMILY DOWN: 0
9. THOUGHTS THAT YOU WOULD BE BETTER OFF DEAD, OR OF HURTING YOURSELF: 0
SUM OF ALL RESPONSES TO PHQ QUESTIONS 1-9: 0
5. POOR APPETITE OR OVEREATING: 0
SUM OF ALL RESPONSES TO PHQ9 QUESTIONS 1 & 2: 0
8. MOVING OR SPEAKING SO SLOWLY THAT OTHER PEOPLE COULD HAVE NOTICED. OR THE OPPOSITE, BEING SO FIGETY OR RESTLESS THAT YOU HAVE BEEN MOVING AROUND A LOT MORE THAN USUAL: 0
SUM OF ALL RESPONSES TO PHQ QUESTIONS 1-9: 0
4. FEELING TIRED OR HAVING LITTLE ENERGY: 0
3. TROUBLE FALLING OR STAYING ASLEEP: 0
2. FEELING DOWN, DEPRESSED OR HOPELESS: 0
SUM OF ALL RESPONSES TO PHQ QUESTIONS 1-9: 0
7. TROUBLE CONCENTRATING ON THINGS, SUCH AS READING THE NEWSPAPER OR WATCHING TELEVISION: 0
1. LITTLE INTEREST OR PLEASURE IN DOING THINGS: 0
10. IF YOU CHECKED OFF ANY PROBLEMS, HOW DIFFICULT HAVE THESE PROBLEMS MADE IT FOR YOU TO DO YOUR WORK, TAKE CARE OF THINGS AT HOME, OR GET ALONG WITH OTHER PEOPLE: 0
SUM OF ALL RESPONSES TO PHQ QUESTIONS 1-9: 0

## 2022-10-19 ASSESSMENT — SOCIAL DETERMINANTS OF HEALTH (SDOH): HOW HARD IS IT FOR YOU TO PAY FOR THE VERY BASICS LIKE FOOD, HOUSING, MEDICAL CARE, AND HEATING?: NOT HARD AT ALL

## 2022-10-19 NOTE — PROGRESS NOTES
SUBJECTIVE:  Pt is a of 39 y.o. female comes in today with   Chief Complaint   Patient presents with    Follow-Up from Hospital     Pt states lyrica made her sleepy and dizzy. She vomit 10 times yesterday. Patient presenting today for evaluation of ED follow up. [facility] yesterday for dizziness and vomiting. Dizziness described as \"Like I am in a boat\". RA: worsening. Generalized joint pain, especially right knee. Eval with Dr Marcell Suggs 1 week ago. New start Lyrica, Methotrexate increased and fluid drained from right knee and steroid injection. Worsening fatigue, sleeping during day and dizziness since starting Lyrica. Yesterday sudden onset of hands shaking, unable to  pens, vomited x 10. Reports headache for 1 month, since starting HA. Skipped lyrica afternoon dose tomorrow. Has also started aquatic physical therapy  Bakers cyst to left knee. Pain only when palpated. Prior to Visit Medications    Medication Sig Taking? Authorizing Provider   VENTOLIN  (90 Base) MCG/ACT inhaler INHALE 2 PUFFS INTO THE LUNGS EVERY 6 HOURS AS NEEDED FOR WHEEZING Yes ANDREA Wilkinson CNP   folic acid (FOLVITE) 1 MG tablet Take 1 tablet by mouth daily Yes Patrick Cintron MD   hydroxychloroquine (PLAQUENIL) 200 MG tablet Take 1 tablet by mouth 2 times daily Yes Patrick Cintron MD   methotrexate (RHEUMATREX) 2.5 MG chemo tablet Take 7 tablets by mouth once a week Yes Patrick Cintron MD   Upadacitinib ER (RINVOQ) 15 MG TB24 Take 15 mg by mouth daily Yes Patrick Cintron MD   pregabalin (LYRICA) 50 MG capsule Take 1 capsule by mouth 3 times daily for 90 days.  Yes Patrick Cintron MD   pantoprazole (PROTONIX) 40 MG tablet TAKE 1 TABLET BY MOUTH DAILY Yes ANDREA Peo CNP   cetirizine (ZYRTEC) 10 MG tablet TAKE 1 TABLET BY MOUTH DAILY Yes ANDREA Poe CNP   fluticasone (FLONASE) 50 MCG/ACT nasal spray SHAKE LIQUID AND USE 2 SPRAYS IN EACH NOSTRIL EVERY DAY Yes Lex Jose Sedrick Schwab, APRN - CNP   busPIRone (BUSPAR) 5 MG tablet TAKE ONE TABLET BY MOUTH THREE TIMES A DAY AS NEEDED FOR ANXIETY Yes ANDREA Kelley CNP   fluconazole (DIFLUCAN) 150 MG tablet TAKE 1 TABLET BY MOUTH 1 TIME. MAY REPEAT 3 DAYS LATER IF SYMPTOMS PERSIST Yes ANDREA Kelley CNP   desvenlafaxine succinate (PRISTIQ) 100 MG TB24 extended release tablet TAKE 1 TABLET BY MOUTH DAILY Yes Javier Lanier MD   rOPINIRole (REQUIP) 3 MG tablet TAKE 1 TABLET BY MOUTH EVERY NIGHT Yes Javier Lanier MD   hydroCHLOROthiazide (MICROZIDE) 12.5 MG capsule TAKE 1 CAPSULE BY MOUTH EVERY MORNING Yes Javier Lanier MD   valsartan (DIOVAN) 80 mg tablet Take 1 tablet by mouth daily Yes ANDREA Kelley CNP   estradiol (ESTRACE) 2 MG tablet Take 2 mg by mouth daily Yes Historical Provider, MD   Biotin 1000 MCG TABS Take by mouth Yes Historical Provider, MD   Cholecalciferol (VITAMIN D3) 50 MCG (2000 UT) CAPS Take 2,000 capsules by mouth daily Yes Historical Provider, MD   acetaminophen (TYLENOL) 500 MG tablet Take 500 mg by mouth every 6 hours as needed for Pain Yes Historical Provider, MD   SUMAtriptan (IMITREX) 100 MG tablet Take 100 mg by mouth daily as needed Yes Historical Provider, MD         Patient's allergies, past medical, surgical, social and family histories werereviewed and updated as appropriate. Review of Systems   Constitutional:  Positive for fatigue. Negative for chills and fever. Respiratory:  Negative for shortness of breath. Cardiovascular:  Negative for chest pain and palpitations. Gastrointestinal:  Positive for vomiting (resolved). Negative for abdominal pain, constipation, diarrhea and nausea. Musculoskeletal:  Positive for arthralgias and joint swelling. Negative for back pain. Neurological:  Positive for dizziness and headaches. Physical Exam  Vitals reviewed. Constitutional:       Appearance: She is well-developed. She is obese.    Cardiovascular:      Rate and Rhythm: Normal rate and regular rhythm. Pulmonary:      Effort: Pulmonary effort is normal.      Breath sounds: Normal breath sounds. Skin:     General: Skin is warm and dry. Neurological:      Mental Status: She is alert and oriented to person, place, and time. Psychiatric:         Mood and Affect: Mood normal.         Behavior: Behavior normal.         Thought Content: Thought content normal.         Judgment: Judgment normal.     Vitals:    10/19/22 1055   BP: 108/86   Pulse: 92   SpO2: 99%   Weight: 244 lb 9.6 oz (110.9 kg)             ASSESSMENT:  1. Vomiting without nausea, unspecified vomiting type    2. Seronegative rheumatoid arthritis ([facility] [address]    3. Fibromyalgia    4. Other headache syndrome    5. Other fatigue        PLAN:  1. Vomiting without nausea, unspecified vomiting type  Resolved  ED records reviewed  Will decrease Lyrica to 50 mg BID x 1 month, then can increase to 50 mg BID  Zofran prn    2. [facility]  Monitored by specialist- no acute findings meriting change in the plan    3. Fibromyalgia  See # 1    4. Other headache syndrome  Likely related to Methotrexate  Surveillance  Can continue Tyl prn    5. Other fatigue  Improving  Related to Lyrica, see # 1    See pt instructions  F/u prn. Discussed use, benefit, and side effects of prescribed medications. All patient questions answered. Pt voiced understanding.

## 2022-10-31 ENCOUNTER — HOSPITAL ENCOUNTER (OUTPATIENT)
Dept: PHYSICAL THERAPY | Age: 42
Setting detail: THERAPIES SERIES
Discharge: HOME | End: 2022-10-31
Payer: MEDICAID

## 2022-10-31 NOTE — FLOWSHEET NOTE
[address]     Physical Therapy  Cancellation/No-show Note  Patient Name:  Dannis Hashimoto  :  1980   Date:  10/31/2022  Cancelled visits to date: 1  No-shows to date: 0    Patient status for today's appointment patient:  [x]  Cancelled 10/31 (pool)  []  Rescheduled appointment  []  No-show     Reason given by patient:  []  Patient ill  []  Conflicting appointment  []  No transportation    [x]  Conflict with work  []  No reason given  []  Other:     Comments:      Phone call information:   []  Phone call made today to patient at _ time at number provided:      []  Patient answered, conversation as follows:    []  Patient did not answer, message left as follows:  []  Phone call not made today  [x]  Phone call not needed - pt contacted us to cancel and provided reason for cancellation.      Electronically signed by:  Santa Hernandez PT DPT

## 2022-11-02 ENCOUNTER — HOSPITAL ENCOUNTER (OUTPATIENT)
Dept: PHYSICAL THERAPY | Age: 42
Setting detail: THERAPIES SERIES
Discharge: HOME | End: 2022-11-02

## 2022-11-02 NOTE — FLOWSHEET NOTE
[address] [place] [address] [employer] [place] [employer]     Physical Therapy  Cancellation/No-show Note  Patient Name:  Misael Lang  :  1980   Date:  2022  Cancelled visits to date: 1  No-shows to date: 1    Patient status for today's appointment patient:  [x]  Cancelled 10/31 (pool)  []  Rescheduled appointment  [x]  No-show  (pool)     Reason given by patient:  []  Patient ill  []  Conflicting appointment  []  No transportation    []  Conflict with work  [x]  No reason given  []  Other:     Comments:      Phone call information:   []  Phone call made today to patient at _ time at number provided:      []  Patient answered, conversation as follows:    []  Patient did not answer, message left as follows:  [x]  Phone call not made today  []  Phone call not needed - pt contacted us to cancel and provided reason for cancellation.      Electronically signed by:  Gi Medina PT DPT

## 2022-11-07 ENCOUNTER — HOSPITAL ENCOUNTER (OUTPATIENT)
Dept: PHYSICAL THERAPY | Age: 42
Setting detail: THERAPIES SERIES
Discharge: HOME | End: 2022-11-07

## 2022-11-07 NOTE — FLOWSHEET NOTE
[address]     Physical Therapy  Cancellation/No-show Note  Patient Name:  Belle Lockwood  :  1980   Date:  2022  Cancelled visits to date: 2  No-shows to date: 1    Patient status for today's appointment patient:  [x]  Cancelled 10/31 (pool),  (pool)  []  Rescheduled appointment  [x]  No-show  (pool)     Reason given by patient:  []  Patient ill  []  Conflicting appointment  []  No transportation    [x]  Conflict with work  []  No reason given  []  Other:     Comments:      Phone call information:   []  Phone call made today to patient at _ time at number provided:      []  Patient answered, conversation as follows:    []  Patient did not answer, message left as follows:  []  Phone call not made today  [x]  Phone call not needed - pt contacted us to cancel and provided reason for cancellation.      Electronically signed by:  Alfonso Farias PT DPT

## 2022-11-13 DIAGNOSIS — M06.00 SERONEGATIVE RHEUMATOID ARTHRITIS (HCC): ICD-10-CM

## 2022-11-14 RX ORDER — FOLIC ACID 1 MG/1
1 TABLET ORAL DAILY
Qty: 90 TABLET | Refills: 0 | OUTPATIENT
Start: 2022-11-14 | End: 2023-02-12

## 2022-11-14 RX ORDER — FLUCONAZOLE 150 MG/1
TABLET ORAL
Qty: 2 TABLET | Refills: 0 | Status: SHIPPED | OUTPATIENT
Start: 2022-11-14 | End: 2023-01-04 | Stop reason: ALTCHOICE

## 2022-11-14 RX ORDER — FLUCONAZOLE 150 MG/1
TABLET ORAL
Qty: 2 TABLET | Refills: 3 | OUTPATIENT
Start: 2022-11-14

## 2022-11-14 NOTE — TELEPHONE ENCOUNTER
Medication:   Requested Prescriptions     Pending Prescriptions Disp Refills    fluconazole (DIFLUCAN) 150 MG tablet [Pharmacy Med Name: FLUCONAZOLE 150MG TABLETS] 2 tablet 3     Sig: TAKE 1 TABLET BY MOUTH 1 TIME. MAY REPEAT 3 DAYS LATER IF SYMPTOMS PERSIST        Last Filled:      Patient Phone Number: [phone] (home)     Last appt: 10/19/2022   Next appt: Visit date not found    Last OARRS:   RX Monitoring 10/11/2022   Attestation -   Periodic Controlled Substance Monitoring No signs of potential drug abuse or diversion identified.

## 2022-11-17 ENCOUNTER — HOSPITAL ENCOUNTER (OUTPATIENT)
Age: 42
Discharge: HOME | End: 2022-11-17
Payer: MEDICAID

## 2022-11-17 DIAGNOSIS — M06.00 SERONEGATIVE RHEUMATOID ARTHRITIS (HCC): ICD-10-CM

## 2022-11-17 DIAGNOSIS — Z79.899 HIGH RISK MEDICATION USE: ICD-10-CM

## 2022-11-17 LAB
ALT SERPL-CCNC: 19 U/L (ref 10–40)
AST SERPL-CCNC: 19 U/L (ref 15–37)
BASOPHILS ABSOLUTE: 0 K/UL (ref 0–0.2)
BASOPHILS RELATIVE PERCENT: 0.4 %
C-REACTIVE PROTEIN: 4.1 MG/L (ref 0–5.1)
CHOLESTEROL, FASTING: 193 MG/DL (ref 0–199)
CREAT SERPL-MCNC: 0.7 MG/DL (ref 0.6–1.1)
EOSINOPHILS ABSOLUTE: 0 K/UL (ref 0–0.6)
EOSINOPHILS RELATIVE PERCENT: 0.5 %
GFR SERPL CREATININE-BSD FRML MDRD: >60 ML/MIN/{1.73_M2}
HCT VFR BLD CALC: 39.7 % (ref 36–48)
HDLC SERPL-MCNC: 63 MG/DL (ref 40–60)
HEMOGLOBIN: 13 G/DL (ref 12–16)
LDL CHOLESTEROL CALCULATED: 114 MG/DL
LYMPHOCYTES ABSOLUTE: 2.1 K/UL (ref 1–5.1)
LYMPHOCYTES RELATIVE PERCENT: 35.6 %
MCH RBC QN AUTO: 30.4 PG (ref 26–34)
MCHC RBC AUTO-ENTMCNC: 32.8 G/DL (ref 31–36)
MCV RBC AUTO: 92.7 FL (ref 80–100)
MONOCYTES ABSOLUTE: 0.4 K/UL (ref 0–1.3)
MONOCYTES RELATIVE PERCENT: 6.7 %
NEUTROPHILS ABSOLUTE: 3.3 K/UL (ref 1.7–7.7)
NEUTROPHILS RELATIVE PERCENT: 56.8 %
PDW BLD-RTO: 14.3 % (ref 12.4–15.4)
PLATELET # BLD: 277 K/UL (ref 135–450)
PMV BLD AUTO: 9.1 FL (ref 5–10.5)
RBC # BLD: 4.28 M/UL (ref 4–5.2)
TRIGLYCERIDE, FASTING: 82 MG/DL (ref 0–150)
URIC ACID, SERUM: 4.6 MG/DL (ref 2.6–6)
VLDLC SERPL CALC-MCNC: 16 MG/DL
WBC # BLD: 5.9 K/UL (ref 4–11)

## 2022-11-17 PROCEDURE — 86140 C-REACTIVE PROTEIN: CPT

## 2022-11-17 PROCEDURE — 85025 COMPLETE CBC W/AUTO DIFF WBC: CPT

## 2022-11-17 PROCEDURE — 36415 COLL VENOUS BLD VENIPUNCTURE: CPT

## 2022-11-17 PROCEDURE — 84550 ASSAY OF BLOOD/URIC ACID: CPT

## 2022-11-17 PROCEDURE — 82565 ASSAY OF CREATININE: CPT

## 2022-11-17 PROCEDURE — 80061 LIPID PANEL: CPT

## 2022-11-17 PROCEDURE — 84460 ALANINE AMINO (ALT) (SGPT): CPT

## 2022-11-17 PROCEDURE — 84450 TRANSFERASE (AST) (SGOT): CPT

## 2022-11-17 NOTE — RESULT ENCOUNTER NOTE
Her inflammatory marker is normal for the first time!! I hope her arthritis is doing well. Her cholesterol level has actually improved from 5 months ago.  Rest of her labs are normal.

## 2022-11-20 ENCOUNTER — PATIENT MESSAGE (OUTPATIENT)
Dept: RHEUMATOLOGY | Age: 42
End: 2022-11-20

## 2022-11-21 NOTE — TELEPHONE ENCOUNTER
From: Rula Byers  To: Dr. Arroyo Emperatriz: 11/20/2022 6:11 PM EST  Subject: Knee    Im having trouble with my knee again. It feels the same way it did before you drained it and gave me the injections.  :(

## 2022-11-22 ENCOUNTER — OFFICE VISIT (OUTPATIENT)
Dept: RHEUMATOLOGY | Age: 42
End: 2022-11-22
Payer: MEDICAID

## 2022-11-22 VITALS
SYSTOLIC BLOOD PRESSURE: 126 MMHG | BODY MASS INDEX: 42.57 KG/M2 | WEIGHT: 248 LBS | HEART RATE: 84 BPM | DIASTOLIC BLOOD PRESSURE: 88 MMHG

## 2022-11-22 DIAGNOSIS — M47.816 SPONDYLOSIS OF LUMBAR REGION WITHOUT MYELOPATHY OR RADICULOPATHY: ICD-10-CM

## 2022-11-22 DIAGNOSIS — M17.0 BILATERAL PRIMARY OSTEOARTHRITIS OF KNEE: ICD-10-CM

## 2022-11-22 DIAGNOSIS — Z79.899 HIGH RISK MEDICATION USE: ICD-10-CM

## 2022-11-22 DIAGNOSIS — M79.7 FIBROMYALGIA: ICD-10-CM

## 2022-11-22 DIAGNOSIS — M06.00 SERONEGATIVE RHEUMATOID ARTHRITIS (HCC): Primary | ICD-10-CM

## 2022-11-22 PROCEDURE — 3074F SYST BP LT 130 MM HG: CPT | Performed by: INTERNAL MEDICINE

## 2022-11-22 PROCEDURE — 1036F TOBACCO NON-USER: CPT | Performed by: INTERNAL MEDICINE

## 2022-11-22 PROCEDURE — 99214 OFFICE O/P EST MOD 30 MIN: CPT | Performed by: INTERNAL MEDICINE

## 2022-11-22 PROCEDURE — G8484 FLU IMMUNIZE NO ADMIN: HCPCS | Performed by: INTERNAL MEDICINE

## 2022-11-22 PROCEDURE — G8427 DOCREV CUR MEDS BY ELIG CLIN: HCPCS | Performed by: INTERNAL MEDICINE

## 2022-11-22 PROCEDURE — 3078F DIAST BP <80 MM HG: CPT | Performed by: INTERNAL MEDICINE

## 2022-11-22 PROCEDURE — G8417 CALC BMI ABV UP PARAM F/U: HCPCS | Performed by: INTERNAL MEDICINE

## 2022-11-22 RX ORDER — HYDROXYCHLOROQUINE SULFATE 200 MG/1
200 TABLET, FILM COATED ORAL 2 TIMES DAILY
Qty: 180 TABLET | Refills: 0 | Status: SHIPPED | OUTPATIENT
Start: 2022-11-22 | End: 2023-02-20

## 2022-11-22 RX ORDER — FOLIC ACID 1 MG/1
1 TABLET ORAL DAILY
Qty: 90 TABLET | Refills: 0 | Status: SHIPPED | OUTPATIENT
Start: 2022-11-22 | End: 2023-02-20

## 2022-11-22 RX ORDER — CLINDAMYCIN AND BENZOYL PEROXIDE 10; 50 MG/G; MG/G
GEL TOPICAL
COMMUNITY
Start: 2022-11-22

## 2022-11-22 RX ORDER — TRETINOIN 0.5 MG/G
CREAM TOPICAL
COMMUNITY
Start: 2022-11-22

## 2022-11-22 RX ORDER — UPADACITINIB 15 MG/1
15 TABLET, EXTENDED RELEASE ORAL DAILY
Qty: 90 TABLET | Refills: 0 | Status: SHIPPED | OUTPATIENT
Start: 2022-11-22 | End: 2023-02-20

## 2022-11-22 NOTE — ASSESSMENT & PLAN NOTE
- CRP finally normalized on 11/17/22. Inflammatory arthritis appears to be well controlled on her current immunosuppression regimen. - cont current dose of  mg BID, MTX 17.5 mg PO wkly and Updacitinib 15 mg daily.

## 2022-11-22 NOTE — PROGRESS NOTES
Misty Martinez MD  [facility]) Physicians - Rheumatology    [x] [facility]:  [facility]  [address] [facility] [] [facility]:  [address] [place] [address]   Office: [phone]  Fax: [phone]     RHEUMATOLOGY PROGRESS NOTE    ASSESSMENT/PLAN:  Deyanira Wise is a 43 y.o. female w/ seronegative RA, OA of knees, degenerative arthritis s/p lumbar laminectomy in 2013 and fibromyalgia. PMHx pertinent for EMELINA on CPAP, RLS, depression, anxiety. Current rheum meds:   mg BID: started in 7/2021  MTX 17.5 mg  Upadacitinib 15 mg PO wkly: started in 10/2022  Pregabalin 50 mg BID  Pristiq + Buspirone: per PCP    Prior rheum meds:  Ibuprofen, Naproxen 500 mg BID, Meloxicam 15 mg daily PRN: ineffective  Diclofenac 50 mg TID PRN: caused elevated BP  SSZ 1000 mg BID: took from 5/2020 - 8/2022, switched to MTX d/t active arthritis  Adalimumab 40 mg SC Q2wk: took from 11/2021-4/2022 switched to Etanercept d/t active disease  Etanercept 50 mg SC wkly: took from 4/2022-10/2022 switched to Upadacitinib d/t active disease    1. Seronegative rheumatoid arthritis ([facility] [address]  Assessment & Plan:  - CRP finally normalized on 11/17/22. Inflammatory arthritis appears to be well controlled on her current immunosuppression regimen. - cont current dose of  mg BID, MTX 17.5 mg PO wkly and Updacitinib 15 mg daily. Orders:  -     diclofenac sodium (VOLTAREN) 1 % GEL; Apply 4 g topically 4 times daily, Topical, 4 TIMES DAILY Starting Tue 11/22/2022, Until Thu 12/22/2022, For 30 days, Disp-150 g, R-3, Normal  -     methotrexate (RHEUMATREX) 2.5 MG chemo tablet; Take 7 tablets by mouth once a week, Disp-84 tablet, R-0, DAWNormal  -     hydroxychloroquine (PLAQUENIL) 200 MG tablet; Take 1 tablet by mouth 2 times daily, Disp-180 tablet, [id number]  -     folic acid (FOLVITE) 1 MG tablet;  Take 1 tablet by mouth daily, Disp-90 tablet, R-0Normal  -     Upadacitinib ER (RINVOQ) 15 MG TB24; Take 15 mg by mouth daily, Disp-90 tablet, R-0Normal  2. Bilateral primary osteoarthritis of knee  Assessment & Plan:  - s/p L knee arthrocentesis and IA steroid injection on 10/11/22. Synovial fluid analysis findings not c/w inflammatory arthritis. - made referral to Ortho to evaluate her knee pain as her inflammatory arthritis appears to be well controlled currently. - did not respond to multiple NSAIDs in the past. Recommended trying arthritis strength Acetaminophen up to 1000 mg TID PRN. - cont Pregabalin 50 mg BID. Orders:  -     diclofenac sodium (VOLTAREN) 1 % GEL; Apply 4 g topically 4 times daily, Topical, 4 TIMES DAILY Starting Tue 11/22/2022, Until Thu 12/22/2022, For 30 days, Disp-150 g, R-3, Normal  -     Estevan Martinez MD, Orthopedics and Sports Medicine (Hip; Knee; Shoulder), [facility]  3. Spondylosis of lumbar region without myelopathy or radiculopathy  Assessment & Plan:  - did not respond to NSAIDs. - cont Pregabalin. 4. Fibromyalgia  Assessment & Plan:  - remains active. - recently started on Pregabalin 50 mg BID. - she has been referred to aquatic PT. Orders:  -     diclofenac sodium (VOLTAREN) 1 % GEL; Apply 4 g topically 4 times daily, Topical, 4 TIMES DAILY Starting Tue 11/22/2022, Until Thu 12/22/2022, For 30 days, Disp-150 g, R-3, Normal  5. High risk medication use  Assessment & Plan:  - annual eye exam for HCQ toxicity monitoring.  - d/w pt that MTX increases the risk of infections, birth defects, liver toxicity, rare lung problems, probable malignancy and bone marrow toxicity. Discussed need to check safety labs 4 weeks after starting MTX followed by labs every 3 months once we reach a steady dose. Strongly recommended alcohol abstinence. She will obtain labs in 4 wks time after increasing MTX dose. Return in about 2 months (around 1/22/2023) for lab result discussion and treatment plan, medication monitoring.     The risks and benefits of my recommendations, as well as other treatment options, benefits and side effects were discussed with the patient today. Questions were answered. NOTE: This report is transcribed by using voice recognition software dragon. Every effort is made to ensure accuracy; however, inadvertent computerized  transcription errors may be present. SUBJECTIVE:  Past medical/surgical history, medications and allergies are reviewed and updated as appropriate. Interval Hx:   Pt reports widespread pain all over her body. \"I feel like I have the flu\". She reports good pain relief from prior L knee arthrocentesis followed by intra-articular steroid injection on 10/11/22 that lasted 1 month. Currently she has recurrence of pain in the superior lateral aspect of her L knee. She has not noticed any improvement in her generalized pain on immunosuppression. She started Pregabalin last month, she feels this is also ineffective.     Rheumatologic ROS:  Constitutional: denies chronic fatigue, fever/chills, night sweats, unintentional weight loss  Integumentary: +chronic mild hair thinning, denies photosensitivity, patchy alopecia, or Sx of Raynaud's phenomenon  Eyes: denies dry eyes, redness or pain, visual disturbance, or floaters  Nose: denies nasal ulcers or recurrent sinusitis  Oral cavity: denies dry mouth or oral ulcers  Cardiovascular: denies CP, palpitations, Hx of pericardial effusion or pericarditis  Respiratory: denies SOB, cough, hemoptysis, or pleurisy  Gastrointestinal: denies heart burn, dysphagia or esophageal dysmotility, denies change in bowel habits or Sx of IBD  Musculoskeletal:  refer to above HPI     Allergies   Allergen Reactions    Metronidazole Rash and Other (See Comments)     LIPS WERE TINGLING, tongue tingling, hives all over body    Other      Glue used to adhere recent surgical incision       Past Medical History:        Diagnosis Date    Anxiety     Arthritis     RA    Cellulitis of left lower extremity     left ankle Depression     WELL CONTROLLED 10-16-17    Fibromyalgia     Heart rate fast     intermittent    Kidney stone     Migraine     Motor tic disorder     Obstructive sleep apnea, adult        Past Surgical History:        Procedure Laterality Date    ABDOMINAL EXPLORATION SURGERY  07/22/2011    excision Meckels diverticulum    ABDOMINOPLASTY  04/14/2014    Vicshanda Che    ADENOIDECTOMY Bilateral     APPENDECTOMY  07/22/2011    BACK SURGERY      CARPAL TUNNEL RELEASE      HYSTERECTOMY, TOTAL ABDOMINAL (CERVIX REMOVED)  06/12/2018    robotic total hyst [facility], Dr Luis Fernando Rahman    LITHOTRIPSY  x2    LUMBAR LAMINECTOMY  06/2013    Rad; left l5-s1    MECKEL DIVERTICULUM EXCISION      OTHER SURGICAL HISTORY      hymenoplasty    OVARIAN CYST REMOVAL  04/13/2018    OPERATIVE LAPAROSCOPY, LEFT OVARIAN CYSTECTOMY WITH DILATATION AND CURETTAGE    OVARY REMOVAL      TONSILLECTOMY Bilateral     URETHRAL STRICTURE DILATATION      WISDOM TOOTH EXTRACTION         Medications:    Current Outpatient Medications   Medication Sig Dispense Refill    clindamycin-benzoyl peroxide (BENZACLIN) 1-5 % gel       fluocinonide (LIDEX) 0.05 % cream       tretinoin (RETIN-A) 0.05 % cream Apply pea-size amount to entire face at bedtime, starting twice a week and increasing to every other night then every night as tolerated.       diclofenac sodium (VOLTAREN) 1 % GEL Apply 4 g topically 4 times daily 150 g 3    methotrexate (RHEUMATREX) 2.5 MG chemo tablet Take 7 tablets by mouth once a week 84 tablet 0    hydroxychloroquine (PLAQUENIL) 200 MG tablet Take 1 tablet by mouth 2 times daily 963 tablet 0    folic acid (FOLVITE) 1 MG tablet Take 1 tablet by mouth daily 90 tablet 0    Upadacitinib ER (RINVOQ) 15 MG TB24 Take 15 mg by mouth daily 90 tablet 0    fluconazole (DIFLUCAN) 150 MG tablet TAKE 1 TABLET BY MOUTH 1 TIME. MAY REPEAT 3 DAYS LATER IF SYMPTOMS PERSIST 2 tablet 0    VENTOLIN  (90 Base) MCG/ACT inhaler INHALE 2 PUFFS INTO THE LUNGS EVERY 6 HOURS AS NEEDED FOR WHEEZING 18 g 2    pregabalin (LYRICA) 50 MG capsule Take 1 capsule by mouth 3 times daily for 90 days. 270 capsule 0    pantoprazole (PROTONIX) 40 MG tablet TAKE 1 TABLET BY MOUTH DAILY 30 tablet 5    cetirizine (ZYRTEC) 10 MG tablet TAKE 1 TABLET BY MOUTH DAILY 30 tablet 11    fluticasone (FLONASE) 50 MCG/ACT nasal spray SHAKE LIQUID AND USE 2 SPRAYS IN EACH NOSTRIL EVERY DAY 16 g 3    busPIRone (BUSPAR) 5 MG tablet TAKE ONE TABLET BY MOUTH THREE TIMES A DAY AS NEEDED FOR ANXIETY 90 tablet 3    desvenlafaxine succinate (PRISTIQ) 100 MG TB24 extended release tablet TAKE 1 TABLET BY MOUTH DAILY 90 tablet 0    rOPINIRole (REQUIP) 3 MG tablet TAKE 1 TABLET BY MOUTH EVERY NIGHT 90 tablet 0    hydroCHLOROthiazide (MICROZIDE) 12.5 MG capsule TAKE 1 CAPSULE BY MOUTH EVERY MORNING 90 capsule 0    valsartan (DIOVAN) 80 mg tablet Take 1 tablet by mouth daily 90 tablet 1    estradiol (ESTRACE) 2 MG tablet Take 2 mg by mouth daily      Biotin 1000 MCG TABS Take by mouth      Cholecalciferol (VITAMIN D3) 50 MCG (2000 UT) CAPS Take 2,000 capsules by mouth daily      acetaminophen (TYLENOL) 500 MG tablet Take 500 mg by mouth every 6 hours as needed for Pain      SUMAtriptan (IMITREX) 100 MG tablet Take 100 mg by mouth daily as needed       No current facility-administered medications for this visit.         OBJECTIVE:  Physical Exam:  /88   Pulse 84   Wt 248 lb (112.5 kg)   LMP 03/13/2018 (Exact Date)   BMI 42.57 kg/m²     GEN: AAOx3, in NAD, well-appearing  HEAD: normocephalic, atraumatic  EYES: no injection or icterus  CVS: RRR  LUNGS: in no acute respiratory distress, CTAB  MSK: there is diffuse myofascial pain w/ 18/18 tender points  Upper extremities:              Hands: no joint deformities, no tenosynovitis or dactylitis, b/l MCP, PIP and DIP joints w/o synovitis, all joints TTP, full fist formation w/ fair  strength              Wrist: b/l wrists w/o synovitis TTP, good flexion/extension Elbow: no synovitis or bursitis, FROM  Lower extremities:              Knees: L knee w/ trace joint effusion lateral joint line slightly TTP, good ROM              Ankles: b/l ankles w/ small effusion diffusely TTP, good ROM              Feet: no toe swelling or pain or warmth on palpation w/ FROM, +MTP squeeze test  INTEGUMENT: no rash or psoriatic lesions, no petechiae, bruises, or palpable purpura, no patchy alopecia, no nail or periungual changes, no clubbing or digital ulcers    DATA:  Labs:   I personally reviewed interval labs and discussed w/ the pt in detail which showed:    Lab Results   Component Value Date    WBC 5.9 11/17/2022    HGB 13.0 11/17/2022    HCT 39.7 11/17/2022    MCV 92.7 11/17/2022     11/17/2022    LYMPHOPCT 35.6 11/17/2022    RBC 4.28 11/17/2022    MCH 30.4 11/17/2022    MCHC 32.8 11/17/2022    RDW 14.3 11/17/2022     Lab Results   Component Value Date     05/24/2022    K 4.1 05/24/2022     05/24/2022    CO2 21 05/24/2022    BUN 14 05/24/2022    CREATININE 0.7 11/17/2022    GLUCOSE 91 05/24/2022    CALCIUM 9.5 05/24/2022    PROT 6.7 09/27/2022    LABALBU 4.6 09/27/2022    BILITOT 0.3 09/27/2022    ALKPHOS 69 09/27/2022    AST 19 11/17/2022    ALT 19 11/17/2022    LABGLOM >60 11/17/2022    GFRAA >60 09/27/2022    AGRATIO 1.4 01/06/2021    GLOB 3.0 01/06/2021     Lab Results   Component Value Date    VITD25 36.6 08/17/2021     No results found for: C3     No results found for: C4     No results found for: ANTIDSDNAIGG     Lab Results   Component Value Date    LABURIC 4.6 11/17/2022      Lab Results   Component Value Date    CRP 4.1 11/17/2022    CRP 5.8 (H) 09/27/2022    CRP 9.9 (H) 07/13/2022    CRP 9.9 (H) 03/09/2022     Lab Results   Component Value Date    SEDRATE 11 11/06/2019    SEDRATE 13 11/10/2011     No results found for: CKTOTAL    Negative OFE x 2 (11/10/11, 7/16/20)  Negative RF x 2 (11/10/11, 11/6/19)  Negative CCP (11/6/19)  Negative HLA B27 (11/6/19)  L knee synovial fluid (10/11/22): Negative crystals  Color, Fluid  Yellow    Appearance, Fluid  Hazy    Clot Eval.  see below    Comment: No Clots Seen   Nucl Cell, Fluid /cumm 225    RBC, Fluid /cumm 0    Neutrophil Count, Fluid % 3    Lymphocytes, Body Fluid % 16    Monocyte Count, Fluid % 29    Macrophages % 52      Negative hepatitis B and C serologies (11/6/19)  Negative Quantiferon TB (10/14/21)    Imaging:  I personally reviewed interval imaging and discussed w/ the pt in detail which included:    MRI L-spine (5/29/13): IMPRESSION:   Moderate size central and L paracentral disc herniation L5-S1 primarily affecting the left S1 nerve root. Broad-based disc protrusion centrally L4-L5. X-rays (11/7/19): Bilateral hands:   No significant plain film abnormality. No erosion, chondrocalcinosis or fracture. Bilateral knees: Moderate degenerative changes about the patellofemoral space bilaterally with lateral subluxation of the patella bilaterally. Bilateral feet:   No significant plain film abnormality. Lumbar spine and sacroiliac joints:   No ankylosis or erosion is appreciated. Mild degenerative changes about the right sacroiliac joint. I independently reviewed above X-rays, L-spine w/o evidence of SpA, hand joints w/o evidence of chronic inflammatory arthritis/erosive changes. MRI pelvis (11/20/19):  1. Trace amount of nonspecific fluid in the left sacroiliac joint. No additional changes to suggest inflammatory arthropathy. 2. Minimal right sacroiliac degenerative change. 3. No abnormality in the hip joints. 4. Mild bilateral gluteus minimus tendinosis and proximal hamstring tendinosis. 5. Mild degenerative changes at L4-5 and L5-S1. I discussed her MRI findings w/ the reading radiologist on 12/2/19, she has mild degenerative arthritis in her sacroiliac joints but nothing inflammatory in nature. MRI R hand (7/26/22):   FINDINGS:   SOFT TISSUES: The visualized flexor and extensor tendons are intact. There is no significant fluid within the tendon sheaths. Postcontrast imaging demonstrates mild enhancement involving the 3rd extensor tendon sheath. No significant flexor tendon sheath enhancement is appreciated. BONE MARROW: There is no evidence of acute fracture or dislocation. A probable cyst is noted within the distal pole of the scaphoid. There is no diffuse marrow replacing process. JOINTS: No significant synovial process is appreciated. There is no significant synovial enhancement on the postcontrast images. There is enhancement of probable small cysts within the scaphoid and trapezoid. The ulnar styloid process is intact. No significant enthesitis is appreciated. No focal or full-thickness cartilage defects are identified. IMPRESSION:  Minimal 3rd extensor tenosynovitis. Otherwise, no evidence of active inflammatory arthropathy. MRI R ankle (7/26/22): FINDINGS:   SYNDESMOTIC LIGAMENTS: There is thickening of the syndesmotic ligament proximally consistent with prior syndesmotic injury. There is mild thickening of the distal anterior inferior tibiofibular syndesmotic ligament. LATERAL COLLATERAL LIGAMENT COMPLEX: The anterior talofibular ligament is thickened and indistinct consistent with previous high-grade sprain. The calcaneofibular ligament is thickened. The posterior talofibular ligament is intact. DELTOID LIGAMENT COMPLEX: Cystic change is noted in the medial malleolus consistent with prior avulsion injury. There is mild attenuation of the deep fibers of the deltoid ligament. SINUS TARSI AND SPRING LIGAMENT: The sinus tarsi fat is preserved. The superomedial portion of the spring ligament is intact. MEDIAL TENDONS: There is a small amount of fluid within the medial tendon sheaths. The medial tendons remain intact. LATERAL TENDONS: There is a small amount of fluid in the peroneal tendon sheath.  The tendons themselves appear intact. ANTERIOR TENDONS: The anterior tendons are intact. ACHILLES TENDON: The Achilles tendon is intact. There is a small amount of fluid in the retrocalcaneal bursa. There is minimal increased T2 signal within the Achilles tendon itself consistent with mild tendinopathy. PLANTAR FASCIA: There is significant thickening of the central cord of the plantar fascia proximally. There is fluid signal intensity within the central cord consistent with small partial-thickness tearing measuring less than 1 cm. There is mild perifascicular edema. There is a mildly edematous plantar calcaneal heel spur. There is edema within the adjacent musculature. TARSAL TUNNEL: There are no obstructing lesions in the tarsal tunnel. BONE MARROW: There is no evidence of acute fracture or dislocation. There is no diffuse marrow replacing process. JOINT SPACES: There is synovitis versus small joint effusion at the tibiotalar articulation. There is full-thickness cartilage fissuring involving the anterior tibial plafond with subchondral edema. There is mild synovitis within the posterior subtalar recess. The Lisfranc ligament is intact. Midfoot alignment is within normal limits. Postcontrast imaging demonstrates synovial enhancement throughout the tibiotalar articulation. There is enhancement of the medial and lateral tendon sheaths consistent with tenosynovitis. There is a small focus of subchondral enhancement involving the posteromedial tibial plafond consistent with cartilage loss. There is enhancement associated with the central cord plantar fasciitis. IMPRESSION:  Tibiotalar joint synovitis. Regions of cartilage loss and subchondral change, consistent with the history of rheumatoid arthritis. Peroneal and medial tendon tenosynovitis, also consistent with the history of rheumatoid arthritis. Severe acute on chronic central cord plantar fasciitis with partial-thickness disruption. Above results were discussed w/ the pt in detail during today's visit.

## 2022-11-22 NOTE — ASSESSMENT & PLAN NOTE
- remains active. - recently started on Pregabalin 50 mg BID. - she has been referred to aquatic PT.

## 2022-11-22 NOTE — ASSESSMENT & PLAN NOTE
- s/p L knee arthrocentesis and IA steroid injection on 10/11/22. Synovial fluid analysis findings not c/w inflammatory arthritis. - made referral to Ortho to evaluate her knee pain as her inflammatory arthritis appears to be well controlled currently. - did not respond to multiple NSAIDs in the past. Recommended trying arthritis strength Acetaminophen up to 1000 mg TID PRN. - cont Pregabalin 50 mg BID.

## 2022-12-03 DIAGNOSIS — G25.81 RLS (RESTLESS LEGS SYNDROME): ICD-10-CM

## 2022-12-05 RX ORDER — ROPINIROLE 3 MG/1
TABLET, FILM COATED ORAL
Qty: 90 TABLET | Refills: 2 | Status: SHIPPED | OUTPATIENT
Start: 2022-12-05

## 2022-12-05 NOTE — TELEPHONE ENCOUNTER
Medication:   Requested Prescriptions     Pending Prescriptions Disp Refills    rOPINIRole (REQUIP) 3 MG tablet [Pharmacy Med Name: ROPINIROLE 3MG TABLETS] 90 tablet 0     Sig: TAKE 1 TABLET BY MOUTH EVERY NIGHT        Last Filled:  90 x0 RF 7/7/22    Patient Phone Number: [phone] (home)     Last appt: 10/19/2022   Next appt: Visit date not found    Last OARRS:   RX Monitoring 10/11/2022   Attestation -   Periodic Controlled Substance Monitoring No signs of potential drug abuse or diversion identified.

## 2022-12-07 ENCOUNTER — OFFICE VISIT (OUTPATIENT)
Dept: ORTHOPEDIC SURGERY | Age: 42
End: 2022-12-07
Payer: MEDICAID

## 2022-12-07 VITALS — BODY MASS INDEX: 42.68 KG/M2 | WEIGHT: 250 LBS | HEIGHT: 64 IN

## 2022-12-07 DIAGNOSIS — M17.10 PATELLOFEMORAL ARTHRITIS: Primary | ICD-10-CM

## 2022-12-07 PROCEDURE — 99244 OFF/OP CNSLTJ NEW/EST MOD 40: CPT | Performed by: ORTHOPAEDIC SURGERY

## 2022-12-07 PROCEDURE — G8417 CALC BMI ABV UP PARAM F/U: HCPCS | Performed by: ORTHOPAEDIC SURGERY

## 2022-12-07 PROCEDURE — G8484 FLU IMMUNIZE NO ADMIN: HCPCS | Performed by: ORTHOPAEDIC SURGERY

## 2022-12-07 PROCEDURE — G8427 DOCREV CUR MEDS BY ELIG CLIN: HCPCS | Performed by: ORTHOPAEDIC SURGERY

## 2022-12-07 NOTE — PROGRESS NOTES
ORTHOPAEDIC CONSULTATION NOTE    Chief Complaint   Patient presents with    New Patient     NP Lt>Rt Knee       HPI  12/7/22 initial orthopedic surgeon E&M visit  43 y.o. female seen in consultation at the request of Dr Awais Germain MD for evaluation of bilateral knee pain:  Left worse than right  Chronic, but recently worsening  She does have history of rheumatoid arthritis -polyarthropathy  affects ankle, knees, wrist and fingers  She reports Dr. Aaron Hoffman performed a left knee aspiration and steroid injection in October  That did help for a few weeks  Pain is described as constant    I have reviewed and discussed the below pain assessment findings with the patient. Pain Assessment  Location of Pain: Knee  Location Modifiers: Left, Lateral  Severity of Pain: 6  Quality of Pain: Grinding, Popping, Cracking  Duration of Pain: Persistent  Frequency of Pain: Constant  Aggravating Factors: Bending, Kneeling, Squatting, Stairs  Limiting Behavior: Some  Relieving Factors: Nsaids  Result of Injury: Yes (Fall 2021)  Work-Related Injury: No  Are there other pain locations you wish to document?: No    Review of Systems  I have read over the ROS from the Patient History Form dated on 12/7/22  Pertinent positives include weight gain, hypertension, peripheral edema, neuropathy, chronic pain, fibromyalgia, depression  Rest of 13 point ROS otherwise negative except per HPI, and scanned into the patient's chart under the Media tab.     Allergies   Allergen Reactions    Metronidazole Rash and Other (See Comments)     LIPS WERE TINGLING, tongue tingling, hives all over body    Other      Glue used to adhere recent surgical incision        Current Outpatient Medications   Medication Sig Dispense Refill    rOPINIRole (REQUIP) 3 MG tablet TAKE 1 TABLET BY MOUTH EVERY NIGHT 90 tablet 2    clindamycin-benzoyl peroxide (BENZACLIN) 1-5 % gel       fluocinonide (LIDEX) 0.05 % cream       tretinoin (RETIN-A) 0.05 % cream Apply pea-size amount to entire face at bedtime, starting twice a week and increasing to every other night then every night as tolerated. diclofenac sodium (VOLTAREN) 1 % GEL Apply 4 g topically 4 times daily 150 g 3    methotrexate (RHEUMATREX) 2.5 MG chemo tablet Take 7 tablets by mouth once a week 84 tablet 0    hydroxychloroquine (PLAQUENIL) 200 MG tablet Take 1 tablet by mouth 2 times daily 327 tablet 0    folic acid (FOLVITE) 1 MG tablet Take 1 tablet by mouth daily 90 tablet 0    Upadacitinib ER (RINVOQ) 15 MG TB24 Take 15 mg by mouth daily 90 tablet 0    fluconazole (DIFLUCAN) 150 MG tablet TAKE 1 TABLET BY MOUTH 1 TIME. MAY REPEAT 3 DAYS LATER IF SYMPTOMS PERSIST 2 tablet 0    VENTOLIN  (90 Base) MCG/ACT inhaler INHALE 2 PUFFS INTO THE LUNGS EVERY 6 HOURS AS NEEDED FOR WHEEZING 18 g 2    pregabalin (LYRICA) 50 MG capsule Take 1 capsule by mouth 3 times daily for 90 days.  270 capsule 0    pantoprazole (PROTONIX) 40 MG tablet TAKE 1 TABLET BY MOUTH DAILY 30 tablet 5    cetirizine (ZYRTEC) 10 MG tablet TAKE 1 TABLET BY MOUTH DAILY 30 tablet 11    fluticasone (FLONASE) 50 MCG/ACT nasal spray SHAKE LIQUID AND USE 2 SPRAYS IN EACH NOSTRIL EVERY DAY 16 g 3    busPIRone (BUSPAR) 5 MG tablet TAKE ONE TABLET BY MOUTH THREE TIMES A DAY AS NEEDED FOR ANXIETY 90 tablet 3    desvenlafaxine succinate (PRISTIQ) 100 MG TB24 extended release tablet TAKE 1 TABLET BY MOUTH DAILY 90 tablet 0    hydroCHLOROthiazide (MICROZIDE) 12.5 MG capsule TAKE 1 CAPSULE BY MOUTH EVERY MORNING 90 capsule 0    valsartan (DIOVAN) 80 mg tablet Take 1 tablet by mouth daily 90 tablet 1    estradiol (ESTRACE) 2 MG tablet Take 2 mg by mouth daily      Biotin 1000 MCG TABS Take by mouth      Cholecalciferol (VITAMIN D3) 50 MCG (2000 UT) CAPS Take 2,000 capsules by mouth daily      acetaminophen (TYLENOL) 500 MG tablet Take 500 mg by mouth every 6 hours as needed for Pain      SUMAtriptan (IMITREX) 100 MG tablet Take 100 mg by mouth daily as needed No current facility-administered medications for this visit.        Past Medical History:   Diagnosis Date    Anxiety     Arthritis     RA    Cellulitis of left lower extremity     left ankle    Depression     WELL CONTROLLED 10-16-17    Fibromyalgia     Heart rate fast     intermittent    Kidney stone     Migraine     Motor tic disorder     Obstructive sleep apnea, adult         Past Surgical History:   Procedure Laterality Date    ABDOMINAL EXPLORATION SURGERY  07/22/2011    excision Meckels diverticulum    ABDOMINOPLASTY  04/14/2014    [facility] [place]    ADENOIDECTOMY Bilateral     APPENDECTOMY  07/22/2011    BACK SURGERY      CARPAL TUNNEL RELEASE      HYSTERECTOMY, TOTAL ABDOMINAL (CERVIX REMOVED)  06/12/2018    robotic total hyst B[facility], Dr Mehta Scot    LITHOTRIPSY  x2    LUMBAR LAMINECTOMY  06/2013    Rad; left l5-s1    MECKEL DIVERTICULUM EXCISION      OTHER SURGICAL HISTORY      hymenoplasty    OVARIAN CYST REMOVAL  04/13/2018    OPERATIVE LAPAROSCOPY, LEFT OVARIAN CYSTECTOMY WITH DILATATION AND CURETTAGE    OVARY REMOVAL      TONSILLECTOMY Bilateral     URETHRAL STRICTURE DILATATION      WISDOM TOOTH EXTRACTION         Family History   Problem Relation Age of Onset    Asthma Mother     High Blood Pressure Mother     Diabetes Father     Atrial Fibrillation Father     Alcohol Abuse Father     High Blood Pressure Father     Heart Disease Paternal Grandmother     Heart Attack Paternal Grandmother     Diabetes Paternal Grandmother     Stroke Paternal Grandfather     Stroke Maternal Grandfather     Atrial Fibrillation Maternal Grandmother     Diabetes Maternal Grandmother     Cancer Neg Hx        Social History     Socioeconomic History    Marital status:      Spouse name: Not on file    Number of children: Not on file    Years of education: Not on file    Highest education level: Not on file   Occupational History    Not on file   Tobacco Use    Smoking status: Never    Smokeless tobacco: Never   Vaping Use Vaping Use: Never used   Substance and Sexual Activity    Alcohol use: No     Alcohol/week: 0.0 standard drinks     Comment: once a week    Drug use: No    Sexual activity: Not on file   Other Topics Concern    Not on file   Social History Narrative    Not on file     Social Determinants of Health     Financial Resource Strain: Low Risk     Difficulty of Paying Living Expenses: Not hard at all   Food Insecurity: No Food Insecurity    Worried About Running Out of Food in the Last Year: Never true    Ran Out of Food in the Last Year: Never true   Transportation Needs: Not on file   Physical Activity: Not on file   Stress: Not on file   Social Connections: Not on file   Intimate Partner Violence: Not on file   Housing Stability: Not on file        Vitals:    12/07/22 0910   Weight: 250 lb (113.4 kg)   Height: 5' 4\" (1.626 m)       Physical Exam  Constitutional - well-groomed, well-nourished, Body mass index is 42.91 kg/m². Psychiatric - pleasant, normal mood & affect  Cardiovascular - RRR, equivocal peripheral edema, posterior tibialis pulse 2+  Respiratory - respirations unlabored, on room air  Skin - no rashes, wounds, or lesions seen on exposed skin  Neurological - BLE SILT SP/DP/T/sural/saphenous nerve distributions; EHL/FHL/TA/GS intact  Bilateral knees:   neutral alignment    effusion noted   No obvious atrophy     No tenderness to palpation medial joint line   No tenderness to palpation lateral joint line   Range of Motion:    Extension:  0    Flexion:  123   Special tests:    Retropatellar crepitus with ROM    Pain with patellar grind test   Ligamentous testing:    Varus stress stable    Valgus stress stable    Lachman stable    Posterior Drawer stable        Imaging:  Images were personally reviewed by myself and discussed with the patient  Bilateral knees 4 views performed 12/7/22 -   Overall alignment is neutral.    The medial compartment articular height is preserved.   The lateral compartment articular height is preserved. The anterior compartment articular height is moderately to severely narrowed with large osteophytes seen. There is mild to moderate lateral patellar tilt. Subchondral sclerosis and cystic changes are seen. Loose bodies identified. Assessment & Plan:  43 y.o. female who presents with    Diagnosis Orders   1. Patellofemoral arthritis  XR KNEE LEFT (MIN 4 VIEWS)    XR KNEE RIGHT (MIN 4 VIEWS)      2. Adult BMI 40.0-44.9 kg/sq m (HCC)            No orders of the defined types were placed in this encounter. Thank you Dr Melchor Storey for referring [facility] to me for evaluation of her knees:    Bilateral patellofemoral arthrosis  Worse symptomatically on the left  Discussed with patient development and pathoanatomy of Baker's cyst.  Informed the patient common finding. Treatment involves addressing the knee pathology to treat the cyst.  Informed the patient of potential for recurrence, enlargement, and/or rupture. Rarely is surgical excision required. Conservative treatment of knee symptoms/arthritis, according to AAOS Clinical Practice Guidelines:  1)  Recommend oral or topical NSAIDs to reduce pain and swelling. 2)  Recommend acetaminophen to reduce pain. 3)  Oral narcotics, including tramadol, result in a significant increase of adverse events and are not effective at improving pain or function for treatment of osteoarthritis of the knee. 4)  Recommend weight loss to reduce stresses on the knee, particularly the patellofemoral compartment which sees up to 6x body weight. 5)  Home exercise program, focusing on strengthening, low impact aerobic exercises, and neuromuscular education. 6)  Intra-articular corticosteroid injections are an option. Corticosteroid injections can be performed every 4 months as needed. 7)  Evidence for intra-articular hyaluronic acid injections (viscosupplementation) is not as strong, but may benefit a subset of patients who have failed other options. Viscosupplementation can be performed every 6 months as needed. 8)  Bracing and cane use can improve pain and function. However, I typically do not recommend bracing for predominately patellofemoral arthritis/pain. Although not specifically listed in the CPGs, ice therapy and topical patches such as Salonpas are good options as well.     Intra-articular knee injection:    No long-term relief with aspiration and steroid injection in Oct  Consider viscosupplementation, we will submit for insurance prior authorization    Enedina Max MD

## 2022-12-16 ENCOUNTER — TELEPHONE (OUTPATIENT)
Dept: ORTHOPEDIC SURGERY | Age: 42
End: 2022-12-16

## 2022-12-16 NOTE — TELEPHONE ENCOUNTER
[facility]  (QTY 1)   LEFT KNEE        NO PA REQUIRED. VALID & BILLABLE ON CLAIM YES. BUY AND BILL.         Called and lm/vm to call and get this scheduled

## 2022-12-17 DIAGNOSIS — F41.8 DEPRESSION WITH ANXIETY: ICD-10-CM

## 2022-12-19 ENCOUNTER — OFFICE VISIT (OUTPATIENT)
Dept: ORTHOPEDIC SURGERY | Age: 42
End: 2022-12-19
Payer: MEDICAID

## 2022-12-19 VITALS — RESPIRATION RATE: 16 BRPM | BODY MASS INDEX: 42.68 KG/M2 | WEIGHT: 250 LBS | HEIGHT: 64 IN

## 2022-12-19 DIAGNOSIS — M17.10 PATELLOFEMORAL ARTHRITIS: Primary | ICD-10-CM

## 2022-12-19 PROCEDURE — 99999 PR OFFICE/OUTPT VISIT,PROCEDURE ONLY: CPT | Performed by: ORTHOPAEDIC SURGERY

## 2022-12-19 PROCEDURE — 20610 DRAIN/INJ JOINT/BURSA W/O US: CPT | Performed by: ORTHOPAEDIC SURGERY

## 2022-12-19 RX ORDER — BUSPIRONE HYDROCHLORIDE 5 MG/1
TABLET ORAL
Qty: 90 TABLET | Refills: 3 | Status: SHIPPED | OUTPATIENT
Start: 2022-12-19

## 2022-12-19 NOTE — PROGRESS NOTES
ORTHOPAEDIC CONSULTATION NOTE    Chief Complaint   Patient presents with    Follow-up     Left knee durolane injection        HPI  12/19/22  Here for [facility]    12/7/22   43 y.o. female seen in consultation at the request of Dr Juan Antonio Lombardi MD for evaluation of bilateral knee pain:  Left worse than right  Chronic, but recently worsening  She does have history of rheumatoid arthritis -polyarthropathy  affects ankle, knees, wrist and fingers  She reports Dr. Smitha Willoughby performed a left knee aspiration and steroid injection in October  That did help for a few weeks  Pain is described as constant    Review of Systems  ROS from the Patient History Form dated on 12/7/22  Pertinent positives include weight gain, hypertension, peripheral edema, neuropathy, chronic pain, fibromyalgia, depression  Rest of 13 point ROS otherwise negative except per HPI, and scanned into the patient's chart under the Media tab. Allergies   Allergen Reactions    Metronidazole Rash and Other (See Comments)     LIPS WERE TINGLING, tongue tingling, hives all over body    Other      Glue used to adhere recent surgical incision        Current Outpatient Medications   Medication Sig Dispense Refill    rOPINIRole (REQUIP) 3 MG tablet TAKE 1 TABLET BY MOUTH EVERY NIGHT 90 tablet 2    clindamycin-benzoyl peroxide (BENZACLIN) 1-5 % gel       fluocinonide (LIDEX) 0.05 % cream       tretinoin (RETIN-A) 0.05 % cream Apply pea-size amount to entire face at bedtime, starting twice a week and increasing to every other night then every night as tolerated.       diclofenac sodium (VOLTAREN) 1 % GEL Apply 4 g topically 4 times daily 150 g 3    methotrexate (RHEUMATREX) 2.5 MG chemo tablet Take 7 tablets by mouth once a week 84 tablet 0    hydroxychloroquine (PLAQUENIL) 200 MG tablet Take 1 tablet by mouth 2 times daily 591 tablet 0    folic acid (FOLVITE) 1 MG tablet Take 1 tablet by mouth daily 90 tablet 0    Upadacitinib ER (RINVOQ) 15 MG TB24 Take 15 mg by mouth daily 90 tablet 0    fluconazole (DIFLUCAN) 150 MG tablet TAKE 1 TABLET BY MOUTH 1 TIME. MAY REPEAT 3 DAYS LATER IF SYMPTOMS PERSIST 2 tablet 0    VENTOLIN  (90 Base) MCG/ACT inhaler INHALE 2 PUFFS INTO THE LUNGS EVERY 6 HOURS AS NEEDED FOR WHEEZING 18 g 2    pregabalin (LYRICA) 50 MG capsule Take 1 capsule by mouth 3 times daily for 90 days. 270 capsule 0    pantoprazole (PROTONIX) 40 MG tablet TAKE 1 TABLET BY MOUTH DAILY 30 tablet 5    cetirizine (ZYRTEC) 10 MG tablet TAKE 1 TABLET BY MOUTH DAILY 30 tablet 11    fluticasone (FLONASE) 50 MCG/ACT nasal spray SHAKE LIQUID AND USE 2 SPRAYS IN EACH NOSTRIL EVERY DAY 16 g 3    busPIRone (BUSPAR) 5 MG tablet TAKE ONE TABLET BY MOUTH THREE TIMES A DAY AS NEEDED FOR ANXIETY 90 tablet 3    desvenlafaxine succinate (PRISTIQ) 100 MG TB24 extended release tablet TAKE 1 TABLET BY MOUTH DAILY 90 tablet 0    hydroCHLOROthiazide (MICROZIDE) 12.5 MG capsule TAKE 1 CAPSULE BY MOUTH EVERY MORNING 90 capsule 0    valsartan (DIOVAN) 80 mg tablet Take 1 tablet by mouth daily 90 tablet 1    estradiol (ESTRACE) 2 MG tablet Take 2 mg by mouth daily      Biotin 1000 MCG TABS Take by mouth      Cholecalciferol (VITAMIN D3) 50 MCG (2000 UT) CAPS Take 2,000 capsules by mouth daily      acetaminophen (TYLENOL) 500 MG tablet Take 500 mg by mouth every 6 hours as needed for Pain      SUMAtriptan (IMITREX) 100 MG tablet Take 100 mg by mouth daily as needed       No current facility-administered medications for this visit.        Past Medical History:   Diagnosis Date    Anxiety     Arthritis     RA    Cellulitis of left lower extremity     left ankle    Depression     WELL CONTROLLED 10-16-17    Fibromyalgia     Heart rate fast     intermittent    Kidney stone     Migraine     Motor tic disorder     Obstructive sleep apnea, adult         Past Surgical History:   Procedure Laterality Date    ABDOMINAL EXPLORATION SURGERY  07/22/2011    excision Meckels diverticulum    ABDOMINOPLASTY 04/14/2014    [facility]    ADENOIDECTOMY Bilateral     APPENDECTOMY  07/22/2011    BACK SURGERY      CARPAL TUNNEL RELEASE      HYSTERECTOMY, TOTAL ABDOMINAL (CERVIX REMOVED)  06/12/2018    robotic total hyst BSO, Dr Mariah Bacon    LITHOTRIPSY  x2    LUMBAR LAMINECTOMY  06/2013    Rad; left l5-s1    MECKEL DIVERTICULUM EXCISION      OTHER SURGICAL HISTORY      hymenoplasty    OVARIAN CYST REMOVAL  04/13/2018    OPERATIVE LAPAROSCOPY, LEFT OVARIAN CYSTECTOMY WITH DILATATION AND CURETTAGE    OVARY REMOVAL      TONSILLECTOMY Bilateral     URETHRAL STRICTURE DILATATION      WISDOM TOOTH EXTRACTION         Family History   Problem Relation Age of Onset    Asthma Mother     High Blood Pressure Mother     Diabetes Father     Atrial Fibrillation Father     Alcohol Abuse Father     High Blood Pressure Father     Heart Disease Paternal Grandmother     Heart Attack Paternal Grandmother     Diabetes Paternal Grandmother     Stroke Paternal Grandfather     Stroke Maternal Grandfather     Atrial Fibrillation Maternal Grandmother     Diabetes Maternal Grandmother     Cancer Neg Hx        Social History     Socioeconomic History    Marital status:      Spouse name: Not on file    Number of children: Not on file    Years of education: Not on file    Highest education level: Not on file   Occupational History    Not on file   Tobacco Use    Smoking status: Never    Smokeless tobacco: Never   Vaping Use    Vaping Use: Never used   Substance and Sexual Activity    Alcohol use: No     Alcohol/week: 0.0 standard drinks     Comment: once a week    Drug use: No    Sexual activity: Not on file   Other Topics Concern    Not on file   Social History Narrative    Not on file     Social Determinants of Health     Financial Resource Strain: Low Risk     Difficulty of Paying Living Expenses: Not hard at all   Food Insecurity: No Food Insecurity    Worried About Running Out of Food in the Last Year: Never true    Ran Out of Food in the Last Year: Never true   Transportation Needs: Not on file   Physical Activity: Not on file   Stress: Not on file   Social Connections: Not on file   Intimate Partner Violence: Not on file   Housing Stability: Not on file        Vitals:    12/19/22 1420   Resp: 16   Weight: 250 lb (113.4 kg)   Height: 5' 4\" (1.626 m)       Physical Exam  Body mass index is 42.91 kg/m². Prior exam:  Psychiatric - pleasant, normal mood & affect  Cardiovascular - RRR, equivocal peripheral edema, posterior tibialis pulse 2+  Respiratory - respirations unlabored, on room air  Skin - no rashes, wounds, or lesions seen on exposed skin  Neurological - BLE SILT SP/DP/T/sural/saphenous nerve distributions; EHL/FHL/TA/GS intact  Bilateral knees:   neutral alignment    effusion noted   No obvious atrophy     No tenderness to palpation medial joint line   No tenderness to palpation lateral joint line   Range of Motion:    Extension:  0    Flexion:  123   Special tests:    Retropatellar crepitus with ROM    Pain with patellar grind test   Ligamentous testing:    Varus stress stable    Valgus stress stable    Lachman stable    Posterior Drawer stable        Imaging:  Prior images reviewed today  Bilateral knees 4 views performed 12/7/22 -   Overall alignment is neutral.    The medial compartment articular height is preserved. The lateral compartment articular height is preserved. The anterior compartment articular height is moderately to severely narrowed with large osteophytes seen. There is mild to moderate lateral patellar tilt. Subchondral sclerosis and cystic changes are seen. Loose bodies identified. Assessment & Plan:  43 y.o. female who presents with    Diagnosis Orders   1.  Patellofemoral arthritis  WA ARTHROCENTESIS ASPIR&/INJ MAJOR JT/BURSA W/O US              Procedures    WA ARTHROCENTESIS ASPIR&/INJ MAJOR JT/BURSA W/O US       Thank you Dr Stephanie Rosales for referring Ophelia Jeong to me for evaluation of her knees:    Bilateral patellofemoral arthrosis  Worse symptomatically on the left  Discussed with patient development and pathoanatomy of Baker's cyst.  Informed the patient common finding. Treatment involves addressing the knee pathology to treat the cyst.  Informed the patient of potential for recurrence, enlargement, and/or rupture. Rarely is surgical excision required. Conservative treatment of knee symptoms/arthritis, according to AAOS Clinical Practice Guidelines:  1)  Recommend oral or topical NSAIDs to reduce pain and swelling. 2)  Recommend acetaminophen to reduce pain. 3)  Oral narcotics, including tramadol, result in a significant increase of adverse events and are not effective at improving pain or function for treatment of osteoarthritis of the knee. 4)  Recommend weight loss to reduce stresses on the knee, particularly the patellofemoral compartment which sees up to 6x body weight. 5)  Home exercise program, focusing on strengthening, low impact aerobic exercises, and neuromuscular education. 6)  Intra-articular corticosteroid injections are an option. Corticosteroid injections can be performed every 4 months as needed. 7)  Evidence for intra-articular hyaluronic acid injections (viscosupplementation) is not as strong, but may benefit a subset of patients who have failed other options. Viscosupplementation can be performed every 6 months as needed. 8)  Bracing and cane use can improve pain and function. However, I typically do not recommend bracing for predominately patellofemoral arthritis/pain. Although not specifically listed in the CPGs, ice therapy and topical patches such as Salonpas are good options as well.     Intra-articular knee injection:    No long-term relief with aspiration and steroid injection in Oct  Risks and benefits of a viscosupplementation injection were discussed with the patient, including the possibility of adverse local site reactions such as infection or a pseudosepsis response. Although rare, an infection is possible and may necessitate surgical treatment if it occurs in the joint or develops into an abscess. The patient elected to proceed, and after verbal consent was obtained and drug allergies were reviewed, the injection site was prepped with alcohol and ChloraPrep. Durolane 60mg was injected into the left knee. There were no immediate complications after the procedure. The patient was advised to ice the area intermittently over the next 24-48 hours until the injection becomes effective.       Roxana Yusuf MD

## 2022-12-23 DIAGNOSIS — F41.8 DEPRESSION WITH ANXIETY: ICD-10-CM

## 2022-12-27 NOTE — TELEPHONE ENCOUNTER
Medication:   Requested Prescriptions     Pending Prescriptions Disp Refills    desvenlafaxine succinate (PRISTIQ) 100 MG TB24 extended release tablet [Pharmacy Med Name: DESVENLAFAXINE ER SUCCINATE 100MG T] 90 tablet 0     Sig: TAKE 1 TABLET BY MOUTH DAILY        Last Filled:  7/7/2022    Patient Phone Number: [phone] (home)     Last appt: 10/19/2022   Next appt: Visit date not found    Last [place]:   RX Monitoring 10/11/2022   Attestation -   Periodic Controlled Substance Monitoring No signs of potential drug abuse or diversion identified.

## 2022-12-28 RX ORDER — DESVENLAFAXINE 100 MG/1
100 TABLET, EXTENDED RELEASE ORAL DAILY
Qty: 90 TABLET | Refills: 0 | Status: SHIPPED | OUTPATIENT
Start: 2022-12-28 | End: 2023-06-26

## 2023-01-04 ENCOUNTER — OFFICE VISIT (OUTPATIENT)
Dept: FAMILY MEDICINE CLINIC | Age: 43
End: 2023-01-04
Payer: MEDICAID

## 2023-01-04 VITALS
HEART RATE: 94 BPM | WEIGHT: 249.4 LBS | BODY MASS INDEX: 42.81 KG/M2 | SYSTOLIC BLOOD PRESSURE: 128 MMHG | OXYGEN SATURATION: 99 % | DIASTOLIC BLOOD PRESSURE: 86 MMHG

## 2023-01-04 DIAGNOSIS — I10 ESSENTIAL HYPERTENSION: ICD-10-CM

## 2023-01-04 DIAGNOSIS — M06.00 SERONEGATIVE RHEUMATOID ARTHRITIS (HCC): ICD-10-CM

## 2023-01-04 DIAGNOSIS — E66.01 CLASS 3 SEVERE OBESITY DUE TO EXCESS CALORIES WITH SERIOUS COMORBIDITY AND BODY MASS INDEX (BMI) OF 40.0 TO 44.9 IN ADULT (HCC): Primary | ICD-10-CM

## 2023-01-04 DIAGNOSIS — M79.7 FIBROMYALGIA: ICD-10-CM

## 2023-01-04 DIAGNOSIS — K21.9 GASTROESOPHAGEAL REFLUX DISEASE WITHOUT ESOPHAGITIS: ICD-10-CM

## 2023-01-04 PROBLEM — E66.813 CLASS 3 SEVERE OBESITY DUE TO EXCESS CALORIES WITH SERIOUS COMORBIDITY AND BODY MASS INDEX (BMI) OF 40.0 TO 44.9 IN ADULT: Status: ACTIVE | Noted: 2020-10-07

## 2023-01-04 PROCEDURE — 3074F SYST BP LT 130 MM HG: CPT | Performed by: NURSE PRACTITIONER

## 2023-01-04 PROCEDURE — 3079F DIAST BP 80-89 MM HG: CPT | Performed by: NURSE PRACTITIONER

## 2023-01-04 PROCEDURE — G8427 DOCREV CUR MEDS BY ELIG CLIN: HCPCS | Performed by: NURSE PRACTITIONER

## 2023-01-04 PROCEDURE — G8417 CALC BMI ABV UP PARAM F/U: HCPCS | Performed by: NURSE PRACTITIONER

## 2023-01-04 PROCEDURE — 99214 OFFICE O/P EST MOD 30 MIN: CPT | Performed by: NURSE PRACTITIONER

## 2023-01-04 PROCEDURE — 1036F TOBACCO NON-USER: CPT | Performed by: NURSE PRACTITIONER

## 2023-01-04 PROCEDURE — G8484 FLU IMMUNIZE NO ADMIN: HCPCS | Performed by: NURSE PRACTITIONER

## 2023-01-04 RX ORDER — VALSARTAN 80 MG/1
80 TABLET ORAL DAILY
Qty: 90 TABLET | Refills: 3 | Status: SHIPPED | OUTPATIENT
Start: 2023-01-04 | End: 2024-01-04

## 2023-01-04 RX ORDER — PANTOPRAZOLE SODIUM 40 MG/1
40 TABLET, DELAYED RELEASE ORAL DAILY
Qty: 90 TABLET | Refills: 3 | Status: SHIPPED | OUTPATIENT
Start: 2023-01-04 | End: 2024-01-04

## 2023-01-04 RX ORDER — PREGABALIN 75 MG/1
75 CAPSULE ORAL 2 TIMES DAILY
Qty: 180 CAPSULE | Refills: 1 | Status: SHIPPED | OUTPATIENT
Start: 2023-01-04 | End: 2023-07-03

## 2023-01-04 RX ORDER — SEMAGLUTIDE 0.25 MG/.5ML
0.25 INJECTION, SOLUTION SUBCUTANEOUS
Qty: 2 ML | Refills: 0 | Status: SHIPPED | OUTPATIENT
Start: 2023-01-04

## 2023-01-04 ASSESSMENT — ENCOUNTER SYMPTOMS
VOMITING: 0
SHORTNESS OF BREATH: 0
NAUSEA: 0
ABDOMINAL PAIN: 0

## 2023-01-04 ASSESSMENT — PATIENT HEALTH QUESTIONNAIRE - PHQ9
SUM OF ALL RESPONSES TO PHQ9 QUESTIONS 1 & 2: 0
6. FEELING BAD ABOUT YOURSELF - OR THAT YOU ARE A FAILURE OR HAVE LET YOURSELF OR YOUR FAMILY DOWN: 0
4. FEELING TIRED OR HAVING LITTLE ENERGY: 0
1. LITTLE INTEREST OR PLEASURE IN DOING THINGS: 0
2. FEELING DOWN, DEPRESSED OR HOPELESS: 0
SUM OF ALL RESPONSES TO PHQ QUESTIONS 1-9: 0
10. IF YOU CHECKED OFF ANY PROBLEMS, HOW DIFFICULT HAVE THESE PROBLEMS MADE IT FOR YOU TO DO YOUR WORK, TAKE CARE OF THINGS AT HOME, OR GET ALONG WITH OTHER PEOPLE: 0
5. POOR APPETITE OR OVEREATING: 0
8. MOVING OR SPEAKING SO SLOWLY THAT OTHER PEOPLE COULD HAVE NOTICED. OR THE OPPOSITE, BEING SO FIGETY OR RESTLESS THAT YOU HAVE BEEN MOVING AROUND A LOT MORE THAN USUAL: 0
3. TROUBLE FALLING OR STAYING ASLEEP: 0
9. THOUGHTS THAT YOU WOULD BE BETTER OFF DEAD, OR OF HURTING YOURSELF: 0
SUM OF ALL RESPONSES TO PHQ QUESTIONS 1-9: 0
7. TROUBLE CONCENTRATING ON THINGS, SUCH AS READING THE NEWSPAPER OR WATCHING TELEVISION: 0
SUM OF ALL RESPONSES TO PHQ QUESTIONS 1-9: 0
SUM OF ALL RESPONSES TO PHQ QUESTIONS 1-9: 0

## 2023-01-04 NOTE — ASSESSMENT & PLAN NOTE
Uncontrolled, change Lyrica today   Start taking 75 mg BID vs 50 mg TID  No inconsistencies with [place].

## 2023-01-04 NOTE — PROGRESS NOTES
SUBJECTIVE:  Pt is a of 43 y.o. female comes in today with   Chief Complaint   Patient presents with    Weight Management     Pt would like to discuss her weight       Patient presenting today for evaluation of weight. Frustrated weight keeps increasing. Unable to exercise d/t chronic pain related to RA and fibro. Diet mostly well balanced, reduced soda intake. Causes her to feel worthless, crying frequently over body image. Lyrica not helping with pain, most days forgets to take afternoon does. Requesting refills on protonix and valsartan. Taking daily without side effects. Prior to Visit Medications    Medication Sig Taking? Authorizing Provider   desvenlafaxine succinate (PRISTIQ) 100 MG TB24 extended release tablet TAKE 1 TABLET BY MOUTH DAILY Yes ANDREA Lucia CNP   busPIRone (BUSPAR) 5 MG tablet TAKE 1 TABLET BY MOUTH THREE TIMES DAILY AS NEEDED FOR ANXIETY Yes ANDREA Garrett CNP   rOPINIRole (REQUIP) 3 MG tablet TAKE 1 TABLET BY MOUTH EVERY NIGHT Yes ANDREA Garrett CNP   clindamycin-benzoyl peroxide (BENZACLIN) 1-5 % gel  Yes Historical Provider, MD   fluocinonide (LIDEX) 0.05 % cream  Yes Historical Provider, MD   tretinoin (RETIN-A) 0.05 % cream Apply pea-size amount to entire face at bedtime, starting twice a week and increasing to every other night then every night as tolerated.  Yes Historical Provider, MD   methotrexate (RHEUMATREX) 2.5 MG chemo tablet Take 7 tablets by mouth once a week Yes Kenton Litten, MD   hydroxychloroquine (PLAQUENIL) 200 MG tablet Take 1 tablet by mouth 2 times daily Yes Kenton Litten, MD   folic acid (FOLVITE) 1 MG tablet Take 1 tablet by mouth daily Yes Kenton Litten, MD   Upadacitinib ER (RINVOQ) 15 MG TB24 Take 15 mg by mouth daily Yes Kenton Litten, MD   fluconazole (DIFLUCAN) 150 MG tablet TAKE 1 TABLET BY MOUTH 1 TIME. MAY REPEAT 3 DAYS LATER IF SYMPTOMS PERSIST Yes ANDREA Garrett CNP   VENTOLIN  (90 Base) MCG/ACT inhaler INHALE 2 PUFFS INTO THE LUNGS EVERY 6 HOURS AS NEEDED FOR WHEEZING Yes ANDREA Salas CNP   pregabalin (LYRICA) 50 MG capsule Take 1 capsule by mouth 3 times daily for 90 days. Yes Darlene Leon MD   pantoprazole (PROTONIX) 40 MG tablet TAKE 1 TABLET BY MOUTH DAILY Yes ANDREA Arriaga CNP   cetirizine (ZYRTEC) 10 MG tablet TAKE 1 TABLET BY MOUTH DAILY Yes ANDREA Arriaga CNP   fluticasone (FLONASE) 50 MCG/ACT nasal spray SHAKE LIQUID AND USE 2 SPRAYS IN EACH NOSTRIL EVERY DAY Yes ANDREA Salas CNP   hydroCHLOROthiazide (MICROZIDE) 12.5 MG capsule TAKE 1 CAPSULE BY MOUTH EVERY MORNING Yes Pee Harp MD   valsartan (DIOVAN) 80 mg tablet Take 1 tablet by mouth daily Yes ANDREA Salas CNP   estradiol (ESTRACE) 2 MG tablet Take 2 mg by mouth daily Yes Historical Provider, MD   Biotin 1000 MCG TABS Take by mouth Yes Historical Provider, MD   Cholecalciferol (VITAMIN D3) 50 MCG (2000 UT) CAPS Take 2,000 capsules by mouth daily Yes Historical Provider, MD   acetaminophen (TYLENOL) 500 MG tablet Take 500 mg by mouth every 6 hours as needed for Pain Yes Historical Provider, MD   SUMAtriptan (IMITREX) 100 MG tablet Take 100 mg by mouth daily as needed Yes Historical Provider, MD   diclofenac sodium (VOLTAREN) 1 % GEL Apply 4 g topically 4 times daily  Darlene Husbands, MD         Patient's allergies, past medical, surgical, social and family histories werereviewed and updated as appropriate. Review of Systems   Constitutional:  Positive for activity change (decreasing) and unexpected weight change (increasing). Negative for diaphoresis. Respiratory:  Negative for shortness of breath. Cardiovascular:  Negative for chest pain and palpitations. Gastrointestinal:  Negative for abdominal pain, nausea and vomiting. Musculoskeletal:  Positive for arthralgias. Neurological:  Negative for dizziness and headaches. Physical Exam  Vitals reviewed. Constitutional:       Appearance: She is well-developed. She is obese. Cardiovascular:      Rate and Rhythm: Normal rate and regular rhythm. Pulmonary:      Effort: Pulmonary effort is normal.      Breath sounds: Normal breath sounds. Skin:     General: Skin is warm and dry. Neurological:      Mental Status: She is alert and oriented to person, place, and time. Psychiatric:         Mood and Affect: Mood is depressed. Affect is tearful. Behavior: Behavior normal.         Thought Content: Thought content normal.         Judgment: Judgment normal.     Vitals:    01/04/23 0838   BP: 128/86   Pulse: 94   SpO2: 99%   Weight: 249 lb 6.4 oz (113.1 kg)             ASSESSMENT:  1. Class 3 severe obesity due to excess calories with serious comorbidity and body mass index (BMI) of 40.0 to 44.9 in adult ([facility] [address]    2. Seronegative rheumatoid arthritis ([facility]    3. Essential hypertension    4. Fibromyalgia    5. Gastroesophageal reflux disease without esophagitis        PLAN:  1. Class 3 severe obesity due to excess calories with serious comorbidity and body mass index (BMI) of 40.0 to 44.9 in adult [facility])  Assessment & Plan:   Uncontrolled, lifestyle modifications recommended   Lengthy discussion with pt today about taking Wegovy in conjunction with healthy lifestyle changes. Supported patient 's  low calorie diet and walking for exercise. I did  her on realistic expectation (losing 5-8% of body weight), medication compliance, long term dietary compliance with low calorie diet and regular exercise/activity. Explained mechanism of action and recommended use of Saxenda for weight loss. We also discuss serious safety concerns, tolerability of medication and common side effects. Pt verbalized understanding and wanting to move forward with medication. Orders:  -     Semaglutide-Weight Management (WEGOVY) 0.25 MG/0.5ML SOAJ SC injection;  Inject 0.25 mg into the skin every 7 days, Disp-2 mL, R-0Normal  - Katrin Vásquez MD, Bariatric Surgery, [facility]  2. Seronegative rheumatoid arthritis ([address]  Assessment & Plan:   Monitored by specialist- no acute findings meriting change in the plan  Orders:  -     pregabalin (LYRICA) 75 MG capsule; Take 1 capsule by mouth 2 times daily for 180 days. Max Daily Amount: 150 mg, Disp-180 capsule, R-1Normal  3. Essential hypertension  Assessment & Plan:   Well-controlled, continue current medications  Orders:  -     valsartan (DIOVAN) 80 MG tablet; Take 1 tablet by mouth daily, Disp-90 tablet, R-3Normal  4. Fibromyalgia  Assessment & Plan:   Uncontrolled, change Lyrica today   Start taking 75 mg BID vs 50 mg TID  No inconsistencies with [place]AR[place]. Orders:  -     pregabalin (LYRICA) 75 MG capsule; Take 1 capsule by mouth 2 times daily for 180 days. Max Daily Amount: 150 mg, Disp-180 capsule, R-1Normal  5. Gastroesophageal reflux disease without esophagitis  Assessment & Plan:   Well-controlled, continue current medications and lifestyle modifications recommended  Orders:  -     pantoprazole (PROTONIX) 40 MG tablet; Take 1 tablet by mouth daily, Disp-90 tablet, R-3ZERO refills remain on this prescription. Your patient is requesting advance approval of refills for this medication to [address] [place] [address]       See pt instructions  F/u 6 months, sooner prn  Discussed use, benefit, and side effects of prescribed medications. All patient questions answered. Pt voiced understanding.

## 2023-01-04 NOTE — ASSESSMENT & PLAN NOTE
Uncontrolled, lifestyle modifications recommended   Lengthy discussion with pt today about taking Wegovy in conjunction with healthy lifestyle changes. Supported patient 's  low calorie diet and walking for exercise. I did  her on realistic expectation (losing 5-8% of body weight), medication compliance, long term dietary compliance with low calorie diet and regular exercise/activity. Explained mechanism of action and recommended use of Saxenda for weight loss. We also discuss serious safety concerns, tolerability of medication and common side effects. Pt verbalized understanding and wanting to move forward with medication.

## 2023-01-06 ENCOUNTER — TELEPHONE (OUTPATIENT)
Dept: FAMILY MEDICINE CLINIC | Age: 43
End: 2023-01-06

## 2023-01-06 NOTE — TELEPHONE ENCOUNTER
Submitted PA for Ning Colvin   [facility]: [id number] STATUS: DENIED. NON COVERED. LETTER ATTACHED. If this requires a response please respond to the pool. [address]). Please advise patient thank you.

## 2023-01-06 NOTE — TELEPHONE ENCOUNTER
Office has been notified that pt is requiring Prior Authorization for the following medication:  Semaglutide-Weight Management (WEGOVY) 0.25 MG/0.5ML SOAJ SC injection [[id number]]      Please initiate this request through CoverMyMeds, contacting the following Payor/Insurance:  [employer]      Please see below, or the documentation attached to this encounter for any additional information that may assist in processing PA:  -- ([facility] (E66.01 , Z68.41)    Thank you!

## 2023-01-13 ENCOUNTER — TELEPHONE (OUTPATIENT)
Dept: ORTHOPEDIC SURGERY | Age: 43
End: 2023-01-13

## 2023-01-13 NOTE — TELEPHONE ENCOUNTER
Notified [employer] regarding mailing the requested medical records to [address] [place] [address] 12/19/22 Tonia Renner

## 2023-01-19 DIAGNOSIS — M06.00 SERONEGATIVE RHEUMATOID ARTHRITIS (HCC): ICD-10-CM

## 2023-01-19 RX ORDER — UPADACITINIB 15 MG/1
TABLET, EXTENDED RELEASE ORAL
Qty: 30 TABLET | OUTPATIENT
Start: 2023-01-19

## 2023-01-27 DIAGNOSIS — R06.2 WHEEZING: ICD-10-CM

## 2023-01-27 DIAGNOSIS — R05.8 ALLERGIC COUGH: ICD-10-CM

## 2023-01-27 RX ORDER — ALBUTEROL SULFATE 90 UG/1
AEROSOL, METERED RESPIRATORY (INHALATION)
Qty: 18 G | Refills: 2 | Status: SHIPPED | OUTPATIENT
Start: 2023-01-27

## 2023-01-27 NOTE — TELEPHONE ENCOUNTER
Medication:   Requested Prescriptions     Pending Prescriptions Disp Refills    albuterol sulfate HFA (PROVENTIL;VENTOLIN;PROAIR) 108 (90 Base) MCG/ACT inhaler [Pharmacy Med Name: ALBUTEROL HFA INH(200 PUFFS)18GM] 18 g 2     Sig: INHALE 2 PUFFS INTO THE LUNGS EVERY 6 HOURS AS NEEDED FOR WHEEZING        Last Filled:  10/17/2022, 18g, 2    Patient Phone Number: [phone] (home)     Last appt: 1/4/2023   Next appt: Visit date not found    Last OAR[place]:   RX Monitoring 10/11/2022   Attestation -   Periodic Controlled Substance Monitoring No signs of potential drug abuse or diversion identified.

## 2023-02-15 ENCOUNTER — OFFICE VISIT (OUTPATIENT)
Dept: RHEUMATOLOGY | Age: 43
End: 2023-02-15
Payer: MEDICAID

## 2023-02-15 VITALS
HEART RATE: 86 BPM | WEIGHT: 249 LBS | BODY MASS INDEX: 42.74 KG/M2 | SYSTOLIC BLOOD PRESSURE: 118 MMHG | DIASTOLIC BLOOD PRESSURE: 78 MMHG

## 2023-02-15 DIAGNOSIS — Z79.899 HIGH RISK MEDICATION USE: ICD-10-CM

## 2023-02-15 DIAGNOSIS — M47.816 SPONDYLOSIS OF LUMBAR REGION WITHOUT MYELOPATHY OR RADICULOPATHY: ICD-10-CM

## 2023-02-15 DIAGNOSIS — M17.0 BILATERAL PRIMARY OSTEOARTHRITIS OF KNEE: ICD-10-CM

## 2023-02-15 DIAGNOSIS — M06.00 SERONEGATIVE RHEUMATOID ARTHRITIS (HCC): Primary | ICD-10-CM

## 2023-02-15 DIAGNOSIS — M79.7 FIBROMYALGIA: ICD-10-CM

## 2023-02-15 PROCEDURE — 3078F DIAST BP <80 MM HG: CPT | Performed by: INTERNAL MEDICINE

## 2023-02-15 PROCEDURE — G8484 FLU IMMUNIZE NO ADMIN: HCPCS | Performed by: INTERNAL MEDICINE

## 2023-02-15 PROCEDURE — 3074F SYST BP LT 130 MM HG: CPT | Performed by: INTERNAL MEDICINE

## 2023-02-15 PROCEDURE — 99214 OFFICE O/P EST MOD 30 MIN: CPT | Performed by: INTERNAL MEDICINE

## 2023-02-15 PROCEDURE — G8417 CALC BMI ABV UP PARAM F/U: HCPCS | Performed by: INTERNAL MEDICINE

## 2023-02-15 PROCEDURE — 1036F TOBACCO NON-USER: CPT | Performed by: INTERNAL MEDICINE

## 2023-02-15 PROCEDURE — G8427 DOCREV CUR MEDS BY ELIG CLIN: HCPCS | Performed by: INTERNAL MEDICINE

## 2023-02-15 RX ORDER — HYDROXYCHLOROQUINE SULFATE 200 MG/1
200 TABLET, FILM COATED ORAL 2 TIMES DAILY
Qty: 180 TABLET | Refills: 0 | Status: SHIPPED | OUTPATIENT
Start: 2023-02-15 | End: 2023-05-16

## 2023-02-15 RX ORDER — UPADACITINIB 15 MG/1
15 TABLET, EXTENDED RELEASE ORAL DAILY
Qty: 90 TABLET | Refills: 0 | Status: SHIPPED | OUTPATIENT
Start: 2023-02-15 | End: 2023-05-16

## 2023-02-15 RX ORDER — PREGABALIN 75 MG/1
75 CAPSULE ORAL 2 TIMES DAILY
Qty: 180 CAPSULE | Refills: 0 | Status: SHIPPED | OUTPATIENT
Start: 2023-02-20 | End: 2023-05-21

## 2023-02-15 RX ORDER — PREGABALIN 50 MG/1
CAPSULE ORAL
COMMUNITY
Start: 2023-01-28 | End: 2023-02-15 | Stop reason: DRUGHIGH

## 2023-02-15 RX ORDER — FOLIC ACID 1 MG/1
1 TABLET ORAL DAILY
Qty: 90 TABLET | Refills: 0 | Status: SHIPPED | OUTPATIENT
Start: 2023-02-15 | End: 2023-05-16

## 2023-02-15 NOTE — ASSESSMENT & PLAN NOTE
- did not respond to NSAIDs. Recommended trying arthritis strength Acetaminophen up to 1000 mg TID PRN. - well controlled on Pregabalin.

## 2023-02-15 NOTE — PROGRESS NOTES
Awilda Pearl MD  [facility] ([facility]) Physicians - Rheumatology    [x] [facility]:  [place] [facility]  [address] [facility] [] [facility] 94:  [address]   Office: [phone]  Fax: [phone]     RHEUMATOLOGY PROGRESS NOTE    ASSESSMENT/PLAN:  Clemencia Yu is a 43 y.o. female w/ seronegative RA, OA of knees, degenerative arthritis s/p lumbar laminectomy in 2013 and fibromyalgia. PMHx pertinent for EMELINA on CPAP, RLS, depression, anxiety. Current rheum meds:   mg BID: started in 7/2021  MTX 17.5 mg PO wkly  Upadacitinib 15 mg PO wkly: started in 10/2022  Pregabalin 50 mg TID  Pristiq + Buspirone: per PCP    Prior rheum meds:  Ibuprofen, Naproxen 500 mg BID, Meloxicam 15 mg daily PRN: ineffective  Diclofenac 50 mg TID PRN: caused elevated BP  SSZ 1000 mg BID: took from 5/2020 - 8/2022, switched to MTX d/t active arthritis  Adalimumab 40 mg SC Q2wk: took from 11/2021-4/2022 switched to Etanercept d/t active disease  Etanercept 50 mg SC wkly: took from 4/2022-10/2022 switched to Upadacitinib d/t active disease    1. Seronegative rheumatoid arthritis ([facility] [address]  Assessment & Plan:  - inflammatory arthritis appears to be well controlled on current immunosuppression regimen. CRP finally normalized on 11/17/22. No active synovitis appreciated on exam today. - cont current dose of  mg BID, MTX 17.5 mg PO wkly and Updacitinib 15 mg daily. - avoid steroids as pt is actively trying to lose weight. - cont Pregabalin 75 mg BID. Orders:  -     diclofenac sodium (VOLTAREN) 1 % GEL; Apply 4 g topically 4 times daily, Topical, 4 TIMES DAILY Starting Wed 2/15/2023, Until Fri 3/17/2023, For 30 days, Disp-150 g, R-3, Normal  -     methotrexate (RHEUMATREX) 2.5 MG chemo tablet; Take 7 tablets by mouth once a week, Disp-84 tablet, R-0, DAWNormal  -     hydroxychloroquine (PLAQUENIL) 200 MG tablet;  Take 1 tablet by mouth 2 times daily, Disp-180 tablet, R-0Normal  - folic acid (FOLVITE) 1 MG tablet; Take 1 tablet by mouth daily, Disp-90 tablet, R-0Normal  -     Upadacitinib ER (RINVOQ) 15 MG TB24; Take 15 mg by mouth daily, Disp-90 tablet, R-0Normal  -     pregabalin (LYRICA) 75 MG capsule; Take 1 capsule by mouth 2 times daily for 90 days. Max Daily Amount: 150 mg, Disp-180 capsule, R-0Print  2. Bilateral primary osteoarthritis of knee  Assessment & Plan:  - s/p L knee arthrocentesis and intra-articular corticosteroid injection on 10/11/22. Synovial fluid analysis findings not c/w inflammatory arthritis. - established care w/ Dr. Mi Nicholson in 12/2022. S/p   intra-articular hyaluronic acid injection to her L knee, pt reported good pain relief from this. - did not respond to multiple NSAIDs in the past. Recommended trying arthritis strength Acetaminophen up to 1000 mg TID PRN. - per pt request, modified Pregabalin dose to 75 mg BID. Orders:  -     diclofenac sodium (VOLTAREN) 1 % GEL; Apply 4 g topically 4 times daily, Topical, 4 TIMES DAILY Starting Wed 2/15/2023, Until Fri 3/17/2023, For 30 days, Disp-150 g, R-3, Normal  -     pregabalin (LYRICA) 75 MG capsule; Take 1 capsule by mouth 2 times daily for 90 days. Max Daily Amount: 150 mg, Disp-180 capsule, R-0Print  3. Spondylosis of lumbar region without myelopathy or radiculopathy  Assessment & Plan:  - did not respond to NSAIDs. Recommended trying arthritis strength Acetaminophen up to 1000 mg TID PRN. - well controlled on Pregabalin. 4. Fibromyalgia  Assessment & Plan:  - currently well controlled. - per pt request, modified Pregabalin dose to 75 mg BID. - she has been referred to aquatic PT. Orders:  -     diclofenac sodium (VOLTAREN) 1 % GEL; Apply 4 g topically 4 times daily, Topical, 4 TIMES DAILY Starting Wed 2/15/2023, Until Fri 3/17/2023, For 30 days, Disp-150 g, R-3, Normal  -     pregabalin (LYRICA) 75 MG capsule; Take 1 capsule by mouth 2 times daily for 90 days.  Max Daily Amount: 150 mg, Disp-180 capsule, R-0Print  5. High risk medication use  Assessment & Plan:  - annual eye exam for HCQ toxicity monitoring.  - pt agreed to obtain labs today. Orders:  -     ALT; Future  -     AST; Future  -     CBC with Auto Differential; Future  -     Creatinine; Future  -     Quantiferon, Incubated; Future     Return in about 3 months (around 5/15/2023) for lab result discussion and treatment plan, medication monitoring. The risks and benefits of my recommendations, as well as other treatment options, benefits and side effects were discussed with the patient today. Questions were answered. NOTE: This report is transcribed by using voice recognition software dragon. Every effort is made to ensure accuracy; however, inadvertent computerized  transcription errors may be present. SUBJECTIVE:  Past medical/surgical history, medications and allergies are reviewed and updated as appropriate. Interval Hx:   Pt reports well controlled arthralgias in her hands and wrists on her current immunosuppression regimen. Pt states she has \"not felt this good in a long time\". She denies any joint pain currently. Morning stiffness is <30 min. She recalls having pain in her R mid foot recently, she had difficulty walking. Pain has spontaneously resolved. She saw Dr. Misty Diaz of OA of knees in December and underwent intra-articular hyaluronic acid injection to her L knee. Pt reports good pain relief from this. Pt states her PCP recently increased her Pregabalin dose to 50 mg TID. She reports well controlled pain on this dose, she is requesting to modify her dose to 75 mg BID. She reports difficulty w/ weight loss. She is moving in w/ her mother.     Rheumatologic ROS:  Constitutional: denies chronic fatigue, fever/chills, night sweats, unintentional weight loss  Integumentary: +chronic mild hair thinning, denies photosensitivity, patchy alopecia, or Sx of Raynaud's phenomenon  Eyes: denies dry eyes, redness or pain, visual disturbance, or floaters  Nose: denies nasal ulcers or recurrent sinusitis  Oral cavity: denies dry mouth or oral ulcers  Cardiovascular: denies CP, palpitations, Hx of pericardial effusion or pericarditis  Respiratory: denies SOB, cough, hemoptysis, or pleurisy  Gastrointestinal: denies heart burn, dysphagia or esophageal dysmotility, denies change in bowel habits or Sx of IBD  Musculoskeletal:  refer to above HPI     Allergies   Allergen Reactions    Metronidazole Rash and Other (See Comments)     LIPS WERE TINGLING, tongue tingling, hives all over body    Other      Glue used to adhere recent surgical incision       Past Medical History:        Diagnosis Date    Anxiety     Arthritis     RA    Cellulitis of left lower extremity     left ankle    Depression     WELL CONTROLLED 10-16-17    Fibromyalgia     Heart rate fast     intermittent    Kidney stone     Migraine     Motor tic disorder     Obstructive sleep apnea, adult        Past Surgical History:        Procedure Laterality Date    ABDOMINAL EXPLORATION SURGERY  07/22/2011    excision Meckels diverticulum    ABDOMINOPLASTY  04/14/2014    Olman Vázquez    ADENOIDECTOMY Bilateral     APPENDECTOMY  07/22/2011    BACK SURGERY      CARPAL TUNNEL RELEASE      HYSTERECTOMY, TOTAL ABDOMINAL (CERVIX REMOVED)  06/12/2018    robotic total hyst BSO, Dr Prieto Rodríguez    LITHOTRIPSY  x2    LUMBAR LAMINECTOMY  06/2013    Rad; left l5-s1    MECKEL DIVERTICULUM EXCISION      OTHER SURGICAL HISTORY      hymenoplasty    OVARIAN CYST REMOVAL  04/13/2018    OPERATIVE LAPAROSCOPY, LEFT OVARIAN CYSTECTOMY WITH DILATATION AND CURETTAGE    OVARY REMOVAL      TONSILLECTOMY Bilateral     URETHRAL STRICTURE DILATATION      WISDOM TOOTH EXTRACTION         Medications:    Current Outpatient Medications   Medication Sig Dispense Refill    diclofenac sodium (VOLTAREN) 1 % GEL Apply 4 g topically 4 times daily 150 g 3    methotrexate (RHEUMATREX) 2.5 MG chemo tablet Take 7 tablets by mouth once a week 84 tablet 0 hydroxychloroquine (PLAQUENIL) 200 MG tablet Take 1 tablet by mouth 2 times daily 344 tablet 0    folic acid (FOLVITE) 1 MG tablet Take 1 tablet by mouth daily 90 tablet 0    Upadacitinib ER (RINVOQ) 15 MG TB24 Take 15 mg by mouth daily 90 tablet 0    [START ON 2/20/2023] pregabalin (LYRICA) 75 MG capsule Take 1 capsule by mouth 2 times daily for 90 days. Max Daily Amount: 150 mg 180 capsule 0    albuterol sulfate HFA (PROVENTIL;VENTOLIN;PROAIR) 108 (90 Base) MCG/ACT inhaler INHALE 2 PUFFS INTO THE LUNGS EVERY 6 HOURS AS NEEDED FOR WHEEZING 18 g 2    pantoprazole (PROTONIX) 40 MG tablet Take 1 tablet by mouth daily 90 tablet 3    valsartan (DIOVAN) 80 MG tablet Take 1 tablet by mouth daily 90 tablet 3    desvenlafaxine succinate (PRISTIQ) 100 MG TB24 extended release tablet TAKE 1 TABLET BY MOUTH DAILY 90 tablet 0    busPIRone (BUSPAR) 5 MG tablet TAKE 1 TABLET BY MOUTH THREE TIMES DAILY AS NEEDED FOR ANXIETY 90 tablet 3    rOPINIRole (REQUIP) 3 MG tablet TAKE 1 TABLET BY MOUTH EVERY NIGHT 90 tablet 2    clindamycin-benzoyl peroxide (BENZACLIN) 1-5 % gel       fluocinonide (LIDEX) 0.05 % cream       tretinoin (RETIN-A) 0.05 % cream Apply pea-size amount to entire face at bedtime, starting twice a week and increasing to every other night then every night as tolerated. cetirizine (ZYRTEC) 10 MG tablet TAKE 1 TABLET BY MOUTH DAILY 30 tablet 11    fluticasone (FLONASE) 50 MCG/ACT nasal spray SHAKE LIQUID AND USE 2 SPRAYS IN EACH NOSTRIL EVERY DAY 16 g 3    estradiol (ESTRACE) 2 MG tablet Take 2 mg by mouth daily      Biotin 1000 MCG TABS Take by mouth      Cholecalciferol (VITAMIN D3) 50 MCG (2000 UT) CAPS Take 2,000 capsules by mouth daily      acetaminophen (TYLENOL) 500 MG tablet Take 500 mg by mouth every 6 hours as needed for Pain      SUMAtriptan (IMITREX) 100 MG tablet Take 100 mg by mouth daily as needed       No current facility-administered medications for this visit.         OBJECTIVE:  Physical Exam:  /78   Pulse 86   Wt 249 lb (112.9 kg)   LMP 03/13/2018 (Exact Date)   BMI 42.74 kg/m²     GEN: AAOx3, in NAD, well-appearing  HEAD: normocephalic, atraumatic  EYES: no injection or icterus  CVS: RRR  LUNGS: in no acute respiratory distress  MSK: there is diffuse myofascial pain w/ 18/18 tender points  Upper extremities:              Hands: no joint deformities, no tenosynovitis or dactylitis, b/l MCP, PIP and DIP joints w/o synovitis, all joints TTP, full fist formation w/ good  strength, fingernails w/ mild periungal erythema              Wrist: b/l wrists w/o synovitis TTP, good flexion/extension              Elbow: no synovitis or bursitis, FROM  Lower extremities:              Knees: b/l knees w/o effusion, L knee medial joint line TTP, palpable Baker's cyst TTP, good ROM              Ankles: b/l ankles w/ small effusion diffusely TTP, good ROM              Feet: no toe swelling or pain or warmth on palpation w/ FROM, negative MTP squeeze test  INTEGUMENT: no rash or psoriatic lesions, no petechiae, bruises, or palpable purpura, no patchy alopecia, no nail changes, no clubbing or digital ulcers    DATA:  Labs:   I personally reviewed interval labs and discussed w/ the pt in detail which showed:    Lab Results   Component Value Date    WBC 5.9 11/17/2022    HGB 13.0 11/17/2022    HCT 39.7 11/17/2022    MCV 92.7 11/17/2022     11/17/2022    LYMPHOPCT 35.6 11/17/2022    RBC 4.28 11/17/2022    MCH 30.4 11/17/2022    MCHC 32.8 11/17/2022    RDW 14.3 11/17/2022     Lab Results   Component Value Date     05/24/2022    K 4.1 05/24/2022     05/24/2022    CO2 21 05/24/2022    BUN 14 05/24/2022    CREATININE 0.7 11/17/2022    GLUCOSE 91 05/24/2022    CALCIUM 9.5 05/24/2022    PROT 6.7 09/27/2022    LABALBU 4.6 09/27/2022    BILITOT 0.3 09/27/2022    ALKPHOS 69 09/27/2022    AST 19 11/17/2022    ALT 19 11/17/2022    LABGLOM >60 11/17/2022    GFRAA >60 09/27/2022    AGRATIO 1.4 01/06/2021    GLOB 3.0 01/06/2021     Lab Results   Component Value Date    VITD25 36.6 08/17/2021     No results found for: C3     No results found for: C4     No results found for: ANTIDSDNAIGG     Lab Results   Component Value Date    LABURIC 4.6 11/17/2022      Lab Results   Component Value Date    CRP 4.1 11/17/2022    CRP 5.8 (H) 09/27/2022    CRP 9.9 (H) 07/13/2022    CRP 9.9 (H) 03/09/2022     Lab Results   Component Value Date    SEDRATE 11 11/06/2019    SEDRATE 13 11/10/2011     No results found for: CKTOTAL    Negative OFE x 2 (11/10/11, 7/16/20)  Negative RF x 2 (11/10/11, 11/6/19)  Negative CCP (11/6/19)  Negative HLA B27 (11/6/19)  L knee synovial fluid (10/11/22): Negative crystals  Color, Fluid  Yellow    Appearance, Fluid  Hazy    Clot Eval.  see below    Comment: No Clots Seen   Nucl Cell, Fluid /cumm 225    RBC, Fluid /cumm 0    Neutrophil Count, Fluid % 3    Lymphocytes, Body Fluid % 16    Monocyte Count, Fluid % 29    Macrophages % 52      Negative hepatitis B and C serologies (11/6/19)  Negative Quantiferon TB (10/14/21)    Imaging:  I personally reviewed interval imaging and discussed w/ the pt in detail which included:    MRI L-spine (5/29/13): IMPRESSION:   Moderate size central and L paracentral disc herniation L5-S1 primarily affecting the left S1 nerve root. Broad-based disc protrusion centrally L4-L5. X-rays (11/7/19): Bilateral hands:   No significant plain film abnormality. No erosion, chondrocalcinosis or fracture. Bilateral knees: Moderate degenerative changes about the patellofemoral space bilaterally with lateral subluxation of the patella bilaterally. Bilateral feet:   No significant plain film abnormality. Lumbar spine and sacroiliac joints:   No ankylosis or erosion is appreciated. Mild degenerative changes about the right sacroiliac joint.      I independently reviewed above X-rays, L-spine w/o evidence of SpA, hand joints w/o evidence of chronic inflammatory arthritis/erosive changes. MRI pelvis (11/20/19):  1. Trace amount of nonspecific fluid in the left sacroiliac joint. No additional changes to suggest inflammatory arthropathy. 2. Minimal right sacroiliac degenerative change. 3. No abnormality in the hip joints. 4. Mild bilateral gluteus minimus tendinosis and proximal hamstring tendinosis. 5. Mild degenerative changes at L4-5 and L5-S1. I discussed her MRI findings w/ the reading radiologist on 12/2/19, she has mild degenerative arthritis in her sacroiliac joints but nothing inflammatory in nature. MRI R hand (7/26/22): FINDINGS:   SOFT TISSUES: The visualized flexor and extensor tendons are intact. There is no significant fluid within the tendon sheaths. Postcontrast imaging demonstrates mild enhancement involving the 3rd extensor tendon sheath. No significant flexor tendon sheath enhancement is appreciated. BONE MARROW: There is no evidence of acute fracture or dislocation. A probable cyst is noted within the distal pole of the scaphoid. There is no diffuse marrow replacing process. JOINTS: No significant synovial process is appreciated. There is no significant synovial enhancement on the postcontrast images. There is enhancement of probable small cysts within the scaphoid and trapezoid. The ulnar styloid process is intact. No significant enthesitis is appreciated. No focal or full-thickness cartilage defects are identified. IMPRESSION:  Minimal 3rd extensor tenosynovitis. Otherwise, no evidence of active inflammatory arthropathy. MRI R ankle (7/26/22): FINDINGS:   SYNDESMOTIC LIGAMENTS: There is thickening of the syndesmotic ligament proximally consistent with prior syndesmotic injury. There is mild thickening of the distal anterior inferior tibiofibular syndesmotic ligament.      LATERAL COLLATERAL LIGAMENT COMPLEX: The anterior talofibular ligament is thickened and indistinct consistent with previous high-grade sprain. The calcaneofibular ligament is thickened. The posterior talofibular ligament is intact. DELTOID LIGAMENT COMPLEX: Cystic change is noted in the medial malleolus consistent with prior avulsion injury. There is mild attenuation of the deep fibers of the deltoid ligament. SINUS TARSI AND SPRING LIGAMENT: The sinus tarsi fat is preserved. The superomedial portion of the spring ligament is intact. MEDIAL TENDONS: There is a small amount of fluid within the medial tendon sheaths. The medial tendons remain intact. LATERAL TENDONS: There is a small amount of fluid in the peroneal tendon sheath. The tendons themselves appear intact. ANTERIOR TENDONS: The anterior tendons are intact. ACHILLES TENDON: The Achilles tendon is intact. There is a small amount of fluid in the retrocalcaneal bursa. There is minimal increased T2 signal within the Achilles tendon itself consistent with mild tendinopathy. PLANTAR FASCIA: There is significant thickening of the central cord of the plantar fascia proximally. There is fluid signal intensity within the central cord consistent with small partial-thickness tearing measuring less than 1 cm. There is mild perifascicular edema. There is a mildly edematous plantar calcaneal heel spur. There is edema within the adjacent musculature. TARSAL TUNNEL: There are no obstructing lesions in the tarsal tunnel. BONE MARROW: There is no evidence of acute fracture or dislocation. There is no diffuse marrow replacing process. JOINT SPACES: There is synovitis versus small joint effusion at the tibiotalar articulation. There is full-thickness cartilage fissuring involving the anterior tibial plafond with subchondral edema. There is mild synovitis within the posterior subtalar recess. The Lisfranc ligament is intact. Midfoot alignment is within normal limits.   Postcontrast imaging demonstrates synovial enhancement throughout the tibiotalar articulation. There is enhancement of the medial and lateral tendon sheaths consistent with tenosynovitis. There is a small focus of subchondral enhancement involving the posteromedial tibial plafond consistent with cartilage loss. There is enhancement associated with the central cord plantar fasciitis. IMPRESSION:  Tibiotalar joint synovitis. Regions of cartilage loss and subchondral change, consistent with the history of rheumatoid arthritis. Peroneal and medial tendon tenosynovitis, also consistent with the history of rheumatoid arthritis. Severe acute on chronic central cord plantar fasciitis with partial-thickness disruption. Above results were discussed w/ the pt in detail during today's visit.

## 2023-02-15 NOTE — ASSESSMENT & PLAN NOTE
- inflammatory arthritis appears to be well controlled on current immunosuppression regimen. CRP finally normalized on 11/17/22. No active synovitis appreciated on exam today. - cont current dose of  mg BID, MTX 17.5 mg PO wkly and Updacitinib 15 mg daily. - avoid steroids as pt is actively trying to lose weight. - cont Pregabalin 75 mg BID.

## 2023-02-15 NOTE — ASSESSMENT & PLAN NOTE
- s/p L knee arthrocentesis and intra-articular corticosteroid injection on 10/11/22. Synovial fluid analysis findings not c/w inflammatory arthritis. - established care w/ Dr. Meet Laidr in 12/2022. S/p   intra-articular hyaluronic acid injection to her L knee, pt reported good pain relief from this. - did not respond to multiple NSAIDs in the past. Recommended trying arthritis strength Acetaminophen up to 1000 mg TID PRN. - per pt request, modified Pregabalin dose to 75 mg BID.

## 2023-02-15 NOTE — ASSESSMENT & PLAN NOTE
- currently well controlled. - per pt request, modified Pregabalin dose to 75 mg BID. - she has been referred to aquatic PT.

## 2023-03-01 DIAGNOSIS — R09.82 POSTNASAL DRIP: ICD-10-CM

## 2023-03-01 RX ORDER — FLUTICASONE PROPIONATE 50 MCG
SPRAY, SUSPENSION (ML) NASAL
Qty: 16 G | Refills: 3 | Status: SHIPPED | OUTPATIENT
Start: 2023-03-01

## 2023-03-01 NOTE — TELEPHONE ENCOUNTER
Medication:   Requested Prescriptions     Pending Prescriptions Disp Refills    fluticasone (FLONASE) 50 MCG/ACT nasal spray [Pharmacy Med Name: FLUTICASONE 50MCG NASAL SP (120) RX] 16 g 3     Sig: SHAKE LIQUID AND USE 2 SPRAYS IN EACH NOSTRIL EVERY DAY        Last Filled:      Patient Phone Number: [phone] (home)     Last appt: 1/4/2023   Next appt: Visit date not found    Last OARRS:   RX Monitoring 10/11/2022   Attestation -   Periodic Controlled Substance Monitoring No signs of potential drug abuse or diversion identified.

## 2023-03-03 ENCOUNTER — TELEPHONE (OUTPATIENT)
Dept: FAMILY MEDICINE CLINIC | Age: 43
End: 2023-03-03

## 2023-03-03 DIAGNOSIS — M06.00 SERONEGATIVE RHEUMATOID ARTHRITIS (HCC): Primary | ICD-10-CM

## 2023-03-03 RX ORDER — METHYLPREDNISOLONE 4 MG/1
TABLET ORAL
Qty: 1 KIT | Refills: 0 | Status: SHIPPED | OUTPATIENT
Start: 2023-03-03 | End: 2023-03-09

## 2023-03-03 NOTE — TELEPHONE ENCOUNTER
Patient called and is having a flare up on her arthritis  and her Rheumatology dr is in between offices and won't be back until may    She is needing a steriod called in to help with the flare up     Pharm is [facility] [address]       Please call and advise

## 2023-04-21 DIAGNOSIS — F41.8 DEPRESSION WITH ANXIETY: ICD-10-CM

## 2023-04-21 RX ORDER — BUSPIRONE HYDROCHLORIDE 5 MG/1
TABLET ORAL
Qty: 90 TABLET | Refills: 3 | Status: SHIPPED | OUTPATIENT
Start: 2023-04-21

## 2023-04-21 NOTE — TELEPHONE ENCOUNTER
Medication:   Requested Prescriptions     Pending Prescriptions Disp Refills    busPIRone (BUSPAR) 5 MG tablet [Pharmacy Med Name: BUSPIRONE 5MG TABLETS] 90 tablet 3     Sig: TAKE 1 TABLET BY MOUTH THREE TIMES DAILY AS NEEDED FOR ANXIETY        Last Filled:  12/19/22 with 3 refills    Patient Phone Number: [phone] (home)     Last appt: 1/4/2023   Next appt: Visit date not found    Last OARRS:   RX Monitoring 10/11/2022   Attestation -   Periodic Controlled Substance Monitoring No signs of potential drug abuse or diversion identified.

## 2023-05-02 DIAGNOSIS — F41.8 DEPRESSION WITH ANXIETY: ICD-10-CM

## 2023-05-03 RX ORDER — DESVENLAFAXINE 100 MG/1
100 TABLET, EXTENDED RELEASE ORAL DAILY
Qty: 90 TABLET | Refills: 3 | Status: SHIPPED | OUTPATIENT
Start: 2023-05-03 | End: 2023-10-30

## 2023-05-09 ENCOUNTER — HOSPITAL ENCOUNTER (OUTPATIENT)
Age: 43
Discharge: HOME | End: 2023-05-09
Payer: MEDICAID

## 2023-05-09 DIAGNOSIS — Z79.899 HIGH RISK MEDICATION USE: ICD-10-CM

## 2023-05-09 LAB
ALT SERPL-CCNC: 21 U/L (ref 10–40)
AST SERPL-CCNC: 18 U/L (ref 15–37)
BASOPHILS # BLD: 0 K/UL (ref 0–0.2)
BASOPHILS NFR BLD: 0.4 %
CREAT SERPL-MCNC: 0.7 MG/DL (ref 0.6–1.1)
DEPRECATED RDW RBC AUTO: 14.3 % (ref 12.4–15.4)
EOSINOPHIL # BLD: 0.1 K/UL (ref 0–0.6)
EOSINOPHIL NFR BLD: 1.2 %
GFR SERPLBLD CREATININE-BSD FMLA CKD-EPI: >60 ML/MIN/{1.73_M2}
HCT VFR BLD AUTO: 38.3 % (ref 36–48)
HGB BLD-MCNC: 12.7 G/DL (ref 12–16)
LYMPHOCYTES # BLD: 1.7 K/UL (ref 1–5.1)
LYMPHOCYTES NFR BLD: 33.4 %
MCH RBC QN AUTO: 31.6 PG (ref 26–34)
MCHC RBC AUTO-ENTMCNC: 33.2 G/DL (ref 31–36)
MCV RBC AUTO: 95 FL (ref 80–100)
MONOCYTES # BLD: 0.5 K/UL (ref 0–1.3)
MONOCYTES NFR BLD: 9.5 %
NEUTROPHILS # BLD: 2.9 K/UL (ref 1.7–7.7)
NEUTROPHILS NFR BLD: 55.5 %
PLATELET # BLD AUTO: 224 K/UL (ref 135–450)
PMV BLD AUTO: 9.2 FL (ref 5–10.5)
RBC # BLD AUTO: 4.03 M/UL (ref 4–5.2)
WBC # BLD AUTO: 5.2 K/UL (ref 4–11)

## 2023-05-09 PROCEDURE — 36415 COLL VENOUS BLD VENIPUNCTURE: CPT

## 2023-05-09 PROCEDURE — 84460 ALANINE AMINO (ALT) (SGPT): CPT

## 2023-05-09 PROCEDURE — 84450 TRANSFERASE (AST) (SGOT): CPT

## 2023-05-09 PROCEDURE — 86480 TB TEST CELL IMMUN MEASURE: CPT

## 2023-05-09 PROCEDURE — 82565 ASSAY OF CREATININE: CPT

## 2023-05-09 PROCEDURE — 85025 COMPLETE CBC W/AUTO DIFF WBC: CPT

## 2023-05-11 LAB
GAMMA INTERFERON BACKGROUND BLD IA-ACNC: 0.05 IU/ML
MITOGEN IGNF BCKGRD COR BLD-ACNC: >10 IU/ML
QUANTI TB GOLD PLUS: NEGATIVE
QUANTI TB1 MINUS NIL: 0.06 IU/ML (ref 0–0.34)
QUANTI TB2 MINUS NIL: 0.03 IU/ML (ref 0–0.34)

## 2023-05-23 ENCOUNTER — OFFICE VISIT (OUTPATIENT)
Dept: FAMILY MEDICINE CLINIC | Age: 43
End: 2023-05-23
Payer: MEDICAID

## 2023-05-23 ENCOUNTER — HOSPITAL ENCOUNTER (OUTPATIENT)
Age: 43
Discharge: HOME | End: 2023-05-23
Payer: MEDICAID

## 2023-05-23 VITALS
DIASTOLIC BLOOD PRESSURE: 84 MMHG | WEIGHT: 245.6 LBS | SYSTOLIC BLOOD PRESSURE: 116 MMHG | BODY MASS INDEX: 42.16 KG/M2 | OXYGEN SATURATION: 100 % | HEART RATE: 70 BPM

## 2023-05-23 DIAGNOSIS — R19.7 DIARRHEA OF PRESUMED INFECTIOUS ORIGIN: ICD-10-CM

## 2023-05-23 DIAGNOSIS — R19.7 DIARRHEA OF PRESUMED INFECTIOUS ORIGIN: Primary | ICD-10-CM

## 2023-05-23 DIAGNOSIS — H93.19 TINNITUS, UNSPECIFIED LATERALITY: ICD-10-CM

## 2023-05-23 DIAGNOSIS — H53.9 VISION DISTURBANCE: ICD-10-CM

## 2023-05-23 DIAGNOSIS — R10.9 ABDOMINAL CRAMPING: ICD-10-CM

## 2023-05-23 LAB
ALBUMIN SERPL-MCNC: 4.6 G/DL (ref 3.4–5)
ALBUMIN/GLOB SERPL: 2.1 {RATIO} (ref 1.1–2.2)
ALP SERPL-CCNC: 80 U/L (ref 40–129)
ALT SERPL-CCNC: 35 U/L (ref 10–40)
ANION GAP SERPL CALCULATED.3IONS-SCNC: 9 MMOL/L (ref 3–16)
AST SERPL-CCNC: 27 U/L (ref 15–37)
BASOPHILS # BLD: 0 K/UL (ref 0–0.2)
BASOPHILS NFR BLD: 0.2 %
BILIRUB SERPL-MCNC: 0.3 MG/DL (ref 0–1)
BUN SERPL-MCNC: 11 MG/DL (ref 7–20)
C DIFF TOX A+B STL QL IA: NORMAL
CALCIUM SERPL-MCNC: 9.2 MG/DL (ref 8.3–10.6)
CHLORIDE SERPL-SCNC: 107 MMOL/L (ref 99–110)
CO2 SERPL-SCNC: 25 MMOL/L (ref 21–32)
CREAT SERPL-MCNC: 0.7 MG/DL (ref 0.6–1.1)
DEPRECATED RDW RBC AUTO: 13.9 % (ref 12.4–15.4)
EOSINOPHIL # BLD: 0.1 K/UL (ref 0–0.6)
EOSINOPHIL NFR BLD: 1.1 %
GFR SERPLBLD CREATININE-BSD FMLA CKD-EPI: >60 ML/MIN/{1.73_M2}
GLUCOSE SERPL-MCNC: 87 MG/DL (ref 70–99)
HCT VFR BLD AUTO: 38.1 % (ref 36–48)
HGB BLD-MCNC: 13.1 G/DL (ref 12–16)
LYMPHOCYTES # BLD: 1.1 K/UL (ref 1–5.1)
LYMPHOCYTES NFR BLD: 21.3 %
MCH RBC QN AUTO: 31.6 PG (ref 26–34)
MCHC RBC AUTO-ENTMCNC: 34.3 G/DL (ref 31–36)
MCV RBC AUTO: 92.3 FL (ref 80–100)
MONOCYTES # BLD: 0.5 K/UL (ref 0–1.3)
MONOCYTES NFR BLD: 10.2 %
NEUTROPHILS # BLD: 3.6 K/UL (ref 1.7–7.7)
NEUTROPHILS NFR BLD: 67.2 %
PLATELET # BLD AUTO: 269 K/UL (ref 135–450)
PMV BLD AUTO: 8.5 FL (ref 5–10.5)
POTASSIUM SERPL-SCNC: 3.8 MMOL/L (ref 3.5–5.1)
PROT SERPL-MCNC: 6.8 G/DL (ref 6.4–8.2)
RBC # BLD AUTO: 4.13 M/UL (ref 4–5.2)
SODIUM SERPL-SCNC: 141 MMOL/L (ref 136–145)
WBC # BLD AUTO: 5.3 K/UL (ref 4–11)

## 2023-05-23 PROCEDURE — 87328 CRYPTOSPORIDIUM AG IA: CPT

## 2023-05-23 PROCEDURE — 87336 ENTAMOEB HIST DISPR AG IA: CPT

## 2023-05-23 PROCEDURE — 99214 OFFICE O/P EST MOD 30 MIN: CPT | Performed by: NURSE PRACTITIONER

## 2023-05-23 PROCEDURE — 3074F SYST BP LT 130 MM HG: CPT | Performed by: NURSE PRACTITIONER

## 2023-05-23 PROCEDURE — 87449 NOS EACH ORGANISM AG IA: CPT

## 2023-05-23 PROCEDURE — 3079F DIAST BP 80-89 MM HG: CPT | Performed by: NURSE PRACTITIONER

## 2023-05-23 PROCEDURE — 87324 CLOSTRIDIUM AG IA: CPT

## 2023-05-23 PROCEDURE — 1036F TOBACCO NON-USER: CPT | Performed by: NURSE PRACTITIONER

## 2023-05-23 PROCEDURE — 87506 IADNA-DNA/RNA PROBE TQ 6-11: CPT

## 2023-05-23 PROCEDURE — G8417 CALC BMI ABV UP PARAM F/U: HCPCS | Performed by: NURSE PRACTITIONER

## 2023-05-23 PROCEDURE — G8427 DOCREV CUR MEDS BY ELIG CLIN: HCPCS | Performed by: NURSE PRACTITIONER

## 2023-05-23 RX ORDER — DIPHENOXYLATE HYDROCHLORIDE AND ATROPINE SULFATE 2.5; .025 MG/1; MG/1
1 TABLET ORAL 4 TIMES DAILY PRN
Qty: 40 TABLET | Refills: 0 | Status: SHIPPED | OUTPATIENT
Start: 2023-05-23 | End: 2023-06-02

## 2023-05-23 SDOH — ECONOMIC STABILITY: INCOME INSECURITY: HOW HARD IS IT FOR YOU TO PAY FOR THE VERY BASICS LIKE FOOD, HOUSING, MEDICAL CARE, AND HEATING?: NOT HARD AT ALL

## 2023-05-23 SDOH — ECONOMIC STABILITY: FOOD INSECURITY: WITHIN THE PAST 12 MONTHS, THE FOOD YOU BOUGHT JUST DIDN'T LAST AND YOU DIDN'T HAVE MONEY TO GET MORE.: NEVER TRUE

## 2023-05-23 SDOH — ECONOMIC STABILITY: FOOD INSECURITY: WITHIN THE PAST 12 MONTHS, YOU WORRIED THAT YOUR FOOD WOULD RUN OUT BEFORE YOU GOT MONEY TO BUY MORE.: NEVER TRUE

## 2023-05-23 SDOH — ECONOMIC STABILITY: HOUSING INSECURITY
IN THE LAST 12 MONTHS, WAS THERE A TIME WHEN YOU DID NOT HAVE A STEADY PLACE TO SLEEP OR SLEPT IN A SHELTER (INCLUDING NOW)?: NO

## 2023-05-23 SDOH — ECONOMIC STABILITY: TRANSPORTATION INSECURITY
IN THE PAST 12 MONTHS, HAS LACK OF TRANSPORTATION KEPT YOU FROM MEETINGS, WORK, OR FROM GETTING THINGS NEEDED FOR DAILY LIVING?: NO

## 2023-05-23 ASSESSMENT — ENCOUNTER SYMPTOMS
DIARRHEA: 0
BACK PAIN: 0
NAUSEA: 0
EYE REDNESS: 0
EYE PAIN: 0
VOMITING: 0
PHOTOPHOBIA: 0
CONSTIPATION: 0
ABDOMINAL PAIN: 1
SHORTNESS OF BREATH: 0

## 2023-05-23 ASSESSMENT — PATIENT HEALTH QUESTIONNAIRE - PHQ9
4. FEELING TIRED OR HAVING LITTLE ENERGY: 0
SUM OF ALL RESPONSES TO PHQ QUESTIONS 1-9: 0
8. MOVING OR SPEAKING SO SLOWLY THAT OTHER PEOPLE COULD HAVE NOTICED. OR THE OPPOSITE, BEING SO FIGETY OR RESTLESS THAT YOU HAVE BEEN MOVING AROUND A LOT MORE THAN USUAL: 0
6. FEELING BAD ABOUT YOURSELF - OR THAT YOU ARE A FAILURE OR HAVE LET YOURSELF OR YOUR FAMILY DOWN: 0
SUM OF ALL RESPONSES TO PHQ QUESTIONS 1-9: 0
10. IF YOU CHECKED OFF ANY PROBLEMS, HOW DIFFICULT HAVE THESE PROBLEMS MADE IT FOR YOU TO DO YOUR WORK, TAKE CARE OF THINGS AT HOME, OR GET ALONG WITH OTHER PEOPLE: 0
SUM OF ALL RESPONSES TO PHQ QUESTIONS 1-9: 0
1. LITTLE INTEREST OR PLEASURE IN DOING THINGS: 0
3. TROUBLE FALLING OR STAYING ASLEEP: 0
SUM OF ALL RESPONSES TO PHQ9 QUESTIONS 1 & 2: 0
2. FEELING DOWN, DEPRESSED OR HOPELESS: 0
SUM OF ALL RESPONSES TO PHQ QUESTIONS 1-9: 0
9. THOUGHTS THAT YOU WOULD BE BETTER OFF DEAD, OR OF HURTING YOURSELF: 0
7. TROUBLE CONCENTRATING ON THINGS, SUCH AS READING THE NEWSPAPER OR WATCHING TELEVISION: 0
5. POOR APPETITE OR OVEREATING: 0

## 2023-05-23 NOTE — PROGRESS NOTES
Positive for abdominal pain. Negative for constipation, diarrhea, nausea and vomiting. Genitourinary:  Negative for dysuria, flank pain, frequency, hematuria and urgency. Musculoskeletal:  Negative for back pain. Neurological:  Negative for dizziness, light-headedness and headaches. Physical Exam  Vitals reviewed. Constitutional:       Appearance: She is well-developed. She is obese. Cardiovascular:      Rate and Rhythm: Normal rate and regular rhythm. Heart sounds: Normal heart sounds. Pulmonary:      Effort: Pulmonary effort is normal.      Breath sounds: Normal breath sounds. Abdominal:      General: Bowel sounds are normal. There is no distension. Palpations: Abdomen is soft. Abdomen is not rigid. Tenderness: There is no abdominal tenderness. There is no guarding or rebound. Skin:     General: Skin is warm and dry. Neurological:      Mental Status: She is alert and oriented to person, place, and time. Psychiatric:         Mood and Affect: Mood normal.         Behavior: Behavior normal.         Thought Content: Thought content normal.         Judgment: Judgment normal.     Vitals:    05/23/23 0940   BP: 116/84   Pulse: 70   SpO2: 100%   Weight: 245 lb 9.6 oz (111.4 kg)             ASSESSMENT:  1. Diarrhea of presumed infectious origin    2. Abdominal cramping    3. Tinnitus, unspecified laterality    4. Vision disturbance        PLAN:  1. Diarrhea of presumed infectious origin  Uncontrolled   Awaiting labs  If worsening diarrhea or cramping or blood/mucus in stool will order CT abd/pelvis  Recommend clear liquids to bland diet  -     CBC with Auto Differential; Future  -     Comprehensive Metabolic Panel; Future  -     Clostridium Difficile Toxin/Antigen; Future  -     Gastrointestinal Panel, Molecular; Future  -     O&P PANEL ([employer]) #1; Future  Trial -     diphenoxylate-atropine (DIPHENATOL) 2.5-0.025 MG per tablet;  Take 1 tablet by mouth 4 times daily as

## 2023-05-24 ENCOUNTER — TELEPHONE (OUTPATIENT)
Dept: FAMILY MEDICINE CLINIC | Age: 43
End: 2023-05-24

## 2023-05-24 LAB
CRYPTOSP AG STL QL IA: NORMAL
E HISTOLYT AG STL QL IA: NORMAL
G LAMBLIA AG STL QL IA: NORMAL
GI PATHOGENS PNL STL NAA+PROBE: ABNORMAL
GI PATHOGENS PNL STL NAA+PROBE: ABNORMAL
ORGANISM: ABNORMAL

## 2023-05-25 DIAGNOSIS — A49.8 E COLI INFECTION: ICD-10-CM

## 2023-05-25 DIAGNOSIS — A04.72 C. DIFFICILE DIARRHEA: Primary | ICD-10-CM

## 2023-05-25 RX ORDER — AZITHROMYCIN 500 MG/1
500 TABLET, FILM COATED ORAL DAILY
Qty: 3 TABLET | Refills: 0 | Status: SHIPPED | OUTPATIENT
Start: 2023-05-25 | End: 2023-05-28

## 2023-06-05 DIAGNOSIS — R06.2 WHEEZING: ICD-10-CM

## 2023-06-05 DIAGNOSIS — R05.8 ALLERGIC COUGH: ICD-10-CM

## 2023-06-05 RX ORDER — ALBUTEROL SULFATE 90 UG/1
2 AEROSOL, METERED RESPIRATORY (INHALATION) EVERY 6 HOURS PRN
Qty: 18 G | Refills: 2 | Status: SHIPPED | OUTPATIENT
Start: 2023-06-05

## 2023-06-05 NOTE — TELEPHONE ENCOUNTER
Medication and Quantity requested:      albuterol sulfate HFA (PROVENTIL;VENTOLIN;PROAIR) 108 (90 Base) MCG/ACT inhaler [[id number]]     Last Visit  05/23/2023    Pharmacy and phone number updated in [facility]:  [facility]

## 2023-06-05 NOTE — TELEPHONE ENCOUNTER
Medication:   Requested Prescriptions     Pending Prescriptions Disp Refills    albuterol sulfate HFA (PROVENTIL;VENTOLIN;PROAIR) 108 (90 Base) MCG/ACT inhaler 18 g 2     Sig: Inhale 2 puffs into the lungs every 6 hours as needed for Wheezing for wheezing        Last Filled:      Patient Phone Number: [phone] (home)     Last appt: 5/23/2023   Next appt: Visit date not found    Last OARRS:   RX Monitoring 10/11/2022   Attestation -   Periodic Controlled Substance Monitoring No signs of potential drug abuse or diversion identified.

## 2023-06-26 ENCOUNTER — HOSPITAL ENCOUNTER (EMERGENCY)
Age: 43
Discharge: HOME | End: 2023-06-26
Payer: MEDICAID

## 2023-06-26 ENCOUNTER — OFFICE VISIT (OUTPATIENT)
Dept: FAMILY MEDICINE CLINIC | Age: 43
End: 2023-06-26
Payer: MEDICAID

## 2023-06-26 ENCOUNTER — TELEPHONE (OUTPATIENT)
Dept: ORTHOPEDIC SURGERY | Age: 43
End: 2023-06-26

## 2023-06-26 ENCOUNTER — APPOINTMENT (OUTPATIENT)
Dept: GENERAL RADIOLOGY | Age: 43
End: 2023-06-26
Payer: MEDICAID

## 2023-06-26 VITALS
OXYGEN SATURATION: 97 % | TEMPERATURE: 98.9 F | DIASTOLIC BLOOD PRESSURE: 125 MMHG | HEART RATE: 83 BPM | HEIGHT: 65 IN | RESPIRATION RATE: 20 BRPM | SYSTOLIC BLOOD PRESSURE: 140 MMHG | BODY MASS INDEX: 40.48 KG/M2 | WEIGHT: 243 LBS

## 2023-06-26 VITALS
WEIGHT: 243.4 LBS | OXYGEN SATURATION: 98 % | HEART RATE: 89 BPM | DIASTOLIC BLOOD PRESSURE: 80 MMHG | SYSTOLIC BLOOD PRESSURE: 108 MMHG | BODY MASS INDEX: 41.78 KG/M2

## 2023-06-26 DIAGNOSIS — M25.562 ACUTE PAIN OF LEFT KNEE: Primary | ICD-10-CM

## 2023-06-26 PROCEDURE — G8417 CALC BMI ABV UP PARAM F/U: HCPCS | Performed by: NURSE PRACTITIONER

## 2023-06-26 PROCEDURE — 3079F DIAST BP 80-89 MM HG: CPT | Performed by: NURSE PRACTITIONER

## 2023-06-26 PROCEDURE — 99284 EMERGENCY DEPT VISIT MOD MDM: CPT

## 2023-06-26 PROCEDURE — 73562 X-RAY EXAM OF KNEE 3: CPT

## 2023-06-26 PROCEDURE — 1036F TOBACCO NON-USER: CPT | Performed by: NURSE PRACTITIONER

## 2023-06-26 PROCEDURE — 3074F SYST BP LT 130 MM HG: CPT | Performed by: NURSE PRACTITIONER

## 2023-06-26 PROCEDURE — G8427 DOCREV CUR MEDS BY ELIG CLIN: HCPCS | Performed by: NURSE PRACTITIONER

## 2023-06-26 PROCEDURE — 6370000000 HC RX 637 (ALT 250 FOR IP): Performed by: PHYSICIAN ASSISTANT

## 2023-06-26 PROCEDURE — 93971 EXTREMITY STUDY: CPT

## 2023-06-26 PROCEDURE — 99213 OFFICE O/P EST LOW 20 MIN: CPT | Performed by: NURSE PRACTITIONER

## 2023-06-26 RX ORDER — HYDROCODONE BITARTRATE AND ACETAMINOPHEN 5; 325 MG/1; MG/1
1 TABLET ORAL EVERY 6 HOURS PRN
Qty: 28 TABLET | Refills: 0 | Status: SHIPPED | OUTPATIENT
Start: 2023-06-26 | End: 2023-06-26

## 2023-06-26 RX ORDER — HYDROCODONE BITARTRATE AND ACETAMINOPHEN 5; 325 MG/1; MG/1
1 TABLET ORAL ONCE
Status: COMPLETED | OUTPATIENT
Start: 2023-06-26 | End: 2023-06-26

## 2023-06-26 RX ORDER — HYDROCODONE BITARTRATE AND ACETAMINOPHEN 5; 325 MG/1; MG/1
1 TABLET ORAL EVERY 6 HOURS PRN
Qty: 8 TABLET | Refills: 0 | Status: SHIPPED | OUTPATIENT
Start: 2023-06-26 | End: 2023-06-29

## 2023-06-26 RX ORDER — IBUPROFEN 800 MG/1
800 TABLET ORAL ONCE
Status: COMPLETED | OUTPATIENT
Start: 2023-06-26 | End: 2023-06-26

## 2023-06-26 RX ADMIN — IBUPROFEN 800 MG: 800 TABLET, FILM COATED ORAL at 15:07

## 2023-06-26 RX ADMIN — HYDROCODONE BITARTRATE AND ACETAMINOPHEN 1 TABLET: 5; 325 TABLET ORAL at 15:07

## 2023-06-26 ASSESSMENT — PAIN SCALES - GENERAL
PAINLEVEL_OUTOF10: 10
PAINLEVEL_OUTOF10: 10

## 2023-06-26 ASSESSMENT — ENCOUNTER SYMPTOMS
NAUSEA: 0
DIARRHEA: 0
WHEEZING: 0
COUGH: 0
SHORTNESS OF BREATH: 0
RHINORRHEA: 0
ABDOMINAL PAIN: 0
VOMITING: 0

## 2023-06-26 ASSESSMENT — PATIENT HEALTH QUESTIONNAIRE - PHQ9
3. TROUBLE FALLING OR STAYING ASLEEP: 0
SUM OF ALL RESPONSES TO PHQ QUESTIONS 1-9: 0
SUM OF ALL RESPONSES TO PHQ QUESTIONS 1-9: 0
1. LITTLE INTEREST OR PLEASURE IN DOING THINGS: 0
SUM OF ALL RESPONSES TO PHQ QUESTIONS 1-9: 0
2. FEELING DOWN, DEPRESSED OR HOPELESS: 0
6. FEELING BAD ABOUT YOURSELF - OR THAT YOU ARE A FAILURE OR HAVE LET YOURSELF OR YOUR FAMILY DOWN: 0
8. MOVING OR SPEAKING SO SLOWLY THAT OTHER PEOPLE COULD HAVE NOTICED. OR THE OPPOSITE, BEING SO FIGETY OR RESTLESS THAT YOU HAVE BEEN MOVING AROUND A LOT MORE THAN USUAL: 0
7. TROUBLE CONCENTRATING ON THINGS, SUCH AS READING THE NEWSPAPER OR WATCHING TELEVISION: 0
4. FEELING TIRED OR HAVING LITTLE ENERGY: 0
SUM OF ALL RESPONSES TO PHQ QUESTIONS 1-9: 0
10. IF YOU CHECKED OFF ANY PROBLEMS, HOW DIFFICULT HAVE THESE PROBLEMS MADE IT FOR YOU TO DO YOUR WORK, TAKE CARE OF THINGS AT HOME, OR GET ALONG WITH OTHER PEOPLE: 0
5. POOR APPETITE OR OVEREATING: 0
SUM OF ALL RESPONSES TO PHQ9 QUESTIONS 1 & 2: 0
9. THOUGHTS THAT YOU WOULD BE BETTER OFF DEAD, OR OF HURTING YOURSELF: 0

## 2023-06-26 ASSESSMENT — PAIN DESCRIPTION - LOCATION: LOCATION: KNEE

## 2023-06-26 ASSESSMENT — PAIN DESCRIPTION - ORIENTATION: ORIENTATION: LEFT

## 2023-06-26 ASSESSMENT — LIFESTYLE VARIABLES
HOW MANY STANDARD DRINKS CONTAINING ALCOHOL DO YOU HAVE ON A TYPICAL DAY: 1 OR 2
HOW OFTEN DO YOU HAVE A DRINK CONTAINING ALCOHOL: MONTHLY OR LESS

## 2023-06-26 ASSESSMENT — PAIN - FUNCTIONAL ASSESSMENT: PAIN_FUNCTIONAL_ASSESSMENT: 0-10

## 2023-06-28 ENCOUNTER — OFFICE VISIT (OUTPATIENT)
Dept: ORTHOPEDIC SURGERY | Age: 43
End: 2023-06-28
Payer: MEDICAID

## 2023-06-28 VITALS — BODY MASS INDEX: 40.48 KG/M2 | HEIGHT: 65 IN | WEIGHT: 243 LBS

## 2023-06-28 DIAGNOSIS — M17.12 ARTHRITIS OF LEFT KNEE: ICD-10-CM

## 2023-06-28 DIAGNOSIS — M06.00 SERONEGATIVE RHEUMATOID ARTHRITIS (HCC): ICD-10-CM

## 2023-06-28 DIAGNOSIS — M71.22 BAKER'S CYST OF KNEE, LEFT: ICD-10-CM

## 2023-06-28 DIAGNOSIS — M17.10 PATELLOFEMORAL ARTHRITIS: Primary | ICD-10-CM

## 2023-06-28 PROCEDURE — G8427 DOCREV CUR MEDS BY ELIG CLIN: HCPCS | Performed by: ORTHOPAEDIC SURGERY

## 2023-06-28 PROCEDURE — G8417 CALC BMI ABV UP PARAM F/U: HCPCS | Performed by: ORTHOPAEDIC SURGERY

## 2023-06-28 PROCEDURE — 1036F TOBACCO NON-USER: CPT | Performed by: ORTHOPAEDIC SURGERY

## 2023-06-28 PROCEDURE — 99214 OFFICE O/P EST MOD 30 MIN: CPT | Performed by: ORTHOPAEDIC SURGERY

## 2023-06-29 DIAGNOSIS — R52 ACUTE PAIN: Primary | ICD-10-CM

## 2023-07-06 ENCOUNTER — HOSPITAL ENCOUNTER (OUTPATIENT)
Dept: MRI IMAGING | Age: 43
Discharge: HOME | End: 2023-07-06
Attending: FAMILY MEDICINE
Payer: MEDICAID

## 2023-07-06 DIAGNOSIS — R52 ACUTE PAIN: ICD-10-CM

## 2023-07-06 PROCEDURE — 73721 MRI JNT OF LWR EXTRE W/O DYE: CPT

## 2023-07-07 ENCOUNTER — TELEPHONE (OUTPATIENT)
Dept: BARIATRICS/WEIGHT MGMT | Age: 43
End: 2023-07-07

## 2023-07-07 ENCOUNTER — TELEPHONE (OUTPATIENT)
Dept: ORTHOPEDIC SURGERY | Age: 43
End: 2023-07-07

## 2023-07-07 NOTE — TELEPHONE ENCOUNTER
[address]48 [employer]  (QTY 1)   LEFT KNEE        NO PA REQUIRED. VALID & BILLABLE ON CLAIM YES. BUY AND BILL. Per [facility]  GUIDELINES. Called and lm/vm that I called her.

## 2023-07-07 NOTE — TELEPHONE ENCOUNTER
Patient was sent Dr. Yeager digital bariatric seminar. Patient DOES have BWLS coverage with [facility] (No Req Diet ) Bariatric benefit form scanned in media.

## 2023-07-20 DIAGNOSIS — R09.82 POSTNASAL DRIP: ICD-10-CM

## 2023-07-20 RX ORDER — FLUTICASONE PROPIONATE 50 MCG
SPRAY, SUSPENSION (ML) NASAL
Qty: 16 G | Refills: 3 | Status: SHIPPED | OUTPATIENT
Start: 2023-07-20

## 2023-07-20 NOTE — TELEPHONE ENCOUNTER
Medication:   Requested Prescriptions     Pending Prescriptions Disp Refills    fluticasone (FLONASE) 50 MCG/ACT nasal spray [Pharmacy Med Name: FLUTICASONE 50MCG NASAL SP (120) RX] 16 g 3     Sig: SHAKE LIQUID AND USE 2 SPRAYS IN EACH NOSTRIL EVERY DAY        Last Filled:      Patient Phone Number: [phone] (home)     Last appt: 6/26/2023   Next appt: Visit date not found    Last OARRS:   RX Monitoring 10/11/2022   Attestation -   Periodic Controlled Substance Monitoring No signs of potential drug abuse or diversion identified.

## 2023-08-07 ENCOUNTER — OFFICE VISIT (OUTPATIENT)
Dept: ORTHOPEDIC SURGERY | Age: 43
End: 2023-08-07
Payer: MEDICAID

## 2023-08-07 VITALS — HEIGHT: 65 IN | WEIGHT: 246 LBS | BODY MASS INDEX: 40.98 KG/M2

## 2023-08-07 DIAGNOSIS — M17.10 PATELLOFEMORAL ARTHRITIS: Primary | ICD-10-CM

## 2023-08-07 DIAGNOSIS — S83.232A COMPLEX TEAR OF MEDIAL MENISCUS OF LEFT KNEE AS CURRENT INJURY, INITIAL ENCOUNTER: ICD-10-CM

## 2023-08-07 DIAGNOSIS — M71.22 BAKER'S CYST OF KNEE, LEFT: ICD-10-CM

## 2023-08-07 PROCEDURE — G8427 DOCREV CUR MEDS BY ELIG CLIN: HCPCS | Performed by: ORTHOPAEDIC SURGERY

## 2023-08-07 PROCEDURE — G8417 CALC BMI ABV UP PARAM F/U: HCPCS | Performed by: ORTHOPAEDIC SURGERY

## 2023-08-07 PROCEDURE — 99214 OFFICE O/P EST MOD 30 MIN: CPT | Performed by: ORTHOPAEDIC SURGERY

## 2023-08-07 PROCEDURE — 1036F TOBACCO NON-USER: CPT | Performed by: ORTHOPAEDIC SURGERY

## 2023-08-07 NOTE — PROGRESS NOTES
mild arthritis in the medial and lateral compartment with osteophytes seen throughout the knee. Her osteoarthritis is most severe in the patellofemoral joint. No bony lesions or cortical erosions appreciated. MRI L knee (8/7/2023)  1. Moderate joint effusion. 2. Severe patellofemoral osteoarthritis with complete loss of the articular  cartilage and extensive osteophyte formation. 3. Large radial tear of the posterior root insertion of the medial meniscus with  additional horizontal oblique tear of the posterior horn. 4. Mild-moderate osteoarthritis of the medial and lateral compartments with  osteophyte formation and mild chondromalacia. Impression  Patellofemoral arthritis  Medial meniscus root tear         Assessment: Patient is a 43 y.o. female presents today with left knee pain. She has a complex problem/history including rheumatoid arthritis, severe patellofemoral arthritis and a medial meniscus root tear. Both of these knee pathologies are treated differently thererfore, I'd like to get an evaluation by my partner, Dr. Katerin Christianson for an evaluation for patellofemoral arthroplasty versus medial meniscus root repair versus intra-articular corticosteroid injection. Impression:      Office Procedures:  No orders of the defined types were placed in this encounter. No orders of the defined types were placed in this encounter. Plan:    Due to the complexity of knee pathologies and medical history, we will refer her to Dr. No Mei for an evaluation of a patellofemoral arthroplasty plus/minus meniscus root repair, versus total joint options. We will hold off on intra-articular corticosteroid injection at this time. I will defer to his expertise regarding her definitive management. All the patient's questions were answered while in the clinic. The patient is understanding of all instructions and agrees with the plan.     Approximately  45 minutes were spent on patient education and coordinating

## 2023-08-08 ENCOUNTER — OFFICE VISIT (OUTPATIENT)
Dept: ORTHOPEDIC SURGERY | Age: 43
End: 2023-08-08
Payer: MEDICAID

## 2023-08-08 VITALS — WEIGHT: 246 LBS | HEIGHT: 65 IN | BODY MASS INDEX: 40.98 KG/M2

## 2023-08-08 DIAGNOSIS — S83.232A COMPLEX TEAR OF MEDIAL MENISCUS OF LEFT KNEE AS CURRENT INJURY, INITIAL ENCOUNTER: ICD-10-CM

## 2023-08-08 DIAGNOSIS — M17.10 PATELLOFEMORAL ARTHRITIS: Primary | ICD-10-CM

## 2023-08-08 PROCEDURE — 1036F TOBACCO NON-USER: CPT | Performed by: ORTHOPAEDIC SURGERY

## 2023-08-08 PROCEDURE — 99215 OFFICE O/P EST HI 40 MIN: CPT | Performed by: ORTHOPAEDIC SURGERY

## 2023-08-08 PROCEDURE — G8427 DOCREV CUR MEDS BY ELIG CLIN: HCPCS | Performed by: ORTHOPAEDIC SURGERY

## 2023-08-08 PROCEDURE — G8417 CALC BMI ABV UP PARAM F/U: HCPCS | Performed by: ORTHOPAEDIC SURGERY

## 2023-08-09 ENCOUNTER — TELEPHONE (OUTPATIENT)
Dept: ORTHOPEDIC SURGERY | Age: 43
End: 2023-08-09

## 2023-08-09 NOTE — TELEPHONE ENCOUNTER
I returned the pt's call. The pt would like to reschedule her up coming sx on 8/25/23.  She would like to push it back

## 2023-08-10 NOTE — TELEPHONE ENCOUNTER
SPOKE WITH PATIENT. SHE ALSO SPOKE WITH DR. GHOSH DIRECTLY IN REGARDS TO SX. WE ARE GOING TO KEEP SX DATE OF 8/25 FOR OPTIMAL SUCCESS PO. ALL QUESTIONS ANSWERED. PATIENT EXPRESSED UNDERSTANDING.

## 2023-08-10 NOTE — TELEPHONE ENCOUNTER
Patient call and she would like to know if she can get and call back to see if she can move her surgery up and week earlier? She just need a call back regarding this matter. Please Advise.

## 2023-08-10 NOTE — PROGRESS NOTES
8/8/2023     Reason for visit:  Left knee pain    History of Present Illness: The patient is a 45-year-old female who presents for evaluation of her left knee. She presents as a referral from my partner Dr. Smooth Moffett. She has had knee pain for over a year. She reports that it was initially anterior made worse with bending, kneeling, stairs. She was treated with activity modification, anti-inflammatory medications as well as injections. She did receive the hyaluronic acid injection last December with some benefit. However she does report worsening of her symptoms 1 month ago. She was walking down the stairs when she felt a popping sensation deep within her knee. Following that she experienced immediate severe pain and had difficulty bearing weight. Ever since then she has both medial and anterior pain.     Medical History:  Past Medical History:   Diagnosis Date    Anxiety     Arthritis     RA    Cellulitis of left lower extremity     left ankle    Depression     WELL CONTROLLED 10-16-17    Fibromyalgia     Heart rate fast     intermittent    Kidney stone     Migraine     Motor tic disorder     Obstructive sleep apnea, adult       Past Surgical History:   Procedure Laterality Date    ABDOMINAL EXPLORATION SURGERY  07/22/2011    excision Meckels diverticulum    ABDOMINOPLASTY  04/14/2014    Max Mare    ADENOIDECTOMY Bilateral     APPENDECTOMY  07/22/2011    BACK SURGERY      CARPAL TUNNEL RELEASE      HYSTERECTOMY, TOTAL ABDOMINAL (CERVIX REMOVED)  06/12/2018    robotic total hyst BSO, Dr Sun Nuno    LITHOTRIPSY  x2    LUMBAR LAMINECTOMY  06/2013    Rad; left l5-s1    MECKEL DIVERTICULUM EXCISION      OTHER SURGICAL HISTORY      hymenoplasty    OVARIAN CYST REMOVAL  04/13/2018    OPERATIVE LAPAROSCOPY, LEFT OVARIAN CYSTECTOMY WITH DILATATION AND CURETTAGE    OVARY REMOVAL      TONSILLECTOMY Bilateral     URETHRAL STRICTURE DILATATION      WISDOM TOOTH EXTRACTION        Family History   Problem Relation

## 2023-08-11 ENCOUNTER — TELEPHONE (OUTPATIENT)
Dept: ORTHOPEDIC SURGERY | Age: 43
End: 2023-08-11

## 2023-08-11 NOTE — TELEPHONE ENCOUNTER
SPOKE WITH PATIENT. RELAYED WE NEEDED TO REVIEW THE SCHEDULE AND WOULD F/U WITH HER ON Monday AFTER DR. Marianna Riggs CAN DO SO.  PATIENT FINE WITH THIS PLAN.

## 2023-08-16 NOTE — PLAN OF CARE
postural re-education, activity modification, progression of HEP. HEP instruction: Pt provided with written HEP instructions. Patient education:  Patient was thoroughly educated on this date regarding prehabilitation goals, importance of PT sessions in improving overall ROM, strength and stability prior to surgery, and how prehabilitation will facilitate improved post-operative outcomes. The patient was educated on and instructed in HEP as listed. The patient was given a detailed handout for exercises to initiate in the hospital post-operatively as well as at home. The discharge plan from the hospital was reviewed with the patient; specifically, to reduce length of hospital stay and to minimize time before reinitiating outpatient physical therapy after surgery. Education regarding early mobility post-operatively in the hospital and emphasis on working with both physical therapy and nursing staff to achieve ambulation goal of 150 feet was provided. Also, education regarding goals and expectations was provided; specifically, knee flexion range of motion greater than or equal to 90 degrees and 0 degrees knee extension to promote improved gait mechanics and ambulation. The patient was educated about all applicable post-op restrictions and precautions. The patient was encouraged to utilize ice/cold pack after surgery to address pain, minimize swelling as often as possible. It is in my medical opinion that this patient is clear from all physical barriers prior to consideration for surgery, activity modifications prior to and post operatively have been discussed with this patient as well as discharge planning and is cleared for surgery from physical therapy perspective. GOALS:  Patient stated goal: get back to PLOF, do her own housework, walk a mile at least  [] Progressing: [] Met: [] Not Met: [] Adjusted    Therapist goals for Patient:   Short Term Goals: To be achieved in: 1 visit  1.  Independent in HEP and

## 2023-08-16 NOTE — TELEPHONE ENCOUNTER
Approved  CPT: [id number]  AUTH: [id number]  DATE RANGE: 8/25/23 TO 11/23/23  INSURANCE: [facility]  LOCATION [facility]  AREA OF SX LT KNEE  NOTE: DOC SCANNED

## 2023-08-17 ENCOUNTER — HOSPITAL ENCOUNTER (OUTPATIENT)
Dept: PHYSICAL THERAPY | Age: 43
Setting detail: THERAPIES SERIES
Discharge: HOME | End: 2023-08-17
Payer: MEDICAID

## 2023-08-17 PROCEDURE — 97110 THERAPEUTIC EXERCISES: CPT

## 2023-08-17 PROCEDURE — 97161 PT EVAL LOW COMPLEX 20 MIN: CPT

## 2023-08-17 PROCEDURE — 97750 PHYSICAL PERFORMANCE TEST: CPT

## 2023-08-17 PROCEDURE — 97530 THERAPEUTIC ACTIVITIES: CPT

## 2023-08-17 NOTE — FLOWSHEET NOTE
[address]  Phone: [phone]   Fax: [phone]    Physical Therapy Daily Treatment Note    Date:  2023     Patient Name:  Gama Kyle    :  1980  MRN: [id number]  Medical Diagnosis: SURGERY 23  MEDIAL MENISCUS ROOT REPAIR WITH LEFT KNEE   PATELLOFEMORAL ARTHROPLASTY  Patellofemoral arthritis [M17.10]  Complex tear of medial meniscus of left knee as current injury, initial encounter [S89.096A]  Treatment Diagnosis:  L knee pain limiting standing activities, walking, squatting, mild strength deficits,    Insurance/Certification information:  PT Insurance Information: CCP/ No deductible/ No copay/ [facility]  Physician Information:  Teodoro Goss MD  Plan of care signed (Y/N): []  Yes [x]  No     Date of Patient follow up with Physician:      Progress Report: []  Yes  [x]  No     Date Range for reporting period:  Beginnin2023  PN:   Ending:     Progress report due (10 Rx/or 30 days whichever is less): 1st post-op visit 3/0/88    Recertification due (POC duration/ or 90 days whichever is less): ~23    Visit # Insurance Allowable Auth required?  Date Range    30 [x]  Yes  []  No pcy       Units approved Units used Date Range   TBD  (Requested )   TBD TBD     Latex Allergy:  [x]NO      []YES  Preferred Language for Healthcare:   [x]English       []other:    Functional Scale:       Date assessed:  LEFS: raw score = 31/50; dysfunction = 61%  23    Pain level:  up to 8/10 L knee     SUBJECTIVE:  See eval    OBJECTIVE:       Functional Testing  Sx Date 23 Prehab Date   23 Post-op Re-Eval Date   4 week F/U date   8 week F/U date   D/C Date        TUG (sec) 8.63       30 second sit to stand (reps) 11  pain       6 minute walk (m) 342 m          Balance:    Narrowed FARSHAD (sec) 10       Semi Tandem (sec) 10       Tandem (sec) 10       SLS (sec) 10          Knee AROM L R L R L R L R L R   Flexion 0 120

## 2023-08-23 ENCOUNTER — TELEPHONE (OUTPATIENT)
Dept: ORTHOPEDIC SURGERY | Age: 43
End: 2023-08-23

## 2023-08-23 DIAGNOSIS — M17.10 PATELLOFEMORAL ARTHRITIS: Primary | ICD-10-CM

## 2023-08-23 DIAGNOSIS — G89.18 POST-OPERATIVE PAIN: Primary | ICD-10-CM

## 2023-08-23 NOTE — PROGRESS NOTES
Name_______________________________________Printed:____________________  Date and time of surgery_8/25/2023____0900___________________Arrival Time:__0700______________   1. The instructions given regarding when and if a patient needs to stop oral intake prior to surgery varies. Follow the specific instructions you were given                  __x_Nothing to eat or to drink after Midnight the night before.                   ____Carbo loading or instructions will be given to select patients-if you have been given those instructions -please do the following                           The evening before your surgery after dinner before midnight drink 40 ounces of gatorade. If you are diabetic use sugar free. The morning of surgery drink 40 ounces of water. This needs to be finished 3 hours prior to your surgery start time. 2. Take the following pills with a small sip of water on the morning of surgery__protonix, lyrica_________________________________________________                  Do not take blood pressure medications ending in pril or sartan the sun prior to surgery or the morning of surgery. Dr Apple Pepper patient are not to take any medications the AM of surgery. 3. Aspirin, Ibuprofen, Advil, Naproxen, Vitamin E and other Anti-inflammatory products and supplements should be stopped for 5 -7days before surgery or as directed by your physician. 4. Check with your Doctor regarding stopping Plavix, Coumadin,Eliquis, Lovenox,Effient,Pradaxa,Xarelto, Fragmin or other blood thinners and follow their instructions. 5. Do not smoke, and do not drink any alcoholic beverages 24 hours prior to surgery. This includes NA Beer. Refrain from the usage of any recreational drugs. 6. You may brush your teeth and gargle the morning of surgery. DO NOT SWALLOW WATER   7. You MUST make arrangements for a responsible adult to stay on site while you are here and take you home after your surgery.  You will not be allowed to leave

## 2023-08-24 RX ORDER — ONDANSETRON 4 MG/1
4 TABLET, FILM COATED ORAL EVERY 8 HOURS PRN
Qty: 21 TABLET | Refills: 0 | Status: SHIPPED | OUTPATIENT
Start: 2023-08-24 | End: 2023-08-31

## 2023-08-24 RX ORDER — HYDROCODONE BITARTRATE AND ACETAMINOPHEN 10; 325 MG/1; MG/1
1 TABLET ORAL EVERY 4 HOURS PRN
Qty: 30 TABLET | Refills: 0 | Status: SHIPPED | OUTPATIENT
Start: 2023-08-24 | End: 2023-08-31

## 2023-08-24 NOTE — DISCHARGE INSTRUCTIONS
Orthopedic Surgery Discharge Instructions    Medications:   A pain medication has been prescribed for you. Please take this as instructed by the pharmacist.  An anti-nausea medication has been prescribed for you to use as needed for nausea. Either aspirin or a blood thinner medication may have been prescribed for you. Please take this as instructed. Activity:  Non weightbearing with crutches. Must remain in knee brace locked in extension at all times while ambulating. When not ambulating may unlock the brace to 90 degrees. You should sleep in the brace for the first 2 weeks. 3 times a day you should unlock the brace and dangle from the side of a bed or chair so that your knee is bent to 90 degrees. Incision/dressings: You may remove your dressing in 72 hours. Until then the dressing needs to remain clean and dry. Following removal dressing please apply dry dressing daily. You may shower in 72 hours and allow the water to run over the incision. However no submerging underwater or getting in pools. Follow-up: If you do not already have a postoperative follow-up appointment scheduled you should call to schedule an appointment within 10 to 14 days of surgery. Emergency or after hours questions:  Please call the main call center at [phone] for all questions or concerns during evening and weekend hours. The call center will direct your call to the on-call physician to answer your questions and concerns. During weekly office hours you may call my assistant, Ivonne, at [phone]. Melani Aguilera MD            Orthopaedic Surgery Sports Medicine and [address] [facility] and [address] [facility]            Team Physician [facility]              Kate    Follow your surgeons instructions.   Follow up

## 2023-08-24 NOTE — H&P
Reason for visit:  Left knee pain     History of Present Illness: The patient is a 70-year-old female who presents for evaluation of her left knee. She presents as a referral from my partner Dr. Colton Hua. She has had knee pain for over a year. She reports that it was initially anterior made worse with bending, kneeling, stairs. She was treated with activity modification, anti-inflammatory medications as well as injections. She did receive the hyaluronic acid injection last December with some benefit. However she does report worsening of her symptoms 1 month ago. She was walking down the stairs when she felt a popping sensation deep within her knee. Following that she experienced immediate severe pain and had difficulty bearing weight. Ever since then she has both medial and anterior pain.      Medical History:  Past Medical History        Past Medical History:   Diagnosis Date    Anxiety      Arthritis       RA    Cellulitis of left lower extremity       left ankle    Depression       WELL CONTROLLED 10-16-17    Fibromyalgia      Heart rate fast       intermittent    Kidney stone      Migraine      Motor tic disorder      Obstructive sleep apnea, adult           Past Surgical History         Past Surgical History:   Procedure Laterality Date    ABDOMINAL EXPLORATION SURGERY   07/22/2011     excision Meckels diverticulum    ABDOMINOPLASTY   04/14/2014     Shameka Leroy    ADENOIDECTOMY Bilateral      APPENDECTOMY   07/22/2011    BACK SURGERY        CARPAL TUNNEL RELEASE        HYSTERECTOMY, TOTAL ABDOMINAL (CERVIX REMOVED)   06/12/2018     robotic total hyst BSO, Dr Parvin Cruz    LITHOTRIPSY   x2    LUMBAR LAMINECTOMY   06/2013     Rad; left l5-s1    MECKEL DIVERTICULUM EXCISION        OTHER SURGICAL HISTORY         hymenoplasty    OVARIAN CYST REMOVAL   04/13/2018     OPERATIVE LAPAROSCOPY, LEFT OVARIAN CYSTECTOMY WITH DILATATION AND CURETTAGE    OVARY REMOVAL        TONSILLECTOMY Bilateral      URETHRAL No medial meniscus with extrusion of the body. Intact chondral surfaces of the medial compartment. Severe degenerative changes of the patellofemoral joint noted. Assessment:  Knee pain with imaging evidence of patellofemoral degenerative joint disease along with new acute posterior medial meniscus root tear     Plan:  I had a very long discussion with the patient. We spent time reviewing imaging findings. She has a very complex constellation of findings. She has had pain over a year as result of her patellofemoral osteoarthritis. However her new injury resulted in the tear of the posterior root of the medial meniscus. I did tell the patient that meniscus root tears have been found to be biomechanically equivalent to complete meniscal deficiency and puts her at high risk for accelerated degeneration of the medial compartment. Options include operative as well as nonoperative treatment options. From an operative standpoint I would recommend treating both the patellofemoral osteoarthritis as well as a medial meniscus root tear. I did tell the patient that would be to preserve as much as her knee is possible to allow it to feel like a more natural knee and allow her to be as active as possible. Therefore I would recommend a left knee arthroscopy with posterior medial meniscus root repair along with a patellofemoral arthroplasty. The risk were defined as but not limited to infection, bleeding, damage to blood vessels or nerves, need for additional surgery. She does understand elects proceed.

## 2023-08-25 ENCOUNTER — ANESTHESIA EVENT (OUTPATIENT)
Dept: OPERATING ROOM | Age: 43
End: 2023-08-25
Payer: MEDICAID

## 2023-08-25 ENCOUNTER — HOSPITAL ENCOUNTER (OUTPATIENT)
Age: 43
Setting detail: OUTPATIENT SURGERY
Discharge: HOME | End: 2023-08-25
Attending: ORTHOPAEDIC SURGERY | Admitting: ORTHOPAEDIC SURGERY
Payer: MEDICAID

## 2023-08-25 ENCOUNTER — APPOINTMENT (OUTPATIENT)
Dept: GENERAL RADIOLOGY | Age: 43
End: 2023-08-25
Attending: ORTHOPAEDIC SURGERY
Payer: MEDICAID

## 2023-08-25 ENCOUNTER — ANESTHESIA (OUTPATIENT)
Dept: OPERATING ROOM | Age: 43
End: 2023-08-25
Payer: MEDICAID

## 2023-08-25 VITALS
RESPIRATION RATE: 28 BRPM | HEIGHT: 65 IN | WEIGHT: 243.6 LBS | BODY MASS INDEX: 40.59 KG/M2 | SYSTOLIC BLOOD PRESSURE: 111 MMHG | HEART RATE: 124 BPM | TEMPERATURE: 97.6 F | DIASTOLIC BLOOD PRESSURE: 92 MMHG | OXYGEN SATURATION: 93 %

## 2023-08-25 DIAGNOSIS — G25.81 RLS (RESTLESS LEGS SYNDROME): ICD-10-CM

## 2023-08-25 PROCEDURE — 6360000002 HC RX W HCPCS: Performed by: ORTHOPAEDIC SURGERY

## 2023-08-25 PROCEDURE — 3700000001 HC ADD 15 MINUTES (ANESTHESIA): Performed by: ORTHOPAEDIC SURGERY

## 2023-08-25 PROCEDURE — 3700000000 HC ANESTHESIA ATTENDED CARE: Performed by: ORTHOPAEDIC SURGERY

## 2023-08-25 PROCEDURE — 7100000001 HC PACU RECOVERY - ADDTL 15 MIN: Performed by: ORTHOPAEDIC SURGERY

## 2023-08-25 PROCEDURE — 6370000000 HC RX 637 (ALT 250 FOR IP): Performed by: STUDENT IN AN ORGANIZED HEALTH CARE EDUCATION/TRAINING PROGRAM

## 2023-08-25 PROCEDURE — C1713 ANCHOR/SCREW BN/BN,TIS/BN: HCPCS | Performed by: ORTHOPAEDIC SURGERY

## 2023-08-25 PROCEDURE — 2500000003 HC RX 250 WO HCPCS: Performed by: NURSE ANESTHETIST, CERTIFIED REGISTERED

## 2023-08-25 PROCEDURE — 2720000010 HC SURG SUPPLY STERILE: Performed by: ORTHOPAEDIC SURGERY

## 2023-08-25 PROCEDURE — 6360000002 HC RX W HCPCS: Performed by: NURSE ANESTHETIST, CERTIFIED REGISTERED

## 2023-08-25 PROCEDURE — 6370000000 HC RX 637 (ALT 250 FOR IP)

## 2023-08-25 PROCEDURE — L1810 KO ELASTIC WITH JOINTS: HCPCS | Performed by: ORTHOPAEDIC SURGERY

## 2023-08-25 PROCEDURE — 3600000014 HC SURGERY LEVEL 4 ADDTL 15MIN: Performed by: ORTHOPAEDIC SURGERY

## 2023-08-25 PROCEDURE — 2580000003 HC RX 258: Performed by: ORTHOPAEDIC SURGERY

## 2023-08-25 PROCEDURE — 3600000004 HC SURGERY LEVEL 4 BASE: Performed by: ORTHOPAEDIC SURGERY

## 2023-08-25 PROCEDURE — C1776 JOINT DEVICE (IMPLANTABLE): HCPCS | Performed by: ORTHOPAEDIC SURGERY

## 2023-08-25 PROCEDURE — 6370000000 HC RX 637 (ALT 250 FOR IP): Performed by: NURSE ANESTHETIST, CERTIFIED REGISTERED

## 2023-08-25 PROCEDURE — 6360000002 HC RX W HCPCS: Performed by: STUDENT IN AN ORGANIZED HEALTH CARE EDUCATION/TRAINING PROGRAM

## 2023-08-25 PROCEDURE — 7100000000 HC PACU RECOVERY - FIRST 15 MIN: Performed by: ORTHOPAEDIC SURGERY

## 2023-08-25 PROCEDURE — 7100000010 HC PHASE II RECOVERY - FIRST 15 MIN: Performed by: ORTHOPAEDIC SURGERY

## 2023-08-25 PROCEDURE — 2500000003 HC RX 250 WO HCPCS: Performed by: ORTHOPAEDIC SURGERY

## 2023-08-25 PROCEDURE — 71045 X-RAY EXAM CHEST 1 VIEW: CPT

## 2023-08-25 PROCEDURE — 2709999900 HC NON-CHARGEABLE SUPPLY: Performed by: ORTHOPAEDIC SURGERY

## 2023-08-25 PROCEDURE — 2580000003 HC RX 258: Performed by: STUDENT IN AN ORGANIZED HEALTH CARE EDUCATION/TRAINING PROGRAM

## 2023-08-25 PROCEDURE — 7100000011 HC PHASE II RECOVERY - ADDTL 15 MIN: Performed by: ORTHOPAEDIC SURGERY

## 2023-08-25 DEVICE — COMPONENT FEM W11XL21.5MM TAPR POST DISP FOR RESURF SYS: Type: IMPLANTABLE DEVICE | Site: KNEE | Status: FUNCTIONAL

## 2023-08-25 DEVICE — FOOTPRINT ULTRA PK SUTURE ANCHOR 4.5
Type: IMPLANTABLE DEVICE | Site: KNEE | Status: FUNCTIONAL
Brand: FOOTPRINT

## 2023-08-25 DEVICE — CEMENT BNE 40GM FULL DOSE PMMA W/ GENT HI VISC RADPQ LNG: Type: IMPLANTABLE DEVICE | Site: KNEE | Status: FUNCTIONAL

## 2023-08-25 DEVICE — ANCHOR SUTURE BIOCOMP 4.75X19.1 MM SWIVELOCK C: Type: IMPLANTABLE DEVICE | Site: KNEE | Status: FUNCTIONAL

## 2023-08-25 DEVICE — IMPLANTABLE DEVICE: Type: IMPLANTABLE DEVICE | Site: KNEE | Status: FUNCTIONAL

## 2023-08-25 DEVICE — BUTTON SUT DIA12MM TI FOR PRI BKUP FIBERWIRE FIX OF ACL PCL: Type: IMPLANTABLE DEVICE | Site: KNEE | Status: FUNCTIONAL

## 2023-08-25 DEVICE — SYSTEM IMPL MENIS ROOT REP TECH: Type: IMPLANTABLE DEVICE | Site: KNEE | Status: FUNCTIONAL

## 2023-08-25 RX ORDER — KETAMINE HCL IN NACL, ISO-OSM 100MG/10ML
SYRINGE (ML) INJECTION PRN
Status: DISCONTINUED | OUTPATIENT
Start: 2023-08-25 | End: 2023-08-25 | Stop reason: SDUPTHER

## 2023-08-25 RX ORDER — MAGNESIUM SULFATE HEPTAHYDRATE 500 MG/ML
INJECTION, SOLUTION INTRAMUSCULAR; INTRAVENOUS PRN
Status: DISCONTINUED | OUTPATIENT
Start: 2023-08-25 | End: 2023-08-25 | Stop reason: SDUPTHER

## 2023-08-25 RX ORDER — TRANEXAMIC ACID 10 MG/ML
1000 INJECTION, SOLUTION INTRAVENOUS
Status: COMPLETED | OUTPATIENT
Start: 2023-08-25 | End: 2023-08-25

## 2023-08-25 RX ORDER — IPRATROPIUM BROMIDE AND ALBUTEROL SULFATE 2.5; .5 MG/3ML; MG/3ML
SOLUTION RESPIRATORY (INHALATION)
Status: COMPLETED
Start: 2023-08-25 | End: 2023-08-25

## 2023-08-25 RX ORDER — ONDANSETRON 2 MG/ML
INJECTION INTRAMUSCULAR; INTRAVENOUS PRN
Status: DISCONTINUED | OUTPATIENT
Start: 2023-08-25 | End: 2023-08-25 | Stop reason: SDUPTHER

## 2023-08-25 RX ORDER — FENTANYL CITRATE 50 UG/ML
INJECTION, SOLUTION INTRAMUSCULAR; INTRAVENOUS PRN
Status: DISCONTINUED | OUTPATIENT
Start: 2023-08-25 | End: 2023-08-25 | Stop reason: SDUPTHER

## 2023-08-25 RX ORDER — SODIUM CHLORIDE 9 MG/ML
INJECTION, SOLUTION INTRAVENOUS PRN
Status: DISCONTINUED | OUTPATIENT
Start: 2023-08-25 | End: 2023-08-25 | Stop reason: HOSPADM

## 2023-08-25 RX ORDER — ALBUTEROL SULFATE 2.5 MG/3ML
2.5 SOLUTION RESPIRATORY (INHALATION) ONCE
Status: DISCONTINUED | OUTPATIENT
Start: 2023-08-25 | End: 2023-08-25 | Stop reason: HOSPADM

## 2023-08-25 RX ORDER — ACETAMINOPHEN 325 MG/1
650 TABLET ORAL ONCE
Status: COMPLETED | OUTPATIENT
Start: 2023-08-25 | End: 2023-08-25

## 2023-08-25 RX ORDER — UPADACITINIB 15 MG/1
15 TABLET, EXTENDED RELEASE ORAL DAILY
COMMUNITY

## 2023-08-25 RX ORDER — SODIUM CHLORIDE 0.9 % (FLUSH) 0.9 %
5-40 SYRINGE (ML) INJECTION EVERY 12 HOURS SCHEDULED
Status: DISCONTINUED | OUTPATIENT
Start: 2023-08-25 | End: 2023-08-25 | Stop reason: HOSPADM

## 2023-08-25 RX ORDER — HYDROMORPHONE HYDROCHLORIDE 2 MG/ML
0.5 INJECTION, SOLUTION INTRAMUSCULAR; INTRAVENOUS; SUBCUTANEOUS EVERY 5 MIN PRN
Status: DISCONTINUED | OUTPATIENT
Start: 2023-08-25 | End: 2023-08-25 | Stop reason: HOSPADM

## 2023-08-25 RX ORDER — SODIUM CHLORIDE 0.9 % (FLUSH) 0.9 %
5-40 SYRINGE (ML) INJECTION PRN
Status: DISCONTINUED | OUTPATIENT
Start: 2023-08-25 | End: 2023-08-25 | Stop reason: HOSPADM

## 2023-08-25 RX ORDER — EPINEPHRINE 1 MG/ML
INJECTION, SOLUTION INTRAMUSCULAR; SUBCUTANEOUS PRN
Status: DISCONTINUED | OUTPATIENT
Start: 2023-08-25 | End: 2023-08-25 | Stop reason: SDUPTHER

## 2023-08-25 RX ORDER — DEXAMETHASONE SODIUM PHOSPHATE 4 MG/ML
INJECTION, SOLUTION INTRA-ARTICULAR; INTRALESIONAL; INTRAMUSCULAR; INTRAVENOUS; SOFT TISSUE PRN
Status: DISCONTINUED | OUTPATIENT
Start: 2023-08-25 | End: 2023-08-25 | Stop reason: SDUPTHER

## 2023-08-25 RX ORDER — MIDAZOLAM HYDROCHLORIDE 1 MG/ML
INJECTION INTRAMUSCULAR; INTRAVENOUS PRN
Status: DISCONTINUED | OUTPATIENT
Start: 2023-08-25 | End: 2023-08-25 | Stop reason: SDUPTHER

## 2023-08-25 RX ORDER — SODIUM CHLORIDE, SODIUM LACTATE, POTASSIUM CHLORIDE, CALCIUM CHLORIDE 600; 310; 30; 20 MG/100ML; MG/100ML; MG/100ML; MG/100ML
INJECTION, SOLUTION INTRAVENOUS CONTINUOUS
Status: DISCONTINUED | OUTPATIENT
Start: 2023-08-25 | End: 2023-08-25 | Stop reason: HOSPADM

## 2023-08-25 RX ORDER — PROPOFOL 10 MG/ML
INJECTION, EMULSION INTRAVENOUS PRN
Status: DISCONTINUED | OUTPATIENT
Start: 2023-08-25 | End: 2023-08-25 | Stop reason: SDUPTHER

## 2023-08-25 RX ORDER — ALBUTEROL SULFATE 90 UG/1
AEROSOL, METERED RESPIRATORY (INHALATION) PRN
Status: DISCONTINUED | OUTPATIENT
Start: 2023-08-25 | End: 2023-08-25 | Stop reason: SDUPTHER

## 2023-08-25 RX ORDER — HYDROMORPHONE HYDROCHLORIDE 2 MG/ML
INJECTION, SOLUTION INTRAMUSCULAR; INTRAVENOUS; SUBCUTANEOUS PRN
Status: DISCONTINUED | OUTPATIENT
Start: 2023-08-25 | End: 2023-08-25 | Stop reason: SDUPTHER

## 2023-08-25 RX ORDER — FENTANYL CITRATE 50 UG/ML
50 INJECTION, SOLUTION INTRAMUSCULAR; INTRAVENOUS EVERY 5 MIN PRN
Status: DISCONTINUED | OUTPATIENT
Start: 2023-08-25 | End: 2023-08-25 | Stop reason: HOSPADM

## 2023-08-25 RX ORDER — OXYCODONE HYDROCHLORIDE 5 MG/1
5 TABLET ORAL
Status: COMPLETED | OUTPATIENT
Start: 2023-08-25 | End: 2023-08-25

## 2023-08-25 RX ORDER — SODIUM CHLORIDE, SODIUM LACTATE, POTASSIUM CHLORIDE, CALCIUM CHLORIDE 600; 310; 30; 20 MG/100ML; MG/100ML; MG/100ML; MG/100ML
INJECTION, SOLUTION INTRAVENOUS CONTINUOUS
Status: DISCONTINUED | OUTPATIENT
Start: 2023-08-25 | End: 2023-08-25 | Stop reason: SDUPTHER

## 2023-08-25 RX ORDER — HYDROXYCHLOROQUINE SULFATE 200 MG/1
200 TABLET, FILM COATED ORAL 2 TIMES DAILY
COMMUNITY

## 2023-08-25 RX ORDER — LIDOCAINE HYDROCHLORIDE 10 MG/ML
0.5 INJECTION, SOLUTION EPIDURAL; INFILTRATION; INTRACAUDAL; PERINEURAL ONCE
Status: DISCONTINUED | OUTPATIENT
Start: 2023-08-25 | End: 2023-08-25 | Stop reason: HOSPADM

## 2023-08-25 RX ORDER — ONDANSETRON 2 MG/ML
4 INJECTION INTRAMUSCULAR; INTRAVENOUS
Status: DISCONTINUED | OUTPATIENT
Start: 2023-08-25 | End: 2023-08-25 | Stop reason: HOSPADM

## 2023-08-25 RX ORDER — APREPITANT 40 MG/1
40 CAPSULE ORAL ONCE
Status: COMPLETED | OUTPATIENT
Start: 2023-08-25 | End: 2023-08-25

## 2023-08-25 RX ADMIN — FENTANYL CITRATE 25 MCG: 50 INJECTION, SOLUTION INTRAMUSCULAR; INTRAVENOUS at 10:16

## 2023-08-25 RX ADMIN — HYDROMORPHONE HYDROCHLORIDE 0.5 MG: 2 INJECTION, SOLUTION INTRAMUSCULAR; INTRAVENOUS; SUBCUTANEOUS at 10:37

## 2023-08-25 RX ADMIN — PROPOFOL 200 MG: 10 INJECTION, EMULSION INTRAVENOUS at 09:23

## 2023-08-25 RX ADMIN — TRANEXAMIC ACID 1000 MG: 10 INJECTION, SOLUTION INTRAVENOUS at 09:29

## 2023-08-25 RX ADMIN — SODIUM CHLORIDE, POTASSIUM CHLORIDE, SODIUM LACTATE AND CALCIUM CHLORIDE: 600; 310; 30; 20 INJECTION, SOLUTION INTRAVENOUS at 09:20

## 2023-08-25 RX ADMIN — DEXAMETHASONE SODIUM PHOSPHATE 8 MG: 4 INJECTION, SOLUTION INTRAMUSCULAR; INTRAVENOUS at 09:29

## 2023-08-25 RX ADMIN — OXYCODONE HYDROCHLORIDE 5 MG: 5 TABLET ORAL at 12:50

## 2023-08-25 RX ADMIN — Medication 25 MG: at 09:23

## 2023-08-25 RX ADMIN — HYDROMORPHONE HYDROCHLORIDE 0.5 MG: 2 INJECTION, SOLUTION INTRAMUSCULAR; INTRAVENOUS; SUBCUTANEOUS at 10:24

## 2023-08-25 RX ADMIN — HYDROMORPHONE HYDROCHLORIDE 0.5 MG: 2 INJECTION, SOLUTION INTRAMUSCULAR; INTRAVENOUS; SUBCUTANEOUS at 13:04

## 2023-08-25 RX ADMIN — FENTANYL CITRATE 50 MCG: 50 INJECTION, SOLUTION INTRAMUSCULAR; INTRAVENOUS at 09:47

## 2023-08-25 RX ADMIN — ALBUTEROL SULFATE 2 PUFF: 90 AEROSOL, METERED RESPIRATORY (INHALATION) at 12:12

## 2023-08-25 RX ADMIN — TRANEXAMIC ACID 1000 MG: 10 INJECTION, SOLUTION INTRAVENOUS at 11:21

## 2023-08-25 RX ADMIN — ALBUTEROL SULFATE 2 PUFF: 90 AEROSOL, METERED RESPIRATORY (INHALATION) at 10:47

## 2023-08-25 RX ADMIN — CEFAZOLIN 2000 MG: 2 INJECTION, POWDER, FOR SOLUTION INTRAMUSCULAR; INTRAVENOUS at 09:18

## 2023-08-25 RX ADMIN — FENTANYL CITRATE 50 MCG: 50 INJECTION, SOLUTION INTRAMUSCULAR; INTRAVENOUS at 09:20

## 2023-08-25 RX ADMIN — ACETAMINOPHEN 650 MG: 325 TABLET ORAL at 08:06

## 2023-08-25 RX ADMIN — APREPITANT 40 MG: 40 CAPSULE ORAL at 08:06

## 2023-08-25 RX ADMIN — ONDANSETRON 4 MG: 2 INJECTION INTRAMUSCULAR; INTRAVENOUS at 09:29

## 2023-08-25 RX ADMIN — MAGNESIUM SULFATE HEPTAHYDRATE 1 G: 500 INJECTION, SOLUTION INTRAMUSCULAR; INTRAVENOUS at 10:21

## 2023-08-25 RX ADMIN — MIDAZOLAM 2 MG: 1 INJECTION INTRAMUSCULAR; INTRAVENOUS at 09:20

## 2023-08-25 RX ADMIN — SODIUM CHLORIDE, POTASSIUM CHLORIDE, SODIUM LACTATE AND CALCIUM CHLORIDE: 600; 310; 30; 20 INJECTION, SOLUTION INTRAVENOUS at 07:54

## 2023-08-25 RX ADMIN — Medication 25 MG: at 09:40

## 2023-08-25 RX ADMIN — HYDROMORPHONE HYDROCHLORIDE 0.5 MG: 2 INJECTION, SOLUTION INTRAMUSCULAR; INTRAVENOUS; SUBCUTANEOUS at 11:53

## 2023-08-25 RX ADMIN — HYDROMORPHONE HYDROCHLORIDE 0.5 MG: 2 INJECTION, SOLUTION INTRAMUSCULAR; INTRAVENOUS; SUBCUTANEOUS at 12:59

## 2023-08-25 RX ADMIN — EPINEPHRINE 2 MCG: 1 INJECTION INTRAMUSCULAR; INTRAVENOUS; SUBCUTANEOUS at 12:12

## 2023-08-25 RX ADMIN — FENTANYL CITRATE 25 MCG: 50 INJECTION, SOLUTION INTRAMUSCULAR; INTRAVENOUS at 10:07

## 2023-08-25 RX ADMIN — IPRATROPIUM BROMIDE AND ALBUTEROL SULFATE 3 ML: .5; 3 SOLUTION RESPIRATORY (INHALATION) at 13:02

## 2023-08-25 RX ADMIN — FENTANYL CITRATE 50 MCG: 50 INJECTION, SOLUTION INTRAMUSCULAR; INTRAVENOUS at 09:34

## 2023-08-25 RX ADMIN — PROPOFOL 50 MG: 10 INJECTION, EMULSION INTRAVENOUS at 09:38

## 2023-08-25 ASSESSMENT — PAIN SCALES - WONG BAKER
WONGBAKER_NUMERICALRESPONSE: 0

## 2023-08-25 ASSESSMENT — PAIN DESCRIPTION - LOCATION
LOCATION: KNEE
LOCATION: KNEE

## 2023-08-25 ASSESSMENT — PAIN SCALES - GENERAL
PAINLEVEL_OUTOF10: 2
PAINLEVEL_OUTOF10: 8
PAINLEVEL_OUTOF10: 5
PAINLEVEL_OUTOF10: 4
PAINLEVEL_OUTOF10: 6

## 2023-08-25 ASSESSMENT — PAIN DESCRIPTION - ORIENTATION
ORIENTATION: LEFT
ORIENTATION: LEFT

## 2023-08-25 ASSESSMENT — PAIN DESCRIPTION - PAIN TYPE: TYPE: ACUTE PAIN

## 2023-08-25 ASSESSMENT — PAIN DESCRIPTION - DESCRIPTORS
DESCRIPTORS: DISCOMFORT;ACHING
DESCRIPTORS: ACHING

## 2023-08-25 NOTE — PROGRESS NOTES
Discharge instructions reviewed with patient and pts Father and step mom Cas. All home medications have been reviewed, pt v/u. Discharge instructions signed. Pt discharged via wheelchair. Pt discharged with knee immobilizer and all belongings. Step Mom Jazmyne taking stable pt home. Crutches in car.

## 2023-08-25 NOTE — PROGRESS NOTES
Pt arrived from OR to PACU bay 2. [facility]. Pt  sedated. Surgical incisions dressings in place to left knee with immobilizer in place . Pt on 5L NC, ST, and VSS. Will continue to monitor.

## 2023-08-25 NOTE — PROGRESS NOTES
Pt placed back on 3L nasal cannula d/t decrease in sats. Dr Gab Rosas at bedside. Pt using incentive spirometer. Pts Dad and Step mom notified for update and DC instructions.

## 2023-08-25 NOTE — ANESTHESIA PRE PROCEDURE
Department of Anesthesiology  Preprocedure Note       Name:  Melody Scheuermann   Age:  43 y.o.  :  1980                                          MRN:  [id number]         Date:  2023      Surgeon: Leslie Leong):  Laura Atkinson MD    Procedure: Procedure(s):  LEFT KNEE ARTHROSCOPY, MEDIAL MENISCUS ROOT REPAIR WITH -[address]  LEFT KNEE PATELLOFEMORAL ARTHROPLASTY    Medications prior to admission:   Prior to Admission medications    Medication Sig Start Date End Date Taking? Authorizing Provider   Upadacitinib ER (RINVOQ) 15 MG TB24 Take 15 mg by mouth daily   Yes Historical Provider, MD   hydroxychloroquine (PLAQUENIL) 200 MG tablet Take 1 tablet by mouth in the morning and at bedtime   Yes Historical Provider, MD   ondansetron (ZOFRAN) 4 MG tablet Take 1 tablet by mouth every 8 hours as needed for Nausea or Vomiting 23  Laura Atkinson MD   HYDROcodone-acetaminophen (NORCO)  MG per tablet Take 1 tablet by mouth every 4 hours as needed for Pain for up to 30 doses.  Intended supply: 7 days Max Daily Amount: 6 tablets 23  Laura Atkinson MD   apixaban Birdena Juanito) 2.5 MG TABS tablet Take 1 tablet by mouth 2 times daily 23  Laura Atkinson MD   fluticasone (FLONASE) 50 MCG/ACT nasal spray SHAKE LIQUID AND USE 2 SPRAYS IN [facility] NOSTRIL EVERY DAY  Patient not taking: Reported on 2023   ANDREA Montano CNP   busPIRone (BUSPAR) 10 MG tablet Take 1 tablet by mouth 3 times daily as needed (anxiety) 23   ANDREA Montano CNP   albuterol sulfate HFA (PROVENTIL;VENTOLIN;PROAIR) 108 (90 Base) MCG/ACT inhaler Inhale 2 puffs into the lungs every 6 hours as needed for Wheezing for wheezing  Patient not taking: Reported on 2023   ANDREA Montano CNP   desvenlafaxine succinate (PRISTIQ) 100 MG TB24 extended release tablet Take 1 tablet by mouth daily 5/3/23 10/30/23  ANDREA Montano - CNP   diclofenac sodium (VOLTAREN) 1 % GEL Apply 4 g

## 2023-08-25 NOTE — ANESTHESIA POSTPROCEDURE EVALUATION
Department of Anesthesiology  Postprocedure Note    Patient: Jon Gomez  MRN: [id number]  YOB: 1980  Date of evaluation: 8/25/2023      Procedure Summary     Date: 08/25/23 Room / Location: [address]    Anesthesia Start: 0920 Anesthesia Stop: 5404    Procedures:       LEFT KNEE ARTHROSCOPY, MEDIAL MENISCUS ROOT REPAIR WITH -[address] (Left: Knee)      LEFT KNEE PATELLOFEMORAL ARTHROPLASTY (Left: Knee) Diagnosis:       Complex tear of medial meniscus of left knee, unspecified whether old or current tear, initial encounter      Osteoarthritis of patellofemoral joint, unspecified laterality      (Complex tear of medial meniscus of left knee, unspecified whether old or current tear, initial encounter [S83.232A])      (Osteoarthritis of patellofemoral joint, unspecified laterality [M17.10])    Surgeons: Kenna Downing MD Responsible Provider: Judit Watson MD    Anesthesia Type: general ASA Status: 3          Anesthesia Type: No value filed. Aline Phase I: Aline Score: 10    Aline Phase II:        Anesthesia Post Evaluation    Patient location during evaluation: bedside  Patient participation: complete - patient participated  Level of consciousness: awake and alert  Pain score: 0  Airway patency: patent  Nausea & Vomiting: no vomiting  Complications: no  Cardiovascular status: hemodynamically stable  Respiratory status: nonlabored ventilation  Hydration status: stable  Comments: Low saturations in PACU with RA. Bedside CXR reassuring and patient given nebulizer and IS with improvement. Patient feeling well and does not endrose any SOB or dyspnea.   Pain management: adequate

## 2023-08-28 ENCOUNTER — HOSPITAL ENCOUNTER (OUTPATIENT)
Dept: PHYSICAL THERAPY | Age: 43
Setting detail: THERAPIES SERIES
Discharge: HOME | End: 2023-08-28
Attending: ORTHOPAEDIC SURGERY
Payer: MEDICAID

## 2023-08-28 PROCEDURE — 97164 PT RE-EVAL EST PLAN CARE: CPT

## 2023-08-28 NOTE — OP NOTE
Operative Note      Patient: Jing Nina  YOB: 1980  MRN: [id number]    Date of Procedure: 8/25/2023    Pre-Op Diagnosis Codes:     * Complex tear of medial meniscus of left knee, unspecified whether old or current tear, initial encounter [S83.232A]     * Osteoarthritis of patellofemoral joint, unspecified laterality [M17.10]    Post-Op Diagnosis: Same       Procedure(s):  LEFT KNEE ARTHROSCOPY, MEDIAL MENISCUS ROOT REPAIR WITH -ARTHREX  LEFT KNEE PATELLOFEMORAL ARTHROPLASTY  Diffuse 3 compartment synovectomy    Surgeon(s):  Eladio Kendall MD    Assistant:   Surgical Assistant: Caro Hand    Anesthesia: General    Estimated Blood Loss (mL): Minimal    Complications: None    Specimens:   * No specimens in log *    Implants:  Implant Name Type Inv.  Item Serial No.  Lot No. LRB No. Used Action   CEMENT BNE 40GM FULL DOSE PMMA W/ GENT HI VISC RADPQ LNG - [id number]  CEMENT BNE 40GM FULL DOSE PMMA W/ GENT HI VISC RADPQ LNG  [employer]- [id number] Left 1 Implanted   BUTTON SUT [id number] TI FOR MUKESH BKUP FIBERWIRE FIX OF ACL PCL - [id number]  BUTTON SUT [id number] TI FOR MUKESH BKUP FIBERWIRE FIX OF ACL PCL  [facility]RE[facility] [id number] Left 1 Implanted   ANCHOR SUT DIA4.5MM TAP IN THE [facility] FOR SFT TISS FIX FOOTPRINT - [id number]  ANCHOR SUT DIA4.5MM TAP IN THE [facility] FOR SFT TISS FIX FOOTPRINT  [facility] [id number] Left 1 Implanted   SYSTEM IMPL MENIS ROOT REP [facility] - [id number]  SYSTEM IMPL MENIS ROOT REP [facility]  [employer]RE[employer] [facility]Sh[facility] [id number] Left 1 Implanted   ANCHOR SUTURE BIOCOMP 4.75X19.1 MM Rolm Plum [id number]  ANCHOR SUTURE BIOCOMP 4.75X19.1 MM Brittni Proud [facility]Sh[facility] [id number] Left 1 Implanted   COMPONENT FEM A67DT08.5MM TAPR POST DISP FOR RESURF SYS - [id number]  COMPONENT FEM X93KJ16.5MM TAPR POST DISP FOR RESURF SYS  ARTHROSURFACE- [id number] Left 1 Implanted   COMPONENT FEM L8.5MM 4MM OFFSET TROCHLEAR CO CHROM ALLY - [id number]  COMPONENT FEM PCL were intact within the notch. The lateral compartment was entered and demonstrated no evidence of chondral or meniscus abnormality. There was evidence of synovitis and synovial hypertrophy within the patellofemoral, medial, lateral, and infrapatellar regions. Therefore a diffuse synovectomy was then performed using an arthroscopic shaver. At that point I elected to proceed with repair. Two novostitch devices were utilized in order to place locking luggage tag sutures within the posterior horn/root region of the medial meniscus. Excellent purchase was achieved. An assesory medial portal was created for suture management. We then elected to use our Arthrex meniscus root guide. 2 cm incision was made over the anterior lateral proximal tibia. Fascial layer was incised and the musculature was gently elevated off of the proximal tibia. The guide was set in the desired position based on anatomic landmarks. Drilling was performed and we elected to ream a 6 mm wide by 5 mm deep tunnel with the Arthrex flip cutter. Shuttling suture was placed in the suture limbs attached to the meniscus was successfully passed through the tibial tunnel. The meniscus reduced anatomically. The suture limbs were tied over a 4-hole metallic Arthrex button under direct visualization to arthroscope ensuring the appropriate tension. Backup fixation was achieved with Arthrex swivel lock. Fixation was achieved in 30 degrees of flexion. The probe was introduced and demonstrated anatomic restoration of the posterior medial meniscus root with strong biomechanical stability. An anterior based incision was made. Medial patellar arthrotomy was performed. We gain access to the patellofemoral joint. There is grade 4 chondral degeneration of the lateral trochlea as well as the entire patella. We focused on the trochlea. We utilized the Arthrosurface wave technology.   A guidewire was placed within the central trochlear

## 2023-08-28 NOTE — PLAN OF CARE
Exercise and NMR EXR  [x] (78221) Provided verbal/tactile cueing for activities related to strengthening, flexibility, endurance, ROM for improvements in LE, proximal hip, and core control with self care, mobility, lifting, ambulation.  [] (49400) Provided verbal/tactile cueing for activities related to improving balance, coordination, kinesthetic sense, posture, motor skill, proprioception  to assist with LE, proximal hip, and core control in self care, mobility, lifting, ambulation and eccentric single leg control.   [] (46040) Therapist is in constant attendance of 2 or more patients providing skilled therapy interventions, but not providing any significant amount of measurable one-on-one time to either patient, for improvements in LE, proximal hip, and core control in self care, mobility, lifting, ambulation and eccentric single leg control.      NMR and Therapeutic Activities:    [x] (98220 or 05541) Provided verbal/tactile cueing for activities related to improving balance, coordination, kinesthetic sense, posture, motor skill, proprioception and motor activation to allow for proper function of core, proximal hip and LE with self care and ADLs  [] (23616) Gait Re-education- Provided training and instruction to the patient for proper LE, core and proximal hip recruitment and positioning and eccentric body weight control with ambulation re-education including up and down stairs     Home Exercise Program:    [x] (18214) Reviewed/Progressed HEP activities related to strengthening, flexibility, endurance, ROM of core, proximal hip and LE for functional self-care, mobility, lifting and ambulation/stair navigation   [] (39540)Reviewed/Progressed HEP activities related to improving balance, coordination, kinesthetic sense, posture, motor skill, proprioception of core, proximal hip and LE for self care, mobility, lifting, and ambulation/stair navigation      Manual Treatments:  PROM / STM / Oscillations-Mobs:  G-I, II,

## 2023-08-29 RX ORDER — ROPINIROLE 3 MG/1
TABLET, FILM COATED ORAL
Qty: 90 TABLET | Refills: 2 | Status: SHIPPED | OUTPATIENT
Start: 2023-08-29

## 2023-08-29 NOTE — TELEPHONE ENCOUNTER
Medication:   Requested Prescriptions     Pending Prescriptions Disp Refills    rOPINIRole (REQUIP) 3 MG tablet [Pharmacy Med Name: ROPINIROLE 3MG TABLETS] 90 tablet 2     Sig: TAKE 1 TABLET BY MOUTH EVERY NIGHT        Last Filled:      Patient Phone Number: [phone] (home)     Last appt: 6/26/2023   Next appt: Visit date not found    Last OARRS:   RX Monitoring 10/11/2022   Attestation -   Periodic Controlled Substance Monitoring No signs of potential drug abuse or diversion identified.

## 2023-09-01 ENCOUNTER — HOSPITAL ENCOUNTER (OUTPATIENT)
Dept: PHYSICAL THERAPY | Age: 43
Setting detail: THERAPIES SERIES
Discharge: HOME | End: 2023-09-01
Attending: ORTHOPAEDIC SURGERY
Payer: MEDICAID

## 2023-09-01 PROCEDURE — 97140 MANUAL THERAPY 1/> REGIONS: CPT

## 2023-09-01 PROCEDURE — 97110 THERAPEUTIC EXERCISES: CPT

## 2023-09-01 NOTE — FLOWSHEET NOTE
to facilitate independence with functional tasks. Treatment/Activity Tolerance:  [x] Pt able to complete treatment [] Patient limited by fatique  [] Patient limited by pain  [] Patient limited by other medical complications  [] Other:     Prognosis:   [x] Good [] Fair  [] Poor    Patient Requires Follow-up: [x] Yes  [] No    Return to Play:    [x]  N/A       Plan for next treatment session: begin exercises as allowed by MD protocol. Consider vasopump for swelling    PLAN: See eval. PT 2x / week for 12 weeks. Awaiting insurance auth  [x] Continue per plan of care [x] Alter current plan (see comments)  [] Plan of care initiated [] Hold pending MD visit [] Discharge    Electronically signed by: Curt Rivas PTA, ATC      Note: If patient does not return for scheduled/ recommended follow up visits, this note will serve as a discharge from care along with most recent update on progress.

## 2023-09-04 DIAGNOSIS — R05.8 ALLERGIC COUGH: ICD-10-CM

## 2023-09-04 DIAGNOSIS — R06.2 WHEEZING: ICD-10-CM

## 2023-09-05 ENCOUNTER — OFFICE VISIT (OUTPATIENT)
Dept: ORTHOPEDIC SURGERY | Age: 43
End: 2023-09-05

## 2023-09-05 VITALS — BODY MASS INDEX: 40.48 KG/M2 | WEIGHT: 243 LBS | HEIGHT: 65 IN

## 2023-09-05 DIAGNOSIS — M17.10 PATELLOFEMORAL ARTHRITIS: Primary | ICD-10-CM

## 2023-09-05 DIAGNOSIS — S83.232A COMPLEX TEAR OF MEDIAL MENISCUS OF LEFT KNEE AS CURRENT INJURY, INITIAL ENCOUNTER: ICD-10-CM

## 2023-09-05 PROCEDURE — 99024 POSTOP FOLLOW-UP VISIT: CPT | Performed by: ORTHOPAEDIC SURGERY

## 2023-09-05 RX ORDER — ALBUTEROL SULFATE 90 UG/1
AEROSOL, METERED RESPIRATORY (INHALATION)
Qty: 18 G | Refills: 2 | Status: SHIPPED | OUTPATIENT
Start: 2023-09-05

## 2023-09-05 NOTE — TELEPHONE ENCOUNTER
Medication:   Requested Prescriptions     Pending Prescriptions Disp Refills    albuterol sulfate HFA (PROVENTIL;VENTOLIN;PROAIR) 108 (90 Base) MCG/ACT inhaler [Pharmacy Med Name: ALBUTEROL HFA INH(200 PUFFS) 18GM] 18 g 2     Sig: INHALE 2 PUFFS INTO THE LUNGS EVERY 6 HOURS AS NEEDED FOR WHEEZING        Last Filled:      Patient Phone Number: [phone] (home)     Last appt: 6/26/2023   Next appt: Visit date not found    Last OARRS:   RX Monitoring 10/11/2022   Attestation -   Periodic Controlled Substance Monitoring No signs of potential drug abuse or diversion identified.

## 2023-09-08 ENCOUNTER — HOSPITAL ENCOUNTER (OUTPATIENT)
Dept: PHYSICAL THERAPY | Age: 43
Setting detail: THERAPIES SERIES
Discharge: HOME | End: 2023-09-08
Attending: ORTHOPAEDIC SURGERY
Payer: MEDICAID

## 2023-09-08 PROCEDURE — 97110 THERAPEUTIC EXERCISES: CPT

## 2023-09-08 PROCEDURE — 97140 MANUAL THERAPY 1/> REGIONS: CPT

## 2023-09-08 NOTE — FLOWSHEET NOTE
[address] [facility]  Phone: [phone]   Fax: [phone]    Physical Therapy Daily Treatment Note    Date:  2023     Patient Name:  Luis Alberto Prado    :  1980  MRN: [id number]  Medical Diagnosis: SURGERY 23  MEDIAL MENISCUS ROOT REPAIR WITH LEFT KNEE   PATELLOFEMORAL ARTHROPLASTY  Patellofemoral arthritis [M17.10]  Complex tear of medial meniscus of left knee as current injury, initial encounter [S83.650A]  Treatment Diagnosis:  L knee pain limiting standing activities, walking, squatting, mild strength deficits; dec L knee ROM and strength, impaired balance, transfers, and gait  Insurance/Certification information:  [facility] No deductible/ No copay/ [facility]  Physician Information:  Taurus Johnson MD  Plan of care signed (Y/N): []  Yes [x]  No     Date of Patient follow up with Physician:      Progress Report: [x]  Yes  []  No     Date Range for reporting period:  Beginnin2023  Post-op re-eval: 23  Ending:     Progress report due (10 Rx/or 30 days whichever is less): 12th visit or     Recertification due (POC duration/ or 90 days whichever is less): ~23    Visit # Insurance Allowable Auth required? Date Range   30 [x]  Yes  []  No pcy       Units approved Units used Date Range   63736, 18920, 30148, 57553  Vaso 37622)   100 units each    25 units -     Latex Allergy:  [x]NO      []YES  Preferred Language for Healthcare:   [x]English       []other:    Functional Scale:       Date assessed:  LEFS: raw score = 31/80; dysfunction = 61% 23  LEFS: raw score = 4/80; dysfunction = 95% 23    Pain level:  1/10 L knee     SUBJECTIVE:  Pt states pain is much improved. Can sleep without brace now.  Hasn't fully followed HEP because work has been busy (still working from home, in her bed) and she also had heart palpitations on Monday, spoke with cardiologist and this has been

## 2023-09-11 ENCOUNTER — HOSPITAL ENCOUNTER (OUTPATIENT)
Dept: PHYSICAL THERAPY | Age: 43
Setting detail: THERAPIES SERIES
Discharge: HOME | End: 2023-09-11
Attending: ORTHOPAEDIC SURGERY
Payer: MEDICAID

## 2023-09-11 PROCEDURE — 97110 THERAPEUTIC EXERCISES: CPT

## 2023-09-11 NOTE — FLOWSHEET NOTE
(29544)  [] EVAL - HIGH (50876)  [] RE-EVAL (72934)  [x] GY(52843) x 3      [] Ionto  [] NMR (79253) x       [] Vaso  [] Manual (41810) x      [] Ultrasound  [] TA x        [] Mech Traction (41075)  [] Aquatic Therapy x     [] ES (un) (67627):   [] Home Management Training x  [] ES(attended) (13203)   [] Dry Needling 1-2 muscles (72288):  [] Dry Needling 3+ muscles (474589  [] Group:      [] Other: PHYS PERF TESTING    GOALS:   Patient stated goal: get back to PLOF, do her own housework, walk a mile at least  [] Progressing: [] Met: [] Not Met: [] Adjusted     Therapist goals for Patient:   Short Term Goals: To be achieved in: 1 visit  1. Independent in HEP and progression per patient tolerance, in order to prevent re-injury. [x] Progressing: [] Met: [] Not Met: [] Adjusted  2. Patient will have a decrease in pain to facilitate improvement in movement, function, and ADLs as indicated by Functional Deficits. [] Progressing: [] Met: [x] Not Met: [] Adjusted  3. All patient questions regarding expectations for rehab following upcoming surgery are answered. [] Progressing: [x] Met: [] Not Met: [] Adjusted     Long Term Goals: long term goals to be determined at re-eval following upcoming surgery       Overall Progression Towards Functional goals/ Treatment Progress Update:  [] Patient is progressing as expected towards functional goals listed. [] Progression is slowed due to complexities/Impairments listed. [] Progression has been slowed due to co-morbidities.   [x] Plan just implemented, too soon to assess goals progression <30days   [] Goals require adjustment due to lack of progress  [] Patient is not progressing as expected and requires additional follow up with physician  [] Other    Persisting Functional Limitations/Impairments:  []Sitting [x]Standing   [x]Walking [x]Stairs   [x]Transfers [x]ADLs   [x]Squatting/bending [x]Kneeling  [x]Housework []Job related tasks  []Driving [x]Sports/Recreation

## 2023-09-12 DIAGNOSIS — F41.8 DEPRESSION WITH ANXIETY: ICD-10-CM

## 2023-09-12 RX ORDER — BUSPIRONE HYDROCHLORIDE 5 MG/1
TABLET ORAL
Qty: 90 TABLET | Refills: 3 | OUTPATIENT
Start: 2023-09-12

## 2023-09-13 DIAGNOSIS — F41.8 DEPRESSION WITH ANXIETY: ICD-10-CM

## 2023-09-13 RX ORDER — BUSPIRONE HYDROCHLORIDE 10 MG/1
10 TABLET ORAL 3 TIMES DAILY PRN
Qty: 90 TABLET | Refills: 3 | Status: CANCELLED | OUTPATIENT
Start: 2023-09-13

## 2023-09-15 ENCOUNTER — HOSPITAL ENCOUNTER (OUTPATIENT)
Dept: PHYSICAL THERAPY | Age: 43
Setting detail: THERAPIES SERIES
Discharge: HOME | End: 2023-09-15
Attending: ORTHOPAEDIC SURGERY
Payer: MEDICAID

## 2023-09-15 PROCEDURE — 97110 THERAPEUTIC EXERCISES: CPT

## 2023-09-15 RX ORDER — BUSPIRONE HYDROCHLORIDE 10 MG/1
10 TABLET ORAL 3 TIMES DAILY PRN
Qty: 90 TABLET | Refills: 3 | Status: SHIPPED | OUTPATIENT
Start: 2023-09-15

## 2023-09-15 NOTE — FLOWSHEET NOTE
Discharge    Electronically signed by: Lin Garcia, PT, DPT, OMT-C      Note: If patient does not return for scheduled/ recommended follow up visits, this note will serve as a discharge from care along with most recent update on progress.

## 2023-09-20 ENCOUNTER — HOSPITAL ENCOUNTER (OUTPATIENT)
Dept: PHYSICAL THERAPY | Age: 43
Setting detail: THERAPIES SERIES
Discharge: HOME | End: 2023-09-20
Attending: ORTHOPAEDIC SURGERY
Payer: MEDICAID

## 2023-09-20 PROCEDURE — 97110 THERAPEUTIC EXERCISES: CPT

## 2023-09-20 PROCEDURE — 97112 NEUROMUSCULAR REEDUCATION: CPT

## 2023-09-20 NOTE — FLOWSHEET NOTE
[address]  Phone: [phone]   Fax: [phone]    Physical Therapy Daily Treatment Note    Date:  2023     Patient Name:  Jean Carlos Harrell    :  1980  MRN: [id number]  Medical Diagnosis: SURGERY 23  MEDIAL MENISCUS ROOT REPAIR WITH LEFT KNEE   PATELLOFEMORAL ARTHROPLASTY  Patellofemoral arthritis [M17.10]  Complex tear of medial meniscus of left knee as current injury, initial encounter [S83.142A]  Treatment Diagnosis:  L knee pain limiting standing activities, walking, squatting, mild strength deficits; dec L knee ROM and strength, impaired balance, transfers, and gait  Insurance/Certification information:  [facility] No deductible/ No copay/ AUTH [facility]  Physician Information:  Rishi Augustin MD  Plan of care signed (Y/N): [x]  Yes []  No     Date of Patient follow up with Physician: 10/03/23     Progress Report: []  Yes  [x]  No     Date Range for reporting period:  Beginnin2023  Post-op re-eval: 23  Ending:     Progress report due (10 Rx/or 30 days whichever is less):  visit or     Recertification due (POC duration/ or 90 days whichever is less): ~23    Visit # Insurance Allowable Auth required? Date Range    30 [x]  Yes  []  No pcy       Units approved Units used Date Range   80775, 03857, 31687, 81146  Vaso (98424)   100 units each    25 units -     Latex Allergy:  [x]NO      []YES  Preferred Language for Healthcare:   [x]English       []other:    Functional Scale:       Date assessed:  LEFS: raw score = 31/80; dysfunction = 61% 23  LEFS: raw score = 4/80; dysfunction = 95% 23    Pain level:  3/10 L knee     SUBJECTIVE:  Pt reports she is more sore than normal and is having pain over incision site with some clear drainage appearing with pressure and slight redness. Pt reports she is doing HEP at home. Pt reports she is seeing MD 10/03/23.  She plans to

## 2023-09-21 ENCOUNTER — PATIENT MESSAGE (OUTPATIENT)
Dept: ORTHOPEDIC SURGERY | Age: 43
End: 2023-09-21

## 2023-09-21 DIAGNOSIS — G89.18 POST-OPERATIVE PAIN: Primary | ICD-10-CM

## 2023-09-21 RX ORDER — SULFAMETHOXAZOLE AND TRIMETHOPRIM 800; 160 MG/1; MG/1
1 TABLET ORAL 2 TIMES DAILY
Qty: 14 TABLET | Refills: 0 | Status: SHIPPED | OUTPATIENT
Start: 2023-09-21 | End: 2023-09-28

## 2023-09-21 NOTE — TELEPHONE ENCOUNTER
From: Selina Mcgrath  To: Dr. Christian Baron: 9/21/2023 12:04 PM EDT  Subject: Infected incision? One of the incisions on my knee has been super tender the last couple of days. I just got undressed to get in the shower and noticed that it looks pretty swollen. I touched next to it and it popped. Pus came out of the site. Just wanted to know what I need to do, if anything. Please advise. Pics attached.

## 2023-09-27 ENCOUNTER — HOSPITAL ENCOUNTER (OUTPATIENT)
Dept: PHYSICAL THERAPY | Age: 43
Setting detail: THERAPIES SERIES
Discharge: HOME | End: 2023-09-27
Attending: ORTHOPAEDIC SURGERY
Payer: MEDICAID

## 2023-09-27 PROCEDURE — 97110 THERAPEUTIC EXERCISES: CPT

## 2023-09-27 PROCEDURE — 97112 NEUROMUSCULAR REEDUCATION: CPT

## 2023-09-27 PROCEDURE — 97016 VASOPNEUMATIC DEVICE THERAPY: CPT

## 2023-09-27 NOTE — PROGRESS NOTES
protocol with empty end feel as pt can likely easily achieve further flexion beyond protocol's restriction. Pt's strength is also progressing well. Pt fatigued with BFR, as appropriate. Pt did have some difficulty with 3# hip add SLR progression, though other planes well tolerated. Vaso at end of session as pt had significant inc in swelling this date, also encouraged pt to ice and elevate at home. Future sessions will continue to progress program within pt's tolerances and MD's specific protocol in attempt to restore optimal L LE function and to facilitate independence with functional tasks. Treatment/Activity Tolerance:  [x] Pt able to complete treatment [] Patient limited by fatique  [] Patient limited by pain  [] Patient limited by other medical complications  [] Other:     Prognosis:   [x] Good [] Fair  [] Poor    Patient Requires Follow-up: [x] Yes  [] No    Return to Play:    [x]  N/A       Plan for next treatment session: begin exercises as allowed by MD protocol. Increase BRF repetitions and exercises npv until fatigue    PLAN: See lynsey. PT 2x / week for 12 weeks. [x] Continue per plan of care [x] Alter current plan (see comments)  [] Plan of care initiated [] Hold pending MD visit [] Discharge    Electronically signed by: Marques Ortiz, PT, DPT, OMT-C      Note: If patient does not return for scheduled/ recommended follow up visits, this note will serve as a discharge from care along with most recent update on progress.

## 2023-10-03 ENCOUNTER — HOSPITAL ENCOUNTER (OUTPATIENT)
Dept: PHYSICAL THERAPY | Age: 43
Setting detail: THERAPIES SERIES
Discharge: HOME | End: 2023-10-03
Attending: ORTHOPAEDIC SURGERY
Payer: MEDICAID

## 2023-10-03 ENCOUNTER — OFFICE VISIT (OUTPATIENT)
Dept: ORTHOPEDIC SURGERY | Age: 43
End: 2023-10-03

## 2023-10-03 VITALS — WEIGHT: 246 LBS | BODY MASS INDEX: 40.98 KG/M2 | HEIGHT: 65 IN

## 2023-10-03 DIAGNOSIS — M17.10 PATELLOFEMORAL ARTHRITIS: Primary | ICD-10-CM

## 2023-10-03 PROCEDURE — 97112 NEUROMUSCULAR REEDUCATION: CPT

## 2023-10-03 PROCEDURE — 97116 GAIT TRAINING THERAPY: CPT

## 2023-10-03 PROCEDURE — 99024 POSTOP FOLLOW-UP VISIT: CPT | Performed by: ORTHOPAEDIC SURGERY

## 2023-10-03 NOTE — FLOWSHEET NOTE
symptoms or restriction to work towards return to prior level of function. Status: [] Progressing: [] Met: [] Not Met: [] Adjusted  Patient will resume normal work/leisure activities without pain. Status: [] Progressing: [] Met: [] Not Met: [] Adjusted       Overall Progression Towards Functional goals/ Treatment Progress Update:  [] Patient is progressing as expected towards functional goals listed. [] Progression is slowed due to complexities/Impairments listed. [] Progression has been slowed due to co-morbidities. [x] Plan just implemented, too soon to assess goals progression <30days   [] Goals require adjustment due to lack of progress  [] Patient is not progressing as expected and requires additional follow up with physician  [] Other    Persisting Functional Limitations/Impairments:  []Sitting [x]Standing   [x]Walking [x]Stairs   [x]Transfers [x]ADLs   [x]Squatting/bending [x]Kneeling  [x]Housework []Job related tasks  []Driving [x]Sports/Recreation   [x]Sleeping []Other:    ASSESSMENT:  2 rounds of BFR performed today with inc occlusion to 70% initially and 60% on second round. Pt very fatigued following. Pt felt a pain in anterior hip/groin on last set of SLR, improved upon stopping and performing sidelying hip abd and again at baseline when BFR stopped, pain likely can be attributed to fatigue or cramping. Education as noted above for progression of weight bearing pending MD visit following today's session. Pt verbalized understanding of education provided. Future sessions will continue to progress program within pt's tolerances and MD's specific protocol in attempt to restore optimal L LE function and to facilitate independence with functional tasks.       Treatment/Activity Tolerance:  [x] Pt able to complete treatment [] Patient limited by fatique  [] Patient limited by pain  [] Patient limited by other medical complications  [] Other:     Prognosis:   [x] Good [] Fair  []

## 2023-10-04 ENCOUNTER — TELEPHONE (OUTPATIENT)
Dept: BARIATRICS/WEIGHT MGMT | Age: 43
End: 2023-10-04

## 2023-10-04 NOTE — TELEPHONE ENCOUNTER
Called as a new pt courtesy call - spoke w patient. Did receive paperwork - told patient to have new pt paperwork completely filled out, insurance card, and id and to arrive at appt time. If they didn't have the paperwork filled out and arrive on time may be rescheduled. Resent nppw via email.

## 2023-10-05 ENCOUNTER — OFFICE VISIT (OUTPATIENT)
Dept: BARIATRICS/WEIGHT MGMT | Age: 43
End: 2023-10-05
Payer: MEDICAID

## 2023-10-05 VITALS
DIASTOLIC BLOOD PRESSURE: 89 MMHG | HEIGHT: 65 IN | OXYGEN SATURATION: 93 % | HEART RATE: 102 BPM | BODY MASS INDEX: 41.59 KG/M2 | SYSTOLIC BLOOD PRESSURE: 123 MMHG | WEIGHT: 249.6 LBS

## 2023-10-05 DIAGNOSIS — I10 ESSENTIAL HYPERTENSION: ICD-10-CM

## 2023-10-05 DIAGNOSIS — M17.0 BILATERAL PRIMARY OSTEOARTHRITIS OF KNEE: ICD-10-CM

## 2023-10-05 DIAGNOSIS — E66.01 CLASS 3 SEVERE OBESITY DUE TO EXCESS CALORIES WITH SERIOUS COMORBIDITY AND BODY MASS INDEX (BMI) OF 40.0 TO 44.9 IN ADULT (HCC): ICD-10-CM

## 2023-10-05 DIAGNOSIS — G47.33 OBSTRUCTIVE SLEEP APNEA ON CPAP: ICD-10-CM

## 2023-10-05 DIAGNOSIS — K21.9 CHRONIC GERD: Primary | ICD-10-CM

## 2023-10-05 PROCEDURE — 99205 OFFICE O/P NEW HI 60 MIN: CPT | Performed by: SURGERY

## 2023-10-05 PROCEDURE — 1036F TOBACCO NON-USER: CPT | Performed by: SURGERY

## 2023-10-05 PROCEDURE — 3074F SYST BP LT 130 MM HG: CPT | Performed by: SURGERY

## 2023-10-05 PROCEDURE — G8427 DOCREV CUR MEDS BY ELIG CLIN: HCPCS | Performed by: SURGERY

## 2023-10-05 PROCEDURE — G8417 CALC BMI ABV UP PARAM F/U: HCPCS | Performed by: SURGERY

## 2023-10-05 PROCEDURE — 3079F DIAST BP 80-89 MM HG: CPT | Performed by: SURGERY

## 2023-10-05 PROCEDURE — G8484 FLU IMMUNIZE NO ADMIN: HCPCS | Performed by: SURGERY

## 2023-10-05 NOTE — PATIENT INSTRUCTIONS
Patient received dietary handouts and education. Goals:   -Eat 4-5 times daily  -Avoid high fat and high sugar foods  -Include protein with all meals and snacks  -Avoid carbonation and caffeine  -Avoid calorie containing beverages  -Increase physical activity as tolerated      -Plan for Laparoscopic sleeve gastrectomy      Pre-operative work up Ordered:    - F/U in 4 weeks. - Nutrition Labs. - Protein Shake Trial.  - Psych Evaluation.   - Cardiac Clearance. - CPAP Compliance. - EGD (endoscopy to check your stomach). - Support Group Attendance. - Obtain letter of medical necessity (PCP Letter). - Quit Smoking,  Alcohol, Caffeine and Carbonated Drinks  - Obtain records for Weight History 2 yrs. - Start Regular Exercise and track your activities. - Start Tracking your food Intake and follow dietary guidelines. - Referral to Behaviorist.               Patient advised that its their responsibility to follow up for studies, referrals and/or labs ordered today.

## 2023-10-06 ENCOUNTER — HOSPITAL ENCOUNTER (OUTPATIENT)
Dept: PHYSICAL THERAPY | Age: 43
Setting detail: THERAPIES SERIES
Discharge: HOME | End: 2023-10-06
Attending: ORTHOPAEDIC SURGERY
Payer: MEDICAID

## 2023-10-06 ENCOUNTER — TELEPHONE (OUTPATIENT)
Dept: CARDIOLOGY CLINIC | Age: 43
End: 2023-10-06

## 2023-10-06 PROCEDURE — 97112 NEUROMUSCULAR REEDUCATION: CPT

## 2023-10-06 PROCEDURE — 97116 GAIT TRAINING THERAPY: CPT

## 2023-10-06 PROCEDURE — 97110 THERAPEUTIC EXERCISES: CPT

## 2023-10-06 NOTE — TELEPHONE ENCOUNTER
New Patient Referral    Referring Provider Name:Juan J munoz   Phone Number:[phone]  Fax Number:  Address: [address] [place] [address]    Diagnosis/Reason for Visit:cardiac clearance    Cardiac Clearance?  yes    Cardiac Testing: (Yes/No/Unsure) unsure    Date testing was completed?___________      Have records been requested? (Yes/No)no    Preferred Language:___english_____    needed? (Yes/No)no    10/06/23 spoke to pt and she is going to have her cardiologist at [facility] provide clearance

## 2023-10-06 NOTE — FLOWSHEET NOTE
[address]  Phone: [phone]   Fax: [phone]    Physical Therapy Daily Treatment Note    Date:  10/06/2023     Patient Name:  Keo Salinas    :  1980  MRN: [id number]  Medical Diagnosis: SURGERY 23  MEDIAL MENISCUS ROOT REPAIR WITH LEFT KNEE   PATELLOFEMORAL ARTHROPLASTY  Patellofemoral arthritis [M17.10]  Complex tear of medial meniscus of left knee as current injury, initial encounter [S83.200A]  Treatment Diagnosis:  L knee pain limiting standing activities, walking, squatting, mild strength deficits; dec L knee ROM and strength, impaired balance, transfers, and gait  Insurance/Certification information:  PT Insurance Information: [employer]/ No deductible/ No copay/ AUTH THRU [facility]  Physician Information:  Kana Jones MD  Plan of care signed (Y/N): [x]  Yes []  No     Date of Patient follow up with Physician: 10/03/23     Progress Report: [x]  Yes  []  No     Date Range for reporting period:  Beginnin2023  Post-op re-eval: 23  PN: 23  Ending:     Progress report due (10 Rx/or 30 days whichever is less):  visit or     Recertification due (POC duration/ or 90 days whichever is less): ~23    Visit # Insurance Allowable Auth required? Date Range   10/24 30 [x]  Yes  []  No pcy       Units approved Units used Date Range   24270, 53444, 01342, 76327  Vaso (84848)   100 units each    25 units -     Latex Allergy:  [x]NO      []YES  Preferred Language for Healthcare:   [x]English       []other:    Functional Scale:       Date assessed:  LEFS: raw score = 31/80; dysfunction = 61% 23  LEFS: raw score = 4/80; dysfunction = 95% 23  LEFS: raw score = 23/80; dysfunction = 71.25% 23    Pain level:  0/10 L knee     SUBJECTIVE:  Pt reports she was released to  today. No flexion restriction.  Medial and lateral port sites still have a milky pus coming from it, took entire course of

## 2023-10-09 ENCOUNTER — HOSPITAL ENCOUNTER (OUTPATIENT)
Dept: PHYSICAL THERAPY | Age: 43
Setting detail: THERAPIES SERIES
Discharge: HOME | End: 2023-10-09
Attending: ORTHOPAEDIC SURGERY
Payer: MEDICAID

## 2023-10-09 NOTE — FLOWSHEET NOTE
[address] [facility], [address] office: [phone] fax: [phone]    Physical Therapy  Cancellation/No-show Note  Patient Name:  Mary Carmen Montemayor  :  1980   Date:  10/9/2023  Cancelled visits to date: 1  No-shows to date: 0    For today's appointment patient:  [x]  Cancelled; 10/09/2023  []  Rescheduled appointment  []  No-show     Reason given by patient:  [x]  Patient ill; pt has a migraine  []  Conflicting appointment  []  No transportation    []  Conflict with work  []  No reason given  []  Other:     Comments:      Phone call information:   []  Phone call made today to patient at _ time at number provided:      []  Patient answered, conversation as follows:    []  Patient did not answer, message left as follows:  []  Phone call not made today  [x]  Phone call not needed - pt contacted us to cancel and provided reason for cancellation.      Electronically signed by:  Francisco Javier Graham, PT, DPT

## 2023-10-10 ENCOUNTER — OFFICE VISIT (OUTPATIENT)
Dept: ORTHOPEDIC SURGERY | Age: 43
End: 2023-10-10

## 2023-10-10 ENCOUNTER — TELEPHONE (OUTPATIENT)
Dept: ORTHOPEDIC SURGERY | Age: 43
End: 2023-10-10

## 2023-10-10 VITALS — WEIGHT: 249 LBS | BODY MASS INDEX: 41.48 KG/M2 | HEIGHT: 65 IN

## 2023-10-10 DIAGNOSIS — G89.18 POST-OPERATIVE PAIN: Primary | ICD-10-CM

## 2023-10-10 PROCEDURE — 99024 POSTOP FOLLOW-UP VISIT: CPT | Performed by: ORTHOPAEDIC SURGERY

## 2023-10-10 RX ORDER — SULFAMETHOXAZOLE AND TRIMETHOPRIM 800; 160 MG/1; MG/1
1 TABLET ORAL 2 TIMES DAILY
Qty: 20 TABLET | Refills: 0 | Status: SHIPPED | OUTPATIENT
Start: 2023-10-10 | End: 2023-10-20

## 2023-10-11 ENCOUNTER — OFFICE VISIT (OUTPATIENT)
Dept: FAMILY MEDICINE CLINIC | Age: 43
End: 2023-10-11
Payer: MEDICAID

## 2023-10-11 VITALS
BODY MASS INDEX: 41.59 KG/M2 | SYSTOLIC BLOOD PRESSURE: 128 MMHG | HEIGHT: 65 IN | OXYGEN SATURATION: 97 % | HEART RATE: 83 BPM | DIASTOLIC BLOOD PRESSURE: 78 MMHG | RESPIRATION RATE: 16 BRPM | WEIGHT: 249.6 LBS

## 2023-10-11 DIAGNOSIS — B36.9 FUNGAL DERMATITIS: Primary | ICD-10-CM

## 2023-10-11 DIAGNOSIS — I10 ESSENTIAL HYPERTENSION: ICD-10-CM

## 2023-10-11 DIAGNOSIS — E66.01 CLASS 3 SEVERE OBESITY DUE TO EXCESS CALORIES WITH SERIOUS COMORBIDITY AND BODY MASS INDEX (BMI) OF 40.0 TO 44.9 IN ADULT (HCC): ICD-10-CM

## 2023-10-11 DIAGNOSIS — M17.0 BILATERAL PRIMARY OSTEOARTHRITIS OF KNEE: ICD-10-CM

## 2023-10-11 DIAGNOSIS — Z96.659 HISTORY OF PARTIAL KNEE REPLACEMENT: ICD-10-CM

## 2023-10-11 DIAGNOSIS — R51.9 ACUTE NONINTRACTABLE HEADACHE, UNSPECIFIED HEADACHE TYPE: ICD-10-CM

## 2023-10-11 DIAGNOSIS — G47.33 OBSTRUCTIVE SLEEP APNEA ON CPAP: ICD-10-CM

## 2023-10-11 DIAGNOSIS — K21.9 CHRONIC GERD: ICD-10-CM

## 2023-10-11 LAB
25(OH)D3 SERPL-MCNC: 37.3 NG/ML
ALBUMIN SERPL-MCNC: 4.7 G/DL (ref 3.4–5)
ALBUMIN/GLOB SERPL: 2 {RATIO} (ref 1.1–2.2)
ALP SERPL-CCNC: 83 U/L (ref 40–129)
ALT SERPL-CCNC: 33 U/L (ref 10–40)
ANION GAP SERPL CALCULATED.3IONS-SCNC: 13 MMOL/L (ref 3–16)
AST SERPL-CCNC: 31 U/L (ref 15–37)
BASOPHILS # BLD: 0 K/UL (ref 0–0.2)
BASOPHILS NFR BLD: 0.4 %
BILIRUB SERPL-MCNC: 0.3 MG/DL (ref 0–1)
BUN SERPL-MCNC: 11 MG/DL (ref 7–20)
CALCIUM SERPL-MCNC: 9.3 MG/DL (ref 8.3–10.6)
CHLORIDE SERPL-SCNC: 103 MMOL/L (ref 99–110)
CHOLEST SERPL-MCNC: 226 MG/DL (ref 0–199)
CO2 SERPL-SCNC: 25 MMOL/L (ref 21–32)
CREAT SERPL-MCNC: 0.8 MG/DL (ref 0.6–1.1)
DEPRECATED RDW RBC AUTO: 14.1 % (ref 12.4–15.4)
EOSINOPHIL # BLD: 0 K/UL (ref 0–0.6)
EOSINOPHIL NFR BLD: 1 %
FOLATE SERPL-MCNC: 19.5 NG/ML (ref 4.78–24.2)
GFR SERPLBLD CREATININE-BSD FMLA CKD-EPI: >60 ML/MIN/{1.73_M2}
GLUCOSE SERPL-MCNC: 85 MG/DL (ref 70–99)
HCT VFR BLD AUTO: 36.7 % (ref 36–48)
HDLC SERPL-MCNC: 62 MG/DL (ref 40–60)
HGB BLD-MCNC: 12.6 G/DL (ref 12–16)
INR PPP: 0.98 (ref 0.84–1.16)
IRON SATN MFR SERPL: 36 % (ref 15–50)
IRON SERPL-MCNC: 96 UG/DL (ref 37–145)
LDLC SERPL CALC-MCNC: 153 MG/DL
LYMPHOCYTES # BLD: 1.5 K/UL (ref 1–5.1)
LYMPHOCYTES NFR BLD: 34.5 %
MCH RBC QN AUTO: 31.7 PG (ref 26–34)
MCHC RBC AUTO-ENTMCNC: 34.4 G/DL (ref 31–36)
MCV RBC AUTO: 92.2 FL (ref 80–100)
MONOCYTES # BLD: 0.3 K/UL (ref 0–1.3)
MONOCYTES NFR BLD: 7.3 %
NEUTROPHILS # BLD: 2.4 K/UL (ref 1.7–7.7)
NEUTROPHILS NFR BLD: 56.8 %
PLATELET # BLD AUTO: 220 K/UL (ref 135–450)
PMV BLD AUTO: 9.2 FL (ref 5–10.5)
POTASSIUM SERPL-SCNC: 4.3 MMOL/L (ref 3.5–5.1)
PROT SERPL-MCNC: 7.1 G/DL (ref 6.4–8.2)
PROTHROMBIN TIME: 13 SEC (ref 11.5–14.8)
RBC # BLD AUTO: 3.98 M/UL (ref 4–5.2)
SODIUM SERPL-SCNC: 141 MMOL/L (ref 136–145)
TIBC SERPL-MCNC: 267 UG/DL (ref 260–445)
TRIGL SERPL-MCNC: 54 MG/DL (ref 0–150)
TSH SERPL DL<=0.005 MIU/L-ACNC: 0.89 UIU/ML (ref 0.27–4.2)
VIT B12 SERPL-MCNC: 303 PG/ML (ref 211–911)
VLDLC SERPL CALC-MCNC: 11 MG/DL
WBC # BLD AUTO: 4.3 K/UL (ref 4–11)

## 2023-10-11 PROCEDURE — G8484 FLU IMMUNIZE NO ADMIN: HCPCS | Performed by: NURSE PRACTITIONER

## 2023-10-11 PROCEDURE — 1036F TOBACCO NON-USER: CPT | Performed by: NURSE PRACTITIONER

## 2023-10-11 PROCEDURE — 3074F SYST BP LT 130 MM HG: CPT | Performed by: NURSE PRACTITIONER

## 2023-10-11 PROCEDURE — G8427 DOCREV CUR MEDS BY ELIG CLIN: HCPCS | Performed by: NURSE PRACTITIONER

## 2023-10-11 PROCEDURE — G8417 CALC BMI ABV UP PARAM F/U: HCPCS | Performed by: NURSE PRACTITIONER

## 2023-10-11 PROCEDURE — 3078F DIAST BP <80 MM HG: CPT | Performed by: NURSE PRACTITIONER

## 2023-10-11 PROCEDURE — 99214 OFFICE O/P EST MOD 30 MIN: CPT | Performed by: NURSE PRACTITIONER

## 2023-10-11 RX ORDER — KETOCONAZOLE 20 MG/G
CREAM TOPICAL
Qty: 30 G | Refills: 2 | Status: SHIPPED | OUTPATIENT
Start: 2023-10-11

## 2023-10-11 ASSESSMENT — ENCOUNTER SYMPTOMS
VOMITING: 0
NAUSEA: 0

## 2023-10-11 ASSESSMENT — PATIENT HEALTH QUESTIONNAIRE - PHQ9
6. FEELING BAD ABOUT YOURSELF - OR THAT YOU ARE A FAILURE OR HAVE LET YOURSELF OR YOUR FAMILY DOWN: 0
SUM OF ALL RESPONSES TO PHQ QUESTIONS 1-9: 7
9. THOUGHTS THAT YOU WOULD BE BETTER OFF DEAD, OR OF HURTING YOURSELF: 0
3. TROUBLE FALLING OR STAYING ASLEEP: 0
4. FEELING TIRED OR HAVING LITTLE ENERGY: 1
2. FEELING DOWN, DEPRESSED OR HOPELESS: 0
7. TROUBLE CONCENTRATING ON THINGS, SUCH AS READING THE NEWSPAPER OR WATCHING TELEVISION: 3
5. POOR APPETITE OR OVEREATING: 3
1. LITTLE INTEREST OR PLEASURE IN DOING THINGS: 0
SUM OF ALL RESPONSES TO PHQ QUESTIONS 1-9: 7
SUM OF ALL RESPONSES TO PHQ QUESTIONS 1-9: 7
SUM OF ALL RESPONSES TO PHQ9 QUESTIONS 1 & 2: 0
10. IF YOU CHECKED OFF ANY PROBLEMS, HOW DIFFICULT HAVE THESE PROBLEMS MADE IT FOR YOU TO DO YOUR WORK, TAKE CARE OF THINGS AT HOME, OR GET ALONG WITH OTHER PEOPLE: 0
SUM OF ALL RESPONSES TO PHQ QUESTIONS 1-9: 7
8. MOVING OR SPEAKING SO SLOWLY THAT OTHER PEOPLE COULD HAVE NOTICED. OR THE OPPOSITE, BEING SO FIGETY OR RESTLESS THAT YOU HAVE BEEN MOVING AROUND A LOT MORE THAN USUAL: 0

## 2023-10-11 NOTE — PROGRESS NOTES
Time spent: 35 minutes, >50% of which was spent face to face with patient discussing HPI/plan/reviewing records and coordinating care

## 2023-10-12 ENCOUNTER — HOSPITAL ENCOUNTER (OUTPATIENT)
Dept: PHYSICAL THERAPY | Age: 43
Setting detail: THERAPIES SERIES
Discharge: HOME | End: 2023-10-12
Attending: ORTHOPAEDIC SURGERY
Payer: MEDICAID

## 2023-10-12 LAB
EST. AVERAGE GLUCOSE BLD GHB EST-MCNC: 102.5 MG/DL
HBA1C MFR BLD: 5.2 %

## 2023-10-12 PROCEDURE — 97110 THERAPEUTIC EXERCISES: CPT

## 2023-10-12 PROCEDURE — 97112 NEUROMUSCULAR REEDUCATION: CPT

## 2023-10-12 NOTE — FLOWSHEET NOTE
([id number])       Knee mobs/PROM       Tib/Fem Mobs       Patella Mobs   9/11: all planes; WFL, d/c next session   Ankle mobs                           Modalities:   Date: Pressure Time Position: Location of   Measurement  (must include 2) Pre-vaso   (girth cm, pitting) Post-vaso   (girth cm, pitting) Effect of edema on function    9/27 []Low  [x]Medium  []High 15 min []Supine  [x]Reclined  []Seated  []Legs Elevated [x]Suprapatellar  [x]Mid-patellar  []Infrapatellar  [] 51.5  50.7 50.9  48.9 The swelling is clinically significant and interferes with the patient's functional ability to walk. Pt. Education:  08/17/2023  -pt educated on diagnosis, prognosis and expectations for rehab  -Patient was thoroughly educated on this date regarding prehabilitation goals, importance of PT sessions in improving overall ROM, strength and stability prior to surgery, and how prehabilitation will facilitate improved post-operative outcomes. The patient was educated on and instructed in HEP as listed. The patient was given a detailed handout for exercises to initiate in the hospital post-operatively as well as at home. The discharge plan from the hospital was reviewed with the patient; specifically, to reduce length of hospital stay and to minimize time before reinitiating outpatient physical therapy after surgery. Education regarding early mobility post-operatively in the hospital and emphasis on working with both physical therapy and nursing staff to achieve ambulation goal of 150 feet was provided. Also, education regarding goals and expectations was provided; specifically, knee flexion range of motion greater than or equal to 90 degrees and 0 degrees knee extension to promote improved gait mechanics and ambulation. The patient was educated about all applicable post-op restrictions and precautions. The patient was encouraged to utilize ice/cold pack after surgery to address pain, minimize swelling as often as possible.  It

## 2023-10-14 LAB
A-TOCOPHEROL VIT E SERPL-MCNC: 8.5 MG/L (ref 5.5–18)
ANNOTATION COMMENT IMP: NORMAL
BETA+GAMMA TOCOPHEROL SERPL-MCNC: 1.9 MG/L (ref 0–6)
RETINYL PALMITATE SERPL-MCNC: 0.02 MG/L (ref 0–0.1)
VIT A SERPL-MCNC: 0.75 MG/L (ref 0.3–1.2)
VIT B1 BLD-MCNC: 130 NMOL/L (ref 70–180)

## 2023-10-16 ENCOUNTER — HOSPITAL ENCOUNTER (OUTPATIENT)
Dept: PHYSICAL THERAPY | Age: 43
Setting detail: THERAPIES SERIES
Discharge: HOME | End: 2023-10-16
Attending: ORTHOPAEDIC SURGERY
Payer: MEDICAID

## 2023-10-16 PROCEDURE — 97110 THERAPEUTIC EXERCISES: CPT

## 2023-10-16 PROCEDURE — 97112 NEUROMUSCULAR REEDUCATION: CPT

## 2023-10-16 NOTE — FLOWSHEET NOTE
[address] [place] [address]  Phone: [phone]   Fax: [phone]    Physical Therapy Daily Treatment Note    Date:  10/16/2023     Patient Name:  Juve Hill    :  1980  MRN: [id number]  Medical Diagnosis: SURGERY 23  MEDIAL MENISCUS ROOT REPAIR WITH LEFT KNEE   PATELLOFEMORAL ARTHROPLASTY  Patellofemoral arthritis [M17.10]  Complex tear of medial meniscus of left knee as current injury, initial encounter [S83.289A]  Treatment Diagnosis:  L knee pain limiting standing activities, walking, squatting, mild strength deficits; dec L knee ROM and strength, impaired balance, transfers, and gait  Insurance/Certification information:  [facility] No [facility]ible/ No copay/ [facility]RU [facility]  Physician Information:  Kathie Gómez MD  Plan of care signed (Y/N): [x]  Yes []  No     Date of Patient follow up with Physician: 10/03/23     Progress Report: [x]  Yes  []  No     Date Range for reporting period:  Beginnin2023  Post-op re-eval: 23  PN: 23  Ending:     Progress report due (10 Rx/or 30 days whichever is less):  visit or     Recertification due (POC duration/ or 90 days whichever is less): ~23    Visit # Insurance Allowable Auth required?  Date Range   30 [x]  Yes  []  No pcy       Units approved Units used Date Range   33400, 60314, 75794, 18629  Vaso (20550)   100 units each    25 units -     Latex Allergy:  [x]NO      []YES  Preferred Language for Healthcare:   [x]English       []other:    Functional Scale:       Date assessed:  LEFS: raw score = 31/80; dysfunction = 61% 23  LEFS: raw score = 4/80; dysfunction = 95% 23  LEFS: raw score = 23/80; dysfunction = 71.25% 23    Pain level:  soreness at incision due to abscess     SUBJECTIVE: Pt states abscess along incision is gradually healing, pt denies L knee pain currently with primary c/o being gastroc tightness which she

## 2023-10-17 ENCOUNTER — OFFICE VISIT (OUTPATIENT)
Dept: ORTHOPEDIC SURGERY | Age: 43
End: 2023-10-17

## 2023-10-17 VITALS — WEIGHT: 249 LBS | BODY MASS INDEX: 41.48 KG/M2 | HEIGHT: 65 IN

## 2023-10-17 DIAGNOSIS — G89.18 POST-OPERATIVE PAIN: Primary | ICD-10-CM

## 2023-10-17 PROCEDURE — 99024 POSTOP FOLLOW-UP VISIT: CPT | Performed by: ORTHOPAEDIC SURGERY

## 2023-10-18 ENCOUNTER — HOSPITAL ENCOUNTER (OUTPATIENT)
Dept: WOMENS IMAGING | Age: 43
Discharge: HOME | End: 2023-10-18
Payer: MEDICAID

## 2023-10-18 DIAGNOSIS — Z13.9 ENCOUNTER FOR SCREENING: ICD-10-CM

## 2023-10-18 PROCEDURE — 77063 BREAST TOMOSYNTHESIS BI: CPT

## 2023-10-20 ENCOUNTER — HOSPITAL ENCOUNTER (OUTPATIENT)
Dept: PHYSICAL THERAPY | Age: 43
Setting detail: THERAPIES SERIES
End: 2023-10-20
Attending: ORTHOPAEDIC SURGERY
Payer: MEDICAID

## 2023-10-23 ENCOUNTER — HOSPITAL ENCOUNTER (OUTPATIENT)
Dept: PHYSICAL THERAPY | Age: 43
Setting detail: THERAPIES SERIES
Discharge: HOME | End: 2023-10-23
Attending: ORTHOPAEDIC SURGERY
Payer: MEDICAID

## 2023-10-23 PROCEDURE — 97530 THERAPEUTIC ACTIVITIES: CPT

## 2023-10-23 PROCEDURE — 97110 THERAPEUTIC EXERCISES: CPT

## 2023-10-23 NOTE — FLOWSHEET NOTE
fatigue with elevated goblet squats and required VC to increase even weight distrubution between LE and maintain upright posture. Pt was given a home walking program this date, per request, which entails a slow progression of walking time over 4 week periods 4-5x a week to build endurance. Pt was educated to continued to use step to gait pattern for descending stair as she can progress to reciprocal gait pattern for ascending stair to increase safety and promote better B knee control. Pt is making good progress towards goals and will benefit with continuing skilled PT to maintain consistent focus on restoring optimal LE strength and ultimately restoring function. Treatment/Activity Tolerance:  [x] Pt able to complete treatment [] Patient limited by fatique  [] Patient limited by pain  [] Patient limited by other medical complications  [] Other:     Prognosis:   [x] Good [] Fair  [] Poor    Patient Requires Follow-up: [x] Yes  [] No    Return to Play:    [x]  N/A       Plan for next treatment session: begin exercises as allowed by MD protocol. Increase BRF repetitions and exercises npv until fatigue    PLAN: See eval. PT 2x / week for 12 weeks. [x] Continue per plan of care [x] Alter current plan (see comments)  [] Plan of care initiated [] Hold pending MD visit [] Discharge    Electronically signed by: JAH Mirza   Physical therapist was present, directed the patient's care, made skilled judgement, and was responsible for the assessment and treatment of the patient. Elaine Monroe, PT, DPT, OMT-C    Note: If patient does not return for scheduled/ recommended follow up visits, this note will serve as a discharge from care along with most recent update on progress.

## 2023-10-25 RX ORDER — CETIRIZINE HYDROCHLORIDE 10 MG/1
TABLET ORAL
Qty: 30 TABLET | Refills: 11 | Status: SHIPPED | OUTPATIENT
Start: 2023-10-25

## 2023-10-25 NOTE — TELEPHONE ENCOUNTER
Medication:   Requested Prescriptions     Pending Prescriptions Disp Refills    cetirizine (ZYRTEC) 10 MG tablet [Pharmacy Med Name: CETIRIZINE 10MG TABLETS] 30 tablet 11     Sig: TAKE 1 TABLET BY MOUTH DAILY        Last Filled:  10/04/2022    Patient Phone Number: [phone] (home)     Last appt: 10/11/2023   Next appt: Visit date not found    Last OARRS:       10/11/2022     1:50 PM   RX Monitoring   Periodic Controlled Substance Monitoring No signs of potential drug abuse or diversion identified.

## 2023-10-26 ENCOUNTER — HOSPITAL ENCOUNTER (OUTPATIENT)
Dept: PHYSICAL THERAPY | Age: 43
Setting detail: THERAPIES SERIES
Discharge: HOME | End: 2023-10-26
Attending: ORTHOPAEDIC SURGERY
Payer: MEDICAID

## 2023-10-26 PROCEDURE — 97530 THERAPEUTIC ACTIVITIES: CPT

## 2023-10-26 PROCEDURE — 97110 THERAPEUTIC EXERCISES: CPT

## 2023-10-26 NOTE — FLOWSHEET NOTE
[address] [facility]  Phone: [phone]   Fax: [phone]    Physical Therapy Daily Treatment Note    Date:  10/26/2023     Patient Name:  Delroy Rivera    :  1980  MRN: [id number]  Medical Diagnosis: SURGERY 23  MEDIAL MENISCUS ROOT REPAIR WITH LEFT KNEE   PATELLOFEMORAL ARTHROPLASTY  Patellofemoral arthritis [M17.10]  Complex tear of medial meniscus of left knee as current injury, initial encounter [E36.150A]  Treatment Diagnosis:  L knee pain limiting standing activities, walking, squatting, mild strength deficits; dec L knee ROM and strength, impaired balance, transfers, and gait  Insurance/Certification information:  [facility] No deductible/ No copay/ [facility]  Physician Information:  Sofya Naranjo MD  Plan of care signed (Y/N): [x]  Yes []  No     Date of Patient follow up with Physician: 10/03/23     Progress Report: [x]  Yes  []  No     Date Range for reporting period:  Beginnin2023  Post-op re-eval: 23  PN: 23  Ending:     Progress report due (10 Rx/or 30 days whichever is less):  visit or     Recertification due (POC duration/ or 90 days whichever is less): ~23    Visit # Insurance Allowable Auth required? Date Range    30 [x]  Yes  []  No pcy       Units approved Units used Date Range   73572, 09832, 35033, 76566  Vaso (96942)   100 units each    25 units -     Latex Allergy:  [x]NO      []YES  Preferred Language for Healthcare:   [x]English       []other:    Functional Scale:       Date assessed:  LEFS: raw score = 31/80; dysfunction = 61% 23  LEFS: raw score = 4/80; dysfunction = 95% 23  LEFS: raw score = 23/80; dysfunction = 71.25% 23    Pain level:  soreness at incision due to abscess     SUBJECTIVE:    Primary c/o mild early morning L knee tightness. Pt offers no c/o pain.       OBJECTIVE:   : PROM L knee 0-90, L patella mobility mildly

## 2023-10-31 ENCOUNTER — HOSPITAL ENCOUNTER (OUTPATIENT)
Dept: PHYSICAL THERAPY | Age: 43
Setting detail: THERAPIES SERIES
Discharge: HOME | End: 2023-10-31
Attending: ORTHOPAEDIC SURGERY
Payer: MEDICAID

## 2023-10-31 PROCEDURE — 97110 THERAPEUTIC EXERCISES: CPT

## 2023-10-31 PROCEDURE — 97530 THERAPEUTIC ACTIVITIES: CPT

## 2023-10-31 NOTE — PROGRESS NOTES
lifting, and ambulation/stair navigation      Manual Treatments:  PROM / STM / Oscillations-Mobs:  G-I, II, III, IV (PA's, Inf., Post.)  [] (85470) Provided manual therapy to mobilize LE, proximal hip and/or LS spine soft tissue/joints for the purpose of modulating pain, promoting relaxation,  increasing ROM, reducing/eliminating soft tissue swelling/inflammation/restriction, improving soft tissue extensibility and allowing for proper ROM for normal function with self care, mobility, lifting and ambulation. Modalities:  [] (72497) Vasopneumatic compression: Utilized vasopneumatic compression to decrease edema / swelling for the purpose of improving mobility and quad tone / recruitment which will allow for increased overall function including but not limited to self-care, transfers, ambulation, and ascending / descending stairs. Charges:  Timed Code Treatment Minutes: 45   Total Treatment Minutes: 45     [] EVAL - LOW (46149)   [] EVAL - MOD (43824)  [] EVAL - HIGH (78537)  [] RE-EVAL (72241)  [x] SQ(70209) x  1    [] Ionto  [] NMR (56165) x      [] Vaso  [] Manual (08573) x      [] Ultrasound  [x] TA x   2     [] Mech Traction (68840)  [] Aquatic Therapy x     [] ES (un) (06698):   [] Home Management Training x  [] ES(attended) (45592)   [] Dry Needling 1-2 muscles (00013):  [] Dry Needling 3+ muscles (262505  [] Group:      [] Other: Gait training    GOALS:   Patient stated goal: get back to PLOF, do her own housework, walk a mile at least  [x] Progressing: [] Met: [] Not Met: [] Adjusted     Therapist goals for Patient:   Short Term Goals: To be achieved in: 2 weeks  Independent in HEP and progression per patient tolerance, in order to progress toward full function and prevent re-injury. Status: [] Progressing: [x] Met: [] Not Met: [] Adjusted  Patient will have a decrease in pain to 2/10 to help  facilitate improvement in movement, function, and ADLs as indicated by functional deficits.    Status: []

## 2023-11-03 ENCOUNTER — HOSPITAL ENCOUNTER (OUTPATIENT)
Dept: PHYSICAL THERAPY | Age: 43
Setting detail: THERAPIES SERIES
Discharge: HOME | End: 2023-11-03
Attending: ORTHOPAEDIC SURGERY
Payer: MEDICAID

## 2023-11-03 ENCOUNTER — TELEMEDICINE (OUTPATIENT)
Dept: BARIATRICS/WEIGHT MGMT | Age: 43
End: 2023-11-03
Payer: MEDICAID

## 2023-11-03 DIAGNOSIS — E66.01 CLASS 3 SEVERE OBESITY DUE TO EXCESS CALORIES WITH SERIOUS COMORBIDITY AND BODY MASS INDEX (BMI) OF 40.0 TO 44.9 IN ADULT (HCC): Primary | ICD-10-CM

## 2023-11-03 PROCEDURE — 96156 HLTH BHV ASSMT/REASSESSMENT: CPT | Performed by: SOCIAL WORKER

## 2023-11-03 NOTE — FLOWSHEET NOTE
[facility], [address] [facility], [address] [place] [address] office: [phone] fax: [phone]    Physical Therapy  Cancellation/No-show Note  Patient Name:  Ulisses Anthony  :  1980   Date:  11/3/2023  Cancelled visits to date: 2  No-shows to date: 0    For today's appointment patient:  [x]  Cancelled; 10/09/2023, 11/3/23  []  Rescheduled appointment  []  No-show     Reason given by patient:  [x]  Patient ill  []  Conflicting appointment  []  No transportation    []  Conflict with work  []  No reason given  []  Other:     Comments:      Phone call information:   []  Phone call made today to patient at _ time at number provided:      []  Patient answered, conversation as follows:    []  Patient did not answer, message left as follows:  []  Phone call not made today  [x]  Phone call not needed - pt contacted us to cancel and provided reason for cancellation.      Electronically signed by:  Saul Edwards, PT, DPT

## 2023-11-03 NOTE — PROGRESS NOTES
Linda Funez is a 37 y.o. female who presents today for individual counseling encounter / psychosocial assessment related to behavioral health. Linda Funez is presented oriented and engaged throughout assessment. Patient appeared with appropriate insight and cognition. Patient is referred to therapist by Dr. Leslie Shelby. Patient meets criteria for Class 3 severe obesity due to excess calories with serious co-morbidity with BMI of 40.0-44.9    Presenting Problem:   Per patient \"I got  about 5 years ago now. I lost my job, I was laid off and I had surgery. I woke up after surgery and they told me I needed a total hysterectomy. I was diagnosed with RA, then I was diagnosed with Fibromyalgia Everyone was telling me I need to lose weight. I've been uncomfortable in my body for years. Everything just came to a screeching halt. I wasn't able to exercise. I was seeing Dr. Willi Sultana and when I got discharged, I fell off the wagon. \"    Home life / Family relationships / Supports:   Patient is single with a teenage daughter. 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 Patient is sedentary for most of her work day. Psychiatric hx  Patient reports anxiety and depression. Patient reports her anxiety has \"been off the charts\" since contacting Covid. Hx of emotional/stress/bored eating:   Patient agrees to all of the above behaviors. Per patient \"the boredom eating is definitely a downfall with working from home. \"    Daily Health Concerns/Limitations  Sleep apnea    Substance Use:  Patient does consume alcohol on a social basis. Current needs / Limitations   None reported    Additional Questions/Concerns per patient  Patient asked appropriate questions regarding the 30 30 30 rule and holiday eating tips.     Behaviorist overview:   Therapist utilized active listening and motivational interviewing skills to

## 2023-11-06 ENCOUNTER — HOSPITAL ENCOUNTER (OUTPATIENT)
Dept: PHYSICAL THERAPY | Age: 43
Setting detail: THERAPIES SERIES
Discharge: HOME | End: 2023-11-06
Attending: ORTHOPAEDIC SURGERY
Payer: MEDICAID

## 2023-11-06 PROCEDURE — 97110 THERAPEUTIC EXERCISES: CPT

## 2023-11-06 PROCEDURE — 97112 NEUROMUSCULAR REEDUCATION: CPT

## 2023-11-06 NOTE — FLOWSHEET NOTE
prevent re-injury. Status: [] Progressing: [x] Met: [] Not Met: [] Adjusted  Patient will have a decrease in pain to 2/10 to help  facilitate improvement in movement, function, and ADLs as indicated by functional deficits. Status: [] Progressing: [x] Met: [] Not Met: [] Adjusted    Long Term Goals: To be achieved in: 12 weeks  Disability index score of 38% or less for the LEFS to assist with return top prior level of function. Status: [x] Progressing: [] Met: [] Not Met: [] Adjusted  LLE AROM = RLE AROM to allow for proper joint functioning as indicated by patients functional deficits. Status: [] Progressing: [x] Met: [] Not Met: [] Adjusted  Pt to improve strength to 4+/5 or better of proximal hip, quadriceps, and hamstringsto allow for proper muscle and joint use in functional mobility, ADLs and prior level of function  Status: [] Progressing: [x] Met: [] Not Met: [] Adjusted  Patient will return to Usual work, housework or activities, Usual recreational activities, walking around house, squatting, lifting an object from the floor, light home activities, heavy home activities, walk 1 mile, up/down 1 flight of stairs, and stand for length of time without increased symptoms or restriction to work towards return to prior level of function. Status: [x] Progressing: [] Met: [] Not Met: [] Adjusted  Patient will resume normal work/leisure activities without pain. Status: [x] Progressing: [] Met: [] Not Met: [] Adjusted       Overall Progression Towards Functional goals/ Treatment Progress Update:  [x] Patient is progressing as expected towards functional goals listed. [] Progression is slowed due to complexities/Impairments listed. [] Progression has been slowed due to co-morbidities.   [] Plan just implemented, too soon to assess goals progression <30days   [] Goals require adjustment due to lack of progress  [] Patient is not progressing as expected and requires additional follow up with

## 2023-11-10 ENCOUNTER — APPOINTMENT (OUTPATIENT)
Dept: PHYSICAL THERAPY | Age: 43
End: 2023-11-10
Attending: ORTHOPAEDIC SURGERY
Payer: MEDICAID

## 2023-11-13 ENCOUNTER — HOSPITAL ENCOUNTER (OUTPATIENT)
Dept: PHYSICAL THERAPY | Age: 43
Setting detail: THERAPIES SERIES
Discharge: HOME | End: 2023-11-13
Attending: ORTHOPAEDIC SURGERY
Payer: MEDICAID

## 2023-11-13 DIAGNOSIS — R06.2 WHEEZING: ICD-10-CM

## 2023-11-13 DIAGNOSIS — R05.8 ALLERGIC COUGH: ICD-10-CM

## 2023-11-13 PROCEDURE — 97110 THERAPEUTIC EXERCISES: CPT

## 2023-11-13 RX ORDER — ALBUTEROL SULFATE 90 UG/1
AEROSOL, METERED RESPIRATORY (INHALATION)
Qty: 18 G | Refills: 2 | Status: SHIPPED | OUTPATIENT
Start: 2023-11-13

## 2023-11-14 ENCOUNTER — OFFICE VISIT (OUTPATIENT)
Dept: ORTHOPEDIC SURGERY | Age: 43
End: 2023-11-14

## 2023-11-14 VITALS — BODY MASS INDEX: 40.98 KG/M2 | HEIGHT: 65 IN | WEIGHT: 246 LBS

## 2023-11-14 DIAGNOSIS — G89.18 POST-OPERATIVE PAIN: Primary | ICD-10-CM

## 2023-11-14 PROCEDURE — 99024 POSTOP FOLLOW-UP VISIT: CPT | Performed by: ORTHOPAEDIC SURGERY

## 2023-11-14 RX ORDER — METHYLPREDNISOLONE 4 MG/1
TABLET ORAL
Qty: 1 KIT | Refills: 0 | Status: SHIPPED | OUTPATIENT
Start: 2023-11-14

## 2023-11-30 ENCOUNTER — OFFICE VISIT (OUTPATIENT)
Dept: ORTHOPEDIC SURGERY | Age: 43
End: 2023-11-30

## 2023-11-30 VITALS — HEIGHT: 65 IN | WEIGHT: 246 LBS | BODY MASS INDEX: 40.98 KG/M2

## 2023-11-30 DIAGNOSIS — M25.572 LEFT ANKLE PAIN, UNSPECIFIED CHRONICITY: Primary | ICD-10-CM

## 2023-11-30 DIAGNOSIS — M84.375A STRESS FRACTURE OF METATARSAL BONE OF LEFT FOOT, INITIAL ENCOUNTER: ICD-10-CM

## 2023-12-04 ENCOUNTER — HOSPITAL ENCOUNTER (OUTPATIENT)
Age: 43
Discharge: HOME | End: 2023-12-04
Payer: MEDICAID

## 2023-12-04 LAB
ALT SERPL-CCNC: 19 U/L (ref 10–40)
AST SERPL-CCNC: 21 U/L (ref 15–37)
BASOPHILS # BLD: 0 K/UL (ref 0–0.2)
BASOPHILS NFR BLD: 0.5 %
CREAT SERPL-MCNC: 0.7 MG/DL (ref 0.6–1.1)
CRP SERPL-MCNC: 9.3 MG/L (ref 0–5.1)
DEPRECATED RDW RBC AUTO: 15.1 % (ref 12.4–15.4)
EOSINOPHIL # BLD: 0.1 K/UL (ref 0–0.6)
EOSINOPHIL NFR BLD: 1.2 %
GFR SERPLBLD CREATININE-BSD FMLA CKD-EPI: >60 ML/MIN/{1.73_M2}
HCT VFR BLD AUTO: 39.3 % (ref 36–48)
HGB BLD-MCNC: 13.3 G/DL (ref 12–16)
LYMPHOCYTES # BLD: 1.5 K/UL (ref 1–5.1)
LYMPHOCYTES NFR BLD: 28.6 %
MCH RBC QN AUTO: 31.7 PG (ref 26–34)
MCHC RBC AUTO-ENTMCNC: 33.8 G/DL (ref 31–36)
MCV RBC AUTO: 93.7 FL (ref 80–100)
MONOCYTES # BLD: 0.5 K/UL (ref 0–1.3)
MONOCYTES NFR BLD: 8.9 %
NEUTROPHILS # BLD: 3.2 K/UL (ref 1.7–7.7)
NEUTROPHILS NFR BLD: 60.8 %
PLATELET # BLD AUTO: 228 K/UL (ref 135–450)
PMV BLD AUTO: 8.7 FL (ref 5–10.5)
RBC # BLD AUTO: 4.2 M/UL (ref 4–5.2)
WBC # BLD AUTO: 5.3 K/UL (ref 4–11)

## 2023-12-04 PROCEDURE — 36415 COLL VENOUS BLD VENIPUNCTURE: CPT

## 2023-12-04 PROCEDURE — 84450 TRANSFERASE (AST) (SGOT): CPT

## 2023-12-04 PROCEDURE — 86140 C-REACTIVE PROTEIN: CPT

## 2023-12-04 PROCEDURE — 85025 COMPLETE CBC W/AUTO DIFF WBC: CPT

## 2023-12-04 PROCEDURE — 84460 ALANINE AMINO (ALT) (SGPT): CPT

## 2023-12-04 PROCEDURE — 82565 ASSAY OF CREATININE: CPT

## 2024-01-03 ENCOUNTER — PATIENT MESSAGE (OUTPATIENT)
Dept: FAMILY MEDICINE CLINIC | Age: 44
End: 2024-01-03

## 2024-01-03 DIAGNOSIS — J01.90 ACUTE BACTERIAL SINUSITIS: Primary | ICD-10-CM

## 2024-01-03 DIAGNOSIS — B96.89 ACUTE BACTERIAL SINUSITIS: Primary | ICD-10-CM

## 2024-01-03 NOTE — TELEPHONE ENCOUNTER
From: Savita Kohler  To: Nivia Badillo  Sent: 1/3/2024 10:45 AM EST  Subject: Sick again…    Good morning,    So, I was getting over my illness and felt better for about 2 days. Now, I am sick again. I feel like it may be a sinus infection. Face and teeth hurt. I’m also a little dizzy. Do you think I need to make another appointment?

## 2024-01-04 RX ORDER — AZITHROMYCIN 250 MG/1
250 TABLET, FILM COATED ORAL SEE ADMIN INSTRUCTIONS
Qty: 6 TABLET | Refills: 0 | Status: SHIPPED | OUTPATIENT
Start: 2024-01-04 | End: 2024-01-09

## 2024-01-05 DIAGNOSIS — R09.82 POSTNASAL DRIP: ICD-10-CM

## 2024-01-09 RX ORDER — FLUTICASONE PROPIONATE 50 MCG
SPRAY, SUSPENSION (ML) NASAL
Qty: 16 G | Refills: 3 | Status: SHIPPED | OUTPATIENT
Start: 2024-01-09

## 2024-01-09 NOTE — TELEPHONE ENCOUNTER
Medication:   Requested Prescriptions     Pending Prescriptions Disp Refills    fluticasone (FLONASE) 50 MCG/ACT nasal spray [Pharmacy Med Name: FLUTICASONE 50MCG NASAL SP (120) RX] 16 g 3     Sig: SHAKE LIQUID AND USE 2 SPRAYS IN EACH NOSTRIL EVERY DAY        Last Filled:      Patient Phone Number: [phone] (home)     Last appt: 12/18/2023   Next appt: Visit date not found    Last OARRS:       10/11/2022     1:50 PM   RX Monitoring   Periodic Controlled Substance Monitoring No signs of potential drug abuse or diversion identified.

## 2024-01-18 ENCOUNTER — PATIENT MESSAGE (OUTPATIENT)
Dept: ORTHOPEDIC SURGERY | Age: 44
End: 2024-01-18

## 2024-01-18 DIAGNOSIS — Z96.659 HISTORY OF PARTIAL KNEE REPLACEMENT: Primary | ICD-10-CM

## 2024-01-18 RX ORDER — AMOXICILLIN 500 MG/1
1000 CAPSULE ORAL ONCE
Qty: 2 CAPSULE | Refills: 0 | Status: SHIPPED | OUTPATIENT
Start: 2024-01-18 | End: 2024-01-18

## 2024-01-18 NOTE — TELEPHONE ENCOUNTER
From: Savita Kohler  To: Dr. Usama Bender  Sent: 1/18/2024 11:41 AM EST  Subject: Antibiotic for dental cleaning?    Good morning,    I have a dentist appointment scheduled for June 4th. My dentist is telling me that I need to take an antibiotic prior to my cleaning due to my partial knee replacement. Can you please confirm that and what I need to do if that is the case?    Thanks,  Savita Kohler

## 2024-02-02 DIAGNOSIS — R06.2 WHEEZING: ICD-10-CM

## 2024-02-02 DIAGNOSIS — R05.8 ALLERGIC COUGH: ICD-10-CM

## 2024-02-02 RX ORDER — ALBUTEROL SULFATE 90 UG/1
AEROSOL, METERED RESPIRATORY (INHALATION)
Qty: 18 G | Refills: 0 | Status: SHIPPED | OUTPATIENT
Start: 2024-02-02

## 2024-02-02 NOTE — TELEPHONE ENCOUNTER
Medication:   Requested Prescriptions     Pending Prescriptions Disp Refills    albuterol sulfate HFA (PROVENTIL;VENTOLIN;PROAIR) 108 (90 Base) MCG/ACT inhaler [Pharmacy Med Name: ALBUTEROL HFA INH(200 PUFFS) 18GM] 18 g 2     Sig: INHALE 2 PUFFS INTO THE LUNGS EVERY 6 HOURS AS NEEDED FOR WHEEZING        Last Filled:  11/13/2023, 18g, 2    Patient Phone Number: [phone] (home)     Last appt: 12/18/2023   Next appt: Visit date not found    Last OARRS:       10/11/2022     1:50 PM   RX Monitoring   Periodic Controlled Substance Monitoring No signs of potential drug abuse or diversion identified.

## 2024-02-22 DIAGNOSIS — K21.9 GASTROESOPHAGEAL REFLUX DISEASE WITHOUT ESOPHAGITIS: ICD-10-CM

## 2024-02-23 RX ORDER — PANTOPRAZOLE SODIUM 40 MG/1
40 TABLET, DELAYED RELEASE ORAL DAILY
Qty: 90 TABLET | Refills: 3 | OUTPATIENT
Start: 2024-02-23 | End: 2025-02-22

## 2024-02-23 NOTE — TELEPHONE ENCOUNTER
Medication:   Requested Prescriptions     Pending Prescriptions Disp Refills    pantoprazole (PROTONIX) 40 MG tablet [Pharmacy Med Name: PANTOPRAZOLE 40MG TABLETS] 90 tablet 3     Sig: Take 1 tablet by mouth daily        Last Filled:  1/4/2023    Patient Phone Number: [phone] (home)     Last appt: 12/18/2023   Next appt: Visit date not found    Last OARRS:       10/11/2022     1:50 PM   RX Monitoring   Periodic Controlled Substance Monitoring No signs of potential drug abuse or diversion identified.

## 2024-02-27 ENCOUNTER — OFFICE VISIT (OUTPATIENT)
Dept: ORTHOPEDIC SURGERY | Age: 44
End: 2024-02-27
Payer: MEDICAID

## 2024-02-27 VITALS — WEIGHT: 244 LBS | BODY MASS INDEX: 40.65 KG/M2 | HEIGHT: 65 IN

## 2024-02-27 DIAGNOSIS — M25.561 RIGHT KNEE PAIN, UNSPECIFIED CHRONICITY: Primary | ICD-10-CM

## 2024-02-27 PROCEDURE — G8417 CALC BMI ABV UP PARAM F/U: HCPCS | Performed by: ORTHOPAEDIC SURGERY

## 2024-02-27 PROCEDURE — G8427 DOCREV CUR MEDS BY ELIG CLIN: HCPCS | Performed by: ORTHOPAEDIC SURGERY

## 2024-02-27 PROCEDURE — 99214 OFFICE O/P EST MOD 30 MIN: CPT | Performed by: ORTHOPAEDIC SURGERY

## 2024-02-27 PROCEDURE — 1036F TOBACCO NON-USER: CPT | Performed by: ORTHOPAEDIC SURGERY

## 2024-02-27 PROCEDURE — G8484 FLU IMMUNIZE NO ADMIN: HCPCS | Performed by: ORTHOPAEDIC SURGERY

## 2024-02-27 RX ORDER — METHYLPREDNISOLONE 4 MG/1
TABLET ORAL
Qty: 1 KIT | Refills: 0 | Status: SHIPPED | OUTPATIENT
Start: 2024-02-27

## 2024-02-27 NOTE — PROGRESS NOTES
Imagin view x-rays of the right knee were obtained the office today on 2024 and reviewed.  There is no fracture or dislocation.  No other abnormality.      Assessment:  Right knee pain following injury in .  Concern for meniscus root tear    Plan:  I discussed with the patient the differential diagnosis.  At this point given the traumatic nature of the injury combined with her exam and inability bear weight along with mechanical symptoms I would recommend MRI for further evaluation.  She is in agreement.  Following that study she will return to review the results in the office.    Greater than 30 minutes were spent with this encounter.  Time spent included evaluating the patient's chart prior to arrival.  Evaluating the patient in the office including history, physical examination, imaging reviewing, and counseling on next steps.  Lastly, time was spent discussing orders with my staff as well as providing documentation in the chart.                    Usama Bender MD            Orthopaedic Surgery Sports Medicine and Arthroscopy            [facility]            Team Physician [facility] ([place]      Disclaimer:  This note was dictated with voice recognition software.  Though review and correction are routine, we apologize for any errors.

## 2024-03-01 ENCOUNTER — TELEMEDICINE (OUTPATIENT)
Dept: FAMILY MEDICINE CLINIC | Age: 44
End: 2024-03-01
Payer: MEDICAID

## 2024-03-01 DIAGNOSIS — L02.91 ABSCESS: Primary | ICD-10-CM

## 2024-03-01 PROCEDURE — G8417 CALC BMI ABV UP PARAM F/U: HCPCS | Performed by: NURSE PRACTITIONER

## 2024-03-01 PROCEDURE — 99214 OFFICE O/P EST MOD 30 MIN: CPT | Performed by: NURSE PRACTITIONER

## 2024-03-01 PROCEDURE — 1036F TOBACCO NON-USER: CPT | Performed by: NURSE PRACTITIONER

## 2024-03-01 PROCEDURE — G8484 FLU IMMUNIZE NO ADMIN: HCPCS | Performed by: NURSE PRACTITIONER

## 2024-03-01 PROCEDURE — G8427 DOCREV CUR MEDS BY ELIG CLIN: HCPCS | Performed by: NURSE PRACTITIONER

## 2024-03-01 RX ORDER — CEPHALEXIN 500 MG/1
500 CAPSULE ORAL 3 TIMES DAILY
Qty: 30 CAPSULE | Refills: 0 | Status: SHIPPED | OUTPATIENT
Start: 2024-03-01 | End: 2024-03-11

## 2024-03-01 RX ORDER — SULFAMETHOXAZOLE AND TRIMETHOPRIM 800; 160 MG/1; MG/1
1 TABLET ORAL 2 TIMES DAILY
Qty: 20 TABLET | Refills: 0 | Status: SHIPPED | OUTPATIENT
Start: 2024-03-01 | End: 2024-03-11

## 2024-03-01 ASSESSMENT — PATIENT HEALTH QUESTIONNAIRE - PHQ9
5. POOR APPETITE OR OVEREATING: 0
9. THOUGHTS THAT YOU WOULD BE BETTER OFF DEAD, OR OF HURTING YOURSELF: 0
SUM OF ALL RESPONSES TO PHQ QUESTIONS 1-9: 5
4. FEELING TIRED OR HAVING LITTLE ENERGY: 1
7. TROUBLE CONCENTRATING ON THINGS, SUCH AS READING THE NEWSPAPER OR WATCHING TELEVISION: 3
8. MOVING OR SPEAKING SO SLOWLY THAT OTHER PEOPLE COULD HAVE NOTICED. OR THE OPPOSITE, BEING SO FIGETY OR RESTLESS THAT YOU HAVE BEEN MOVING AROUND A LOT MORE THAN USUAL: 0
6. FEELING BAD ABOUT YOURSELF - OR THAT YOU ARE A FAILURE OR HAVE LET YOURSELF OR YOUR FAMILY DOWN: 0
SUM OF ALL RESPONSES TO PHQ QUESTIONS 1-9: 5
3. TROUBLE FALLING OR STAYING ASLEEP: 1
1. LITTLE INTEREST OR PLEASURE IN DOING THINGS: 0
10. IF YOU CHECKED OFF ANY PROBLEMS, HOW DIFFICULT HAVE THESE PROBLEMS MADE IT FOR YOU TO DO YOUR WORK, TAKE CARE OF THINGS AT HOME, OR GET ALONG WITH OTHER PEOPLE: 0
SUM OF ALL RESPONSES TO PHQ9 QUESTIONS 1 & 2: 0
SUM OF ALL RESPONSES TO PHQ QUESTIONS 1-9: 5
2. FEELING DOWN, DEPRESSED OR HOPELESS: 0
SUM OF ALL RESPONSES TO PHQ QUESTIONS 1-9: 5

## 2024-03-01 ASSESSMENT — ENCOUNTER SYMPTOMS
CHEST TIGHTNESS: 0
COUGH: 0
SHORTNESS OF BREATH: 0
GASTROINTESTINAL NEGATIVE: 1
WHEEZING: 0

## 2024-03-01 NOTE — PROGRESS NOTES
3/1/2024    TELEHEALTH EVALUATION -- Audio/Visual (During COVID-19 public health emergency)    Savita Kohler (:  1980) has requested an audio/video evaluation for the following concern(s):    Noticed a few days ago having pain and redness in right axilla.  It has grown in size and is very painful, drained a little a few nights ago.  She has made an appointment with derm but can't get in until 3/4/2024. Denies fevers or malaise.    Review of Systems   Constitutional:  Negative for chills, diaphoresis, fatigue and fever.   Respiratory:  Negative for cough, chest tightness, shortness of breath and wheezing.    Cardiovascular:  Negative for chest pain and palpitations.   Gastrointestinal: Negative.    Genitourinary: Negative.    Skin:  Positive for wound.   Neurological:  Negative for dizziness, weakness and headaches.        Prior to Visit Medications    Medication Sig Taking? Authorizing Provider   sulfamethoxazole-trimethoprim (BACTRIM DS;SEPTRA DS) 800-160 MG per tablet Take 1 tablet by mouth 2 times daily for 10 days Yes Mary Vasquez APRN - CNP   cephALEXin (KEFLEX) 500 MG capsule Take 1 capsule by mouth 3 times daily for 10 days Yes Mary Vasquez APRN - CNP   methylPREDNISolone (MEDROL, HOA,) 4 MG tablet Take by mouth. Yes Usama Bender MD   albuterol sulfate HFA (PROVENTIL;VENTOLIN;PROAIR) 108 (90 Base) MCG/ACT inhaler INHALE 2 PUFFS INTO THE LUNGS EVERY 6 HOURS AS NEEDED FOR WHEEZING Yes Marcellus Ruvalcaba MD   fluticasone (FLONASE) 50 MCG/ACT nasal spray SHAKE LIQUID AND USE 2 SPRAYS IN EACH NOSTRIL EVERY DAY Yes Nivia Badillo APRN - CNP   methylPREDNISolone (MEDROL, HOA,) 4 MG tablet Take by mouth. Yes González Arthur PA-C   Multiple Vitamins-Minerals (THERAPEUTIC MULTIVITAMIN-MINERALS) tablet Take 1 tablet by mouth daily Yes Shelby Jorge MD   cetirizine (ZYRTEC) 10 MG tablet TAKE 1 TABLET BY MOUTH DAILY Yes Nivia Badillo APRN - CNP   busPIRone (BUSPAR) 10 MG tablet Take 1 tablet by

## 2024-03-03 ENCOUNTER — HOSPITAL ENCOUNTER (EMERGENCY)
Age: 44
Discharge: HOME | End: 2024-03-03
Attending: EMERGENCY MEDICINE
Payer: MEDICAID

## 2024-03-03 VITALS
HEIGHT: 65 IN | DIASTOLIC BLOOD PRESSURE: 92 MMHG | WEIGHT: 250 LBS | RESPIRATION RATE: 16 BRPM | TEMPERATURE: 98.2 F | SYSTOLIC BLOOD PRESSURE: 133 MMHG | HEART RATE: 91 BPM | OXYGEN SATURATION: 100 % | BODY MASS INDEX: 41.65 KG/M2

## 2024-03-03 DIAGNOSIS — L03.111 CELLULITIS OF AXILLA, RIGHT: Primary | ICD-10-CM

## 2024-03-03 PROCEDURE — 99283 EMERGENCY DEPT VISIT LOW MDM: CPT

## 2024-03-03 RX ORDER — CLINDAMYCIN HYDROCHLORIDE 300 MG/1
300 CAPSULE ORAL 4 TIMES DAILY
Qty: 40 CAPSULE | Refills: 0 | Status: SHIPPED | OUTPATIENT
Start: 2024-03-03 | End: 2024-03-13

## 2024-03-04 NOTE — ED NOTES
3/4/24  Pt contacted re: ED visit 3/3/24; \"doing better\", encouraged to return if any problems/concerns.

## 2024-03-04 NOTE — ED PROVIDER NOTES
EMERGENCY DEPARTMENT PROVIDER NOTE    Patient Identification  Pt Name: Savita Kohler  MRN: [id number]  Birthdate 1980  Date of evaluation: 3/3/2024  Provider: Hemant Sullivan DO  PCP: Nivia Badillo APRN - CNP    Chief Complaint  Abscess (Last week she thought it was an ingrown hair in right armpit, had a virtual appointment with pcp and placed on atbs, however its getting worse. )      HPI  (History provided by patient)  This is a 43 y.o. female with pertinent past medical history of hypertension, rheumatoid arthritis who was brought in by self for pain and redness under her right armpit.  Symptoms began approximately 1 week ago, initially felt to be an ingrown hair however 2 days ago the pain and redness suddenly worsened.  Had a telehealth visit and was prescribed Bactrim and cephalexin which she began taking 36 hours ago.  She marked the redness under her arm with a pen and the redness has spread beyond these pen borders since starting the antibiotics which prompted her to come to the emergency department today.  She denies any fevers or chills.       I have reviewed the following nursing documentation:  Allergies: Metronidazole, Dermabond, and Other    Past medical history:   Past Medical History:   Diagnosis Date    Anesthesia     permanent lower retainer    Anxiety     Arthritis     RA    Cellulitis of left lower extremity     left ankle    Chronic GERD     Depression     WELL CONTROLLED 10-16-17    Essential hypertension     Fibromyalgia     Heart rate fast     intermittent    Kidney stone     Migraine     Motor tic disorder     Obstructive sleep apnea, adult     uses CPAP    Prolonged emergence from general anesthesia      Past surgical history:   Past Surgical History:   Procedure Laterality Date    ABDOMINAL EXPLORATION SURGERY  07/22/2011    excision Meckels diverticulum    ABDOMINOPLASTY  04/14/2014    [facility]    ADENOIDECTOMY Bilateral     APPENDECTOMY  07/22/2011    BACK SURGERY      [facility]

## 2024-03-06 ENCOUNTER — HOSPITAL ENCOUNTER (OUTPATIENT)
Dept: MRI IMAGING | Age: 44
Discharge: HOME | End: 2024-03-06
Attending: ORTHOPAEDIC SURGERY
Payer: MEDICAID

## 2024-03-06 DIAGNOSIS — M25.561 RIGHT KNEE PAIN, UNSPECIFIED CHRONICITY: ICD-10-CM

## 2024-03-06 PROCEDURE — 73721 MRI JNT OF LWR EXTRE W/O DYE: CPT

## 2024-03-12 ENCOUNTER — OFFICE VISIT (OUTPATIENT)
Dept: ORTHOPEDIC SURGERY | Age: 44
End: 2024-03-12
Payer: MEDICAID

## 2024-03-12 VITALS — WEIGHT: 250 LBS | HEIGHT: 65 IN | BODY MASS INDEX: 41.65 KG/M2

## 2024-03-12 DIAGNOSIS — S83.231A COMPLEX TEAR OF MEDIAL MENISCUS OF RIGHT KNEE AS CURRENT INJURY, INITIAL ENCOUNTER: ICD-10-CM

## 2024-03-12 DIAGNOSIS — M25.561 RIGHT KNEE PAIN, UNSPECIFIED CHRONICITY: Primary | ICD-10-CM

## 2024-03-12 DIAGNOSIS — M17.11 OSTEOARTHRITIS OF RIGHT PATELLOFEMORAL JOINT: ICD-10-CM

## 2024-03-12 PROCEDURE — G8417 CALC BMI ABV UP PARAM F/U: HCPCS | Performed by: ORTHOPAEDIC SURGERY

## 2024-03-12 PROCEDURE — 1036F TOBACCO NON-USER: CPT | Performed by: ORTHOPAEDIC SURGERY

## 2024-03-12 PROCEDURE — 99215 OFFICE O/P EST HI 40 MIN: CPT | Performed by: ORTHOPAEDIC SURGERY

## 2024-03-12 PROCEDURE — G8427 DOCREV CUR MEDS BY ELIG CLIN: HCPCS | Performed by: ORTHOPAEDIC SURGERY

## 2024-03-12 PROCEDURE — G8484 FLU IMMUNIZE NO ADMIN: HCPCS | Performed by: ORTHOPAEDIC SURGERY

## 2024-03-14 DIAGNOSIS — R05.8 ALLERGIC COUGH: ICD-10-CM

## 2024-03-14 DIAGNOSIS — R06.2 WHEEZING: ICD-10-CM

## 2024-03-15 ENCOUNTER — TELEPHONE (OUTPATIENT)
Dept: FAMILY MEDICINE CLINIC | Age: 44
End: 2024-03-15

## 2024-03-15 RX ORDER — ALBUTEROL SULFATE 90 UG/1
AEROSOL, METERED RESPIRATORY (INHALATION)
Qty: 18 G | Refills: 0 | Status: SHIPPED | OUTPATIENT
Start: 2024-03-15

## 2024-03-15 NOTE — TELEPHONE ENCOUNTER
Medication:   Requested Prescriptions     Pending Prescriptions Disp Refills    albuterol sulfate HFA (PROVENTIL;VENTOLIN;PROAIR) 108 (90 Base) MCG/ACT inhaler [Pharmacy Med Name: ALBUTEROL HFA INH(200 PUFFS) 18GM] 18 g 0     Sig: INHALE 2 PUFFS INTO THE LUNGS EVERY 6 HOURS AS NEEDED FOR WHEEZING        Last Filled: 2/2/24     Patient Phone Number: [phone] (home)     Last appt: 3/1/2024   Next appt: Visit date not found    Last OARRS:       10/11/2022     1:50 PM   RX Monitoring   Periodic Controlled Substance Monitoring No signs of potential drug abuse or diversion identified.

## 2024-03-18 NOTE — PROGRESS NOTES
2024     Reason for visit:  Right knee pain following injury on 24    History of Present Illness:  The patient is a 43-year-old female who presents for evaluation of her right knee.  I previously treated her surgically for her left knee.  Her left knee is doing quite well.  She does reports she injured herself while walking upstairs.  She was walking up stairs and felt a loud popping sensation deep within the knee.  Following that event she was unable to bear weight.  She still has difficulty bearing weight and is using crutches.  She does have swelling in the knee as well as catching and locking present.    Objective:  Ht 1.651 m (5' 5\")   Wt 110.7 kg (244 lb)   LMP 2018 (Exact Date)   BMI 40.60 kg/m²      Physical Exam:  The patient is well-appearing and in no apparent distress  Examination of the left knee   There is a small effusion, no gross deformity or skin changes  Range of motion reveals 0 to 125  Neg lachman, negative posterior drawer, no pain or laxity with varus or valgus stress at 0 degrees and 30 degrees of flexion  + medial joint line tenderness  5 out of 5 strength throughout distal muscle groups  Sensation is intact to light touch throughout all distributions  There is no calf swelling or tenderness  Palpable DP pulse, brisk cap refill, 2+ symmetric reflexes     Imagin view x-rays of the right knee were obtained the office today on 2024 and reviewed.  There is no fracture or dislocation.  No other abnormality.  MRI of the right knee was reviewed.  Complete tear of the posterior root of the medial meniscus.  Overall intact chondral surfaces of the medial lateral compartments.  Chronic degeneration of the patellofemoral joint noted.      Assessment:  Right knee pain following injury in .  Imaging consistent with chronic patellofemoral degenerative joint disease and acute posterior medial meniscus root tear    Plan:  I had a very long discussion with the

## 2024-03-22 ENCOUNTER — PREP FOR PROCEDURE (OUTPATIENT)
Dept: ORTHOPEDIC SURGERY | Age: 44
End: 2024-03-22

## 2024-03-22 DIAGNOSIS — S83.231A COMPLEX TEAR OF MEDIAL MENISCUS OF RIGHT KNEE AS CURRENT INJURY, INITIAL ENCOUNTER: Primary | ICD-10-CM

## 2024-03-22 DIAGNOSIS — M17.11 OSTEOARTHRITIS OF RIGHT PATELLOFEMORAL JOINT: ICD-10-CM

## 2024-03-26 RX ORDER — SODIUM CHLORIDE 0.9 % (FLUSH) 0.9 %
5-40 SYRINGE (ML) INJECTION EVERY 12 HOURS SCHEDULED
Status: CANCELLED | OUTPATIENT
Start: 2024-03-26

## 2024-03-26 RX ORDER — SODIUM CHLORIDE 0.9 % (FLUSH) 0.9 %
5-40 SYRINGE (ML) INJECTION PRN
Status: CANCELLED | OUTPATIENT
Start: 2024-03-26

## 2024-03-26 RX ORDER — SODIUM CHLORIDE 9 MG/ML
INJECTION, SOLUTION INTRAVENOUS PRN
Status: CANCELLED | OUTPATIENT
Start: 2024-03-26

## 2024-03-30 DIAGNOSIS — F41.8 DEPRESSION WITH ANXIETY: ICD-10-CM

## 2024-04-01 RX ORDER — DESVENLAFAXINE 100 MG/1
100 TABLET, EXTENDED RELEASE ORAL DAILY
Qty: 90 TABLET | Refills: 0 | Status: SHIPPED | OUTPATIENT
Start: 2024-04-01 | End: 2024-09-28

## 2024-04-01 NOTE — TELEPHONE ENCOUNTER
Medication:   Requested Prescriptions     Pending Prescriptions Disp Refills    desvenlafaxine succinate (PRISTIQ) 100 MG TB24 extended release tablet [Pharmacy Med Name: DESVENLAFAXINE ER SUCCINATE 100MG T] 90 tablet 0     Sig: TAKE 1 TABLET BY MOUTH DAILY        Last Filled:      Patient Phone Number: [phone] (home)     Last appt: 3/1/2024   Next appt: 4/15/2024    Last OARRS:       10/11/2022     1:50 PM   RX Monitoring   Periodic Controlled Substance Monitoring No signs of potential drug abuse or diversion identified.

## 2024-04-03 ENCOUNTER — TELEPHONE (OUTPATIENT)
Dept: ADMINISTRATIVE | Age: 44
End: 2024-04-03

## 2024-04-03 NOTE — TELEPHONE ENCOUNTER
PA requsted via [facility] Community Plan by Online thru [facility] w/clinicals.  Reference # [id number]

## 2024-04-04 NOTE — TELEPHONE ENCOUNTER
APPROVAL     DX CODE: S83.231A; M17.11  CPT CODE: 84697; 90221  AREA OF SURGERY: Right Knee  DATE RANGE: 5/10/2024 to 8/8/2024  LOCATION: [facility]  INSURANCE: [facility] Community Plan  AUTH #: [id number]    Approval scanned in Media

## 2024-04-05 DIAGNOSIS — R06.2 WHEEZING: ICD-10-CM

## 2024-04-05 DIAGNOSIS — R05.8 ALLERGIC COUGH: ICD-10-CM

## 2024-04-08 ENCOUNTER — PATIENT MESSAGE (OUTPATIENT)
Dept: ORTHOPEDIC SURGERY | Age: 44
End: 2024-04-08

## 2024-04-08 DIAGNOSIS — K21.9 GASTROESOPHAGEAL REFLUX DISEASE WITHOUT ESOPHAGITIS: ICD-10-CM

## 2024-04-08 RX ORDER — PANTOPRAZOLE SODIUM 40 MG/1
40 TABLET, DELAYED RELEASE ORAL DAILY
Qty: 90 TABLET | Refills: 3 | Status: SHIPPED | OUTPATIENT
Start: 2024-04-08 | End: 2025-04-08

## 2024-04-08 RX ORDER — ALBUTEROL SULFATE 90 UG/1
AEROSOL, METERED RESPIRATORY (INHALATION)
Qty: 18 G | Refills: 0 | Status: SHIPPED | OUTPATIENT
Start: 2024-04-08

## 2024-04-08 NOTE — TELEPHONE ENCOUNTER
From: Savita Kohler  To: Dr. Usama Bender  Sent: 4/8/2024 3:36 PM EDT  Subject: Insurance     Good afternoon,    I just got home from visiting my dad and while I was gone I received a letter stating that my Medicaid will be terminated on 4/30/24. I’m appealing it but wanted to reach out to see if you all had any input. Would I/ could I move up my surgery? If not, how should I proceed? TIA

## 2024-04-08 NOTE — TELEPHONE ENCOUNTER
Medication:   Requested Prescriptions     Pending Prescriptions Disp Refills    pantoprazole (PROTONIX) 40 MG tablet [Pharmacy Med Name: PANTOPRAZOLE 40MG TABLETS] 30 tablet      Sig: TAKE 1 TABLET BY MOUTH DAILY        Last Filled:  1/4/2023, 90, 3    Patient Phone Number: [phone] (home)     Last appt: 3/1/2024   Next appt: 4/15/2024    Last OARRS:       10/11/2022     1:50 PM   RX Monitoring   Periodic Controlled Substance Monitoring No signs of potential drug abuse or diversion identified.

## 2024-04-08 NOTE — TELEPHONE ENCOUNTER
Medication:   Requested Prescriptions     Pending Prescriptions Disp Refills    albuterol sulfate HFA (PROVENTIL;VENTOLIN;PROAIR) 108 (90 Base) MCG/ACT inhaler [Pharmacy Med Name: ALBUTEROL HFA INH(200 PUFFS) 18GM] 18 g 0     Sig: INHALE 2 PUFFS INTO THE LUNGS EVERY 6 HOURS AS NEEDED FOR WHEEZING        Last Filled:  3/15/2024, 18g, 0    Patient Phone Number: [phone] (home)     Last appt: 3/1/2024   Next appt: 4/8/2024    Last OARRS:       10/11/2022     1:50 PM   RX Monitoring   Periodic Controlled Substance Monitoring No signs of potential drug abuse or diversion identified.

## 2024-04-09 DIAGNOSIS — M17.11 OSTEOARTHRITIS OF RIGHT PATELLOFEMORAL JOINT: ICD-10-CM

## 2024-04-09 DIAGNOSIS — S83.231A COMPLEX TEAR OF MEDIAL MENISCUS OF RIGHT KNEE AS CURRENT INJURY, INITIAL ENCOUNTER: Primary | ICD-10-CM

## 2024-04-09 NOTE — TELEPHONE ENCOUNTER
SPOKE WITH PT.  HER PT VISITS WILL NOT BE COVERED AS SOON AS HER INSURANCE LAPSES.  SHE AT THIS POINT WOULD STILL LIKE TO PROCEED WITH 4/19. SHE WILL F/U WITH INSURANCE FOR THE APPEAL SHE HAS FILED AND GET BACK TO US WITH AN UPDATE WHEN SHE CAN.

## 2024-04-15 ENCOUNTER — HOSPITAL ENCOUNTER (OUTPATIENT)
Age: 44
Discharge: HOME | End: 2024-04-15
Payer: MEDICAID

## 2024-04-15 ENCOUNTER — OFFICE VISIT (OUTPATIENT)
Dept: FAMILY MEDICINE CLINIC | Age: 44
End: 2024-04-15
Payer: MEDICAID

## 2024-04-15 VITALS
TEMPERATURE: 98.5 F | HEART RATE: 100 BPM | HEIGHT: 65 IN | SYSTOLIC BLOOD PRESSURE: 132 MMHG | BODY MASS INDEX: 40.75 KG/M2 | DIASTOLIC BLOOD PRESSURE: 78 MMHG | OXYGEN SATURATION: 99 % | WEIGHT: 244.6 LBS | RESPIRATION RATE: 18 BRPM

## 2024-04-15 DIAGNOSIS — M06.00 SERONEGATIVE RHEUMATOID ARTHRITIS (HCC): ICD-10-CM

## 2024-04-15 DIAGNOSIS — M17.11 OSTEOARTHRITIS OF RIGHT PATELLOFEMORAL JOINT: ICD-10-CM

## 2024-04-15 DIAGNOSIS — Z79.899 HIGH RISK MEDICATION USE: ICD-10-CM

## 2024-04-15 DIAGNOSIS — S83.231A COMPLEX TEAR OF MEDIAL MENISCUS OF RIGHT KNEE AS CURRENT INJURY, INITIAL ENCOUNTER: ICD-10-CM

## 2024-04-15 DIAGNOSIS — M17.11 LOCALIZED OSTEOARTHRITIS OF RIGHT KNEE: ICD-10-CM

## 2024-04-15 DIAGNOSIS — E66.01 CLASS 3 SEVERE OBESITY DUE TO EXCESS CALORIES WITH SERIOUS COMORBIDITY AND BODY MASS INDEX (BMI) OF 40.0 TO 44.9 IN ADULT (HCC): ICD-10-CM

## 2024-04-15 DIAGNOSIS — S83.231S COMPLEX TEAR OF MEDIAL MENISCUS OF RIGHT KNEE AS CURRENT INJURY, SEQUELA: Primary | ICD-10-CM

## 2024-04-15 DIAGNOSIS — G47.33 OBSTRUCTIVE SLEEP APNEA ON CPAP: ICD-10-CM

## 2024-04-15 DIAGNOSIS — K21.9 CHRONIC GERD: ICD-10-CM

## 2024-04-15 DIAGNOSIS — I10 ESSENTIAL HYPERTENSION: ICD-10-CM

## 2024-04-15 DIAGNOSIS — Z01.818 PRE-OP EXAM: ICD-10-CM

## 2024-04-15 LAB
ABO + RH BLD: NORMAL
ALBUMIN SERPL-MCNC: 4.6 G/DL (ref 3.4–5)
ALBUMIN/GLOB SERPL: 1.8 {RATIO} (ref 1.1–2.2)
ALP SERPL-CCNC: 85 U/L (ref 40–129)
ALT SERPL-CCNC: 25 U/L (ref 10–40)
ANION GAP SERPL CALCULATED.3IONS-SCNC: 10 MMOL/L (ref 3–16)
APTT BLD: 29.7 SEC (ref 22.1–36.4)
AST SERPL-CCNC: 21 U/L (ref 15–37)
BACTERIA URNS QL MICRO: ABNORMAL /HPF
BASOPHILS # BLD: 0 K/UL (ref 0–0.2)
BASOPHILS NFR BLD: 0.4 %
BILIRUB SERPL-MCNC: <0.2 MG/DL (ref 0–1)
BILIRUB UR QL STRIP.AUTO: NEGATIVE
BLD GP AB SCN SERPL QL: NORMAL
BUN SERPL-MCNC: 10 MG/DL (ref 7–20)
CALCIUM SERPL-MCNC: 9.6 MG/DL (ref 8.3–10.6)
CHLORIDE SERPL-SCNC: 104 MMOL/L (ref 99–110)
CLARITY UR: CLEAR
CO2 SERPL-SCNC: 27 MMOL/L (ref 21–32)
COLOR UR: YELLOW
CREAT SERPL-MCNC: 0.7 MG/DL (ref 0.6–1.1)
DEPRECATED RDW RBC AUTO: 13.7 % (ref 12.4–15.4)
EOSINOPHIL # BLD: 0.1 K/UL (ref 0–0.6)
EOSINOPHIL NFR BLD: 1.2 %
EPI CELLS #/AREA URNS AUTO: 11 /HPF (ref 0–5)
GFR SERPLBLD CREATININE-BSD FMLA CKD-EPI: >90 ML/MIN/{1.73_M2}
GLUCOSE SERPL-MCNC: 83 MG/DL (ref 70–99)
GLUCOSE UR STRIP.AUTO-MCNC: NEGATIVE MG/DL
HCT VFR BLD AUTO: 38.8 % (ref 36–48)
HGB BLD-MCNC: 13 G/DL (ref 12–16)
HGB UR QL STRIP.AUTO: NEGATIVE
HYALINE CASTS #/AREA URNS AUTO: 0 /LPF (ref 0–8)
INR PPP: 1.02 (ref 0.85–1.15)
KETONES UR STRIP.AUTO-MCNC: NEGATIVE MG/DL
LEUKOCYTE ESTERASE UR QL STRIP.AUTO: ABNORMAL
LYMPHOCYTES # BLD: 2.6 K/UL (ref 1–5.1)
LYMPHOCYTES NFR BLD: 40.4 %
MCH RBC QN AUTO: 31.5 PG (ref 26–34)
MCHC RBC AUTO-ENTMCNC: 33.6 G/DL (ref 31–36)
MCV RBC AUTO: 93.8 FL (ref 80–100)
MONOCYTES # BLD: 0.7 K/UL (ref 0–1.3)
MONOCYTES NFR BLD: 10.5 %
NEUTROPHILS # BLD: 3.1 K/UL (ref 1.7–7.7)
NEUTROPHILS NFR BLD: 47.5 %
NITRITE UR QL STRIP.AUTO: NEGATIVE
PH UR STRIP.AUTO: 6.5 [PH] (ref 5–8)
PLATELET # BLD AUTO: 283 K/UL (ref 135–450)
PMV BLD AUTO: 9.4 FL (ref 5–10.5)
POTASSIUM SERPL-SCNC: 4.1 MMOL/L (ref 3.5–5.1)
PROT SERPL-MCNC: 7.1 G/DL (ref 6.4–8.2)
PROT UR STRIP.AUTO-MCNC: NEGATIVE MG/DL
PROTHROMBIN TIME: 13.6 SEC (ref 11.9–14.9)
RBC # BLD AUTO: 4.14 M/UL (ref 4–5.2)
RBC CLUMPS #/AREA URNS AUTO: 3 /HPF (ref 0–4)
SODIUM SERPL-SCNC: 141 MMOL/L (ref 136–145)
SP GR UR STRIP.AUTO: 1.01 (ref 1–1.03)
UA DIPSTICK W REFLEX MICRO PNL UR: YES
URN SPEC COLLECT METH UR: ABNORMAL
UROBILINOGEN UR STRIP-ACNC: 0.2 E.U./DL
WBC # BLD AUTO: 6.5 K/UL (ref 4–11)
WBC #/AREA URNS AUTO: 2 /HPF (ref 0–5)

## 2024-04-15 PROCEDURE — 36415 COLL VENOUS BLD VENIPUNCTURE: CPT

## 2024-04-15 PROCEDURE — 85610 PROTHROMBIN TIME: CPT

## 2024-04-15 PROCEDURE — 3075F SYST BP GE 130 - 139MM HG: CPT | Performed by: NURSE PRACTITIONER

## 2024-04-15 PROCEDURE — 3078F DIAST BP <80 MM HG: CPT | Performed by: NURSE PRACTITIONER

## 2024-04-15 PROCEDURE — 85730 THROMBOPLASTIN TIME PARTIAL: CPT

## 2024-04-15 PROCEDURE — 93000 ELECTROCARDIOGRAM COMPLETE: CPT | Performed by: NURSE PRACTITIONER

## 2024-04-15 PROCEDURE — 86900 BLOOD TYPING SEROLOGIC ABO: CPT

## 2024-04-15 PROCEDURE — 86850 RBC ANTIBODY SCREEN: CPT

## 2024-04-15 PROCEDURE — G8417 CALC BMI ABV UP PARAM F/U: HCPCS | Performed by: NURSE PRACTITIONER

## 2024-04-15 PROCEDURE — 86901 BLOOD TYPING SEROLOGIC RH(D): CPT

## 2024-04-15 PROCEDURE — 99214 OFFICE O/P EST MOD 30 MIN: CPT | Performed by: NURSE PRACTITIONER

## 2024-04-15 PROCEDURE — 1036F TOBACCO NON-USER: CPT | Performed by: NURSE PRACTITIONER

## 2024-04-15 PROCEDURE — G8427 DOCREV CUR MEDS BY ELIG CLIN: HCPCS | Performed by: NURSE PRACTITIONER

## 2024-04-15 PROCEDURE — 87077 CULTURE AEROBIC IDENTIFY: CPT

## 2024-04-15 PROCEDURE — 80053 COMPREHEN METABOLIC PANEL: CPT

## 2024-04-15 PROCEDURE — 87081 CULTURE SCREEN ONLY: CPT

## 2024-04-15 PROCEDURE — 85025 COMPLETE CBC W/AUTO DIFF WBC: CPT

## 2024-04-15 PROCEDURE — 81001 URINALYSIS AUTO W/SCOPE: CPT

## 2024-04-15 PROCEDURE — 87086 URINE CULTURE/COLONY COUNT: CPT

## 2024-04-15 ASSESSMENT — PATIENT HEALTH QUESTIONNAIRE - PHQ9
SUM OF ALL RESPONSES TO PHQ QUESTIONS 1-9: 0
SUM OF ALL RESPONSES TO PHQ QUESTIONS 1-9: 0
SUM OF ALL RESPONSES TO PHQ9 QUESTIONS 1 & 2: 0
2. FEELING DOWN, DEPRESSED OR HOPELESS: NOT AT ALL
7. TROUBLE CONCENTRATING ON THINGS, SUCH AS READING THE NEWSPAPER OR WATCHING TELEVISION: NOT AT ALL
5. POOR APPETITE OR OVEREATING: NOT AT ALL
10. IF YOU CHECKED OFF ANY PROBLEMS, HOW DIFFICULT HAVE THESE PROBLEMS MADE IT FOR YOU TO DO YOUR WORK, TAKE CARE OF THINGS AT HOME, OR GET ALONG WITH OTHER PEOPLE: NOT DIFFICULT AT ALL
4. FEELING TIRED OR HAVING LITTLE ENERGY: NOT AT ALL
3. TROUBLE FALLING OR STAYING ASLEEP: NOT AT ALL
SUM OF ALL RESPONSES TO PHQ QUESTIONS 1-9: 0
9. THOUGHTS THAT YOU WOULD BE BETTER OFF DEAD, OR OF HURTING YOURSELF: NOT AT ALL
SUM OF ALL RESPONSES TO PHQ QUESTIONS 1-9: 0
8. MOVING OR SPEAKING SO SLOWLY THAT OTHER PEOPLE COULD HAVE NOTICED. OR THE OPPOSITE, BEING SO FIGETY OR RESTLESS THAT YOU HAVE BEEN MOVING AROUND A LOT MORE THAN USUAL: NOT AT ALL
6. FEELING BAD ABOUT YOURSELF - OR THAT YOU ARE A FAILURE OR HAVE LET YOURSELF OR YOUR FAMILY DOWN: NOT AT ALL
1. LITTLE INTEREST OR PLEASURE IN DOING THINGS: NOT AT ALL

## 2024-04-15 NOTE — PROGRESS NOTES
Grandfather     Heart Disease Paternal Grandmother     Heart Attack Paternal Grandmother     Diabetes Paternal Grandmother     Stroke Paternal Grandfather     Asthma Brother     Asthma Brother     Diabetes Brother     Clotting Disorder Paternal Uncle     Early Death Paternal Uncle         Heart attack 57    Cancer Neg Hx          Review of Systems  A comprehensive review of systems was negative except for:   Cardiovascular: positive for chronic palpitations  Gastrointestinal: positive for loose stools  Musculoskeletal: positive for chronic right knee pain  Neurological: positive for headaches       Physical Exam   Vitals:    04/15/24 0834   BP: 132/78   Pulse: 100   Resp: 18   Temp: 98.5 °F (36.9 °C)   SpO2: 99%   Weight: 110.9 kg (244 lb 9.6 oz)   Height: 1.651 m (5' 5\")        Constitutional: She is oriented to person, place, and time. She appears well-developed and well-nourished. No distress.   HENT:   Head: Normocephalic and atraumatic.   Mouth/Throat: Uvula is midline, oropharynx is clear and moist and mucous membranes are normal.   Eyes: Conjunctivae and EOM are normal.   Neck: Trachea normal and normal range of motion. Neck supple.   Cardiovascular: Normal rate, regular rhythm, normal heart sounds and intact distal pulses.    Pulmonary/Chest: Effort normal and breath sounds normal. No respiratory distress. She has no wheezes. She has no rales.   Abdominal: Soft, non-tender. Bowel sounds are normal.   Musculoskeletal: She exhibits no edema and no tenderness.   Neurological: She is alert and oriented to person, place, and time.    Skin: Skin is warm and dry. No rash noted. No erythema.   Psychiatric: She has a normal mood and affect. Her behavior is normal.       EKG Interpretation:  EKG Interpretation:  sinus rhythm, unchanged from previous tracings.  Reviewed by Dr. Ruvalcaba    Lab Review:  not applicable      Assessment:     43 y.o. patient with planned surgery as above.    Known risk factors for perioperative

## 2024-04-15 NOTE — PROGRESS NOTES
Name_______________________________________Printed:____________________  Date and time of surgery________________________Arrival Time:________________   1. The instructions given regarding when and if a patient needs to stop oral intake prior to surgery varies.Follow the specific instructions you were given                  _x__Nothing to eat or to drink after Midnight the night before.                   ____Carbo loading or instructions will be given to select patients-if you have been given those instructions -please do the following                           The evening before your surgery after dinner before midnight drink 40 ounces of gatorade.If you are diabetic use sugar free.  The morning of surgery drink 40 ounces of water.This needs to be finished 3 hours prior to your surgery start time.    2. Take the following pills with a small sip of water on the morning of surgery__pantoprazole, plaquenil, and pregablin_________________________________________________                  Do not take blood pressure medications ending in pril or sartan the sun prior to surgery or the morning of surgery. Dr Felder's patient are not to take any medications the AM of surgery.         3. Aspirin, Ibuprofen, Advil, Naproxen, Vitamin E and other Anti-inflammatory products and supplements should be stopped for 5 -7days before surgery or as directed by your physician.   4. Check with your Doctor regarding stopping Plavix, Coumadin,Eliquis, Lovenox,Effient,Pradaxa,Xarelto, Fragmin or other blood thinners and follow their instructions.   5. Do not smoke, and do not drink any alcoholic beverages 24 hours prior to surgery.  This includes NA Beer.Refrain from the usage of any recreational drugs.   6. You may brush your teeth and gargle the morning of surgery.  DO NOT SWALLOW WATER   7. You MUST make arrangements for a responsible adult to stay on site while you are here and take you home after your surgery. You will not be allowed to  Please bring picture ID and insurance card.             19.  Visit our web site for additional information:  [email or web address]              20.During flu season no children under the age of 14 are permitted in the hospital for the safety of all patients.                              21. If you take a long acting insulin in the evening only  take half of your usual  dose the night  before your procedure              22. If you use a c-pap please bring DOS if staying overnight,             23.For your convenience [facility] has a pharmacy on site to fill your prescriptions.             24. If you use oxygen and have a portable tank please bring it  with you the DOS             25. Bring a complete list of all your medications with name and dose include any supplements.             26. Other__________________________________________   *Please call pre admission testing if you any further questions   [place]         [phone]   [place] [phone]   [place]            [phone]    [facility]  [phone]   [facility]   [phone]       VISITOR POLICY(subject to change)    Current policy is 2 visitors per patient. No children. Mask is  at the discretion of the facility. Visiting hours are 8a-8p. Overnight visitors will be at the discretion of the nurse. All policies subject to change.      All above information reviewed with patient in person or by phone.Patient verbalizes understanding.All questions and concerns addressed.                                                                                                 Patient/Rep__patient__________________                                                                                                                                    PRE OP INSTRUCTIONS

## 2024-04-16 LAB
BACTERIA UR CULT: ABNORMAL
BACTERIA UR CULT: ABNORMAL
ORGANISM: ABNORMAL
ORGANISM: ABNORMAL

## 2024-04-17 ENCOUNTER — PATIENT MESSAGE (OUTPATIENT)
Dept: ORTHOPEDIC SURGERY | Age: 44
End: 2024-04-17

## 2024-04-17 ENCOUNTER — TELEPHONE (OUTPATIENT)
Dept: ORTHOPEDIC SURGERY | Age: 44
End: 2024-04-17

## 2024-04-17 LAB — MRSA SPEC QL CULT: NORMAL

## 2024-04-17 NOTE — TELEPHONE ENCOUNTER
From: Savita Kohler  To: Dr. Usama Bender  Sent: 4/17/2024 6:57 AM EDT  Subject: Test results     Hi Emilia,    Could you please advise on my test results? I’m freaking about all of these abnormal results.     Thanks,  Savita

## 2024-04-17 NOTE — TELEPHONE ENCOUNTER
From: Savita Kohler  To: Dr. Usama Bender  Sent: 4/17/2024 8:26 AM EDT  Subject: Surgery Time    Hi Krunal,    Could you also confirm my time for Friday? I was told that I needed to arrive at 8a and surgery would start at 10a. The lady also said that she wasn’t supposed to tell me and to follow up with you. Lol.

## 2024-04-18 ENCOUNTER — ANESTHESIA EVENT (OUTPATIENT)
Dept: OPERATING ROOM | Age: 44
End: 2024-04-18
Payer: MEDICAID

## 2024-04-18 DIAGNOSIS — G89.18 POST-OP PAIN: Primary | ICD-10-CM

## 2024-04-18 RX ORDER — HYDROCODONE BITARTRATE AND ACETAMINOPHEN 10; 325 MG/1; MG/1
1 TABLET ORAL EVERY 4 HOURS PRN
Qty: 30 TABLET | Refills: 0 | Status: SHIPPED | OUTPATIENT
Start: 2024-04-19 | End: 2024-04-24

## 2024-04-18 RX ORDER — ONDANSETRON 4 MG/1
4 TABLET, FILM COATED ORAL EVERY 8 HOURS PRN
Qty: 21 TABLET | Refills: 0 | Status: SHIPPED | OUTPATIENT
Start: 2024-04-19

## 2024-04-18 NOTE — ANESTHESIA PRE PROCEDURE
\"POCGLU\", \"POCNA\", \"POCK\", \"POCCL\", \"POCBUN\", \"POCHEMO\", \"POCHCT\" in the last 72 hours.    Coags:   Lab Results   Component Value Date/Time    PROTIME 13.6 04/15/2024 11:24 AM    INR 1.02 04/15/2024 11:24 AM    APTT 29.7 04/15/2024 11:24 AM       HCG (If Applicable):   Lab Results   Component Value Date    PREGTESTUR Negative 06/12/2018        ABGs: No results found for: \"PHART\", \"PO2ART\", \"JOF5RLT\", \"JDN7YEP\", \"BEART\", \"C0DBBUZF\"     Type & Screen (If Applicable):  No results found for: \"LABABO\", \"LABRH\"    Drug/Infectious Status (If Applicable):  No results found for: \"HIV\", \"HEPCAB\"    COVID-19 Screening (If Applicable):   Lab Results   Component Value Date/Time    COVID19 Negative 12/18/2023 06:14 PM    COVID19 Detected 06/22/2022 04:09 PM    COVID19 DETECTED 12/30/2020 08:33 AM           Anesthesia Evaluation  Patient summary reviewed and Nursing notes reviewed   no history of anesthetic complications:   Airway: Mallampati: I  TM distance: >3 FB   Neck ROM: full  Mouth opening: > = 3 FB   Dental: normal exam         Pulmonary:normal exam    (+)   shortness of breath:   sleep apnea:                                  Cardiovascular:    (+) hypertension:        Rhythm: regular  Rate: normal                    Neuro/Psych:   (+) neuromuscular disease:, headaches:, psychiatric history:            GI/Hepatic/Renal:   (+) GERD:          Endo/Other:    (+) : arthritis:..                 Abdominal: normal exam            Vascular:          Other Findings:         Anesthesia Plan      general     ASA 3       Induction: intravenous.    MIPS: Postoperative opioids intended.  Anesthetic plan and risks discussed with patient.    Use of blood products discussed with patient whom consented to blood products.    Plan discussed with CRNA.    Attending anesthesiologist reviewed and agrees with Preprocedure content              Puma Brower MD   4/18/2024

## 2024-04-18 NOTE — DISCHARGE INSTRUCTIONS
Orthopedic Surgery Discharge Instructions    Medications:   A pain medication has been prescribed for you.  Please take this as instructed by the pharmacist.  An anti-nausea medication has been prescribed for you to use as needed for nausea.  Either aspirin or a blood thinner medication may have been prescribed for you.  Please take this as instructed.    Activity:  Non weightbearing with crutches. Must remain in knee brace locked in extension at all times while ambulating.  When not ambulating may unlock the brace to 90 degrees.  You should sleep in the brace for the first 2 weeks.  3 times a day you should unlock the brace and dangle from the side of a bed or chair so that your knee is bent to 90 degrees.    Incision/dressings:  You may remove your dressing in 72 hours.  Until then the dressing needs to remain clean and dry.  Following removal dressing please apply dry dressing daily.  You may shower in 72 hours and allow the water to run over the incision.  However no submerging underwater or getting in pools.    Follow-up:  If you do not already have a postoperative follow-up appointment scheduled you should call to schedule an appointment within 10 to 14 days of surgery.    Emergency or after hours questions:  Please call the main call center at [phone] for all questions or concerns during evening and weekend hours.  The call center will direct your call to the on-call physician to answer your questions and concerns. During weekly office hours you may call my assistant, Ivonne, at [phone].                           Usama Bender MD            Orthopaedic Surgery Sports Medicine and Arthroscopy            [facility]            Team Physician [facility])      ORTHOPEDIC/PODIATRY DISCHARGE INSTRUCTIONS    Follow your surgeons instructions.  Make follow-up

## 2024-04-19 ENCOUNTER — APPOINTMENT (OUTPATIENT)
Dept: GENERAL RADIOLOGY | Age: 44
End: 2024-04-19
Attending: ORTHOPAEDIC SURGERY
Payer: MEDICAID

## 2024-04-19 ENCOUNTER — HOSPITAL ENCOUNTER (OUTPATIENT)
Age: 44
Setting detail: OUTPATIENT SURGERY
Discharge: HOME | End: 2024-04-19
Attending: ORTHOPAEDIC SURGERY | Admitting: ORTHOPAEDIC SURGERY
Payer: MEDICAID

## 2024-04-19 ENCOUNTER — ANESTHESIA (OUTPATIENT)
Dept: OPERATING ROOM | Age: 44
End: 2024-04-19
Payer: MEDICAID

## 2024-04-19 VITALS
HEART RATE: 91 BPM | SYSTOLIC BLOOD PRESSURE: 108 MMHG | TEMPERATURE: 98.2 F | BODY MASS INDEX: 40.62 KG/M2 | RESPIRATION RATE: 18 BRPM | WEIGHT: 243.8 LBS | HEIGHT: 65 IN | DIASTOLIC BLOOD PRESSURE: 74 MMHG | OXYGEN SATURATION: 95 %

## 2024-04-19 PROCEDURE — 7100000010 HC PHASE II RECOVERY - FIRST 15 MIN: Performed by: ORTHOPAEDIC SURGERY

## 2024-04-19 PROCEDURE — 3600000014 HC SURGERY LEVEL 4 ADDTL 15MIN: Performed by: ORTHOPAEDIC SURGERY

## 2024-04-19 PROCEDURE — 6360000002 HC RX W HCPCS: Performed by: NURSE ANESTHETIST, CERTIFIED REGISTERED

## 2024-04-19 PROCEDURE — 2500000003 HC RX 250 WO HCPCS: Performed by: ORTHOPAEDIC SURGERY

## 2024-04-19 PROCEDURE — 2720000010 HC SURG SUPPLY STERILE: Performed by: ORTHOPAEDIC SURGERY

## 2024-04-19 PROCEDURE — 2500000003 HC RX 250 WO HCPCS: Performed by: NURSE ANESTHETIST, CERTIFIED REGISTERED

## 2024-04-19 PROCEDURE — 6360000002 HC RX W HCPCS: Performed by: PHYSICIAN ASSISTANT

## 2024-04-19 PROCEDURE — 3700000001 HC ADD 15 MINUTES (ANESTHESIA): Performed by: ORTHOPAEDIC SURGERY

## 2024-04-19 PROCEDURE — 7100000001 HC PACU RECOVERY - ADDTL 15 MIN: Performed by: ORTHOPAEDIC SURGERY

## 2024-04-19 PROCEDURE — C1713 ANCHOR/SCREW BN/BN,TIS/BN: HCPCS | Performed by: ORTHOPAEDIC SURGERY

## 2024-04-19 PROCEDURE — 2580000003 HC RX 258: Performed by: STUDENT IN AN ORGANIZED HEALTH CARE EDUCATION/TRAINING PROGRAM

## 2024-04-19 PROCEDURE — L1810 KO ELASTIC WITH JOINTS: HCPCS | Performed by: ORTHOPAEDIC SURGERY

## 2024-04-19 PROCEDURE — 2580000003 HC RX 258: Performed by: ORTHOPAEDIC SURGERY

## 2024-04-19 PROCEDURE — 6370000000 HC RX 637 (ALT 250 FOR IP): Performed by: STUDENT IN AN ORGANIZED HEALTH CARE EDUCATION/TRAINING PROGRAM

## 2024-04-19 PROCEDURE — 3700000000 HC ANESTHESIA ATTENDED CARE: Performed by: ORTHOPAEDIC SURGERY

## 2024-04-19 PROCEDURE — 73560 X-RAY EXAM OF KNEE 1 OR 2: CPT

## 2024-04-19 PROCEDURE — C1776 JOINT DEVICE (IMPLANTABLE): HCPCS | Performed by: ORTHOPAEDIC SURGERY

## 2024-04-19 PROCEDURE — 2580000003 HC RX 258: Performed by: PHYSICIAN ASSISTANT

## 2024-04-19 PROCEDURE — 3600000004 HC SURGERY LEVEL 4 BASE: Performed by: ORTHOPAEDIC SURGERY

## 2024-04-19 PROCEDURE — 7100000011 HC PHASE II RECOVERY - ADDTL 15 MIN: Performed by: ORTHOPAEDIC SURGERY

## 2024-04-19 PROCEDURE — 6360000002 HC RX W HCPCS: Performed by: ORTHOPAEDIC SURGERY

## 2024-04-19 PROCEDURE — 2709999900 HC NON-CHARGEABLE SUPPLY: Performed by: ORTHOPAEDIC SURGERY

## 2024-04-19 PROCEDURE — 7100000000 HC PACU RECOVERY - FIRST 15 MIN: Performed by: ORTHOPAEDIC SURGERY

## 2024-04-19 DEVICE — COMPONENT FEM W11XL21.5MM TAPR POST DISP FOR RESURF SYS: Type: IMPLANTABLE DEVICE | Site: KNEE | Status: FUNCTIONAL

## 2024-04-19 DEVICE — IMPLANTABLE DEVICE: Type: IMPLANTABLE DEVICE | Site: KNEE | Status: FUNCTIONAL

## 2024-04-19 DEVICE — SYSTEM IMPL MENISCAL ROOT REPAIR W/SWIVELOCK: Type: IMPLANTABLE DEVICE | Site: KNEE | Status: FUNCTIONAL

## 2024-04-19 DEVICE — BUTTON SUT DIA12MM TI FOR PRI BKUP FIBERWIRE FIX OF ACL PCL: Type: IMPLANTABLE DEVICE | Site: KNEE | Status: FUNCTIONAL

## 2024-04-19 DEVICE — CEMENT BNE 40GM FULL DOSE PMMA W/ GENT HI VISC RADPQ LNG: Type: IMPLANTABLE DEVICE | Site: KNEE | Status: FUNCTIONAL

## 2024-04-19 RX ORDER — HYDROMORPHONE HYDROCHLORIDE 2 MG/ML
0.5 INJECTION, SOLUTION INTRAMUSCULAR; INTRAVENOUS; SUBCUTANEOUS EVERY 5 MIN PRN
Status: DISCONTINUED | OUTPATIENT
Start: 2024-04-19 | End: 2024-04-19 | Stop reason: HOSPADM

## 2024-04-19 RX ORDER — SODIUM CHLORIDE 0.9 % (FLUSH) 0.9 %
5-40 SYRINGE (ML) INJECTION EVERY 12 HOURS SCHEDULED
Status: DISCONTINUED | OUTPATIENT
Start: 2024-04-19 | End: 2024-04-19 | Stop reason: HOSPADM

## 2024-04-19 RX ORDER — DEXAMETHASONE SODIUM PHOSPHATE 4 MG/ML
INJECTION, SOLUTION INTRA-ARTICULAR; INTRALESIONAL; INTRAMUSCULAR; INTRAVENOUS; SOFT TISSUE PRN
Status: DISCONTINUED | OUTPATIENT
Start: 2024-04-19 | End: 2024-04-19 | Stop reason: SDUPTHER

## 2024-04-19 RX ORDER — ONDANSETRON 2 MG/ML
4 INJECTION INTRAMUSCULAR; INTRAVENOUS
Status: DISCONTINUED | OUTPATIENT
Start: 2024-04-19 | End: 2024-04-19 | Stop reason: HOSPADM

## 2024-04-19 RX ORDER — MIDAZOLAM HYDROCHLORIDE 1 MG/ML
INJECTION INTRAMUSCULAR; INTRAVENOUS PRN
Status: DISCONTINUED | OUTPATIENT
Start: 2024-04-19 | End: 2024-04-19 | Stop reason: SDUPTHER

## 2024-04-19 RX ORDER — LABETALOL HYDROCHLORIDE 5 MG/ML
10 INJECTION, SOLUTION INTRAVENOUS
Status: DISCONTINUED | OUTPATIENT
Start: 2024-04-19 | End: 2024-04-19 | Stop reason: HOSPADM

## 2024-04-19 RX ORDER — SODIUM CHLORIDE, SODIUM LACTATE, POTASSIUM CHLORIDE, CALCIUM CHLORIDE 600; 310; 30; 20 MG/100ML; MG/100ML; MG/100ML; MG/100ML
INJECTION, SOLUTION INTRAVENOUS CONTINUOUS
Status: DISCONTINUED | OUTPATIENT
Start: 2024-04-19 | End: 2024-04-19 | Stop reason: HOSPADM

## 2024-04-19 RX ORDER — IPRATROPIUM BROMIDE AND ALBUTEROL SULFATE 2.5; .5 MG/3ML; MG/3ML
1 SOLUTION RESPIRATORY (INHALATION)
Status: DISCONTINUED | OUTPATIENT
Start: 2024-04-19 | End: 2024-04-19 | Stop reason: HOSPADM

## 2024-04-19 RX ORDER — KETAMINE HYDROCHLORIDE 10 MG/ML
INJECTION, SOLUTION INTRAMUSCULAR; INTRAVENOUS PRN
Status: DISCONTINUED | OUTPATIENT
Start: 2024-04-19 | End: 2024-04-19 | Stop reason: SDUPTHER

## 2024-04-19 RX ORDER — FAMOTIDINE 10 MG/ML
INJECTION, SOLUTION INTRAVENOUS PRN
Status: DISCONTINUED | OUTPATIENT
Start: 2024-04-19 | End: 2024-04-19 | Stop reason: SDUPTHER

## 2024-04-19 RX ORDER — HYDROMORPHONE HYDROCHLORIDE 2 MG/ML
0.25 INJECTION, SOLUTION INTRAMUSCULAR; INTRAVENOUS; SUBCUTANEOUS EVERY 5 MIN PRN
Status: DISCONTINUED | OUTPATIENT
Start: 2024-04-19 | End: 2024-04-19 | Stop reason: HOSPADM

## 2024-04-19 RX ORDER — ONDANSETRON 2 MG/ML
INJECTION INTRAMUSCULAR; INTRAVENOUS PRN
Status: DISCONTINUED | OUTPATIENT
Start: 2024-04-19 | End: 2024-04-19 | Stop reason: SDUPTHER

## 2024-04-19 RX ORDER — LIDOCAINE HYDROCHLORIDE 20 MG/ML
INJECTION, SOLUTION INFILTRATION; PERINEURAL PRN
Status: DISCONTINUED | OUTPATIENT
Start: 2024-04-19 | End: 2024-04-19 | Stop reason: SDUPTHER

## 2024-04-19 RX ORDER — OXYCODONE HYDROCHLORIDE 5 MG/1
10 TABLET ORAL PRN
Status: COMPLETED | OUTPATIENT
Start: 2024-04-19 | End: 2024-04-19

## 2024-04-19 RX ORDER — OXYCODONE HYDROCHLORIDE 5 MG/1
5 TABLET ORAL PRN
Status: COMPLETED | OUTPATIENT
Start: 2024-04-19 | End: 2024-04-19

## 2024-04-19 RX ORDER — SODIUM CHLORIDE 0.9 % (FLUSH) 0.9 %
5-40 SYRINGE (ML) INJECTION PRN
Status: DISCONTINUED | OUTPATIENT
Start: 2024-04-19 | End: 2024-04-19 | Stop reason: HOSPADM

## 2024-04-19 RX ORDER — FENTANYL CITRATE 50 UG/ML
INJECTION, SOLUTION INTRAMUSCULAR; INTRAVENOUS PRN
Status: DISCONTINUED | OUTPATIENT
Start: 2024-04-19 | End: 2024-04-19 | Stop reason: SDUPTHER

## 2024-04-19 RX ORDER — MAGNESIUM SULFATE HEPTAHYDRATE 500 MG/ML
INJECTION, SOLUTION INTRAMUSCULAR; INTRAVENOUS PRN
Status: DISCONTINUED | OUTPATIENT
Start: 2024-04-19 | End: 2024-04-19 | Stop reason: SDUPTHER

## 2024-04-19 RX ORDER — SODIUM CHLORIDE 9 MG/ML
INJECTION, SOLUTION INTRAVENOUS PRN
Status: DISCONTINUED | OUTPATIENT
Start: 2024-04-19 | End: 2024-04-19 | Stop reason: HOSPADM

## 2024-04-19 RX ORDER — CELECOXIB 200 MG/1
200 CAPSULE ORAL ONCE
Status: COMPLETED | OUTPATIENT
Start: 2024-04-19 | End: 2024-04-19

## 2024-04-19 RX ORDER — HALOPERIDOL 5 MG/ML
1 INJECTION INTRAMUSCULAR
Status: DISCONTINUED | OUTPATIENT
Start: 2024-04-19 | End: 2024-04-19 | Stop reason: HOSPADM

## 2024-04-19 RX ORDER — GLYCOPYRROLATE 0.2 MG/ML
INJECTION INTRAMUSCULAR; INTRAVENOUS PRN
Status: DISCONTINUED | OUTPATIENT
Start: 2024-04-19 | End: 2024-04-19 | Stop reason: SDUPTHER

## 2024-04-19 RX ORDER — DIPHENHYDRAMINE HYDROCHLORIDE 50 MG/ML
12.5 INJECTION INTRAMUSCULAR; INTRAVENOUS
Status: DISCONTINUED | OUTPATIENT
Start: 2024-04-19 | End: 2024-04-19 | Stop reason: HOSPADM

## 2024-04-19 RX ORDER — DEXMEDETOMIDINE HYDROCHLORIDE 100 UG/ML
INJECTION, SOLUTION INTRAVENOUS PRN
Status: DISCONTINUED | OUTPATIENT
Start: 2024-04-19 | End: 2024-04-19 | Stop reason: SDUPTHER

## 2024-04-19 RX ORDER — NALOXONE HYDROCHLORIDE 0.4 MG/ML
INJECTION, SOLUTION INTRAMUSCULAR; INTRAVENOUS; SUBCUTANEOUS PRN
Status: DISCONTINUED | OUTPATIENT
Start: 2024-04-19 | End: 2024-04-19 | Stop reason: HOSPADM

## 2024-04-19 RX ORDER — HYDRALAZINE HYDROCHLORIDE 20 MG/ML
10 INJECTION INTRAMUSCULAR; INTRAVENOUS
Status: DISCONTINUED | OUTPATIENT
Start: 2024-04-19 | End: 2024-04-19 | Stop reason: HOSPADM

## 2024-04-19 RX ORDER — ACETAMINOPHEN 500 MG
1000 TABLET ORAL ONCE
Status: COMPLETED | OUTPATIENT
Start: 2024-04-19 | End: 2024-04-19

## 2024-04-19 RX ORDER — ASPIRIN 81 MG/1
81 TABLET, CHEWABLE ORAL DAILY
COMMUNITY

## 2024-04-19 RX ORDER — HYDROMORPHONE HYDROCHLORIDE 2 MG/ML
INJECTION, SOLUTION INTRAMUSCULAR; INTRAVENOUS; SUBCUTANEOUS PRN
Status: DISCONTINUED | OUTPATIENT
Start: 2024-04-19 | End: 2024-04-19 | Stop reason: SDUPTHER

## 2024-04-19 RX ORDER — KETOROLAC TROMETHAMINE 30 MG/ML
INJECTION, SOLUTION INTRAMUSCULAR; INTRAVENOUS PRN
Status: DISCONTINUED | OUTPATIENT
Start: 2024-04-19 | End: 2024-04-19 | Stop reason: SDUPTHER

## 2024-04-19 RX ORDER — PROPOFOL 10 MG/ML
INJECTION, EMULSION INTRAVENOUS PRN
Status: DISCONTINUED | OUTPATIENT
Start: 2024-04-19 | End: 2024-04-19 | Stop reason: SDUPTHER

## 2024-04-19 RX ADMIN — HYDROMORPHONE HYDROCHLORIDE 0.4 MG: 2 INJECTION, SOLUTION INTRAMUSCULAR; INTRAVENOUS; SUBCUTANEOUS at 10:28

## 2024-04-19 RX ADMIN — DEXAMETHASONE SODIUM PHOSPHATE 8 MG: 4 INJECTION, SOLUTION INTRAMUSCULAR; INTRAVENOUS at 08:44

## 2024-04-19 RX ADMIN — MIDAZOLAM 2 MG: 1 INJECTION INTRAMUSCULAR; INTRAVENOUS at 08:31

## 2024-04-19 RX ADMIN — PHENYLEPHRINE HYDROCHLORIDE 100 MCG: 10 INJECTION INTRAVENOUS at 08:47

## 2024-04-19 RX ADMIN — DEXMEDETOMIDINE HYDROCHLORIDE 6 MCG: 100 INJECTION, SOLUTION INTRAVENOUS at 09:10

## 2024-04-19 RX ADMIN — KETOROLAC TROMETHAMINE 15 MG: 30 INJECTION, SOLUTION INTRAMUSCULAR; INTRAVENOUS at 10:24

## 2024-04-19 RX ADMIN — PROPOFOL 200 MG: 10 INJECTION, EMULSION INTRAVENOUS at 08:37

## 2024-04-19 RX ADMIN — CELECOXIB 200 MG: 200 CAPSULE ORAL at 07:42

## 2024-04-19 RX ADMIN — PHENYLEPHRINE HYDROCHLORIDE 100 MCG: 10 INJECTION INTRAVENOUS at 08:41

## 2024-04-19 RX ADMIN — LIDOCAINE HYDROCHLORIDE 100 MG: 20 INJECTION, SOLUTION INFILTRATION; PERINEURAL at 08:37

## 2024-04-19 RX ADMIN — CEFAZOLIN 2000 MG: 2 INJECTION, POWDER, FOR SOLUTION INTRAMUSCULAR; INTRAVENOUS at 08:29

## 2024-04-19 RX ADMIN — HYDROMORPHONE HYDROCHLORIDE 0.4 MG: 2 INJECTION, SOLUTION INTRAMUSCULAR; INTRAVENOUS; SUBCUTANEOUS at 09:32

## 2024-04-19 RX ADMIN — DEXMEDETOMIDINE HYDROCHLORIDE 4 MCG: 100 INJECTION, SOLUTION INTRAVENOUS at 09:40

## 2024-04-19 RX ADMIN — FAMOTIDINE 20 MG: 10 INJECTION INTRAVENOUS at 08:17

## 2024-04-19 RX ADMIN — FENTANYL CITRATE 50 MCG: 50 INJECTION, SOLUTION INTRAMUSCULAR; INTRAVENOUS at 08:52

## 2024-04-19 RX ADMIN — SODIUM CHLORIDE, POTASSIUM CHLORIDE, SODIUM LACTATE AND CALCIUM CHLORIDE: 600; 310; 30; 20 INJECTION, SOLUTION INTRAVENOUS at 07:39

## 2024-04-19 RX ADMIN — HYDROMORPHONE HYDROCHLORIDE 0.4 MG: 2 INJECTION, SOLUTION INTRAMUSCULAR; INTRAVENOUS; SUBCUTANEOUS at 09:58

## 2024-04-19 RX ADMIN — HYDROMORPHONE HYDROCHLORIDE 0.4 MG: 2 INJECTION, SOLUTION INTRAMUSCULAR; INTRAVENOUS; SUBCUTANEOUS at 09:45

## 2024-04-19 RX ADMIN — DEXMEDETOMIDINE HYDROCHLORIDE 4 MCG: 100 INJECTION, SOLUTION INTRAVENOUS at 09:24

## 2024-04-19 RX ADMIN — GLYCOPYRROLATE 0.2 MG: 0.2 INJECTION, SOLUTION INTRAMUSCULAR; INTRAVENOUS at 08:35

## 2024-04-19 RX ADMIN — KETAMINE HYDROCHLORIDE 25 MG: 10 INJECTION, SOLUTION INTRAMUSCULAR; INTRAVENOUS at 09:05

## 2024-04-19 RX ADMIN — OXYCODONE 5 MG: 5 TABLET ORAL at 13:25

## 2024-04-19 RX ADMIN — ONDANSETRON 4 MG: 2 INJECTION INTRAMUSCULAR; INTRAVENOUS at 08:47

## 2024-04-19 RX ADMIN — HYDROMORPHONE HYDROCHLORIDE 0.2 MG: 2 INJECTION, SOLUTION INTRAMUSCULAR; INTRAVENOUS; SUBCUTANEOUS at 10:44

## 2024-04-19 RX ADMIN — MAGNESIUM SULFATE HEPTAHYDRATE 1 G: 500 INJECTION, SOLUTION INTRAMUSCULAR; INTRAVENOUS at 08:34

## 2024-04-19 RX ADMIN — DEXMEDETOMIDINE HYDROCHLORIDE 6 MCG: 100 INJECTION, SOLUTION INTRAVENOUS at 08:57

## 2024-04-19 RX ADMIN — KETAMINE HYDROCHLORIDE 25 MG: 10 INJECTION, SOLUTION INTRAMUSCULAR; INTRAVENOUS at 08:50

## 2024-04-19 RX ADMIN — FENTANYL CITRATE 50 MCG: 50 INJECTION, SOLUTION INTRAMUSCULAR; INTRAVENOUS at 08:37

## 2024-04-19 RX ADMIN — SODIUM CHLORIDE, POTASSIUM CHLORIDE, SODIUM LACTATE AND CALCIUM CHLORIDE: 600; 310; 30; 20 INJECTION, SOLUTION INTRAVENOUS at 10:13

## 2024-04-19 RX ADMIN — ACETAMINOPHEN 1000 MG: 500 TABLET ORAL at 07:41

## 2024-04-19 RX ADMIN — HYDROMORPHONE HYDROCHLORIDE 0.2 MG: 2 INJECTION, SOLUTION INTRAMUSCULAR; INTRAVENOUS; SUBCUTANEOUS at 10:11

## 2024-04-19 RX ADMIN — CEFAZOLIN 2000 MG: 2 INJECTION, POWDER, FOR SOLUTION INTRAMUSCULAR; INTRAVENOUS at 12:19

## 2024-04-19 ASSESSMENT — PAIN DESCRIPTION - DESCRIPTORS
DESCRIPTORS: ACHING;SORE
DESCRIPTORS: STABBING
DESCRIPTORS: ACHING;SORE
DESCRIPTORS: ACHING

## 2024-04-19 ASSESSMENT — PAIN DESCRIPTION - ORIENTATION
ORIENTATION: RIGHT

## 2024-04-19 ASSESSMENT — PAIN SCALES - GENERAL
PAINLEVEL_OUTOF10: 6
PAINLEVEL_OUTOF10: 3
PAINLEVEL_OUTOF10: 10
PAINLEVEL_OUTOF10: 5
PAINLEVEL_OUTOF10: 3
PAINLEVEL_OUTOF10: 6

## 2024-04-19 ASSESSMENT — PAIN - FUNCTIONAL ASSESSMENT
PAIN_FUNCTIONAL_ASSESSMENT: PREVENTS OR INTERFERES SOME ACTIVE ACTIVITIES AND ADLS
PAIN_FUNCTIONAL_ASSESSMENT: PREVENTS OR INTERFERES SOME ACTIVE ACTIVITIES AND ADLS

## 2024-04-19 ASSESSMENT — PAIN DESCRIPTION - FREQUENCY
FREQUENCY: CONTINUOUS
FREQUENCY: CONTINUOUS

## 2024-04-19 ASSESSMENT — PAIN DESCRIPTION - LOCATION
LOCATION: KNEE

## 2024-04-19 ASSESSMENT — PAIN DESCRIPTION - PAIN TYPE
TYPE: SURGICAL PAIN
TYPE: ACUTE PAIN

## 2024-04-19 ASSESSMENT — ENCOUNTER SYMPTOMS: SHORTNESS OF BREATH: 1

## 2024-04-19 NOTE — PROGRESS NOTES
Discharge instructions review with patient and mother Mallorie. Pt discharged via wheelchair. Pt discharged with 3 RX, CPAP, inhaler and all other belongings. Patient has walker and crutches at home. Mallorie taking stable pt home.

## 2024-04-19 NOTE — PROGRESS NOTES
Pt arrived from OR to PACU bay 7. Reported received from OR staff. Pt not arousable to voice. Surgical incisions dressings in place to right knee. Pt on simple mask and oral airway, NSR, and VSS.

## 2024-04-19 NOTE — PROGRESS NOTES
Pt awake and alert at this time. Pt on 2L NC, keeps falling asleep and sever sleep apnea. VSS. Pt has slight pain but tolerable and no nausea, tolerating PO. Skin warm, palpable pulses and able to wiggle right foot and toes. Pt meets criteria to be discharged from Phase 1.

## 2024-04-19 NOTE — ANESTHESIA POSTPROCEDURE EVALUATION
Department of Anesthesiology  Postprocedure Note    Patient: Savita Kohler  MRN: [id number]  YOB: 1980  Date of evaluation: 4/19/2024    Procedure Summary       Date: 04/19/24 Room / Location: [address]    Anesthesia Start: 0833 Anesthesia Stop: 1055    Procedures:       RIGHT KNEE ARTHROSCOPY, MEDIAL MENISCUS ROOT REPAIR,-ARTHREX, EXACTECH (Right: Knee)      OPEN PATELLOFEMORAL ARTHROPLASTY (Right: Knee) Diagnosis:       Complex tear of medial meniscus of right knee as current injury      Osteoarthritis of right knee      (Complex tear of medial meniscus of right knee as current injury [S83.231A])      (Osteoarthritis of right knee [M17.11])    Surgeons: Usama Bender MD Responsible Provider:     Anesthesia Type: general ASA Status: 3            Anesthesia Type: No value filed.    Aline Phase I: Aline Score: 7    Aline Phase II:      Anesthesia Post Evaluation    Patient location during evaluation: PACU  Patient participation: complete - patient participated  Level of consciousness: awake and alert  Pain score: 3  Airway patency: patent  Nausea & Vomiting: no nausea  Cardiovascular status: blood pressure returned to baseline  Respiratory status: acceptable  Hydration status: euvolemic  Pain management: adequate    No notable events documented.

## 2024-04-22 ENCOUNTER — HOSPITAL ENCOUNTER (OUTPATIENT)
Dept: PHYSICAL THERAPY | Age: 44
Setting detail: THERAPIES SERIES
Discharge: HOME | End: 2024-04-22
Attending: ORTHOPAEDIC SURGERY
Payer: MEDICAID

## 2024-04-22 ENCOUNTER — TELEPHONE (OUTPATIENT)
Dept: ORTHOPEDIC SURGERY | Age: 44
End: 2024-04-22

## 2024-04-22 DIAGNOSIS — S83.231A COMPLEX TEAR OF MEDIAL MENISCUS OF RIGHT KNEE AS CURRENT INJURY, INITIAL ENCOUNTER: Primary | ICD-10-CM

## 2024-04-22 DIAGNOSIS — M25.561 ACUTE PAIN OF RIGHT KNEE: ICD-10-CM

## 2024-04-22 DIAGNOSIS — M17.11 OSTEOARTHRITIS OF RIGHT PATELLOFEMORAL JOINT: ICD-10-CM

## 2024-04-22 PROCEDURE — 97110 THERAPEUTIC EXERCISES: CPT

## 2024-04-22 PROCEDURE — 97161 PT EVAL LOW COMPLEX 20 MIN: CPT

## 2024-04-22 NOTE — PLAN OF CARE
[facility] - Outpatient Rehabilitation and Therapy [address], [place] [address] office: [phone] fax: [phone]     Physical Therapy Initial Evaluation Certification      Dear Usama Bender MD,    We had the pleasure of evaluating the following patient for physical therapy services at [facility].  A summary of our findings can be found in the initial assessment below.  This includes our plan of care.  If you have any questions or concerns regarding these findings, please do not hesitate to contact me at the office phone number listed above.  Thank you for the referral.     Physician Signature:_______________________________Date:__________________  By signing above (or electronic signature), therapist’s plan is approved by physician       Physical Therapy: TREATMENT/PROGRESS NOTE   Patient: Savita Kohler (43 y.o. female)   Examination Date: 2024   :  1980 MRN: [id number]   Visit #: 1   Insurance Allowable Auth Needed   30 [x]Yes    []No    Insurance: Payor: Setup PL / Plan: Setup PLAN [place] / Product Type: *No Product type* /   Insurance ID: [id number] - (Medicaid Managed)  Secondary Insurance (if applicable):    Treatment Diagnosis:     ICD-10-CM    1. Complex tear of medial meniscus of right knee as current injury, initial encounter  S83.231A       2. Osteoarthritis of right patellofemoral joint  M17.11          Medical Diagnosis:  Right knee pain, unspecified chronicity [M25.561]   Referring Physician: Usama Bender MD  PCP: Nivia Badillo, ANDREA - CNP     Plan of care signed (Y/N):     Date of Patient follow up with Physician: 24     Progress Report/POC: EVAL today  POC update due: (10 visits /OR AUTH LIMITS, whichever is less)  2024                                             Precautions/ Contra-indications:           Latex allergy:  NO  Pacemaker:    NO  Contraindications

## 2024-04-22 NOTE — TELEPHONE ENCOUNTER
SPOKE WITH PATIENT. HAVING SOME \"CLICKING\" WHEN ROLLING OVER IN BED STILL. SHE IS AT PHYSICAL THERAPY, SO I SUGGESTED SHE KEEP AN EYE ON IT FOR NEXT COUPLE WEEKS UNTIL SHE CAN F/U IN OFFICE. IF IT HINDERS HER FROM BEING ABLE TO DO ADLS OR EXERCISES TO LET US KNOW. OTHERWISE, WE CAN RE EVALUATE AT HER PO APPT.  PATIENT EXPRESSED UNDERSTANDING.

## 2024-04-23 ENCOUNTER — PATIENT MESSAGE (OUTPATIENT)
Dept: ORTHOPEDIC SURGERY | Age: 44
End: 2024-04-23

## 2024-04-23 DIAGNOSIS — S83.231A COMPLEX TEAR OF MEDIAL MENISCUS OF RIGHT KNEE AS CURRENT INJURY, INITIAL ENCOUNTER: Primary | ICD-10-CM

## 2024-04-23 DIAGNOSIS — M17.11 OSTEOARTHRITIS OF RIGHT PATELLOFEMORAL JOINT: ICD-10-CM

## 2024-04-23 NOTE — OP NOTE
transtibial meniscus root repair it was significantly more technically challenging than a standard meniscus repair and required an additional 50 to 100% of operative time.    The case was technically challenging requiring a skilled assistant.  Therefore my PA, Dionicio Arthur, did assist me for all important aspects of the case.    Electronically signed by Usama Bender MD on 4/23/2024 at 12:25 PM

## 2024-04-25 NOTE — TELEPHONE ENCOUNTER
From: Savita Kohler  Sent: 4/25/2024 7:47 AM EDT  To: [facility]  Subject: Knee    Im struggling…I've been awake since 2:30a due to the pain. Im not sure what is different this time around. Could it be due to the increased bruising? It feels like I’m being poked with a needle and it’s burning. Ugh. I’m sorry for being such a complainer.

## 2024-04-26 RX ORDER — OXYCODONE HYDROCHLORIDE 5 MG/1
5 TABLET ORAL EVERY 6 HOURS PRN
Qty: 28 TABLET | Refills: 0 | Status: SHIPPED | OUTPATIENT
Start: 2024-04-26 | End: 2024-05-03

## 2024-04-27 DIAGNOSIS — R06.2 WHEEZING: ICD-10-CM

## 2024-04-27 DIAGNOSIS — R05.8 ALLERGIC COUGH: ICD-10-CM

## 2024-04-29 NOTE — TELEPHONE ENCOUNTER
Medication:   Requested Prescriptions     Pending Prescriptions Disp Refills    albuterol sulfate HFA (PROVENTIL;VENTOLIN;PROAIR) 108 (90 Base) MCG/ACT inhaler [Pharmacy Med Name: ALBUTEROL HFA INH(200 PUFFS) 18GM] 18 g 0     Sig: INHALE 2 PUFFS INTO THE LUNGS EVERY 6 HOURS AS NEEDED FOR WHEEZING        Last Filled:      Patient Phone Number: [phone] (home)     Last appt: 4/15/2024   Next appt: Visit date not found    Last OARRS:       10/11/2022     1:50 PM   RX Monitoring   Periodic Controlled Substance Monitoring No signs of potential drug abuse or diversion identified.

## 2024-04-30 RX ORDER — ALBUTEROL SULFATE 90 UG/1
AEROSOL, METERED RESPIRATORY (INHALATION)
Qty: 18 G | Refills: 1 | Status: SHIPPED | OUTPATIENT
Start: 2024-04-30

## 2024-05-01 ENCOUNTER — APPOINTMENT (OUTPATIENT)
Dept: PHYSICAL THERAPY | Age: 44
End: 2024-05-01
Attending: ORTHOPAEDIC SURGERY
Payer: MEDICAID

## 2024-05-02 ENCOUNTER — OFFICE VISIT (OUTPATIENT)
Dept: ORTHOPEDIC SURGERY | Age: 44
End: 2024-05-02

## 2024-05-02 VITALS — HEIGHT: 65 IN | WEIGHT: 242 LBS | BODY MASS INDEX: 40.32 KG/M2

## 2024-05-02 DIAGNOSIS — M25.561 RIGHT KNEE PAIN, UNSPECIFIED CHRONICITY: Primary | ICD-10-CM

## 2024-05-02 PROCEDURE — 99024 POSTOP FOLLOW-UP VISIT: CPT | Performed by: ORTHOPAEDIC SURGERY

## 2024-05-07 ENCOUNTER — HOSPITAL ENCOUNTER (OUTPATIENT)
Dept: PHYSICAL THERAPY | Age: 44
Setting detail: THERAPIES SERIES
Discharge: HOME | End: 2024-05-07
Attending: ORTHOPAEDIC SURGERY

## 2024-05-07 PROCEDURE — 97016 VASOPNEUMATIC DEVICE THERAPY: CPT

## 2024-05-07 PROCEDURE — 97140 MANUAL THERAPY 1/> REGIONS: CPT

## 2024-05-07 PROCEDURE — 97110 THERAPEUTIC EXERCISES: CPT

## 2024-05-07 NOTE — FLOWSHEET NOTE
[facility] - Outpatient Rehabilitation and Therapy [address], [place] [address] office: [phone] fax: [phone]           Physical Therapy: TREATMENT/PROGRESS NOTE   Patient: Savita Kohler (43 y.o. female)   Examination Date: 2024   :  1980 MRN: [id number]   Visit #: 2   Insurance Allowable Auth Needed   30 [x]Yes    []No    Insurance: Payor: /   Insurance ID: No Subscriber Number on File  Secondary Insurance (if applicable):    Treatment Diagnosis:   No diagnosis found.     Medical Diagnosis:  Right knee pain, unspecified chronicity [M25.561]   Referring Physician: Usama Bender MD  PCP: Nivia Badillo APRN - CNP     Plan of care signed (Y/N): Y    Date of Patient follow up with Physician: 24     Progress Report/POC: no  POC update due: (10 visits /OR AUTH LIMITS, whichever is less)  2024                                             Precautions/ Contra-indications:           Latex allergy:  NO  Pacemaker:    NO  Contraindications for Manipulation: None  Date of Surgery: 24  Other:    Red Flags:  None    C-SSRS Triggered by Intake questionnaire:   [x] No, Questionnaire did not trigger screening.   [] Yes, Patient intake triggered further evaluation      [] C-SSRS Screening completed  [] PCP notified via Plan of Care  [] Emergency services notified     Preferred Language for Healthcare:   [x] English       [] other:    SUBJECTIVE EXAMINATION     Patient stated complaint: R knee pain s/p menisectomy on 24.        Test used Initial score  2024   Pain Summary VAS 8 1   Functional questionnaire LEFS 4 / 95%    Other:              Pain:  Pain location: R knee   Patient describes pain to be Sharp and dull  Pain decreases with: Resting and Ice  Pain increases with: Activity and Movement     Relevant Medical History: L menisectomy 2023    Living status: lives in a multiple story home (26 steps total) with

## 2024-05-13 ENCOUNTER — APPOINTMENT (OUTPATIENT)
Dept: PHYSICAL THERAPY | Age: 44
End: 2024-05-13
Attending: ORTHOPAEDIC SURGERY
Payer: MEDICAID

## 2024-05-14 ENCOUNTER — HOSPITAL ENCOUNTER (OUTPATIENT)
Dept: PHYSICAL THERAPY | Age: 44
Setting detail: THERAPIES SERIES
Discharge: HOME | End: 2024-05-14
Attending: ORTHOPAEDIC SURGERY
Payer: MEDICAID

## 2024-05-14 DIAGNOSIS — S83.231A COMPLEX TEAR OF MEDIAL MENISCUS OF RIGHT KNEE AS CURRENT INJURY, INITIAL ENCOUNTER: Primary | ICD-10-CM

## 2024-05-14 PROCEDURE — 97140 MANUAL THERAPY 1/> REGIONS: CPT

## 2024-05-14 PROCEDURE — 97016 VASOPNEUMATIC DEVICE THERAPY: CPT

## 2024-05-14 PROCEDURE — 97110 THERAPEUTIC EXERCISES: CPT

## 2024-05-14 NOTE — FLOWSHEET NOTE
10-12 weeks  Disability index score of 10% or less for the LEFS to indicate reduced symptoms with ADLs for safe return to baseline function.                  Status: [] Progressing: [] Met: [] Not Met: [] Adjusted  Patient will demonstrate at least 130 degrees of knee flexion PROM to indicate improved ability to perform functional tasks.  Status: [] Progressing: [] Met: [] Not Met: [] Adjusted  Pt will demonstrate 5/5 strength of the R hip and knee in all planes during manual muscle testing to indicate sufficient strength for safety during functional tasks.  Status: [] Progressing: [] Met: [] Not Met: [] Adjusted  Pt will perform ADLs with less than 3/10 pain to indicate improved tolerance / ability to perform ADLs, work-related tasks, and/or hobbies.  Status: [] Progressing: [] Met: [] Not Met: [] Adjusted  Pt will return to ADLs without an increase in symptoms.                                                                                                 Status: [] Progressing: [] Met: [] Not Met: [] Adjusted  Pt will demonstrate the ability to self-manage minor symptoms if/when they do arise to promote independence throughout course of healing post-discharge.                                                                                 Status: [] Progressing: [] Met: [] Not Met: [] Adjusted  Pt will perform 5xSTS in 10 seconds or less to indicate improved mobility and decrease risk of falls.                                                                           Status: [] Progressing: [] Met: [] Not Met: [] Adjusted   8.   Pt will demonstrate ability to ambulate with WFL gait pattern with no AD by 12 weeks to return to baseline function.  Status: [] Progressing: [] Met: [] Not Met: [] Adjusted   Overall Progression Towards Functional goals/ Treatment Progress Update:  [] Patient is progressing as expected towards functional goals listed.    [] Progression is slowed due to complexities/Impairments

## 2024-05-14 NOTE — PROGRESS NOTES
2024     Reason for visit:  Status post right knee posterior meniscus root repair with open patellofemoral arthroplasty 24    History of Present Illness:  Patient returns postop evaluation. Overall she is doing well. Her pain has been well controlled. She denies any fever or chills.     Objective:  Ht 1.651 m (5' 5\")   Wt 109.8 kg (242 lb)   LMP 2018 (Exact Date)   BMI 40.27 kg/m²      Physical Exam:  The patient is well-appearing and in no apparent distress  Examination of the right knee   There is a small effusion, incision is well-healed.    Range of motion reveals 0 to 125  neg lachman, negative posterior drawer, no pain or laxity with varus or valgus stress at 0 degrees and 30 degrees of flexion  no joint line tenderness  5 out of 5 strength throughout distal muscle groups  Sensation is intact to light touch throughout all distributions  There is no calf swelling or tenderness  Palpable DP pulse, brisk cap refill, 2+ symmetric reflexes     Imagin view x-rays right knee were obtained the office today on 24 and reviewed.  No fracture or dislocation.  Postop change consistent with repair as well as the femoral arthroplasty.  No evidence loosening or failure      Assessment:  Status post right knee posterior meniscus root repair with open patellofemoral arthroplasty 24    Plan:  Overall the patient is doing well.  Continue nonweightbearing.  Continue physical therapy.  Return in 4 weeks for repeat evaluation.  Repeat x-rays at that time.                 Usama Bender MD            Orthopaedic Surgery Sports Medicine and Arthroscopy            [facility]            Team Physician [facility] ([place])      Disclaimer:  This note was dictated with voice recognition software.  Though review and correction are routine, we apologize for any errors.    I have seen and evaluated the patient and agree with the above assessment and

## 2024-05-21 ENCOUNTER — HOSPITAL ENCOUNTER (OUTPATIENT)
Dept: PHYSICAL THERAPY | Age: 44
Setting detail: THERAPIES SERIES
Discharge: HOME | End: 2024-05-21
Attending: ORTHOPAEDIC SURGERY
Payer: MEDICAID

## 2024-05-21 ENCOUNTER — TELEPHONE (OUTPATIENT)
Dept: ORTHOPEDIC SURGERY | Age: 44
End: 2024-05-21

## 2024-05-21 PROCEDURE — 97016 VASOPNEUMATIC DEVICE THERAPY: CPT

## 2024-05-21 PROCEDURE — 97530 THERAPEUTIC ACTIVITIES: CPT

## 2024-05-21 PROCEDURE — 97110 THERAPEUTIC EXERCISES: CPT

## 2024-05-21 PROCEDURE — 97140 MANUAL THERAPY 1/> REGIONS: CPT

## 2024-05-21 NOTE — PLAN OF CARE
Sharp and dull  Pain decreases with: Resting and Ice  Pain increases with: Activity and Movement     Relevant Medical History: L menisectomy (root repair) / PKR 8/30/2023     Living status: lives in a multiple story home (26 steps total) with handrails    Occupation/School:  Work/School Status: Full time  Job Duties/Demands: Sedentary    Sport/ Recreation/ Leisure/ Hobbies: walking    Review Of Systems (ROS):  [x] Performed Review of systems (Integumentary, CardioPulmonary, Neurological) by intake and observation. Intake form has been scanned into medical record. Patient has been instructed to contact their primary care physician regarding ROS issues if not already being addressed at this time.    [x] Patient history, allergies, meds reviewed. Medical chart reviewed. See intake form.     Co-morbidities/Complexities (which will affect course of rehabilitation):  []None        Arthritic conditions  [x] Rheumatoid arthritis (M05.9)  [x] Osteoarthritis (M19.91)  [] Gout        Neurological conditions  [] Prior Stroke (I69.30)  [] Parkinson's (G20)  [] Encephalopathy (G93.40)  [] Multiple Sclerosis (G35)  [] Post-polio (G14)  [] Spinal cord injury (SCI)  [] Traumatic brain injury (TBI)  [] ALS    Gastrointestinal conditions   [] Constipation (K59.00)  [] Chron's/ulcerative colitis    Endocrine conditions   [] Hypothyroid (E03.9)  [] Hyperthyroid [] Hx of COVID    Musculoskeletal conditions  [] Disc pathology   [] Congenital spine pathologies   [] Osteoporosis (M81.8)  [] Osteopenia (M85.8)  [] Scoliosis    Cardio/Pulmonary conditions  [] Asthma (J45)  [] Coughing   [] COPD (J44.9)  [] CHF  [] A-fib          Rheumatological conditions  [] Fibromyalgia (M79.7)  [] Lupus  [] Sjogrens  [] Ankylosing spondylitis  [] Other autoimmune       Metabolic conditions  [] Morbid obesity (E66.01)  [] Diabetes type 1(E10.65) or 2 (E11.65)   [] Neuropathy (G60.9)        Cardiovascular conditions  [x] Hypertension (I10)  [] Hyperlipidemia

## 2024-05-24 ENCOUNTER — HOSPITAL ENCOUNTER (OUTPATIENT)
Dept: PHYSICAL THERAPY | Age: 44
Setting detail: THERAPIES SERIES
Discharge: HOME | End: 2024-05-24
Attending: ORTHOPAEDIC SURGERY
Payer: MEDICAID

## 2024-05-24 PROCEDURE — 97140 MANUAL THERAPY 1/> REGIONS: CPT

## 2024-05-24 PROCEDURE — 97016 VASOPNEUMATIC DEVICE THERAPY: CPT

## 2024-05-24 PROCEDURE — 97110 THERAPEUTIC EXERCISES: CPT

## 2024-05-24 NOTE — PLAN OF CARE
[facility] - Outpatient Rehabilitation and Therapy [address], [place] [address] office: [phone] fax: [phone]           Physical Therapy: TREATMENT/PROGRESS NOTE   Patient: Savita Kohler (43 y.o. female)   Examination Date: 2024   :  1980 MRN: [id number]   Visit #: 5   Insurance Allowable Auth Needed   30 [x]Yes    []No    Insurance: Payor: [employer] [address] / Plan: [employer] [place]AN [place] / Product Type: *No Product type* /   Insurance ID: [id number] - (Medicaid Managed)  Secondary Insurance (if applicable):    Treatment Diagnosis:     ICD-10-CM    1. Complex tear of medial meniscus of right knee as current injury, initial encounter  S83.231A          Medical Diagnosis:  Right knee pain, unspecified chronicity [M25.561]   Referring Physician: Usama Bender MD  PCP: Nivia Badillo APRN - CNP     Plan of care signed (Y/N): Y    Date of Patient follow up with Physician: 24     Progress Report/POC: YES  POC update due: (10 visits /OR AUTH LIMITS, whichever is less)  24                                            Precautions/ Contra-indications:           Latex allergy:  NO  Pacemaker:    NO  Contraindications for Manipulation: recent surgical history (relative)  Date of Surgery: 24  Other:    Red Flags:  None    C-SSRS Triggered by Intake questionnaire:   [x] No, Questionnaire did not trigger screening.   [] Yes, Patient intake triggered further evaluation      [] C-SSRS Screening completed  [] PCP notified via Plan of Care  [] Emergency services notified     Preferred Language for Healthcare:   [x] English       [] other:    SUBJECTIVE EXAMINATION     Patient stated complaint: R knee pain s/p menisectomy (root repair) and PKR on 24.     : pt reports that she is having a lot of tightness.      Test used Initial score  2024   Pain Summary VAS 8 6   Functional questionnaire LEFS 4 / 95% 16 / 80%

## 2024-05-28 ENCOUNTER — APPOINTMENT (OUTPATIENT)
Dept: PHYSICAL THERAPY | Age: 44
End: 2024-05-28
Attending: ORTHOPAEDIC SURGERY
Payer: MEDICAID

## 2024-05-28 ENCOUNTER — HOSPITAL ENCOUNTER (OUTPATIENT)
Dept: PHYSICAL THERAPY | Age: 44
Setting detail: THERAPIES SERIES
Discharge: HOME | End: 2024-05-28
Attending: ORTHOPAEDIC SURGERY
Payer: MEDICAID

## 2024-05-28 PROCEDURE — 97140 MANUAL THERAPY 1/> REGIONS: CPT

## 2024-05-28 PROCEDURE — 97110 THERAPEUTIC EXERCISES: CPT

## 2024-05-28 PROCEDURE — 97016 VASOPNEUMATIC DEVICE THERAPY: CPT

## 2024-05-28 NOTE — FLOWSHEET NOTE
been slowed due to co-morbidities.  [] Plan just implemented, too soon (<30days) to assess goals progression   [] Goals require adjustment due to lack of progress  [] Patient is not progressing as expected and requires additional follow up with physician  [] Other:     TREATMENT PLAN     Frequency/Duration: 1-2x/week for 8-10 weeks for the following treatment interventions:    Interventions:  Therapeutic Exercise (17350) including: strength training, ROM, and functional mobility  Therapeutic Activities (98192) including: functional mobility training and education.  Neuromuscular Re-education (17647) activation and proprioception, including postural re-education.    Gait Training (93816) for normalization of ambulation patterns and AD training.   Manual Therapy (25795) as indicated to include: Passive Range of Motion, Soft Tissue Mobilization, Trigger Point Release, and Myofascial Release  Modalities as needed that may include: Cryotherapy and Vasoneumatic Compression  Patient education on joint protection, activity modification, and progression of HEP    Plan: POC initiated as per evaluation    Electronically Signed by Pam Delgado, PT  Date: 05/28/2024     Note: Portions of this note have been templated and/or copied from initial evaluation, reassessments and prior notes for documentation efficiency.    Note: If patient does not return for scheduled/recommended follow up visits, this note will serve as a discharge from care along with the most recent update on progress.

## 2024-05-30 ENCOUNTER — HOSPITAL ENCOUNTER (OUTPATIENT)
Dept: PHYSICAL THERAPY | Age: 44
Setting detail: THERAPIES SERIES
Discharge: HOME | End: 2024-05-30
Attending: ORTHOPAEDIC SURGERY
Payer: MEDICAID

## 2024-05-30 ENCOUNTER — OFFICE VISIT (OUTPATIENT)
Dept: ORTHOPEDIC SURGERY | Age: 44
End: 2024-05-30

## 2024-05-30 VITALS — HEIGHT: 65 IN | BODY MASS INDEX: 40.32 KG/M2 | WEIGHT: 242 LBS

## 2024-05-30 DIAGNOSIS — M25.561 RIGHT KNEE PAIN, UNSPECIFIED CHRONICITY: Primary | ICD-10-CM

## 2024-05-30 PROCEDURE — 97140 MANUAL THERAPY 1/> REGIONS: CPT

## 2024-05-30 PROCEDURE — 99024 POSTOP FOLLOW-UP VISIT: CPT | Performed by: ORTHOPAEDIC SURGERY

## 2024-05-30 PROCEDURE — 97016 VASOPNEUMATIC DEVICE THERAPY: CPT

## 2024-05-30 PROCEDURE — 97110 THERAPEUTIC EXERCISES: CPT

## 2024-05-30 PROCEDURE — 97530 THERAPEUTIC ACTIVITIES: CPT

## 2024-05-30 NOTE — FLOWSHEET NOTE
[facility] - Outpatient Rehabilitation and Therapy [address], [place] [address] office: [phone] fax: [phone]           Physical Therapy: TREATMENT/PROGRESS NOTE   Patient: Savita Kohler (43 y.o. female)   Examination Date: 2024   :  1980 MRN: [id number]   Visit #: 7   Insurance Allowable Auth Needed   30 [x]Yes    []No    Insurance: Payor: [employer] [place] / Plan: [employer] PLAN [place] / Product Type: *No Product type* /   Insurance ID: [id number] - (Medicaid Managed)  Secondary Insurance (if applicable):    Treatment Diagnosis:     ICD-10-CM    1. Complex tear of medial meniscus of right knee as current injury, initial encounter  S83.231A          Medical Diagnosis:  Right knee pain, unspecified chronicity [M25.561]   Referring Physician: Usama Bender MD  PCP: Nivia Badillo APRN - CNP     Plan of care signed (Y/N): Y    Date of Patient follow up with Physician: 24     Progress Report/POC: NO  POC update due: (10 visits /OR AUTH LIMITS, whichever is less)  24                                            Precautions/ Contra-indications:           Latex allergy:  NO  Pacemaker:    NO  Contraindications for Manipulation: recent surgical history (relative)  Date of Surgery: 24  Other:    Red Flags:  None    C-SSRS Triggered by Intake questionnaire:   [x] No, Questionnaire did not trigger screening.   [] Yes, Patient intake triggered further evaluation      [] C-SSRS Screening completed  [] PCP notified via Plan of Care  [] Emergency services notified     Preferred Language for Healthcare:   [x] English       [] other:    SUBJECTIVE EXAMINATION     Patient stated complaint: R knee pain s/p menisectomy (root repair) and PKR on 24.      - pt reports that she saw the doctor this am. He said she is allowing to begin PWB. Brace for 2 more weeks. He is not worried about the subluxing of the patella at this time.

## 2024-06-03 ENCOUNTER — HOSPITAL ENCOUNTER (OUTPATIENT)
Dept: PHYSICAL THERAPY | Age: 44
Setting detail: THERAPIES SERIES
Discharge: HOME | End: 2024-06-03
Attending: ORTHOPAEDIC SURGERY
Payer: COMMERCIAL

## 2024-06-03 PROCEDURE — 97140 MANUAL THERAPY 1/> REGIONS: CPT

## 2024-06-03 PROCEDURE — 97016 VASOPNEUMATIC DEVICE THERAPY: CPT

## 2024-06-03 PROCEDURE — 97110 THERAPEUTIC EXERCISES: CPT

## 2024-06-03 NOTE — FLOWSHEET NOTE
least 3+/5 strength of the R hip in all planes during manual muscle testing to indicate sufficient strength for safety during functional tasks.  Status: [x] Progressing: [] Met: [] Not Met: [] Adjusted      Long Term Goals: To be achieved in: 10-12 weeks  Disability index score of 10% or less for the LEFS to indicate reduced symptoms with ADLs for safe return to baseline function.                  Status: [x] Progressing: [] Met: [] Not Met: [] Adjusted  Patient will demonstrate at least 130 degrees of knee flexion PROM to indicate improved ability to perform functional tasks.  Status: [x] Progressing: [] Met: [] Not Met: [] Adjusted  Pt will demonstrate 5/5 strength of the R hip and knee in all planes during manual muscle testing to indicate sufficient strength for safety during functional tasks.  Status: [x] Progressing: [] Met: [] Not Met: [] Adjusted  Pt will perform ADLs with less than 3/10 pain to indicate improved tolerance / ability to perform ADLs, work-related tasks, and/or hobbies.  Status: [x] Progressing: [] Met: [] Not Met: [] Adjusted  Pt will return to ADLs without an increase in symptoms.                                                                                                 Status: [x] Progressing: [] Met: [] Not Met: [] Adjusted  Pt will demonstrate the ability to self-manage minor symptoms if/when they do arise to promote independence throughout course of healing post-discharge.                                                                                 Status: [x] Progressing: [] Met: [] Not Met: [] Adjusted  Pt will perform 5xSTS in 10 seconds or less to indicate improved mobility and decrease risk of falls.                                                                           Status: [x] Progressing: [] Met: [] Not Met: [] Adjusted   8.   Pt will demonstrate ability to ambulate with WFL gait pattern with no AD by 12 weeks to return to baseline function.  Status: [x]

## 2024-06-04 NOTE — PROGRESS NOTES
2024     Reason for visit:  Status post right knee posterior meniscus root repair with open patellofemoral arthroplasty 24    History of Present Illness:  Patient returns postop evaluation. Overall she is doing well. Her pain has been well controlled. She denies any fever or chills.     Objective:  Ht 1.651 m (5' 5\")   Wt 109.8 kg (242 lb)   LMP 2018 (Exact Date)   BMI 40.27 kg/m²      Physical Exam:  The patient is well-appearing and in no apparent distress  Examination of the right knee   There is a small effusion, incision is well-healed.    Range of motion reveals 0 to 125  neg lachman, negative posterior drawer, no pain or laxity with varus or valgus stress at 0 degrees and 30 degrees of flexion  no joint line tenderness  5 out of 5 strength throughout distal muscle groups  Sensation is intact to light touch throughout all distributions  There is no calf swelling or tenderness  Palpable DP pulse, brisk cap refill, 2+ symmetric reflexes     Imagin view x-rays right knee were obtained the office today on 24 and reviewed.  No fracture or dislocation.  Postop change consistent with repair as well as the femoral arthroplasty.  No evidence loosening or failure      Assessment:  Status post right knee posterior meniscus root repair with open patellofemoral arthroplasty 24    Plan:  Overall the patient is doing well.  We will initiate the weightbearing process.  Return in 6 weeks for repeat evaluation.                 Usama Bender MD            Orthopaedic Surgery Sports Medicine and Arthroscopy            [facility]            Team Physician [facility] ([place])      Disclaimer:  This note was dictated with voice recognition software.  Though review and correction are routine, we apologize for any errors.    I have seen and evaluated the patient and agree with the above assessment and plan.

## 2024-06-05 ENCOUNTER — OFFICE VISIT (OUTPATIENT)
Dept: FAMILY MEDICINE CLINIC | Age: 44
End: 2024-06-05

## 2024-06-05 ENCOUNTER — APPOINTMENT (OUTPATIENT)
Dept: PHYSICAL THERAPY | Age: 44
End: 2024-06-05
Attending: ORTHOPAEDIC SURGERY
Payer: COMMERCIAL

## 2024-06-05 VITALS
BODY MASS INDEX: 40.27 KG/M2 | WEIGHT: 242 LBS | DIASTOLIC BLOOD PRESSURE: 84 MMHG | HEART RATE: 97 BPM | SYSTOLIC BLOOD PRESSURE: 116 MMHG | OXYGEN SATURATION: 97 %

## 2024-06-05 DIAGNOSIS — F33.1 MODERATE EPISODE OF RECURRENT MAJOR DEPRESSIVE DISORDER (HCC): Primary | ICD-10-CM

## 2024-06-05 DIAGNOSIS — F41.9 ANXIETY: ICD-10-CM

## 2024-06-05 DIAGNOSIS — M06.00 SERONEGATIVE RHEUMATOID ARTHRITIS (HCC): ICD-10-CM

## 2024-06-05 DIAGNOSIS — S83.101S: ICD-10-CM

## 2024-06-05 DIAGNOSIS — E66.01 CLASS 3 SEVERE OBESITY DUE TO EXCESS CALORIES WITH SERIOUS COMORBIDITY AND BODY MASS INDEX (BMI) OF 40.0 TO 44.9 IN ADULT (HCC): ICD-10-CM

## 2024-06-05 PROBLEM — Z79.69 LONG TERM (CURRENT) USE OF OTHER IMMUNOMODULATORS AND IMMUNOSUPPRESSANTS: Status: ACTIVE | Noted: 2024-06-04

## 2024-06-05 PROBLEM — S83.101A SUBLUXATION OF RIGHT KNEE: Status: ACTIVE | Noted: 2024-06-05

## 2024-06-05 PROCEDURE — 3074F SYST BP LT 130 MM HG: CPT | Performed by: NURSE PRACTITIONER

## 2024-06-05 PROCEDURE — 99214 OFFICE O/P EST MOD 30 MIN: CPT | Performed by: NURSE PRACTITIONER

## 2024-06-05 PROCEDURE — 3079F DIAST BP 80-89 MM HG: CPT | Performed by: NURSE PRACTITIONER

## 2024-06-05 RX ORDER — SEMAGLUTIDE 0.25 MG/.5ML
0.25 INJECTION, SOLUTION SUBCUTANEOUS
Qty: 2 ML | Refills: 0 | Status: SHIPPED | OUTPATIENT
Start: 2024-06-05

## 2024-06-05 SDOH — ECONOMIC STABILITY: INCOME INSECURITY: HOW HARD IS IT FOR YOU TO PAY FOR THE VERY BASICS LIKE FOOD, HOUSING, MEDICAL CARE, AND HEATING?: NOT HARD AT ALL

## 2024-06-05 SDOH — ECONOMIC STABILITY: FOOD INSECURITY: WITHIN THE PAST 12 MONTHS, THE FOOD YOU BOUGHT JUST DIDN'T LAST AND YOU DIDN'T HAVE MONEY TO GET MORE.: NEVER TRUE

## 2024-06-05 SDOH — ECONOMIC STABILITY: FOOD INSECURITY: WITHIN THE PAST 12 MONTHS, YOU WORRIED THAT YOUR FOOD WOULD RUN OUT BEFORE YOU GOT MONEY TO BUY MORE.: NEVER TRUE

## 2024-06-05 ASSESSMENT — PATIENT HEALTH QUESTIONNAIRE - PHQ9
SUM OF ALL RESPONSES TO PHQ9 QUESTIONS 1 & 2: 6
SUM OF ALL RESPONSES TO PHQ QUESTIONS 1-9: 15
4. FEELING TIRED OR HAVING LITTLE ENERGY: NOT AT ALL
2. FEELING DOWN, DEPRESSED OR HOPELESS: NEARLY EVERY DAY
SUM OF ALL RESPONSES TO PHQ QUESTIONS 1-9: 15
SUM OF ALL RESPONSES TO PHQ QUESTIONS 1-9: 15
10. IF YOU CHECKED OFF ANY PROBLEMS, HOW DIFFICULT HAVE THESE PROBLEMS MADE IT FOR YOU TO DO YOUR WORK, TAKE CARE OF THINGS AT HOME, OR GET ALONG WITH OTHER PEOPLE: SOMEWHAT DIFFICULT
9. THOUGHTS THAT YOU WOULD BE BETTER OFF DEAD, OR OF HURTING YOURSELF: NOT AT ALL
3. TROUBLE FALLING OR STAYING ASLEEP: SEVERAL DAYS
6. FEELING BAD ABOUT YOURSELF - OR THAT YOU ARE A FAILURE OR HAVE LET YOURSELF OR YOUR FAMILY DOWN: NEARLY EVERY DAY
8. MOVING OR SPEAKING SO SLOWLY THAT OTHER PEOPLE COULD HAVE NOTICED. OR THE OPPOSITE, BEING SO FIGETY OR RESTLESS THAT YOU HAVE BEEN MOVING AROUND A LOT MORE THAN USUAL: NEARLY EVERY DAY
5. POOR APPETITE OR OVEREATING: NOT AT ALL
1. LITTLE INTEREST OR PLEASURE IN DOING THINGS: NEARLY EVERY DAY
7. TROUBLE CONCENTRATING ON THINGS, SUCH AS READING THE NEWSPAPER OR WATCHING TELEVISION: MORE THAN HALF THE DAYS
SUM OF ALL RESPONSES TO PHQ QUESTIONS 1-9: 15

## 2024-06-05 ASSESSMENT — ENCOUNTER SYMPTOMS: SHORTNESS OF BREATH: 0

## 2024-06-05 NOTE — PATIENT INSTRUCTIONS
Start Wegovy for weight loss 0.25 mg/weekly for 2 weeks., then increase to 0.5 mg/weekly for 4 weeks, will increased every month until max dose of 2.4 mg/weekly.

## 2024-06-05 NOTE — ASSESSMENT & PLAN NOTE
Uncontrolled, lifestyle modifications recommended and Trial Wegovy since new insurance   Lifestyle recommendations:  Weight loss, aerobic exercise, and start diet lower in saturated fats.   - Increase fresh fruits/vegetables, (baked) fish, chicken, turkey.   - Limit red meat to 1-2 times per month.   - Limit fast food to 1-2 times a month.   - Decrease carbohydrates and refined sugar.

## 2024-06-05 NOTE — PROGRESS NOTES
SUBJECTIVE:  Pt is a of 43 y.o. female comes in today with   Chief Complaint   Patient presents with    Mental Health Problem    Weight Loss     Presenting today for evaluation of  Mental health: she is compliant with Pristiq and Buspar. She is taking Buspar 30 mg once a day in evening. States anxiety is most severe when sun goes down. That helps temporarily for anxiety. Crying all the time. That started with recent complications for surgery. She has failed Lexapro and Viibryd in past.     Weight concerns: new insurance and wanting to start GLP for weight loss. Unable to exercise d/t knee. Diet much improved- smaller portions, mostly eliminated fast food, less snacking and healthier choices.   Wt Readings from Last 3 Encounters:   06/05/24 109.8 kg (242 lb)   05/30/24 109.8 kg (242 lb)   05/02/24 109.8 kg (242 lb)     Right knee subluxing with position changes. Right knee arthroscopy with meniscus repair 4/19/24, that is when knee cap started sliding out of position. She is in PT, no improvement at present. Per pt, may do repeat arthroscopy in future. Next appointment with Dr Rodriguez 7/7/24.      RA: managed by Dr Altamirano, last eval yesterday. Stopped Plaquenil. Still taking Rinvoq, Methotrexate and Lyrica.       Prior to Visit Medications    Medication Sig Taking? Authorizing Provider   Semaglutide-Weight Management (WEGOVY) 0.25 MG/0.5ML SOAJ SC injection Inject 0.25 mg into the skin every 7 days Yes Nivia Badillo APRN - CNP   albuterol sulfate HFA (PROVENTIL;VENTOLIN;PROAIR) 108 (90 Base) MCG/ACT inhaler INHALE 2 PUFFS INTO THE LUNGS EVERY 6 HOURS AS NEEDED FOR WHEEZING Yes Nivia Badillo APRN - CNP   diclofenac (VOLTAREN) 50 MG EC tablet Take 1 tablet by mouth 2 times daily (with meals) Yes González Arthur PA-C   aspirin 81 MG chewable tablet Take 1 tablet by mouth daily Yes Provider, MD Shelby   ondansetron (ZOFRAN) 4 MG tablet Take 1 tablet by mouth every 8 hours as needed for Nausea Yes Jerson

## 2024-06-10 ENCOUNTER — HOSPITAL ENCOUNTER (OUTPATIENT)
Dept: PHYSICAL THERAPY | Age: 44
Setting detail: THERAPIES SERIES
Discharge: HOME | End: 2024-06-10
Attending: ORTHOPAEDIC SURGERY
Payer: COMMERCIAL

## 2024-06-10 PROCEDURE — 97140 MANUAL THERAPY 1/> REGIONS: CPT

## 2024-06-10 PROCEDURE — 97110 THERAPEUTIC EXERCISES: CPT

## 2024-06-10 NOTE — FLOWSHEET NOTE
Status: [x] Progressing: [] Met: [] Not Met: [] Adjusted  Patient will demonstrate at least 130 degrees of knee flexion PROM to indicate improved ability to perform functional tasks.  Status: [x] Progressing: [] Met: [] Not Met: [] Adjusted  Pt will demonstrate 5/5 strength of the R hip and knee in all planes during manual muscle testing to indicate sufficient strength for safety during functional tasks.  Status: [x] Progressing: [] Met: [] Not Met: [] Adjusted  Pt will perform ADLs with less than 3/10 pain to indicate improved tolerance / ability to perform ADLs, work-related tasks, and/or hobbies.  Status: [x] Progressing: [] Met: [] Not Met: [] Adjusted  Pt will return to ADLs without an increase in symptoms.                                                                                                 Status: [x] Progressing: [] Met: [] Not Met: [] Adjusted  Pt will demonstrate the ability to self-manage minor symptoms if/when they do arise to promote independence throughout course of healing post-discharge.                                                                                 Status: [x] Progressing: [] Met: [] Not Met: [] Adjusted  Pt will perform 5xSTS in 10 seconds or less to indicate improved mobility and decrease risk of falls.                                                                           Status: [x] Progressing: [] Met: [] Not Met: [] Adjusted   8.   Pt will demonstrate ability to ambulate with WFL gait pattern with no AD by 12 weeks to return to baseline function.  Status: [x] Progressing: [] Met: [] Not Met: [] Adjusted   Overall Progression Towards Functional goals/ Treatment Progress Update:  [x] Patient is progressing as expected towards functional goals listed.    [] Progression is slowed due to complexities/Impairments listed.  [] Progression has been slowed due to co-morbidities.  [] Plan just implemented, too soon (<30days) to assess goals progression   [] Goals

## 2024-06-11 ENCOUNTER — TELEPHONE (OUTPATIENT)
Dept: ADMINISTRATIVE | Age: 44
End: 2024-06-11

## 2024-06-11 ENCOUNTER — TELEPHONE (OUTPATIENT)
Dept: FAMILY MEDICINE CLINIC | Age: 44
End: 2024-06-11

## 2024-06-11 NOTE — TELEPHONE ENCOUNTER
Submitted PA for Wegovy 0.25MG/0.5ML auto-injectors   Via CMM Key: [id number]  STATUS: DENIAL. LETTER UPLOADED IN MEDIA.    PLAN EXCLUSION.     If this requires a response please respond to the pool. (P [facility] MEDICINE Pre-Auth).    Please advise patient thank you.

## 2024-06-11 NOTE — TELEPHONE ENCOUNTER
Wegovy has been denied.  This medication is not on formulary.  Unfortunately there are no alternatives.

## 2024-06-12 ENCOUNTER — HOSPITAL ENCOUNTER (OUTPATIENT)
Dept: PHYSICAL THERAPY | Age: 44
Setting detail: THERAPIES SERIES
Discharge: HOME | End: 2024-06-12
Attending: ORTHOPAEDIC SURGERY
Payer: COMMERCIAL

## 2024-06-12 PROCEDURE — 97110 THERAPEUTIC EXERCISES: CPT

## 2024-06-12 PROCEDURE — 97140 MANUAL THERAPY 1/> REGIONS: CPT

## 2024-06-12 NOTE — FLOWSHEET NOTE
[facility] - Outpatient Rehabilitation and Therapy [address], [place] [address] office: [phone] fax: [phone]           Physical Therapy: TREATMENT/PROGRESS NOTE   Patient: Savita Kohler (43 y.o. female)   Examination Date: 2024   :  1980 MRN: [id number]   Visit #: 10   Insurance Allowable Auth Needed   30 [x]Yes    []No    Insurance: Payor: [employer] / Plan: [employer] - e(ye)BRAIN PLU / Product Type: *No Product type* /   Insurance ID: [id number] - (Commercial)  Secondary Insurance (if applicable):    Treatment Diagnosis:     ICD-10-CM    1. Complex tear of medial meniscus of right knee as current injury, initial encounter  S83.231A          Medical Diagnosis:  Right knee pain, unspecified chronicity [M25.561]   Referring Physician: Usama Bender MD  PCP: Nivia Badillo APRN - CNP     Plan of care signed (Y/N): Y    Date of Patient follow up with Physician: 24     Progress Report/POC: NO  POC update due: (10 visits /OR AUTH LIMITS, whichever is less)  24                                            Precautions/ Contra-indications:           Latex allergy:  NO  Pacemaker:    NO  Contraindications for Manipulation: recent surgical history (relative)  Date of Surgery: 24  Other:    Red Flags:  None    C-SSRS Triggered by Intake questionnaire:   [x] No, Questionnaire did not trigger screening.   [] Yes, Patient intake triggered further evaluation      [] C-SSRS Screening completed  [] PCP notified via Plan of Care  [] Emergency services notified     Preferred Language for Healthcare:   [x] English       [] other:    SUBJECTIVE EXAMINATION     Patient stated complaint: R knee pain s/p menisectomy (root repair) and PKR on 24.      - pt reports reports she is no longer having pain. Having a bit of tightness and still having popping but otherwise feeling okay.     Test used Initial score  2024   Pain Summary VAS 8 0

## 2024-06-13 DIAGNOSIS — F33.1 MODERATE EPISODE OF RECURRENT MAJOR DEPRESSIVE DISORDER (HCC): Primary | ICD-10-CM

## 2024-06-13 DIAGNOSIS — F33.1 MODERATE EPISODE OF RECURRENT MAJOR DEPRESSIVE DISORDER (HCC): ICD-10-CM

## 2024-06-13 RX ORDER — OXCARBAZEPINE 150 MG/1
150 TABLET, FILM COATED ORAL NIGHTLY
Qty: 30 TABLET | Refills: 1 | Status: SHIPPED | OUTPATIENT
Start: 2024-06-13

## 2024-06-13 RX ORDER — OXCARBAZEPINE 150 MG/1
150 TABLET, FILM COATED ORAL NIGHTLY
Qty: 90 TABLET | OUTPATIENT
Start: 2024-06-13

## 2024-06-14 ENCOUNTER — TELEPHONE (OUTPATIENT)
Dept: FAMILY MEDICINE CLINIC | Age: 44
End: 2024-06-14

## 2024-06-17 ENCOUNTER — APPOINTMENT (OUTPATIENT)
Dept: PHYSICAL THERAPY | Age: 44
End: 2024-06-17
Attending: ORTHOPAEDIC SURGERY
Payer: COMMERCIAL

## 2024-06-18 DIAGNOSIS — E66.01 CLASS 3 SEVERE OBESITY DUE TO EXCESS CALORIES WITH SERIOUS COMORBIDITY AND BODY MASS INDEX (BMI) OF 40.0 TO 44.9 IN ADULT (HCC): ICD-10-CM

## 2024-06-18 RX ORDER — SEMAGLUTIDE 0.25 MG/.5ML
0.25 INJECTION, SOLUTION SUBCUTANEOUS
Qty: 2 ML | Refills: 0 | OUTPATIENT
Start: 2024-06-18

## 2024-06-18 NOTE — TELEPHONE ENCOUNTER
Medication:   Requested Prescriptions     Pending Prescriptions Disp Refills    Semaglutide-Weight Management (WEGOVY) 0.25 MG/0.5ML SOAJ SC injection 2 mL 0     Sig: Inject 0.25 mg into the skin every 7 days       Last Filled:      Patient Phone Number: [phone] (home)     Last appt: 6/5/2024   Next appt: Visit date not found    Last Labs DM:   Lab Results   Component Value Date/Time    LABA1C 5.2 10/11/2023 08:48 AM

## 2024-06-19 ENCOUNTER — HOSPITAL ENCOUNTER (OUTPATIENT)
Dept: PHYSICAL THERAPY | Age: 44
Setting detail: THERAPIES SERIES
Discharge: HOME | End: 2024-06-19
Attending: ORTHOPAEDIC SURGERY
Payer: COMMERCIAL

## 2024-06-19 PROCEDURE — 97140 MANUAL THERAPY 1/> REGIONS: CPT

## 2024-06-19 PROCEDURE — 97110 THERAPEUTIC EXERCISES: CPT

## 2024-06-19 NOTE — FLOWSHEET NOTE
[facility] - Outpatient Rehabilitation and Therapy [address], [place] [address] office: [phone] fax: [phone]       Physical Therapy Re-Certification Plan of Care    Dear Usama Bender MD  ,    We had the pleasure of treating the following patient for physical therapy services at [facility]. A summary of our findings can be found in the updated assessment below.  This includes our plan of care.  If you have any questions or concerns regarding these findings, please do not hesitate to contact me at the office phone number checked above.  Thank you for the referral.     Physician Signature:________________________________Date:__________________  By signing above (or electronic signature), therapist's plan is approved by physician      Functional Outcome: DELMY Kohler 1980 continues to present with functional deficits in strength symmetry, endurance of strength, and muscle activation  limiting ability with walking on uneven ground, managing community ambulation, walking up/down stairs, don/doff shoes/socks, ADLs, light home activity, heavy home activity, and yardwork .  During therapy this date, patient required visual cueing, verbal cueing, progression of exercises and program, and manual interventions for exercise progression, modulating pain, and increasing ROM. Patient will continue to benefit from ongoing evaluation and advanced clinical decision from a Physical Therapist to improve pain control, muscle strength, and high level IADLs to safely return to [facility] without symptoms or restrictions.    Overall Response to Treatment:  Patient is responding well to treatment and improvement is noted with regards to goals    Total Visits: 11     Recommendation:    [x] Continue PT 2x / wk for 6-8 weeks.   [] Hold PT, pending MD visit   [] Discharge to [facility]. Follow up with PT or MD PRN.      Physical Therapy: TREATMENT/PROGRESS NOTE   Patient:

## 2024-06-24 ENCOUNTER — APPOINTMENT (OUTPATIENT)
Dept: PHYSICAL THERAPY | Age: 44
End: 2024-06-24
Attending: ORTHOPAEDIC SURGERY
Payer: COMMERCIAL

## 2024-06-25 ENCOUNTER — PATIENT MESSAGE (OUTPATIENT)
Dept: FAMILY MEDICINE CLINIC | Age: 44
End: 2024-06-25

## 2024-06-25 ENCOUNTER — OFFICE VISIT (OUTPATIENT)
Dept: ORTHOPEDIC SURGERY | Age: 44
End: 2024-06-25

## 2024-06-25 ENCOUNTER — TELEPHONE (OUTPATIENT)
Dept: ORTHOPEDIC SURGERY | Age: 44
End: 2024-06-25

## 2024-06-25 VITALS — BODY MASS INDEX: 40.32 KG/M2 | WEIGHT: 242 LBS | HEIGHT: 65 IN

## 2024-06-25 DIAGNOSIS — E66.01 CLASS 3 SEVERE OBESITY DUE TO EXCESS CALORIES WITH SERIOUS COMORBIDITY AND BODY MASS INDEX (BMI) OF 40.0 TO 44.9 IN ADULT (HCC): Primary | ICD-10-CM

## 2024-06-25 DIAGNOSIS — M25.561 RIGHT KNEE PAIN, UNSPECIFIED CHRONICITY: Primary | ICD-10-CM

## 2024-06-25 PROCEDURE — 99024 POSTOP FOLLOW-UP VISIT: CPT | Performed by: ORTHOPAEDIC SURGERY

## 2024-06-25 NOTE — TELEPHONE ENCOUNTER
From: Savita Kohler  To: Nivia Badillo  Sent: 6/25/2024 9:14 AM EDT  Subject: Wegovy    Good morning,    I reached out to [facility] to see if there was any way they would cover the Wegovy. They said if you submitted a prior auth followed by an appeal they may considered it. I know it’s a big ask but I’m desperate. Please advise either way.     Thanks,  Savita

## 2024-06-26 ENCOUNTER — APPOINTMENT (OUTPATIENT)
Dept: PHYSICAL THERAPY | Age: 44
End: 2024-06-26
Attending: ORTHOPAEDIC SURGERY
Payer: COMMERCIAL

## 2024-06-26 NOTE — PROGRESS NOTES
are routine, we apologize for any errors.    I have seen and evaluated the patient and agree with the above assessment and plan.

## 2024-06-27 DIAGNOSIS — R06.2 WHEEZING: ICD-10-CM

## 2024-06-27 DIAGNOSIS — R05.8 ALLERGIC COUGH: ICD-10-CM

## 2024-06-27 RX ORDER — ALBUTEROL SULFATE 90 UG/1
AEROSOL, METERED RESPIRATORY (INHALATION)
Qty: 18 G | Refills: 1 | Status: SHIPPED | OUTPATIENT
Start: 2024-06-27

## 2024-06-27 NOTE — TELEPHONE ENCOUNTER
Medication:   Requested Prescriptions     Pending Prescriptions Disp Refills    albuterol sulfate HFA (PROVENTIL;VENTOLIN;PROAIR) 108 (90 Base) MCG/ACT inhaler [Pharmacy Med Name: ALBUTEROL HFA INH(200 PUFFS) 18GM] 18 g 1     Sig: INHALE 2 PUFFS INTO THE LUNGS EVERY 6 HOURS AS NEEDED FOR WHEEZING        Last Filled: 18.1  04.30.2024     Patient Phone Number: [phone] (home)     Last appt: 6/5/2024   Next appt: Visit date not found    Last OARRS:       10/11/2022     1:50 PM   RX Monitoring   Periodic Controlled Substance Monitoring No signs of potential drug abuse or diversion identified.

## 2024-07-05 DIAGNOSIS — F41.8 DEPRESSION WITH ANXIETY: ICD-10-CM

## 2024-07-05 RX ORDER — DESVENLAFAXINE 100 MG/1
100 TABLET, EXTENDED RELEASE ORAL DAILY
Qty: 90 TABLET | Refills: 0 | Status: SHIPPED | OUTPATIENT
Start: 2024-07-05 | End: 2025-01-01

## 2024-07-05 NOTE — TELEPHONE ENCOUNTER
Medication:   Requested Prescriptions     Pending Prescriptions Disp Refills    desvenlafaxine succinate (PRISTIQ) 100 MG TB24 extended release tablet [Pharmacy Med Name: DESVENLAFAXINE ER SUCCINATE 100MG T] 90 tablet 0     Sig: TAKE 1 TABLET BY MOUTH DAILY        Last Filled:  04/01/2024    Patient Phone Number: [phone] (home)     Last appt: 6/5/2024   Next appt: Visit date not found    Last OARRS:       10/11/2022     1:50 PM   RX Monitoring   Periodic Controlled Substance Monitoring No signs of potential drug abuse or diversion identified.

## 2024-07-10 ENCOUNTER — HOSPITAL ENCOUNTER (OUTPATIENT)
Dept: CT IMAGING | Age: 44
Discharge: HOME | End: 2024-07-10
Payer: COMMERCIAL

## 2024-07-10 ENCOUNTER — HOSPITAL ENCOUNTER (OUTPATIENT)
Dept: INTERVENTIONAL RADIOLOGY/VASCULAR | Age: 44
Discharge: HOME | End: 2024-07-10
Payer: COMMERCIAL

## 2024-07-10 ENCOUNTER — TELEPHONE (OUTPATIENT)
Dept: ORTHOPEDIC SURGERY | Age: 44
End: 2024-07-10

## 2024-07-10 DIAGNOSIS — G25.81 RLS (RESTLESS LEGS SYNDROME): ICD-10-CM

## 2024-07-10 DIAGNOSIS — M25.561 RIGHT KNEE PAIN, UNSPECIFIED CHRONICITY: ICD-10-CM

## 2024-07-10 PROCEDURE — 73580 CONTRAST X-RAY OF KNEE JOINT: CPT

## 2024-07-10 PROCEDURE — 6360000004 HC RX CONTRAST MEDICATION: Performed by: RADIOLOGY

## 2024-07-10 PROCEDURE — 27369 NJX CNTRST KNE ARTHG/CT/MRI: CPT

## 2024-07-10 PROCEDURE — 2500000003 HC RX 250 WO HCPCS: Performed by: RADIOLOGY

## 2024-07-10 PROCEDURE — 73701 CT LOWER EXTREMITY W/DYE: CPT

## 2024-07-10 RX ORDER — ROPINIROLE 3 MG/1
TABLET, FILM COATED ORAL
Qty: 90 TABLET | Refills: 2 | Status: SHIPPED | OUTPATIENT
Start: 2024-07-10

## 2024-07-10 RX ORDER — LIDOCAINE HYDROCHLORIDE 10 MG/ML
5 INJECTION, SOLUTION EPIDURAL; INFILTRATION; INTRACAUDAL; PERINEURAL ONCE
Status: COMPLETED | OUTPATIENT
Start: 2024-07-10 | End: 2024-07-10

## 2024-07-10 RX ORDER — IOPAMIDOL 408 MG/ML
20 INJECTION, SOLUTION INTRATHECAL ONCE
Status: COMPLETED | OUTPATIENT
Start: 2024-07-10 | End: 2024-07-10

## 2024-07-10 RX ADMIN — LIDOCAINE HYDROCHLORIDE 5 ML: 10 INJECTION, SOLUTION EPIDURAL; INFILTRATION; INTRACAUDAL; PERINEURAL at 10:42

## 2024-07-10 RX ADMIN — IOPAMIDOL 20 ML: 408 INJECTION, SOLUTION INTRATHECAL at 10:42

## 2024-07-10 NOTE — BRIEF OP NOTE
Brief Postoperative Note    Savita Kohler  YOB: 1980  [id number]    Pre-operative Diagnosis: right knee pain    Post-operative Diagnosis: Same    Procedure: Fluoroscopic-guided right knee arthrogram    Anesthesia: Local    Surgeons: Kevin Cardozo MD    Estimated Blood Loss: Less than 5 mL    Complications: None    Specimens: Was Not Obtained    Findings: Successful right knee arthrogram.    Electronically signed by Kevin Cardozo MD on 7/10/2024 at 10:32 AM

## 2024-07-10 NOTE — TELEPHONE ENCOUNTER
Called patient she is scheduled for a f/u 7/11/24 patient scheduled for CT today .She has follow up  8:45 AM w/ MD     Advised patient that we can move her appointment to later in the afternoon to make sure we have results , patient is traveling out of town on Friday             Scheduled test before visit :  FL KNEE ARTHROGRAM , RIGHT CT KNEE RIGHT

## 2024-07-11 ENCOUNTER — TELEPHONE (OUTPATIENT)
Dept: ORTHOPEDIC SURGERY | Age: 44
End: 2024-07-11

## 2024-07-11 NOTE — TELEPHONE ENCOUNTER
Other    PATIENT CANCEL PO APPT FOR TODAY 7/11/24 AT 2 PM , PATIENT CAN'T TAKE OFF WORK , PATIENT WILL BE OUT OF TOWN WEEK OF THE 15 TH AND WILL BE RETURNING [place] OF THE 22 ND     PATIENT IS SHAD FOR 8/1/24    PLEASE CALL PATIENT [phone]

## 2024-07-31 DIAGNOSIS — M17.11 OSTEOARTHRITIS OF RIGHT PATELLOFEMORAL JOINT: ICD-10-CM

## 2024-07-31 DIAGNOSIS — F33.1 MODERATE EPISODE OF RECURRENT MAJOR DEPRESSIVE DISORDER (HCC): ICD-10-CM

## 2024-07-31 DIAGNOSIS — S83.231A COMPLEX TEAR OF MEDIAL MENISCUS OF RIGHT KNEE AS CURRENT INJURY, INITIAL ENCOUNTER: ICD-10-CM

## 2024-07-31 RX ORDER — OXCARBAZEPINE 150 MG/1
150 TABLET, FILM COATED ORAL NIGHTLY
Qty: 90 TABLET | Refills: 1 | Status: SHIPPED | OUTPATIENT
Start: 2024-07-31 | End: 2025-01-27

## 2024-07-31 NOTE — TELEPHONE ENCOUNTER
Medication:   Requested Prescriptions     Pending Prescriptions Disp Refills    OXcarbazepine (TRILEPTAL) 150 MG tablet [Pharmacy Med Name: OXCARBAZEPINE 150MG TABLETS] 30 tablet 1     Sig: TAKE 1 TABLET BY MOUTH EVERY NIGHT        Last Filled:      Patient Phone Number: [phone] (home)     Last appt: 6/5/2024   Next appt: Visit date not found    Last OARRS:       10/11/2022     1:50 PM   RX Monitoring   Periodic Controlled Substance Monitoring No signs of potential drug abuse or diversion identified.

## 2024-08-01 ENCOUNTER — OFFICE VISIT (OUTPATIENT)
Dept: ORTHOPEDIC SURGERY | Age: 44
End: 2024-08-01
Payer: COMMERCIAL

## 2024-08-01 DIAGNOSIS — M22.8X1 MALTRACKING OF RIGHT PATELLA: Primary | ICD-10-CM

## 2024-08-01 PROCEDURE — G8427 DOCREV CUR MEDS BY ELIG CLIN: HCPCS | Performed by: ORTHOPAEDIC SURGERY

## 2024-08-01 PROCEDURE — G8417 CALC BMI ABV UP PARAM F/U: HCPCS | Performed by: ORTHOPAEDIC SURGERY

## 2024-08-01 PROCEDURE — 99214 OFFICE O/P EST MOD 30 MIN: CPT | Performed by: ORTHOPAEDIC SURGERY

## 2024-08-01 PROCEDURE — 1036F TOBACCO NON-USER: CPT | Performed by: ORTHOPAEDIC SURGERY

## 2024-08-02 ENCOUNTER — PREP FOR PROCEDURE (OUTPATIENT)
Dept: ORTHOPEDIC SURGERY | Age: 44
End: 2024-08-02

## 2024-08-02 DIAGNOSIS — M22.8X1 PATELLAR TRACKING DISORDER, RIGHT: ICD-10-CM

## 2024-08-04 DIAGNOSIS — F41.8 DEPRESSION WITH ANXIETY: ICD-10-CM

## 2024-08-05 RX ORDER — DESVENLAFAXINE 100 MG/1
100 TABLET, EXTENDED RELEASE ORAL DAILY
Qty: 90 TABLET | Refills: 2 | Status: SHIPPED | OUTPATIENT
Start: 2024-08-05 | End: 2025-05-02

## 2024-08-05 NOTE — TELEPHONE ENCOUNTER
Medication:   Requested Prescriptions     Pending Prescriptions Disp Refills    desvenlafaxine succinate (PRISTIQ) 100 MG TB24 extended release tablet [Pharmacy Med Name: DESVENLAFAXINE ER SUCCINATE 100MG T] 90 tablet 0     Sig: TAKE 1 TABLET BY MOUTH DAILY        Last Filled:      Patient Phone Number: [phone] (home)     Last appt: 6/5/2024   Next appt: Visit date not found    Last OARRS:       10/11/2022     1:50 PM   RX Monitoring   Periodic Controlled Substance Monitoring No signs of potential drug abuse or diversion identified.

## 2024-08-08 ENCOUNTER — PATIENT MESSAGE (OUTPATIENT)
Dept: ORTHOPEDIC SURGERY | Age: 44
End: 2024-08-08

## 2024-08-08 RX ORDER — SODIUM CHLORIDE 0.9 % (FLUSH) 0.9 %
5-40 SYRINGE (ML) INJECTION PRN
Status: CANCELLED | OUTPATIENT
Start: 2024-08-08

## 2024-08-08 RX ORDER — SODIUM CHLORIDE 9 MG/ML
INJECTION, SOLUTION INTRAVENOUS PRN
Status: CANCELLED | OUTPATIENT
Start: 2024-08-08

## 2024-08-08 RX ORDER — SODIUM CHLORIDE 0.9 % (FLUSH) 0.9 %
5-40 SYRINGE (ML) INJECTION EVERY 12 HOURS SCHEDULED
Status: CANCELLED | OUTPATIENT
Start: 2024-08-08

## 2024-08-08 NOTE — TELEPHONE ENCOUNTER
From: Savita Kohler  To: Dr. Usama Bender  Sent: 8/8/2024 11:07 AM EDT  Subject: Surgery    Good morning,    Please cancel my upcoming surgery on Friday, 8/16.    Thanks,  Savita

## 2024-08-08 NOTE — PROGRESS NOTES
release and a medial imbrication.  Would also perform knee arthroscopy.  The patient did state that if the meniscus root is found to have not healed over retorn she does not want that addressed during the surgery.  And therefore we will rick her wishes regards to that.  The risk and benefits of surgical intervention were 30 discussed.  She does understand like to proceed.    Greater than 30 minutes were spent with this encounter.  Time spent included evaluating the patient's chart prior to arrival.  Evaluating the patient in the office including history, physical examination, imaging reviewing, and counseling on next steps.  Lastly, time was spent discussing orders with my staff as well as providing documentation in the chart.                    Usama Bender MD            Orthopaedic Surgery Sports Medicine and Arthroscopy            [facility]            Team Physician [facility] ([place])      Disclaimer:  This note was dictated with voice recognition software.  Though review and correction are routine, we apologize for any errors.    I have seen and evaluated the patient and agree with the above assessment and plan.

## 2024-08-21 ENCOUNTER — OFFICE VISIT (OUTPATIENT)
Dept: BARIATRICS/WEIGHT MGMT | Age: 44
End: 2024-08-21

## 2024-08-21 VITALS
WEIGHT: 251.6 LBS | HEIGHT: 65 IN | DIASTOLIC BLOOD PRESSURE: 64 MMHG | SYSTOLIC BLOOD PRESSURE: 110 MMHG | HEART RATE: 82 BPM | OXYGEN SATURATION: 100 % | RESPIRATION RATE: 18 BRPM | BODY MASS INDEX: 41.92 KG/M2

## 2024-08-21 DIAGNOSIS — R05.8 ALLERGIC COUGH: ICD-10-CM

## 2024-08-21 DIAGNOSIS — R06.2 WHEEZING: ICD-10-CM

## 2024-08-21 DIAGNOSIS — E66.01 MORBID OBESITY WITH BMI OF 40.0-44.9, ADULT (HCC): Primary | ICD-10-CM

## 2024-08-21 RX ORDER — ALBUTEROL SULFATE 90 UG/1
AEROSOL, METERED RESPIRATORY (INHALATION)
Qty: 8.5 G | Refills: 1 | Status: SHIPPED | OUTPATIENT
Start: 2024-08-21

## 2024-08-21 NOTE — TELEPHONE ENCOUNTER
Medication:   Requested Prescriptions     Pending Prescriptions Disp Refills    albuterol sulfate HFA (PROVENTIL;VENTOLIN;PROAIR) 108 (90 Base) MCG/ACT inhaler [Pharmacy Med Name: ALBUTEROL HFA INH (200 PUFFS) 8.5GM] 8.5 g      Sig: INHALE 2 PUFFS INTO THE LUNGS EVERY 6 HOURS AS NEEDED FOR WHEEZING     Last Filled:  # 18 gram with 1 refill on 6/27/24    Last appt: 6/5/2024   Next appt: Visit date not found    Last OARRS:       10/11/2022     1:50 PM   RX Monitoring   Periodic Controlled Substance Monitoring No signs of potential drug abuse or diversion identified.

## 2024-08-21 NOTE — PROGRESS NOTES
Savita Kohler is a 43 y.o. female with a date of birth of 1980.    Vitals:    08/21/24 1234   BP: 110/64   Pulse: 82   Resp: 18   SpO2: 100%   Weight: 114.1 kg (251 lb 9.6 oz)   Height: 1.638 m (5' 4.5\")    BMI: Body mass index is 42.52 kg/m². Obesity Classification: Class III    Weight History:   Wt Readings from Last 3 Encounters:   08/21/24 114.1 kg (251 lb 9.6 oz)   06/25/24 109.8 kg (242 lb)   06/05/24 109.8 kg (242 lb)       Pt attended Medical Weight Management Seminar. Patient was educated on low-carb diet protocol. Nutrition and habit guidelines were discussed and written information was provided. Bariatric Nutrition Questionnaire completed during class and scanned into media.       Goals  Weight: 150#  Health Improvement: less joint pain, lessen amount of meds and be present in pictures     Assessment  Nutritional Needs: RMR=(9.99 x 114.1) + (6.25 x 163.8) - (4.92 x 43 y.o.) -161 = 1791 kcal x 1.3 (sedentary activity factor)= 2328 kcal - 1000 (for 2 lb weight loss/week)= 1328 kcal.    Patient has participated in the following weight loss programs: low carb diet, low fat diet, nutrition counseling with RDN,  and weight watchers.   Patient has not participated in meal replacement/liquid diets.  Patient has not participated in weight loss medications.  Patient does not have history of bariatric surgery.     Pt reports she is fast easter, eats until she is overly full/stuffed, eats out of boredom, stress/emotions and grazes on food.     Plan  Plan/Recommendations: General weight loss/lifestyle modification strategies discussed (elicit support from others; identify saboteurs; non-food rewards, etc).  Diet interventions: 1200 kcal LCMP  Optifast:  Pt interested in   Diet Medications:  Pt interested in  Bariatric Surgery:  Pt not interested in  1:1 RD Visit:  Pt not interested in    PES Statement: Overweight/Obesity related to lack of exercise, sedentary lifestyle, unhealthy eating habits,

## 2024-08-22 ENCOUNTER — TELEMEDICINE (OUTPATIENT)
Dept: BARIATRICS/WEIGHT MGMT | Age: 44
End: 2024-08-22
Payer: COMMERCIAL

## 2024-08-22 DIAGNOSIS — Z71.3 DIETARY COUNSELING AND SURVEILLANCE: ICD-10-CM

## 2024-08-22 DIAGNOSIS — E66.01 MORBID OBESITY WITH BMI OF 40.0-44.9, ADULT (HCC): Primary | ICD-10-CM

## 2024-08-22 PROCEDURE — G2211 COMPLEX E/M VISIT ADD ON: HCPCS | Performed by: FAMILY MEDICINE

## 2024-08-22 PROCEDURE — G8427 DOCREV CUR MEDS BY ELIG CLIN: HCPCS | Performed by: FAMILY MEDICINE

## 2024-08-22 PROCEDURE — 99204 OFFICE O/P NEW MOD 45 MIN: CPT | Performed by: FAMILY MEDICINE

## 2024-08-23 ENCOUNTER — HOSPITAL ENCOUNTER (EMERGENCY)
Age: 44
Discharge: HOME | End: 2024-08-23
Attending: STUDENT IN AN ORGANIZED HEALTH CARE EDUCATION/TRAINING PROGRAM
Payer: COMMERCIAL

## 2024-08-23 ENCOUNTER — APPOINTMENT (OUTPATIENT)
Dept: CT IMAGING | Age: 44
End: 2024-08-23
Payer: COMMERCIAL

## 2024-08-23 VITALS
HEART RATE: 100 BPM | HEIGHT: 65 IN | DIASTOLIC BLOOD PRESSURE: 98 MMHG | TEMPERATURE: 98 F | BODY MASS INDEX: 41.82 KG/M2 | RESPIRATION RATE: 18 BRPM | SYSTOLIC BLOOD PRESSURE: 153 MMHG | OXYGEN SATURATION: 97 % | WEIGHT: 251 LBS

## 2024-08-23 DIAGNOSIS — L03.211 CELLULITIS OF FACE: Primary | ICD-10-CM

## 2024-08-23 LAB
ALBUMIN SERPL-MCNC: 4.5 G/DL (ref 3.4–5)
ALBUMIN/GLOB SERPL: 1.6 {RATIO} (ref 1.1–2.2)
ALP SERPL-CCNC: 101 U/L (ref 40–129)
ALT SERPL-CCNC: 20 U/L (ref 10–40)
ANION GAP SERPL CALCULATED.3IONS-SCNC: 9 MMOL/L (ref 3–16)
AST SERPL-CCNC: 20 U/L (ref 15–37)
BASOPHILS # BLD: 0 K/UL (ref 0–0.2)
BASOPHILS NFR BLD: 0.4 %
BILIRUB SERPL-MCNC: 0.3 MG/DL (ref 0–1)
BUN SERPL-MCNC: 16 MG/DL (ref 7–20)
CALCIUM SERPL-MCNC: 9.3 MG/DL (ref 8.3–10.6)
CHLORIDE SERPL-SCNC: 107 MMOL/L (ref 99–110)
CO2 SERPL-SCNC: 26 MMOL/L (ref 21–32)
CREAT SERPL-MCNC: 0.7 MG/DL (ref 0.6–1.1)
DEPRECATED RDW RBC AUTO: 14.9 % (ref 12.4–15.4)
EOSINOPHIL # BLD: 0.1 K/UL (ref 0–0.6)
EOSINOPHIL NFR BLD: 1 %
GFR SERPLBLD CREATININE-BSD FMLA CKD-EPI: >90 ML/MIN/{1.73_M2}
GLUCOSE SERPL-MCNC: 117 MG/DL (ref 70–99)
HCG SERPL QL: NEGATIVE
HCT VFR BLD AUTO: 38.2 % (ref 36–48)
HGB BLD-MCNC: 13 G/DL (ref 12–16)
LACTATE BLDV-SCNC: 1 MMOL/L (ref 0.4–1.9)
LYMPHOCYTES # BLD: 2.2 K/UL (ref 1–5.1)
LYMPHOCYTES NFR BLD: 26.3 %
MCH RBC QN AUTO: 32 PG (ref 26–34)
MCHC RBC AUTO-ENTMCNC: 34.1 G/DL (ref 31–36)
MCV RBC AUTO: 93.8 FL (ref 80–100)
MONOCYTES # BLD: 0.8 K/UL (ref 0–1.3)
MONOCYTES NFR BLD: 9.1 %
NEUTROPHILS # BLD: 5.2 K/UL (ref 1.7–7.7)
NEUTROPHILS NFR BLD: 63.2 %
PLATELET # BLD AUTO: 255 K/UL (ref 135–450)
PMV BLD AUTO: 8.2 FL (ref 5–10.5)
POTASSIUM SERPL-SCNC: 4.3 MMOL/L (ref 3.5–5.1)
PROT SERPL-MCNC: 7.4 G/DL (ref 6.4–8.2)
RBC # BLD AUTO: 4.07 M/UL (ref 4–5.2)
SODIUM SERPL-SCNC: 142 MMOL/L (ref 136–145)
WBC # BLD AUTO: 8.3 K/UL (ref 4–11)

## 2024-08-23 PROCEDURE — 80053 COMPREHEN METABOLIC PANEL: CPT

## 2024-08-23 PROCEDURE — 6360000002 HC RX W HCPCS: Performed by: STUDENT IN AN ORGANIZED HEALTH CARE EDUCATION/TRAINING PROGRAM

## 2024-08-23 PROCEDURE — 96365 THER/PROPH/DIAG IV INF INIT: CPT

## 2024-08-23 PROCEDURE — 2580000003 HC RX 258: Performed by: STUDENT IN AN ORGANIZED HEALTH CARE EDUCATION/TRAINING PROGRAM

## 2024-08-23 PROCEDURE — 85025 COMPLETE CBC W/AUTO DIFF WBC: CPT

## 2024-08-23 PROCEDURE — 83605 ASSAY OF LACTIC ACID: CPT

## 2024-08-23 PROCEDURE — 99285 EMERGENCY DEPT VISIT HI MDM: CPT

## 2024-08-23 PROCEDURE — 84703 CHORIONIC GONADOTROPIN ASSAY: CPT

## 2024-08-23 PROCEDURE — 6370000000 HC RX 637 (ALT 250 FOR IP): Performed by: STUDENT IN AN ORGANIZED HEALTH CARE EDUCATION/TRAINING PROGRAM

## 2024-08-23 PROCEDURE — 6360000004 HC RX CONTRAST MEDICATION: Performed by: STUDENT IN AN ORGANIZED HEALTH CARE EDUCATION/TRAINING PROGRAM

## 2024-08-23 PROCEDURE — 70491 CT SOFT TISSUE NECK W/DYE: CPT

## 2024-08-23 PROCEDURE — 96375 TX/PRO/DX INJ NEW DRUG ADDON: CPT

## 2024-08-23 RX ORDER — LIDOCAINE AND PRILOCAINE 25; 25 MG/G; MG/G
CREAM TOPICAL PRN
Status: DISCONTINUED | OUTPATIENT
Start: 2024-08-23 | End: 2024-08-23 | Stop reason: HOSPADM

## 2024-08-23 RX ORDER — CLINDAMYCIN HYDROCHLORIDE 300 MG/1
300 CAPSULE ORAL 4 TIMES DAILY
Qty: 40 CAPSULE | Refills: 0 | Status: SHIPPED | OUTPATIENT
Start: 2024-08-23 | End: 2024-09-02

## 2024-08-23 RX ORDER — ONDANSETRON 2 MG/ML
4 INJECTION INTRAMUSCULAR; INTRAVENOUS EVERY 6 HOURS PRN
Status: DISCONTINUED | OUTPATIENT
Start: 2024-08-23 | End: 2024-08-23 | Stop reason: HOSPADM

## 2024-08-23 RX ORDER — OXYCODONE HYDROCHLORIDE AND ACETAMINOPHEN 5; 325 MG/1; MG/1
1 TABLET ORAL EVERY 6 HOURS PRN
Qty: 12 TABLET | Refills: 0 | Status: SHIPPED | OUTPATIENT
Start: 2024-08-23 | End: 2024-08-26

## 2024-08-23 RX ORDER — SODIUM CHLORIDE, SODIUM LACTATE, POTASSIUM CHLORIDE, AND CALCIUM CHLORIDE .6; .31; .03; .02 G/100ML; G/100ML; G/100ML; G/100ML
1000 INJECTION, SOLUTION INTRAVENOUS ONCE
Status: COMPLETED | OUTPATIENT
Start: 2024-08-23 | End: 2024-08-23

## 2024-08-23 RX ORDER — OXYCODONE HYDROCHLORIDE AND ACETAMINOPHEN 5; 325 MG/1; MG/1
1 TABLET ORAL ONCE
Status: COMPLETED | OUTPATIENT
Start: 2024-08-23 | End: 2024-08-23

## 2024-08-23 RX ORDER — LIDOCAINE 50 MG/G
1 PATCH TOPICAL DAILY
Qty: 30 PATCH | Refills: 0 | Status: SHIPPED | OUTPATIENT
Start: 2024-08-23

## 2024-08-23 RX ORDER — MORPHINE SULFATE 4 MG/ML
4 INJECTION, SOLUTION INTRAMUSCULAR; INTRAVENOUS ONCE
Status: COMPLETED | OUTPATIENT
Start: 2024-08-23 | End: 2024-08-23

## 2024-08-23 RX ORDER — CLINDAMYCIN PHOSPHATE 600 MG/50ML
600 INJECTION, SOLUTION INTRAVENOUS ONCE
Status: COMPLETED | OUTPATIENT
Start: 2024-08-23 | End: 2024-08-23

## 2024-08-23 RX ADMIN — SODIUM CHLORIDE, POTASSIUM CHLORIDE, SODIUM LACTATE AND CALCIUM CHLORIDE 1000 ML: 600; 310; 30; 20 INJECTION, SOLUTION INTRAVENOUS at 05:20

## 2024-08-23 RX ADMIN — LIDOCAINE AND PRILOCAINE: 25; 25 CREAM TOPICAL at 06:56

## 2024-08-23 RX ADMIN — CLINDAMYCIN PHOSPHATE 600 MG: 600 INJECTION, SOLUTION INTRAVENOUS at 06:55

## 2024-08-23 RX ADMIN — IOPAMIDOL 75 ML: 755 INJECTION, SOLUTION INTRAVENOUS at 05:35

## 2024-08-23 RX ADMIN — MORPHINE SULFATE 4 MG: 4 INJECTION, SOLUTION INTRAMUSCULAR; INTRAVENOUS at 05:19

## 2024-08-23 RX ADMIN — OXYCODONE HYDROCHLORIDE AND ACETAMINOPHEN 1 TABLET: 5; 325 TABLET ORAL at 06:57

## 2024-08-23 ASSESSMENT — LIFESTYLE VARIABLES
HOW OFTEN DO YOU HAVE A DRINK CONTAINING ALCOHOL: MONTHLY OR LESS
HOW MANY STANDARD DRINKS CONTAINING ALCOHOL DO YOU HAVE ON A TYPICAL DAY: 1 OR 2

## 2024-08-23 NOTE — ED PROVIDER NOTES
[facility] EMERGENCY DEPARTMENT  EMERGENCY DEPARTMENT ENCOUNTER      Pt Name: Savita Kohler  MRN: [id number]  Birthdate 1980  Date of evaluation: 8/23/2024  Provider: Kelsea Collazo MD    CHIEF COMPLAINT       Chief Complaint   Patient presents with    Abscess     Pt came in from home, pt reports abscess to chin that formed 2 days ago that has been increasing in swelling and pain, pt reports they started taking antibiotic yesterday         HISTORY OF PRESENT ILLNESS   (Location/Symptom, Timing/Onset, Context/Setting, Quality, Duration, Modifying Factors, Severity)  Note limiting factors.   Savita Kohler is a 43 y.o. female who presents to the emergency department for chin redness, pain and swelling that started about 2 days ago.  She sent a picture to her dermatologist who prescribed her oral antibiotics.  She is on day #2 of oral antibiotic therapy.  No fevers or chills or discharge from the area.  She does have a history of abscesses in her axillary region.  Has never needed an I&D before.  She follows with dermatology, is taking doxycycline.  Chart reviewed: Patient has history of rheumatoid arthritis on Rinvoq therapy and Plaquenil.  Nursing Notes were reviewed.    REVIEW OF SYSTEMS    (2-9 systems for level 4, 10 or more for level 5)     Review of Systems    Except as noted above the remainder of the review of systems was reviewed and negative.       PAST MEDICAL HISTORY     Past Medical History:   Diagnosis Date    ADHD (attention deficit hyperactivity disorder)     Rx from Nivia 1/2022    Anesthesia     permanent lower retainer    Anxiety     Arthritis     RA    Cellulitis of left lower extremity     left ankle    Chronic back pain 2013    Chronic GERD     Depression     WELL CONTROLLED 10-16-17    Essential hypertension     Fibromyalgia     Heart rate fast     intermittent    Kidney stone     Migraine     Motor tic disorder     Obesity     Obstructive sleep apnea, adult     uses CPAP

## 2024-08-27 ENCOUNTER — HOSPITAL ENCOUNTER (OUTPATIENT)
Age: 44
Discharge: HOME | End: 2024-08-27
Payer: COMMERCIAL

## 2024-08-27 DIAGNOSIS — E66.01 MORBID OBESITY WITH BMI OF 40.0-44.9, ADULT (HCC): ICD-10-CM

## 2024-08-27 LAB
25(OH)D3 SERPL-MCNC: 39.6 NG/ML
ALBUMIN SERPL-MCNC: 4.7 G/DL (ref 3.4–5)
ALBUMIN/GLOB SERPL: 1.6 {RATIO} (ref 1.1–2.2)
ALP SERPL-CCNC: 99 U/L (ref 40–129)
ALT SERPL-CCNC: 23 U/L (ref 10–40)
ANION GAP SERPL CALCULATED.3IONS-SCNC: 12 MMOL/L (ref 3–16)
AST SERPL-CCNC: 25 U/L (ref 15–37)
BILIRUB SERPL-MCNC: <0.2 MG/DL (ref 0–1)
BUN SERPL-MCNC: 14 MG/DL (ref 7–20)
CALCIUM SERPL-MCNC: 10.3 MG/DL (ref 8.3–10.6)
CHLORIDE SERPL-SCNC: 103 MMOL/L (ref 99–110)
CHOLEST SERPL-MCNC: 213 MG/DL (ref 0–199)
CO2 SERPL-SCNC: 27 MMOL/L (ref 21–32)
CREAT SERPL-MCNC: 0.8 MG/DL (ref 0.6–1.1)
DEPRECATED RDW RBC AUTO: 14.7 % (ref 12.4–15.4)
EST. AVERAGE GLUCOSE BLD GHB EST-MCNC: 105.4 MG/DL
FOLATE SERPL-MCNC: 19 NG/ML (ref 4.78–24.2)
GFR SERPLBLD CREATININE-BSD FMLA CKD-EPI: >90 ML/MIN/{1.73_M2}
GLUCOSE SERPL-MCNC: 91 MG/DL (ref 70–99)
HBA1C MFR BLD: 5.3 %
HCT VFR BLD AUTO: 41.3 % (ref 36–48)
HDLC SERPL-MCNC: 53 MG/DL (ref 40–60)
HGB BLD-MCNC: 13.9 G/DL (ref 12–16)
LDLC SERPL CALC-MCNC: 146 MG/DL
MCH RBC QN AUTO: 31.4 PG (ref 26–34)
MCHC RBC AUTO-ENTMCNC: 33.6 G/DL (ref 31–36)
MCV RBC AUTO: 93.5 FL (ref 80–100)
PLATELET # BLD AUTO: 322 K/UL (ref 135–450)
PMV BLD AUTO: 8.8 FL (ref 5–10.5)
POTASSIUM SERPL-SCNC: 4.9 MMOL/L (ref 3.5–5.1)
PROT SERPL-MCNC: 7.7 G/DL (ref 6.4–8.2)
RBC # BLD AUTO: 4.42 M/UL (ref 4–5.2)
SODIUM SERPL-SCNC: 142 MMOL/L (ref 136–145)
TRIGL SERPL-MCNC: 71 MG/DL (ref 0–150)
TSH SERPL DL<=0.005 MIU/L-ACNC: 1.49 UIU/ML (ref 0.27–4.2)
VIT B12 SERPL-MCNC: 438 PG/ML (ref 211–911)
VLDLC SERPL CALC-MCNC: 14 MG/DL
WBC # BLD AUTO: 5.9 K/UL (ref 4–11)

## 2024-08-27 PROCEDURE — 85027 COMPLETE CBC AUTOMATED: CPT

## 2024-08-27 PROCEDURE — 82306 VITAMIN D 25 HYDROXY: CPT

## 2024-08-27 PROCEDURE — 80053 COMPREHEN METABOLIC PANEL: CPT

## 2024-08-27 PROCEDURE — 82607 VITAMIN B-12: CPT

## 2024-08-27 PROCEDURE — 84443 ASSAY THYROID STIM HORMONE: CPT

## 2024-08-27 PROCEDURE — 83036 HEMOGLOBIN GLYCOSYLATED A1C: CPT

## 2024-08-27 PROCEDURE — 82746 ASSAY OF FOLIC ACID SERUM: CPT

## 2024-08-27 PROCEDURE — 36415 COLL VENOUS BLD VENIPUNCTURE: CPT

## 2024-08-27 PROCEDURE — 80061 LIPID PANEL: CPT

## 2024-09-18 ENCOUNTER — TELEPHONE (OUTPATIENT)
Dept: BARIATRICS/WEIGHT MGMT | Age: 44
End: 2024-09-18

## 2024-09-18 ENCOUNTER — TELEMEDICINE (OUTPATIENT)
Dept: BARIATRICS/WEIGHT MGMT | Age: 44
End: 2024-09-18
Payer: COMMERCIAL

## 2024-09-18 DIAGNOSIS — E66.01 MORBID OBESITY WITH BMI OF 40.0-44.9, ADULT: Primary | ICD-10-CM

## 2024-09-18 DIAGNOSIS — Z71.3 DIETARY COUNSELING AND SURVEILLANCE: ICD-10-CM

## 2024-09-18 DIAGNOSIS — E78.5 HYPERLIPIDEMIA, UNSPECIFIED HYPERLIPIDEMIA TYPE: ICD-10-CM

## 2024-09-18 PROCEDURE — G8427 DOCREV CUR MEDS BY ELIG CLIN: HCPCS | Performed by: FAMILY MEDICINE

## 2024-09-18 PROCEDURE — 99214 OFFICE O/P EST MOD 30 MIN: CPT | Performed by: FAMILY MEDICINE

## 2024-09-18 PROCEDURE — G2211 COMPLEX E/M VISIT ADD ON: HCPCS | Performed by: FAMILY MEDICINE

## 2024-09-18 RX ORDER — SEMAGLUTIDE 0.25 MG/.5ML
0.25 INJECTION, SOLUTION SUBCUTANEOUS
Qty: 2 ML | Refills: 0 | Status: SHIPPED | OUTPATIENT
Start: 2024-09-18 | End: 2024-10-14

## 2024-09-18 NOTE — PROGRESS NOTES
Patient: Savita Kohler                      Encounter Date: 9/18/2024    YOB: 1980                Age: 43 y.o.    Chief Complaint   Patient presents with    Weight Management     F/u MWM         Patient identification was verified at the start of the visit.         9/18/2024    10:43 AM   Patient-Reported Vitals   Patient-Reported Weight 250   Patient-Reported Height 5’5         BP Readings from Last 1 Encounters:   08/23/24 (!) 153/98       BMI Readings from Last 1 Encounters:   08/23/24 41.77 kg/m²       Pulse Readings from Last 1 Encounters:   08/23/24 100          Wt Readings from Last 3 Encounters:   08/23/24 113.9 kg (251 lb)   08/21/24 114.1 kg (251 lb 9.6 oz)   06/25/24 109.8 kg (242 lb)        HPI: 43 y.o. female with a long-standing history of obesity presents today for virtual video follow-up. Her weight is stable from her last visit. No significant changes in diet or exercise.     Labs reviewed with patient         Allergies   Allergen Reactions    Metronidazole Rash and Other (See Comments)     LIPS WERE TINGLING, tongue tingling, hives all over body    Dermabond Hives         Current Outpatient Medications:     lidocaine (LIDODERM) 5 %, Place 1 patch onto the skin daily 12 hours on, 12 hours off. (Patient not taking: Reported on 9/3/2024), Disp: 30 patch, Rfl: 0    albuterol sulfate HFA (PROVENTIL;VENTOLIN;PROAIR) 108 (90 Base) MCG/ACT inhaler, INHALE 2 PUFFS INTO THE LUNGS EVERY 6 HOURS AS NEEDED FOR WHEEZING, Disp: 8.5 g, Rfl: 1    desvenlafaxine succinate (PRISTIQ) 100 MG TB24 extended release tablet, Take 1 tablet by mouth daily, Disp: 90 tablet, Rfl: 2    OXcarbazepine (TRILEPTAL) 150 MG tablet, Take 1 tablet by mouth nightly, Disp: 90 tablet, Rfl: 1    rOPINIRole (REQUIP) 3 MG tablet, TAKE 1 TABLET BY MOUTH EVERY NIGHT, Disp: 90 tablet, Rfl: 2    diclofenac (VOLTAREN) 50 MG EC tablet, Take 1 tablet by mouth 2 times daily (with meals) (Patient not taking: Reported on

## 2024-09-19 ENCOUNTER — PATIENT MESSAGE (OUTPATIENT)
Dept: BARIATRICS/WEIGHT MGMT | Age: 44
End: 2024-09-19

## 2024-09-19 ENCOUNTER — OFFICE VISIT (OUTPATIENT)
Dept: FAMILY MEDICINE CLINIC | Age: 44
End: 2024-09-19
Payer: COMMERCIAL

## 2024-09-19 VITALS
DIASTOLIC BLOOD PRESSURE: 74 MMHG | WEIGHT: 249.6 LBS | HEIGHT: 65 IN | RESPIRATION RATE: 16 BRPM | OXYGEN SATURATION: 97 % | BODY MASS INDEX: 41.59 KG/M2 | TEMPERATURE: 98.6 F | SYSTOLIC BLOOD PRESSURE: 118 MMHG | HEART RATE: 101 BPM

## 2024-09-19 DIAGNOSIS — E66.01 MORBID OBESITY WITH BMI OF 40.0-44.9, ADULT (HCC): ICD-10-CM

## 2024-09-19 DIAGNOSIS — M06.00 SERONEGATIVE RHEUMATOID ARTHRITIS (HCC): ICD-10-CM

## 2024-09-19 DIAGNOSIS — K21.9 CHRONIC GERD: ICD-10-CM

## 2024-09-19 DIAGNOSIS — Z01.818 PREOP EXAMINATION: ICD-10-CM

## 2024-09-19 DIAGNOSIS — M25.561 CHRONIC PAIN OF RIGHT KNEE: ICD-10-CM

## 2024-09-19 DIAGNOSIS — M79.7 FIBROMYALGIA: ICD-10-CM

## 2024-09-19 DIAGNOSIS — Z79.899 HIGH RISK MEDICATION USE: ICD-10-CM

## 2024-09-19 DIAGNOSIS — M17.11 OSTEOARTHRITIS OF RIGHT KNEE, UNSPECIFIED OSTEOARTHRITIS TYPE: Primary | ICD-10-CM

## 2024-09-19 DIAGNOSIS — G47.33 OBSTRUCTIVE SLEEP APNEA ON CPAP: ICD-10-CM

## 2024-09-19 DIAGNOSIS — I10 ESSENTIAL HYPERTENSION: ICD-10-CM

## 2024-09-19 DIAGNOSIS — G89.29 CHRONIC PAIN OF RIGHT KNEE: ICD-10-CM

## 2024-09-19 PROCEDURE — 3078F DIAST BP <80 MM HG: CPT | Performed by: NURSE PRACTITIONER

## 2024-09-19 PROCEDURE — G8417 CALC BMI ABV UP PARAM F/U: HCPCS | Performed by: NURSE PRACTITIONER

## 2024-09-19 PROCEDURE — 99214 OFFICE O/P EST MOD 30 MIN: CPT | Performed by: NURSE PRACTITIONER

## 2024-09-19 PROCEDURE — G8427 DOCREV CUR MEDS BY ELIG CLIN: HCPCS | Performed by: NURSE PRACTITIONER

## 2024-09-19 PROCEDURE — 3074F SYST BP LT 130 MM HG: CPT | Performed by: NURSE PRACTITIONER

## 2024-09-19 PROCEDURE — 1036F TOBACCO NON-USER: CPT | Performed by: NURSE PRACTITIONER

## 2024-09-19 ASSESSMENT — PATIENT HEALTH QUESTIONNAIRE - PHQ9
4. FEELING TIRED OR HAVING LITTLE ENERGY: NOT AT ALL
9. THOUGHTS THAT YOU WOULD BE BETTER OFF DEAD, OR OF HURTING YOURSELF: NOT AT ALL
SUM OF ALL RESPONSES TO PHQ QUESTIONS 1-9: 0
3. TROUBLE FALLING OR STAYING ASLEEP: NOT AT ALL
SUM OF ALL RESPONSES TO PHQ QUESTIONS 1-9: 0
SUM OF ALL RESPONSES TO PHQ QUESTIONS 1-9: 0
6. FEELING BAD ABOUT YOURSELF - OR THAT YOU ARE A FAILURE OR HAVE LET YOURSELF OR YOUR FAMILY DOWN: NOT AT ALL
1. LITTLE INTEREST OR PLEASURE IN DOING THINGS: NOT AT ALL
5. POOR APPETITE OR OVEREATING: NOT AT ALL
SUM OF ALL RESPONSES TO PHQ QUESTIONS 1-9: 0
7. TROUBLE CONCENTRATING ON THINGS, SUCH AS READING THE NEWSPAPER OR WATCHING TELEVISION: NOT AT ALL
8. MOVING OR SPEAKING SO SLOWLY THAT OTHER PEOPLE COULD HAVE NOTICED. OR THE OPPOSITE, BEING SO FIGETY OR RESTLESS THAT YOU HAVE BEEN MOVING AROUND A LOT MORE THAN USUAL: NOT AT ALL
2. FEELING DOWN, DEPRESSED OR HOPELESS: NOT AT ALL
SUM OF ALL RESPONSES TO PHQ9 QUESTIONS 1 & 2: 0
10. IF YOU CHECKED OFF ANY PROBLEMS, HOW DIFFICULT HAVE THESE PROBLEMS MADE IT FOR YOU TO DO YOUR WORK, TAKE CARE OF THINGS AT HOME, OR GET ALONG WITH OTHER PEOPLE: NOT DIFFICULT AT ALL

## 2024-09-23 ENCOUNTER — HOSPITAL ENCOUNTER (OUTPATIENT)
Age: 44
Discharge: HOME | End: 2024-09-23
Payer: COMMERCIAL

## 2024-09-23 DIAGNOSIS — Z01.818 PREOP TESTING: ICD-10-CM

## 2024-09-23 LAB
ABO + RH BLD: NORMAL
APTT BLD: 29.9 SEC (ref 22.1–36.4)
BACTERIA URNS QL MICRO: ABNORMAL /HPF
BASOPHILS # BLD: 0 K/UL (ref 0–0.2)
BASOPHILS NFR BLD: 0.7 %
BILIRUB UR QL STRIP.AUTO: NEGATIVE
BLD GP AB SCN SERPL QL: NORMAL
CLARITY UR: ABNORMAL
COLOR UR: YELLOW
DEPRECATED RDW RBC AUTO: 14.3 % (ref 12.4–15.4)
EOSINOPHIL # BLD: 0.1 K/UL (ref 0–0.6)
EOSINOPHIL NFR BLD: 1.6 %
EPI CELLS #/AREA URNS AUTO: 13 /HPF (ref 0–5)
GLUCOSE UR STRIP.AUTO-MCNC: NEGATIVE MG/DL
HCT VFR BLD AUTO: 40 % (ref 36–48)
HGB BLD-MCNC: 13.2 G/DL (ref 12–16)
HGB UR QL STRIP.AUTO: NEGATIVE
HYALINE CASTS #/AREA URNS AUTO: 0 /LPF (ref 0–8)
INR PPP: 1.01 (ref 0.85–1.15)
KETONES UR STRIP.AUTO-MCNC: NEGATIVE MG/DL
LEUKOCYTE ESTERASE UR QL STRIP.AUTO: ABNORMAL
LYMPHOCYTES # BLD: 1.9 K/UL (ref 1–5.1)
LYMPHOCYTES NFR BLD: 28.4 %
MCH RBC QN AUTO: 30.9 PG (ref 26–34)
MCHC RBC AUTO-ENTMCNC: 32.9 G/DL (ref 31–36)
MCV RBC AUTO: 93.8 FL (ref 80–100)
MONOCYTES # BLD: 0.6 K/UL (ref 0–1.3)
MONOCYTES NFR BLD: 8.5 %
NEUTROPHILS # BLD: 4 K/UL (ref 1.7–7.7)
NEUTROPHILS NFR BLD: 60.8 %
NITRITE UR QL STRIP.AUTO: NEGATIVE
PH UR STRIP.AUTO: 5 [PH] (ref 5–8)
PLATELET # BLD AUTO: 244 K/UL (ref 135–450)
PMV BLD AUTO: 9.4 FL (ref 5–10.5)
PROT UR STRIP.AUTO-MCNC: NEGATIVE MG/DL
PROTHROMBIN TIME: 13.5 SEC (ref 11.9–14.9)
RBC # BLD AUTO: 4.27 M/UL (ref 4–5.2)
RBC CLUMPS #/AREA URNS AUTO: 2 /HPF (ref 0–4)
RHEUMATOID FACT SER IA-ACNC: <10 IU/ML
SP GR UR STRIP.AUTO: 1.02 (ref 1–1.03)
UA DIPSTICK W REFLEX MICRO PNL UR: YES
URATE SERPL-MCNC: 4.4 MG/DL (ref 2.6–6)
URN SPEC COLLECT METH UR: ABNORMAL
UROBILINOGEN UR STRIP-ACNC: 0.2 E.U./DL
WBC # BLD AUTO: 6.6 K/UL (ref 4–11)
WBC #/AREA URNS AUTO: 3 /HPF (ref 0–5)

## 2024-09-23 PROCEDURE — 87081 CULTURE SCREEN ONLY: CPT

## 2024-09-23 PROCEDURE — 87086 URINE CULTURE/COLONY COUNT: CPT

## 2024-09-23 PROCEDURE — 86480 TB TEST CELL IMMUN MEASURE: CPT

## 2024-09-23 PROCEDURE — 86431 RHEUMATOID FACTOR QUANT: CPT

## 2024-09-23 PROCEDURE — 86901 BLOOD TYPING SEROLOGIC RH(D): CPT

## 2024-09-23 PROCEDURE — 86900 BLOOD TYPING SEROLOGIC ABO: CPT

## 2024-09-23 PROCEDURE — 86200 CCP ANTIBODY: CPT

## 2024-09-23 PROCEDURE — 83516 IMMUNOASSAY NONANTIBODY: CPT

## 2024-09-23 PROCEDURE — 84550 ASSAY OF BLOOD/URIC ACID: CPT

## 2024-09-23 PROCEDURE — 87077 CULTURE AEROBIC IDENTIFY: CPT

## 2024-09-23 PROCEDURE — 85610 PROTHROMBIN TIME: CPT

## 2024-09-23 PROCEDURE — 85730 THROMBOPLASTIN TIME PARTIAL: CPT

## 2024-09-23 PROCEDURE — 86850 RBC ANTIBODY SCREEN: CPT

## 2024-09-23 PROCEDURE — 85025 COMPLETE CBC W/AUTO DIFF WBC: CPT

## 2024-09-23 PROCEDURE — 81001 URINALYSIS AUTO W/SCOPE: CPT

## 2024-09-23 PROCEDURE — 36415 COLL VENOUS BLD VENIPUNCTURE: CPT

## 2024-09-24 LAB
BACTERIA UR CULT: NORMAL
CCP IGG SERPL-ACNC: <0.5 U/ML (ref 0–2.9)
TISSUE TRANSGLUTAMINASE IGA: <0.5 U/ML (ref 0–14)

## 2024-09-25 LAB
GAMMA INTERFERON BACKGROUND BLD IA-ACNC: 0.46 IU/ML
M TB IFN-G BLD-IMP: NEGATIVE
M TB IFN-G CD4+ BCKGRND COR BLD-ACNC: 0 IU/ML
M TB IFN-G CD4+CD8+ BCKGRND COR BLD-ACNC: 0 IU/ML
MITOGEN IGNF BCKGRD COR BLD-ACNC: 9.54 IU/ML
MRSA SPEC QL CULT: NORMAL

## 2024-10-07 DIAGNOSIS — G43.909 MIGRAINE WITHOUT STATUS MIGRAINOSUS, NOT INTRACTABLE, UNSPECIFIED MIGRAINE TYPE: Primary | ICD-10-CM

## 2024-10-07 RX ORDER — BUTALBITAL, ACETAMINOPHEN AND CAFFEINE 50; 325; 40 MG/1; MG/1; MG/1
1 TABLET ORAL 3 TIMES DAILY PRN
Qty: 20 TABLET | Refills: 0 | Status: SHIPPED | OUTPATIENT
Start: 2024-10-07 | End: 2024-10-09

## 2024-10-07 NOTE — PROGRESS NOTES
2022      TELEHEALTH EVALUATION -- Audio/Visual (During SHYPI-75 public health emergency)    HPI:    Vidal Day (:  1980) has requested an audio/video evaluation for the following concern(s):    Concerned that she has ADHD. Constant shaking- legs- states I can't sit still, inability to focus, forgetting what people tell her, stupid mistakes at work, missing details, \"feels like my mind is going mile a minute\". Several unfinished home chores. Symptoms present for approx 6-12 months. Worsening, which increases her anxiety. She is compliant with Pristiq, thinks it is helping. Only taking Buspar prn      Review of Systems   Constitutional: Negative for fatigue. Respiratory: Negative for shortness of breath. Cardiovascular: Negative for chest pain and palpitations. Psychiatric/Behavioral: Positive for decreased concentration. Negative for agitation, sleep disturbance and suicidal ideas. The patient is nervous/anxious and is hyperactive. Prior to Visit Medications    Medication Sig Taking? Authorizing Provider   sulfaSALAzine (AZULFIDINE) 500 MG tablet Take 2 tablets by mouth 2 times daily Yes Elda Rushing MD   hydroxychloroquine (PLAQUENIL) 200 MG tablet Take 1 tablet by mouth 2 times daily Yes Elda Rushing MD   diclofenac (VOLTAREN) 50 MG EC tablet Take 1 tablet by mouth 3 times daily as needed for Pain Yes Elda Rushing MD   Adalimumab (HUMIRA PEN) 40 MG/0.4ML PNKT Inject 40 mg into the skin every 14 days Citrate free. Yes Elda Rushing MD   predniSONE (DELTASONE) 10 MG tablet Take 2 tablets by mouth daily for 10 days, THEN 1 tablet daily for 10 days, THEN 0.5 tablets daily for 10 days.  Yes Elda Rushing MD   rOPINIRole (REQUIP) 3 MG tablet Take 1 tablet by mouth nightly Yes ANDREA Vail CNP   cetirizine (ZYRTEC) 10 MG tablet TAKE 1 TABLET BY MOUTH DAILY Yes ANDREA Vail CNP   desvenlafaxine succinate (PRISTIQ) 100 MG TB24 extended release Patient scheduled with Dr. Singer today at 10:30am.    tablet TAKE 1 TABLET BY MOUTH DAILY Yes ANDREA Zacarias CNP   pantoprazole (PROTONIX) 20 MG tablet Take 20 mg by mouth daily Yes Historical Provider, MD   busPIRone (BUSPAR) 5 MG tablet TAKE ONE TABLET BY MOUTH THREE TIMES A DAY AS NEEDED FOR ANXIETY Yes ANDREA Zacarias CNP   estradiol (ESTRACE) 2 MG tablet Take 2 mg by mouth daily Yes Historical Provider, MD   Biotin 1000 MCG TABS Take by mouth Yes Historical Provider, MD   fluticasone (FLONASE) 50 MCG/ACT nasal spray SPRAY TWO SPRAYS IN EACH NOSTRIL ONCE DAILY Yes ANDREA Zacarias CNP   albuterol sulfate HFA (PROVENTIL HFA) 108 (90 Base) MCG/ACT inhaler Inhale 2 puffs into the lungs every 6 hours as needed for Wheezing Yes ANDREA Zacarias CNP   Cholecalciferol (VITAMIN D3) 50 MCG (2000 UT) CAPS Take 2,000 capsules by mouth daily Yes Historical Provider, MD   acetaminophen (TYLENOL) 500 MG tablet Take 500 mg by mouth every 6 hours as needed for Pain Yes Historical Provider, MD   SUMAtriptan (IMITREX) 100 MG tablet Take 100 mg by mouth daily as needed Yes Historical Provider, MD       Past Medical History:   Diagnosis Date    Anxiety     Arthritis     RA    Cellulitis of left lower extremity     left ankle    Depression     WELL CONTROLLED 10-16-17    Heart rate fast     intermittent    Kidney stone     Migraine     Motor tic disorder     Obstructive sleep apnea, adult        Past Surgical History:   Procedure Laterality Date    ABDOMINAL EXPLORATION SURGERY  07/22/2011    excision Meckels diverticulum    ABDOMINOPLASTY  04/14/2014    [place] ADENOIDECTOMY Bilateral     APPENDECTOMY  07/22/2011    BACK SURGERY      CARPAL TUNNEL RELEASE      HYSTERECTOMY, TOTAL ABDOMINAL  06/12/2018    robotic total hyst BSO, Dr Kincaid Moulding LITHOTRIPSY  x2    LUMBAR LAMINECTOMY  06/2013    Rad; left l5-s1    MECKEL DIVERTICULUM EXCISION      OTHER SURGICAL HISTORY      hymenoplasty    OVARIAN CYST REMOVAL  04/13/2018    OPERATIVE LAPAROSCOPY, LEFT OVARIAN CYSTECTOMY WITH DILATATION AND CURETTAGE    OVARY REMOVAL      TONSILLECTOMY Bilateral     URETHRAL STRICTURE DILATATION      WISDOM TOOTH EXTRACTION         Family History   Problem Relation Age of Onset    Asthma Mother     High Blood Pressure Mother     Diabetes Father     Atrial Fibrillation Father     Alcohol Abuse Father     High Blood Pressure Father     Heart Disease Paternal Grandmother     Heart Attack Paternal Grandmother     Diabetes Paternal Grandmother     Stroke Paternal Grandfather     Stroke Maternal Grandfather     Atrial Fibrillation Maternal Grandmother     Diabetes Maternal Grandmother     Cancer Neg Hx        Allergies   Allergen Reactions    Metronidazole Rash and Other (See Comments)     LIPS WERE TINGLING, tongue tingling, hives all over body    Other      Glue used to adhere recent surgical incision       Social History     Tobacco Use    Smoking status: Never Smoker    Smokeless tobacco: Never Used   Vaping Use    Vaping Use: Never used   Substance Use Topics    Alcohol use: No     Alcohol/week: 0.0 standard drinks     Comment: once a week    Drug use: No          PHYSICAL EXAMINATION:  Vital Signs: (As obtained by patient/caregiver or practitioner observation)  LMP 03/13/2018 (Exact Date)   Respiratory rate appears normal      Constitutional: Appears well-developed and well-nourished. No apparent distress    Mental status: Alert and awake. Oriented to person/place/darwin. Able to follow commands    Eyes: EOM normal. Sclera normal. No discharge visible  HENT: Normocephalic, atraumatic.    Mouth/Throat: Mucous membranes are moist. External Ears Normal    Neck: No visualized mass   Pulmonary/Chest: Respiratory effort normal.  No visualized signs of difficulty breathing or respiratory distress        Musculoskeletal:  Normal range of motion of neck  Neurological:       No Facial Asymmetry (Cranial nerve 7 motor function) (limited exam to video visit). No gaze palsy       Skin:  No significant exanthematous lesions or discoloration noted on facial skin       Psychiatric: Normal Affect. No Hallucinations            ASSESSMENT/PLAN:  1. Attention deficit hyperactivity disorder (ADHD), combined type  New diagnosis  Trial - Dexmethylphenidate HCl ER (FOCALIN XR) 5 MG CP24; Take 5 mg by mouth daily for 30 days. Dispense: 30 capsule; Refill: 0  No inconsistencies with OARRS. Medication initiated after consulting with collaborating physician. 2. Depression with anxiety  Stable  Continue Pristiq and Buspar unchanged       Return in about 4 weeks (around 2/10/2022) for ADHD follow up. Stefano Gordon is a 39 y.o. female being evaluated by a Virtual Visit (video visit) encounter to address concerns as mentioned above. A caregiver was present when appropriate. Due to this being a TeleHealth encounter (During [facility] [place] public health emergency), evaluation of the following organ systems was limited: Vitals/Constitutional/EENT/Resp/CV/GI//MS/Neuro/Skin/Heme-Lymph-Imm. Pursuant to the emergency declaration under the [address] waiver authority and the [employer] Act, this Virtual Visit was conducted with patient's (and/or legal guardian's) consent, to reduce the patient's risk of exposure to COVID-19 and provide necessary medical care. The patient (and/or legal guardian) has also been advised to contact this office for worsening conditions or problems, and seek emergency medical treatment and/or call 911 if deemed necessary. Patient identification was verified at the start of the visit: Yes    Total time spent on this encounter: Not billed by time minutes    Services were provided through a video synchronous discussion virtually to substitute for in-person clinic visit. Patient and provider were located at their individual homes.     --Violeta Abbasi, ANDREA - CNP on 1/13/2022 at 10:34 AM    An electronic signature was used to authenticate this note. Leah Pimentel

## 2024-10-09 ENCOUNTER — HOSPITAL ENCOUNTER (EMERGENCY)
Age: 44
Discharge: HOME | End: 2024-10-09
Attending: EMERGENCY MEDICINE
Payer: COMMERCIAL

## 2024-10-09 VITALS
RESPIRATION RATE: 18 BRPM | DIASTOLIC BLOOD PRESSURE: 84 MMHG | OXYGEN SATURATION: 95 % | BODY MASS INDEX: 41.82 KG/M2 | HEART RATE: 92 BPM | HEIGHT: 65 IN | TEMPERATURE: 98.6 F | WEIGHT: 251 LBS | SYSTOLIC BLOOD PRESSURE: 137 MMHG

## 2024-10-09 DIAGNOSIS — L08.9 FACIAL INFECTION: ICD-10-CM

## 2024-10-09 DIAGNOSIS — R51.9 NONINTRACTABLE HEADACHE, UNSPECIFIED CHRONICITY PATTERN, UNSPECIFIED HEADACHE TYPE: Primary | ICD-10-CM

## 2024-10-09 DIAGNOSIS — G43.909 MIGRAINE WITHOUT STATUS MIGRAINOSUS, NOT INTRACTABLE, UNSPECIFIED MIGRAINE TYPE: ICD-10-CM

## 2024-10-09 PROCEDURE — 99283 EMERGENCY DEPT VISIT LOW MDM: CPT

## 2024-10-09 PROCEDURE — 6370000000 HC RX 637 (ALT 250 FOR IP): Performed by: EMERGENCY MEDICINE

## 2024-10-09 RX ORDER — BUTALBITAL, ACETAMINOPHEN AND CAFFEINE 50; 325; 40 MG/1; MG/1; MG/1
1 TABLET ORAL ONCE
Status: COMPLETED | OUTPATIENT
Start: 2024-10-09 | End: 2024-10-09

## 2024-10-09 RX ORDER — BUTALBITAL, ACETAMINOPHEN AND CAFFEINE 50; 325; 40 MG/1; MG/1; MG/1
1 TABLET ORAL 3 TIMES DAILY PRN
Qty: 20 TABLET | Refills: 0 | Status: SHIPPED | OUTPATIENT
Start: 2024-10-09

## 2024-10-09 RX ADMIN — BUTALBITAL, ACETAMINOPHEN, AND CAFFEINE 1 TABLET: 325; 50; 40 TABLET ORAL at 22:13

## 2024-10-10 NOTE — ED PROVIDER NOTES
CURRENT MEDICATIONS       Current Discharge Medication List        CONTINUE these medications which have NOT CHANGED    Details   Semaglutide-Weight Management (WEGOVY) 0.25 MG/0.5ML SOAJ SC injection Inject 0.25 mg into the skin every 7 days  Qty: 2 mL, Refills: 0      lidocaine (LIDODERM) 5 % Place 1 patch onto the skin daily 12 hours on, 12 hours off.  Qty: 30 patch, Refills: 0      albuterol sulfate HFA (PROVENTIL;VENTOLIN;PROAIR) 108 (90 Base) MCG/ACT inhaler INHALE 2 PUFFS INTO THE LUNGS EVERY 6 HOURS AS NEEDED FOR WHEEZING  Qty: 8.5 g, Refills: 1    Associated Diagnoses: Allergic cough; Wheezing      desvenlafaxine succinate (PRISTIQ) 100 MG TB24 extended release tablet Take 1 tablet by mouth daily  Qty: 90 tablet, Refills: 2    Comments: ZERO refills remain on this prescription. Your patient is requesting advance approval of refills for this medication to PREVENT ANY MISSED DOSES  Associated Diagnoses: Depression with anxiety      OXcarbazepine (TRILEPTAL) 150 MG tablet Take 1 tablet by mouth nightly  Qty: 90 tablet, Refills: 1    Comments: ZERO refills remain on this prescription. Your patient is requesting advance approval of refills for this medication to PREVENT ANY MISSED DOSES  Associated Diagnoses: Moderate episode of recurrent major depressive disorder (HCC)      rOPINIRole (REQUIP) 3 MG tablet TAKE 1 TABLET BY MOUTH EVERY NIGHT  Qty: 90 tablet, Refills: 2    Comments: ZERO refills remain on this prescription. Your patient is requesting advance approval of refills for this medication to PREVENT ANY MISSED DOSES  Associated Diagnoses: RLS (restless legs syndrome)      diclofenac (VOLTAREN) 50 MG EC tablet Take 1 tablet by mouth 2 times daily (with meals)  Qty: 60 tablet, Refills: 3    Associated Diagnoses: Complex tear of medial meniscus of right knee as current injury, initial encounter; Osteoarthritis of right patellofemoral joint      aspirin 81 MG chewable tablet Take 1 tablet by mouth

## 2024-10-10 NOTE — DISCHARGE INSTRUCTIONS
I have represcribed your Fioricet.  Continue taking your doxycycline.  See your doctor tomorrow for recheck and return for any other emergencies.

## 2024-10-15 DIAGNOSIS — R05.8 ALLERGIC COUGH: ICD-10-CM

## 2024-10-15 DIAGNOSIS — R06.2 WHEEZING: ICD-10-CM

## 2024-10-15 RX ORDER — ALBUTEROL SULFATE 90 UG/1
INHALANT RESPIRATORY (INHALATION)
Qty: 8.5 G | Refills: 1 | Status: SHIPPED | OUTPATIENT
Start: 2024-10-15

## 2024-10-15 NOTE — TELEPHONE ENCOUNTER
Medication:   Requested Prescriptions     Pending Prescriptions Disp Refills    albuterol sulfate HFA (PROVENTIL;VENTOLIN;PROAIR) 108 (90 Base) MCG/ACT inhaler [Pharmacy Med Name: ALBUTEROL HFA INH (200 PUFFS) 8.5GM] 8.5 g 1     Sig: INHALE 2 PUFFS INTO THE LUNGS EVERY 6 HOURS AS NEEDED FOR WHEEZING        Last Filled:      Patient Phone Number: [phone] (home)     Last appt: 9/19/2024   Next appt: Visit date not found    Last OARRS:       10/11/2022     1:50 PM   RX Monitoring   Periodic Controlled Substance Monitoring No signs of potential drug abuse or diversion identified.

## 2024-10-31 RX ORDER — CETIRIZINE HYDROCHLORIDE 10 MG/1
TABLET ORAL
Qty: 30 TABLET | Refills: 11 | Status: SHIPPED | OUTPATIENT
Start: 2024-10-31

## 2024-10-31 NOTE — TELEPHONE ENCOUNTER
Medication:   Requested Prescriptions     Pending Prescriptions Disp Refills    cetirizine (ZYRTEC) 10 MG tablet [Pharmacy Med Name: CETIRIZINE 10MG TABLETS] 30 tablet 11     Sig: TAKE 1 TABLET BY MOUTH DAILY        Last Filled:  10/25/2023, 30, 11    Patient Phone Number: [phone] (home)     Last appt: 9/19/2024   Next appt: Visit date not found    Last OARRS:       10/11/2022     1:50 PM   RX Monitoring   Periodic Controlled Substance Monitoring No signs of potential drug abuse or diversion identified.

## 2024-11-08 NOTE — DISCHARGE INSTRUCTIONS
Continue taking Bactrim and cephalexin as you are prescribed.  May take ibuprofen and/or Tylenol as needed for pain.  I recommend you use warm compresses under your right armpit for 15 minutes multiple times daily.    If your redness and pain continues to worsen despite taking Bactrim and cephalexin for 48 hours, stop taking this medication and switch to clindamycin.  It is important that you keep your follow-up with your dermatologist.    Return to the emergency department if you develop fevers, severe chills, worsening pain, numbness or swelling in your right arm, or drainage from under your armpit.  Return if you have other new or changing symptoms that concern you and you wish to be reevaluated.   Clear

## 2024-11-13 ENCOUNTER — OFFICE VISIT (OUTPATIENT)
Dept: FAMILY MEDICINE CLINIC | Age: 44
End: 2024-11-13

## 2024-11-13 VITALS
OXYGEN SATURATION: 97 % | WEIGHT: 252.6 LBS | BODY MASS INDEX: 42.03 KG/M2 | SYSTOLIC BLOOD PRESSURE: 114 MMHG | DIASTOLIC BLOOD PRESSURE: 76 MMHG | HEART RATE: 92 BPM

## 2024-11-13 DIAGNOSIS — F41.9 ANXIETY: ICD-10-CM

## 2024-11-13 DIAGNOSIS — R51.9 ACUTE INTRACTABLE HEADACHE, UNSPECIFIED HEADACHE TYPE: Primary | ICD-10-CM

## 2024-11-13 RX ORDER — CETIRIZINE HYDROCHLORIDE 10 MG/1
10 TABLET ORAL DAILY
Qty: 90 TABLET | Refills: 3 | Status: SHIPPED | OUTPATIENT
Start: 2024-11-13

## 2024-11-13 RX ORDER — LORAZEPAM 0.5 MG/1
0.5 TABLET ORAL 2 TIMES DAILY PRN
Qty: 30 TABLET | Refills: 0 | Status: SHIPPED | OUTPATIENT
Start: 2024-11-13 | End: 2024-11-28

## 2024-11-13 ASSESSMENT — PATIENT HEALTH QUESTIONNAIRE - PHQ9
3. TROUBLE FALLING OR STAYING ASLEEP: NOT AT ALL
9. THOUGHTS THAT YOU WOULD BE BETTER OFF DEAD, OR OF HURTING YOURSELF: NOT AT ALL
7. TROUBLE CONCENTRATING ON THINGS, SUCH AS READING THE NEWSPAPER OR WATCHING TELEVISION: NOT AT ALL
SUM OF ALL RESPONSES TO PHQ QUESTIONS 1-9: 0
SUM OF ALL RESPONSES TO PHQ QUESTIONS 1-9: 0
4. FEELING TIRED OR HAVING LITTLE ENERGY: NOT AT ALL
SUM OF ALL RESPONSES TO PHQ9 QUESTIONS 1 & 2: 0
5. POOR APPETITE OR OVEREATING: NOT AT ALL
6. FEELING BAD ABOUT YOURSELF - OR THAT YOU ARE A FAILURE OR HAVE LET YOURSELF OR YOUR FAMILY DOWN: NOT AT ALL
10. IF YOU CHECKED OFF ANY PROBLEMS, HOW DIFFICULT HAVE THESE PROBLEMS MADE IT FOR YOU TO DO YOUR WORK, TAKE CARE OF THINGS AT HOME, OR GET ALONG WITH OTHER PEOPLE: NOT DIFFICULT AT ALL
1. LITTLE INTEREST OR PLEASURE IN DOING THINGS: NOT AT ALL
2. FEELING DOWN, DEPRESSED OR HOPELESS: NOT AT ALL
SUM OF ALL RESPONSES TO PHQ QUESTIONS 1-9: 0
8. MOVING OR SPEAKING SO SLOWLY THAT OTHER PEOPLE COULD HAVE NOTICED. OR THE OPPOSITE, BEING SO FIGETY OR RESTLESS THAT YOU HAVE BEEN MOVING AROUND A LOT MORE THAN USUAL: NOT AT ALL
SUM OF ALL RESPONSES TO PHQ QUESTIONS 1-9: 0

## 2024-11-13 ASSESSMENT — ENCOUNTER SYMPTOMS: SHORTNESS OF BREATH: 0

## 2024-11-13 NOTE — ASSESSMENT & PLAN NOTE
Chronic, not at goal (unstable), trial short course of benzo. Cautioned on possibility for addiction/dependency. Advised I will not prescribe longer than 3 months. No driving when taking  No inconsistencies with OARRS.  Medication initiated after consulting with collaborating physician.    Orders:    LORazepam (ATIVAN) 0.5 MG tablet; Take 1 tablet by mouth 2 times daily as needed for Anxiety for up to 15 days. Max Daily Amount: 1 mg

## 2024-11-13 NOTE — PROGRESS NOTES
Savita Kohler (:  1980) is a 44 y.o. female,Established patient, here for evaluation of the following chief complaint(s):  Headache (X over 1 month )         Assessment & Plan  Acute intractable headache, unspecified headache type   Chronic, not at goal (unstable), discussed with pt I do not believe this is migraine d/t Imitrex and fioricet not helping when they normally resolve her migraine headaches  Discussed possibly related to anxiety/stress. Awaiting response to Ativan. Also recommend eval with Neurology    Orders:    Kimberly Myrick MD, Neurology, [facility]    Anxiety   Chronic, not at goal (unstable), trial short course of benzo. Cautioned on possibility for addiction/dependency. Advised I will not prescribe longer than 3 months. No driving when taking  No inconsistencies with OARRS.  Medication initiated after consulting with collaborating physician.    Orders:    LORazepam (ATIVAN) 0.5 MG tablet; Take 1 tablet by mouth 2 times daily as needed for Anxiety for up to 15 days. Max Daily Amount: 1 mg      Return in 4 weeks (on 2024) for anxiety follow up/headache follow up.       Subjective   HPI    Migraine:  Occurrence: ongoing for over 1 month.   Location: right sided  Quality: can be aching or throbbing in nature  Severity:  4/10  Duration: constant all day   Modifying factors: Tyl will resolve headache. Tyl 2- 3 x day. No improvement with Fioricet or Imitrex.   Associated signs and symptoms: slight nausea, no vomiting; occasional light sensitivity.   Initially thought related to gas leak, Perez came out and corrected leak but headaches are persisting.   Thinks it may be anxiety related. She has upcoming knee surgery which is increasing her anxiety. Recent cellulitis flare up with caused anxiety. Financial and time restrictions with holidays causing stress/anxiety.    Patient's allergies, past medical, surgical, social and family histories were reviewed and updated as

## 2024-11-30 DIAGNOSIS — F33.1 MODERATE EPISODE OF RECURRENT MAJOR DEPRESSIVE DISORDER (HCC): ICD-10-CM

## 2024-11-30 DIAGNOSIS — R06.2 WHEEZING: ICD-10-CM

## 2024-11-30 DIAGNOSIS — R05.8 ALLERGIC COUGH: ICD-10-CM

## 2024-12-02 RX ORDER — OXCARBAZEPINE 150 MG/1
150 TABLET, FILM COATED ORAL NIGHTLY
Qty: 90 TABLET | Refills: 1 | Status: SHIPPED | OUTPATIENT
Start: 2024-12-02

## 2024-12-02 RX ORDER — ALBUTEROL SULFATE 90 UG/1
INHALANT RESPIRATORY (INHALATION)
Qty: 8.5 G | Refills: 1 | Status: SHIPPED | OUTPATIENT
Start: 2024-12-02

## 2024-12-02 NOTE — TELEPHONE ENCOUNTER
Medication:   Requested Prescriptions     Pending Prescriptions Disp Refills    albuterol sulfate HFA (PROVENTIL;VENTOLIN;PROAIR) 108 (90 Base) MCG/ACT inhaler [Pharmacy Med Name: ALBUTEROL HFA INH (200 PUFFS) 8.5GM] 8.5 g 1     Sig: INHALE 2 PUFFS INTO THE LUNGS EVERY 6 HOURS AS NEEDED FOR WHEEZING    OXcarbazepine (TRILEPTAL) 150 MG tablet [Pharmacy Med Name: OXCARBAZEPINE 150MG TABLETS] 90 tablet 1     Sig: TAKE 1 TABLET BY MOUTH EVERY NIGHT        Last Filled:      Patient Phone Number: [phone] (home)     Last appt: 11/13/2024   Next appt: 12/4/2024    Last OARRS:       10/11/2022     1:50 PM   RX Monitoring   Periodic Controlled Substance Monitoring No signs of potential drug abuse or diversion identified.

## 2024-12-04 ENCOUNTER — OFFICE VISIT (OUTPATIENT)
Dept: FAMILY MEDICINE CLINIC | Age: 44
End: 2024-12-04

## 2024-12-04 VITALS
SYSTOLIC BLOOD PRESSURE: 116 MMHG | BODY MASS INDEX: 42.22 KG/M2 | OXYGEN SATURATION: 97 % | TEMPERATURE: 99.3 F | DIASTOLIC BLOOD PRESSURE: 82 MMHG | RESPIRATION RATE: 18 BRPM | HEIGHT: 65 IN | WEIGHT: 253.4 LBS | HEART RATE: 89 BPM

## 2024-12-04 DIAGNOSIS — Z79.899 HIGH RISK MEDICATION USE: ICD-10-CM

## 2024-12-04 DIAGNOSIS — M06.00 SERONEGATIVE RHEUMATOID ARTHRITIS (HCC): ICD-10-CM

## 2024-12-04 DIAGNOSIS — E66.01 MORBID OBESITY WITH BMI OF 40.0-44.9, ADULT: ICD-10-CM

## 2024-12-04 DIAGNOSIS — M17.11 OSTEOARTHRITIS OF RIGHT KNEE, UNSPECIFIED OSTEOARTHRITIS TYPE: Primary | ICD-10-CM

## 2024-12-04 DIAGNOSIS — G47.33 OBSTRUCTIVE SLEEP APNEA ON CPAP: ICD-10-CM

## 2024-12-04 DIAGNOSIS — I10 ESSENTIAL HYPERTENSION: ICD-10-CM

## 2024-12-04 DIAGNOSIS — K21.9 CHRONIC GERD: ICD-10-CM

## 2024-12-04 DIAGNOSIS — M79.7 FIBROMYALGIA: ICD-10-CM

## 2024-12-04 DIAGNOSIS — Z01.818 PREOP EXAMINATION: ICD-10-CM

## 2024-12-04 ASSESSMENT — PATIENT HEALTH QUESTIONNAIRE - PHQ9
4. FEELING TIRED OR HAVING LITTLE ENERGY: NOT AT ALL
SUM OF ALL RESPONSES TO PHQ QUESTIONS 1-9: 1
SUM OF ALL RESPONSES TO PHQ QUESTIONS 1-9: 1
8. MOVING OR SPEAKING SO SLOWLY THAT OTHER PEOPLE COULD HAVE NOTICED. OR THE OPPOSITE, BEING SO FIGETY OR RESTLESS THAT YOU HAVE BEEN MOVING AROUND A LOT MORE THAN USUAL: NOT AT ALL
SUM OF ALL RESPONSES TO PHQ9 QUESTIONS 1 & 2: 1
1. LITTLE INTEREST OR PLEASURE IN DOING THINGS: NOT AT ALL
2. FEELING DOWN, DEPRESSED OR HOPELESS: SEVERAL DAYS
5. POOR APPETITE OR OVEREATING: NOT AT ALL
SUM OF ALL RESPONSES TO PHQ QUESTIONS 1-9: 1
SUM OF ALL RESPONSES TO PHQ QUESTIONS 1-9: 1
10. IF YOU CHECKED OFF ANY PROBLEMS, HOW DIFFICULT HAVE THESE PROBLEMS MADE IT FOR YOU TO DO YOUR WORK, TAKE CARE OF THINGS AT HOME, OR GET ALONG WITH OTHER PEOPLE: NOT DIFFICULT AT ALL
6. FEELING BAD ABOUT YOURSELF - OR THAT YOU ARE A FAILURE OR HAVE LET YOURSELF OR YOUR FAMILY DOWN: NOT AT ALL
3. TROUBLE FALLING OR STAYING ASLEEP: NOT AT ALL
7. TROUBLE CONCENTRATING ON THINGS, SUCH AS READING THE NEWSPAPER OR WATCHING TELEVISION: NOT AT ALL
9. THOUGHTS THAT YOU WOULD BE BETTER OFF DEAD, OR OF HURTING YOURSELF: NOT AT ALL

## 2024-12-04 NOTE — PATIENT INSTRUCTIONS
Hold Methotrexate 2 weeks prior to surgery; No Rinvoq morning of surgery and hold for 1 week after. Hold all supplements 1 week prior to surgery, including Aspirin

## 2024-12-04 NOTE — PROGRESS NOTES
08/25/2023    LEFT KNEE PATELLOFEMORAL ARTHROPLASTY performed by Usama Bender MD at [facility] ASC OR    URETHRAL STRICTURE DILATATION      WISDOM TOOTH EXTRACTION         Family History   Problem Relation Age of Onset    Arthritis Mother     Mental Illness Mother     Elevated Lipids Mother     Hypertension Mother     Asthma Mother     High Blood Pressure Mother     Osteoarthritis Mother     Rheum Arthritis Mother     Asthma Father     Elevated Lipids Father     Hypertension Father     Diabetes Father     Atrial Fibrillation Father     Alcohol Abuse Father     High Blood Pressure Father     Asthma Brother     Asthma Brother     Arthritis Maternal Grandmother     Mental Illness Maternal Grandmother     Atrial Fibrillation Maternal Grandmother     Diabetes Maternal Grandmother     Stroke Maternal Grandfather     Heart Disease Paternal Grandmother     Heart Attack Paternal Grandmother     Diabetes Paternal Grandmother     Stroke Paternal Grandfather     Asthma Brother     Asthma Brother     Diabetes Brother     Clotting Disorder Paternal Uncle     Early Death Paternal Uncle         Heart attack 57    Cancer Neg Hx          Review of Systems  A comprehensive review of systems was negative except for:   Cardiovascular: positive for chronic palpitations  Gastrointestinal: positive for loose stools  Musculoskeletal: positive for chronic right knee pain  Neurological: positive for headaches       Physical Exam   Vitals:    12/04/24 0935   BP: 116/82   Pulse: 89   Resp: 18   Temp: 99.3 °F (37.4 °C)   SpO2: 97%   Weight: 114.9 kg (253 lb 6.4 oz)   Height: 1.651 m (5' 5\")          Constitutional: She is oriented to person, place, and time. She appears well-developed and well-nourished. No distress.   HENT:   Head: Normocephalic and atraumatic.   Mouth/Throat: Uvula is midline, oropharynx is clear and moist and mucous membranes are normal.   Eyes: Conjunctivae and EOM are normal.   Neck: Trachea normal and normal range of motion.

## 2024-12-11 ENCOUNTER — TELEPHONE (OUTPATIENT)
Dept: WOMENS IMAGING | Age: 44
End: 2024-12-11

## 2024-12-11 ENCOUNTER — HOSPITAL ENCOUNTER (OUTPATIENT)
Dept: WOMENS IMAGING | Age: 44
Discharge: HOME | End: 2024-12-11
Payer: COMMERCIAL

## 2024-12-11 VITALS — HEIGHT: 65 IN | BODY MASS INDEX: 41.65 KG/M2 | WEIGHT: 250 LBS

## 2024-12-11 DIAGNOSIS — Z12.31 VISIT FOR SCREENING MAMMOGRAM: ICD-10-CM

## 2024-12-11 PROCEDURE — 77063 BREAST TOMOSYNTHESIS BI: CPT

## 2024-12-11 NOTE — TELEPHONE ENCOUNTER
NN spoke to pt regarding screening mammogram results and follow up imaging recommendations. Pt scheduled for right breast ultrasound.

## 2024-12-12 ENCOUNTER — HOSPITAL ENCOUNTER (OUTPATIENT)
Dept: WOMENS IMAGING | Age: 44
Discharge: HOME | End: 2024-12-12
Payer: COMMERCIAL

## 2024-12-12 ENCOUNTER — HOSPITAL ENCOUNTER (OUTPATIENT)
Dept: ULTRASOUND IMAGING | Age: 44
Discharge: HOME | End: 2024-12-12
Payer: COMMERCIAL

## 2024-12-12 DIAGNOSIS — R92.8 ABNORMAL SCREENING MAMMOGRAM: Primary | ICD-10-CM

## 2024-12-12 DIAGNOSIS — R92.8 ABNORMAL MAMMOGRAM: ICD-10-CM

## 2024-12-12 PROCEDURE — 76642 ULTRASOUND BREAST LIMITED: CPT

## 2024-12-12 PROCEDURE — G0279 TOMOSYNTHESIS, MAMMO: HCPCS

## 2024-12-16 ENCOUNTER — APPOINTMENT (OUTPATIENT)
Dept: GENERAL RADIOLOGY | Age: 44
End: 2024-12-16
Attending: ORTHOPAEDIC SURGERY
Payer: COMMERCIAL

## 2024-12-16 ENCOUNTER — ANESTHESIA EVENT (OUTPATIENT)
Dept: OPERATING ROOM | Age: 44
End: 2024-12-16
Payer: COMMERCIAL

## 2024-12-16 ENCOUNTER — ANESTHESIA (OUTPATIENT)
Dept: OPERATING ROOM | Age: 44
End: 2024-12-16
Payer: COMMERCIAL

## 2024-12-16 ENCOUNTER — HOSPITAL ENCOUNTER (OUTPATIENT)
Age: 44
Discharge: HOME | End: 2024-12-16
Attending: ORTHOPAEDIC SURGERY | Admitting: ORTHOPAEDIC SURGERY
Payer: COMMERCIAL

## 2024-12-16 VITALS
WEIGHT: 251.9 LBS | HEIGHT: 65 IN | DIASTOLIC BLOOD PRESSURE: 84 MMHG | SYSTOLIC BLOOD PRESSURE: 122 MMHG | OXYGEN SATURATION: 96 % | TEMPERATURE: 98.1 F | BODY MASS INDEX: 41.97 KG/M2 | HEART RATE: 109 BPM | RESPIRATION RATE: 21 BRPM

## 2024-12-16 DIAGNOSIS — Z01.818 PREOP TESTING: Primary | ICD-10-CM

## 2024-12-16 DIAGNOSIS — M17.11 PRIMARY OSTEOARTHRITIS OF RIGHT KNEE: ICD-10-CM

## 2024-12-16 LAB
ABO + RH BLD: NORMAL
ANION GAP SERPL CALCULATED.3IONS-SCNC: 11 MMOL/L (ref 3–16)
BLD GP AB SCN SERPL QL: NORMAL
BUN SERPL-MCNC: 12 MG/DL (ref 7–20)
CALCIUM SERPL-MCNC: 9 MG/DL (ref 8.3–10.6)
CHLORIDE SERPL-SCNC: 105 MMOL/L (ref 99–110)
CO2 SERPL-SCNC: 24 MMOL/L (ref 21–32)
CREAT SERPL-MCNC: 0.8 MG/DL (ref 0.6–1.1)
GFR SERPLBLD CREATININE-BSD FMLA CKD-EPI: >90 ML/MIN/{1.73_M2}
GLUCOSE BLD-MCNC: 123 MG/DL (ref 70–99)
GLUCOSE SERPL-MCNC: 104 MG/DL (ref 70–99)
HCT VFR BLD AUTO: 37.2 % (ref 36–48)
HGB BLD-MCNC: 12.2 G/DL (ref 12–16)
PERFORMED ON: ABNORMAL
POTASSIUM SERPL-SCNC: 4.1 MMOL/L (ref 3.5–5.1)
SODIUM SERPL-SCNC: 140 MMOL/L (ref 136–145)

## 2024-12-16 PROCEDURE — 6370000000 HC RX 637 (ALT 250 FOR IP): Performed by: ORTHOPAEDIC SURGERY

## 2024-12-16 PROCEDURE — 7100000011 HC PHASE II RECOVERY - ADDTL 15 MIN: Performed by: ORTHOPAEDIC SURGERY

## 2024-12-16 PROCEDURE — 86900 BLOOD TYPING SEROLOGIC ABO: CPT

## 2024-12-16 PROCEDURE — 2500000003 HC RX 250 WO HCPCS: Performed by: ORTHOPAEDIC SURGERY

## 2024-12-16 PROCEDURE — 2709999900 HC NON-CHARGEABLE SUPPLY: Performed by: ORTHOPAEDIC SURGERY

## 2024-12-16 PROCEDURE — 64447 NJX AA&/STRD FEMORAL NRV IMG: CPT | Performed by: ANESTHESIOLOGY

## 2024-12-16 PROCEDURE — 6360000002 HC RX W HCPCS: Performed by: NURSE ANESTHETIST, CERTIFIED REGISTERED

## 2024-12-16 PROCEDURE — 7100000000 HC PACU RECOVERY - FIRST 15 MIN: Performed by: ORTHOPAEDIC SURGERY

## 2024-12-16 PROCEDURE — C1713 ANCHOR/SCREW BN/BN,TIS/BN: HCPCS | Performed by: ORTHOPAEDIC SURGERY

## 2024-12-16 PROCEDURE — 3700000000 HC ANESTHESIA ATTENDED CARE: Performed by: ORTHOPAEDIC SURGERY

## 2024-12-16 PROCEDURE — 6360000002 HC RX W HCPCS: Performed by: ORTHOPAEDIC SURGERY

## 2024-12-16 PROCEDURE — 3700000001 HC ADD 15 MINUTES (ANESTHESIA): Performed by: ORTHOPAEDIC SURGERY

## 2024-12-16 PROCEDURE — 73560 X-RAY EXAM OF KNEE 1 OR 2: CPT

## 2024-12-16 PROCEDURE — 97162 PT EVAL MOD COMPLEX 30 MIN: CPT

## 2024-12-16 PROCEDURE — 85014 HEMATOCRIT: CPT

## 2024-12-16 PROCEDURE — 85018 HEMOGLOBIN: CPT

## 2024-12-16 PROCEDURE — 64999 UNLISTED PX NERVOUS SYSTEM: CPT | Performed by: ANESTHESIOLOGY

## 2024-12-16 PROCEDURE — 2720000010 HC SURG SUPPLY STERILE: Performed by: ORTHOPAEDIC SURGERY

## 2024-12-16 PROCEDURE — 86901 BLOOD TYPING SEROLOGIC RH(D): CPT

## 2024-12-16 PROCEDURE — 97165 OT EVAL LOW COMPLEX 30 MIN: CPT

## 2024-12-16 PROCEDURE — 97116 GAIT TRAINING THERAPY: CPT

## 2024-12-16 PROCEDURE — 2580000003 HC RX 258: Performed by: ORTHOPAEDIC SURGERY

## 2024-12-16 PROCEDURE — C1776 JOINT DEVICE (IMPLANTABLE): HCPCS | Performed by: ORTHOPAEDIC SURGERY

## 2024-12-16 PROCEDURE — 86850 RBC ANTIBODY SCREEN: CPT

## 2024-12-16 PROCEDURE — 7100000010 HC PHASE II RECOVERY - FIRST 15 MIN: Performed by: ORTHOPAEDIC SURGERY

## 2024-12-16 PROCEDURE — 3600000005 HC SURGERY LEVEL 5 BASE: Performed by: ORTHOPAEDIC SURGERY

## 2024-12-16 PROCEDURE — 6360000002 HC RX W HCPCS: Performed by: ANESTHESIOLOGY

## 2024-12-16 PROCEDURE — 7100000001 HC PACU RECOVERY - ADDTL 15 MIN: Performed by: ORTHOPAEDIC SURGERY

## 2024-12-16 PROCEDURE — 2500000003 HC RX 250 WO HCPCS: Performed by: NURSE ANESTHETIST, CERTIFIED REGISTERED

## 2024-12-16 PROCEDURE — 3600000015 HC SURGERY LEVEL 5 ADDTL 15MIN: Performed by: ORTHOPAEDIC SURGERY

## 2024-12-16 PROCEDURE — 80048 BASIC METABOLIC PNL TOTAL CA: CPT

## 2024-12-16 DEVICE — CEMENT BNE 40GM FULL DOSE PMMA W/ GENT HI VISC RADPQ LNG: Type: IMPLANTABLE DEVICE | Site: KNEE | Status: FUNCTIONAL

## 2024-12-16 DEVICE — COMPONENT FEM SZ 5 R KNEE NAR POST STBL CEM ATTUNE: Type: IMPLANTABLE DEVICE | Site: KNEE | Status: FUNCTIONAL

## 2024-12-16 DEVICE — BASEPLATE TIB SZ 4 ROT PLATFRM CO CHROM MOLYBDENUM TI ALLY: Type: IMPLANTABLE DEVICE | Site: KNEE | Status: FUNCTIONAL

## 2024-12-16 DEVICE — INSERT TIB SZ 5 THK10MM POLY POST STBL ROT PLATFRM ATTUNE: Type: IMPLANTABLE DEVICE | Site: KNEE | Status: FUNCTIONAL

## 2024-12-16 DEVICE — IMPL KNEE STEM ATT REV CEMENTED 14X30MM: Type: IMPLANTABLE DEVICE | Site: KNEE | Status: FUNCTIONAL

## 2024-12-16 DEVICE — COMPONENT PAT DIA35MM KNEE POLY CEM MEDIALIZED ANAT ATTUNE: Type: IMPLANTABLE DEVICE | Site: PATELLA | Status: FUNCTIONAL

## 2024-12-16 RX ORDER — DEXAMETHASONE SODIUM PHOSPHATE 4 MG/ML
INJECTION, SOLUTION INTRA-ARTICULAR; INTRALESIONAL; INTRAMUSCULAR; INTRAVENOUS; SOFT TISSUE
Status: DISCONTINUED | OUTPATIENT
Start: 2024-12-16 | End: 2024-12-16 | Stop reason: SDUPTHER

## 2024-12-16 RX ORDER — ASPIRIN 81 MG/1
81 TABLET ORAL 2 TIMES DAILY
Qty: 60 TABLET | Refills: 0 | Status: SHIPPED | OUTPATIENT
Start: 2024-12-16

## 2024-12-16 RX ORDER — ACETAMINOPHEN 500 MG
1000 TABLET ORAL 3 TIMES DAILY
Status: CANCELLED | OUTPATIENT
Start: 2024-12-16

## 2024-12-16 RX ORDER — MELOXICAM 15 MG/1
15 TABLET ORAL DAILY
Qty: 30 TABLET | Refills: 3 | Status: SHIPPED | OUTPATIENT
Start: 2024-12-16

## 2024-12-16 RX ORDER — TRANEXAMIC ACID 10 MG/ML
1000 INJECTION, SOLUTION INTRAVENOUS ONCE
Status: COMPLETED | OUTPATIENT
Start: 2024-12-17 | End: 2024-12-16

## 2024-12-16 RX ORDER — ONDANSETRON 2 MG/ML
4 INJECTION INTRAMUSCULAR; INTRAVENOUS EVERY 6 HOURS PRN
Status: CANCELLED | OUTPATIENT
Start: 2024-12-16

## 2024-12-16 RX ORDER — OXYCODONE HYDROCHLORIDE 5 MG/1
5 TABLET ORAL EVERY 4 HOURS PRN
Qty: 42 TABLET | Refills: 0 | Status: SHIPPED | OUTPATIENT
Start: 2024-12-16 | End: 2024-12-23

## 2024-12-16 RX ORDER — SODIUM CHLORIDE 0.9 % (FLUSH) 0.9 %
5-40 SYRINGE (ML) INJECTION PRN
Status: DISCONTINUED | OUTPATIENT
Start: 2024-12-16 | End: 2024-12-16 | Stop reason: HOSPADM

## 2024-12-16 RX ORDER — MAGNESIUM SULFATE HEPTAHYDRATE 500 MG/ML
INJECTION, SOLUTION INTRAMUSCULAR; INTRAVENOUS
Status: DISCONTINUED | OUTPATIENT
Start: 2024-12-16 | End: 2024-12-16 | Stop reason: SDUPTHER

## 2024-12-16 RX ORDER — DIPHENHYDRAMINE HCL 25 MG
25 TABLET ORAL EVERY 6 HOURS PRN
Status: CANCELLED | OUTPATIENT
Start: 2024-12-16

## 2024-12-16 RX ORDER — MELOXICAM 7.5 MG/1
7.5 TABLET ORAL DAILY
Status: CANCELLED | OUTPATIENT
Start: 2024-12-16 | End: 2024-12-19

## 2024-12-16 RX ORDER — OXYCODONE HYDROCHLORIDE 5 MG/1
5 TABLET ORAL EVERY 4 HOURS PRN
Status: DISCONTINUED | OUTPATIENT
Start: 2024-12-16 | End: 2024-12-16 | Stop reason: HOSPADM

## 2024-12-16 RX ORDER — ONDANSETRON 4 MG/1
4 TABLET, ORALLY DISINTEGRATING ORAL EVERY 8 HOURS PRN
Status: CANCELLED | OUTPATIENT
Start: 2024-12-16

## 2024-12-16 RX ORDER — OXYCODONE HYDROCHLORIDE 5 MG/1
10 TABLET ORAL EVERY 4 HOURS PRN
Status: DISCONTINUED | OUTPATIENT
Start: 2024-12-16 | End: 2024-12-16 | Stop reason: HOSPADM

## 2024-12-16 RX ORDER — SODIUM CHLORIDE 9 MG/ML
INJECTION, SOLUTION INTRAVENOUS CONTINUOUS
Status: DISCONTINUED | OUTPATIENT
Start: 2024-12-16 | End: 2024-12-16 | Stop reason: HOSPADM

## 2024-12-16 RX ORDER — CEPHALEXIN 500 MG/1
500 CAPSULE ORAL 2 TIMES DAILY
Qty: 4 CAPSULE | Refills: 0 | Status: SHIPPED | OUTPATIENT
Start: 2024-12-16

## 2024-12-16 RX ORDER — ROCURONIUM BROMIDE 10 MG/ML
INJECTION, SOLUTION INTRAVENOUS
Status: DISCONTINUED | OUTPATIENT
Start: 2024-12-16 | End: 2024-12-16 | Stop reason: SDUPTHER

## 2024-12-16 RX ORDER — SODIUM CHLORIDE 9 MG/ML
INJECTION, SOLUTION INTRAVENOUS PRN
Status: CANCELLED | OUTPATIENT
Start: 2024-12-16

## 2024-12-16 RX ORDER — ROPIVACAINE HYDROCHLORIDE 5 MG/ML
INJECTION, SOLUTION EPIDURAL; INFILTRATION; PERINEURAL
Status: COMPLETED | OUTPATIENT
Start: 2024-12-16 | End: 2024-12-16

## 2024-12-16 RX ORDER — MIDAZOLAM HYDROCHLORIDE 2 MG/2ML
2 INJECTION, SOLUTION INTRAMUSCULAR; INTRAVENOUS ONCE
Status: COMPLETED | OUTPATIENT
Start: 2024-12-16 | End: 2024-12-16

## 2024-12-16 RX ORDER — ONDANSETRON 2 MG/ML
INJECTION INTRAMUSCULAR; INTRAVENOUS
Status: DISCONTINUED | OUTPATIENT
Start: 2024-12-16 | End: 2024-12-16 | Stop reason: SDUPTHER

## 2024-12-16 RX ORDER — LIDOCAINE HYDROCHLORIDE 10 MG/ML
0.5 INJECTION, SOLUTION EPIDURAL; INFILTRATION; INTRACAUDAL; PERINEURAL ONCE
Status: DISCONTINUED | OUTPATIENT
Start: 2024-12-16 | End: 2024-12-16 | Stop reason: HOSPADM

## 2024-12-16 RX ORDER — DIAZEPAM 5 MG/1
5 TABLET ORAL EVERY 6 HOURS PRN
Status: CANCELLED | OUTPATIENT
Start: 2024-12-16

## 2024-12-16 RX ORDER — TRANEXAMIC ACID 10 MG/ML
1000 INJECTION, SOLUTION INTRAVENOUS
Status: COMPLETED | OUTPATIENT
Start: 2024-12-16 | End: 2024-12-16

## 2024-12-16 RX ORDER — ONDANSETRON 2 MG/ML
4 INJECTION INTRAMUSCULAR; INTRAVENOUS
Status: DISCONTINUED | OUTPATIENT
Start: 2024-12-16 | End: 2024-12-16 | Stop reason: HOSPADM

## 2024-12-16 RX ORDER — LIDOCAINE HYDROCHLORIDE 20 MG/ML
INJECTION, SOLUTION INFILTRATION; PERINEURAL
Status: DISCONTINUED | OUTPATIENT
Start: 2024-12-16 | End: 2024-12-16 | Stop reason: SDUPTHER

## 2024-12-16 RX ORDER — SODIUM CHLORIDE 0.9 % (FLUSH) 0.9 %
5-40 SYRINGE (ML) INJECTION EVERY 12 HOURS SCHEDULED
Status: DISCONTINUED | OUTPATIENT
Start: 2024-12-16 | End: 2024-12-16 | Stop reason: HOSPADM

## 2024-12-16 RX ORDER — HYDROMORPHONE HYDROCHLORIDE 2 MG/ML
0.5 INJECTION, SOLUTION INTRAMUSCULAR; INTRAVENOUS; SUBCUTANEOUS EVERY 5 MIN PRN
Status: DISCONTINUED | OUTPATIENT
Start: 2024-12-16 | End: 2024-12-16 | Stop reason: HOSPADM

## 2024-12-16 RX ORDER — DEXMEDETOMIDINE HYDROCHLORIDE 100 UG/ML
INJECTION, SOLUTION INTRAVENOUS
Status: DISCONTINUED | OUTPATIENT
Start: 2024-12-16 | End: 2024-12-16 | Stop reason: SDUPTHER

## 2024-12-16 RX ORDER — SODIUM CHLORIDE 0.9 % (FLUSH) 0.9 %
5-40 SYRINGE (ML) INJECTION PRN
Status: CANCELLED | OUTPATIENT
Start: 2024-12-16

## 2024-12-16 RX ORDER — DIAZEPAM 5 MG/1
5 TABLET ORAL EVERY 8 HOURS PRN
Qty: 20 TABLET | Refills: 0 | Status: SHIPPED | OUTPATIENT
Start: 2024-12-16 | End: 2024-12-26

## 2024-12-16 RX ORDER — PROPOFOL 10 MG/ML
INJECTION, EMULSION INTRAVENOUS
Status: DISCONTINUED | OUTPATIENT
Start: 2024-12-16 | End: 2024-12-16 | Stop reason: SDUPTHER

## 2024-12-16 RX ORDER — HYDROMORPHONE HYDROCHLORIDE 2 MG/ML
0.25 INJECTION, SOLUTION INTRAMUSCULAR; INTRAVENOUS; SUBCUTANEOUS EVERY 5 MIN PRN
Status: DISCONTINUED | OUTPATIENT
Start: 2024-12-16 | End: 2024-12-16 | Stop reason: HOSPADM

## 2024-12-16 RX ORDER — NALOXONE HYDROCHLORIDE 0.4 MG/ML
INJECTION, SOLUTION INTRAMUSCULAR; INTRAVENOUS; SUBCUTANEOUS PRN
Status: DISCONTINUED | OUTPATIENT
Start: 2024-12-16 | End: 2024-12-16 | Stop reason: HOSPADM

## 2024-12-16 RX ORDER — ACETAMINOPHEN 500 MG
1000 TABLET ORAL EVERY 6 HOURS PRN
Qty: 60 TABLET | Refills: 3 | Status: SHIPPED | OUTPATIENT
Start: 2024-12-16

## 2024-12-16 RX ORDER — SUCCINYLCHOLINE/SOD CL,ISO/PF 200MG/10ML
SYRINGE (ML) INTRAVENOUS
Status: DISCONTINUED | OUTPATIENT
Start: 2024-12-16 | End: 2024-12-16 | Stop reason: SDUPTHER

## 2024-12-16 RX ORDER — SODIUM CHLORIDE 0.9 % (FLUSH) 0.9 %
5-40 SYRINGE (ML) INJECTION EVERY 12 HOURS SCHEDULED
Status: CANCELLED | OUTPATIENT
Start: 2024-12-16

## 2024-12-16 RX ORDER — ASPIRIN 325 MG
325 TABLET, DELAYED RELEASE (ENTERIC COATED) ORAL 2 TIMES DAILY
Status: CANCELLED | OUTPATIENT
Start: 2024-12-16

## 2024-12-16 RX ORDER — DIPHENHYDRAMINE HYDROCHLORIDE 50 MG/ML
25 INJECTION INTRAMUSCULAR; INTRAVENOUS EVERY 6 HOURS PRN
Status: CANCELLED | OUTPATIENT
Start: 2024-12-16

## 2024-12-16 RX ORDER — SODIUM CHLORIDE, SODIUM LACTATE, POTASSIUM CHLORIDE, CALCIUM CHLORIDE 600; 310; 30; 20 MG/100ML; MG/100ML; MG/100ML; MG/100ML
INJECTION, SOLUTION INTRAVENOUS CONTINUOUS
Status: CANCELLED | OUTPATIENT
Start: 2024-12-16

## 2024-12-16 RX ORDER — SODIUM CHLORIDE 9 MG/ML
INJECTION, SOLUTION INTRAVENOUS PRN
Status: DISCONTINUED | OUTPATIENT
Start: 2024-12-16 | End: 2024-12-16 | Stop reason: HOSPADM

## 2024-12-16 RX ORDER — HYDROMORPHONE HYDROCHLORIDE 2 MG/ML
INJECTION, SOLUTION INTRAMUSCULAR; INTRAVENOUS; SUBCUTANEOUS
Status: DISCONTINUED | OUTPATIENT
Start: 2024-12-16 | End: 2024-12-16 | Stop reason: SDUPTHER

## 2024-12-16 RX ADMIN — TRANEXAMIC ACID 1000 MG: 10 INJECTION, SOLUTION INTRAVENOUS at 10:44

## 2024-12-16 RX ADMIN — CEFAZOLIN 2000 MG: 2 INJECTION, POWDER, FOR SOLUTION INTRAMUSCULAR; INTRAVENOUS at 13:53

## 2024-12-16 RX ADMIN — DEXAMETHASONE SODIUM PHOSPHATE 8 MG: 4 INJECTION, SOLUTION INTRAMUSCULAR; INTRAVENOUS at 10:43

## 2024-12-16 RX ADMIN — HYDROMORPHONE HYDROCHLORIDE 1 MG: 2 INJECTION, SOLUTION INTRAMUSCULAR; INTRAVENOUS; SUBCUTANEOUS at 10:38

## 2024-12-16 RX ADMIN — ROCURONIUM BROMIDE 20 MG: 10 INJECTION, SOLUTION INTRAVENOUS at 11:38

## 2024-12-16 RX ADMIN — DEXMEDETOMIDINE HYDROCHLORIDE 10 MCG: 100 INJECTION, SOLUTION INTRAVENOUS at 12:18

## 2024-12-16 RX ADMIN — DEXMEDETOMIDINE HYDROCHLORIDE 10 MCG: 100 INJECTION, SOLUTION INTRAVENOUS at 10:54

## 2024-12-16 RX ADMIN — PROPOFOL 130 MG: 10 INJECTION, EMULSION INTRAVENOUS at 10:38

## 2024-12-16 RX ADMIN — ROPIVACAINE HYDROCHLORIDE 20 ML: 5 INJECTION, SOLUTION EPIDURAL; INFILTRATION; PERINEURAL at 08:50

## 2024-12-16 RX ADMIN — ROPIVACAINE HYDROCHLORIDE 20 ML: 5 INJECTION, SOLUTION EPIDURAL; INFILTRATION; PERINEURAL at 08:46

## 2024-12-16 RX ADMIN — CEFAZOLIN 2000 MG: 2 INJECTION, POWDER, FOR SOLUTION INTRAMUSCULAR; INTRAVENOUS at 10:41

## 2024-12-16 RX ADMIN — SODIUM CHLORIDE: 9 INJECTION, SOLUTION INTRAVENOUS at 08:39

## 2024-12-16 RX ADMIN — MAGNESIUM SULFATE HEPTAHYDRATE 1 G: 500 INJECTION, SOLUTION INTRAMUSCULAR; INTRAVENOUS at 10:43

## 2024-12-16 RX ADMIN — Medication 100 MG: at 10:39

## 2024-12-16 RX ADMIN — LIDOCAINE HYDROCHLORIDE 50 MG: 20 INJECTION, SOLUTION INFILTRATION; PERINEURAL at 10:38

## 2024-12-16 RX ADMIN — ROCURONIUM BROMIDE 50 MG: 10 INJECTION, SOLUTION INTRAVENOUS at 10:49

## 2024-12-16 RX ADMIN — ONDANSETRON 4 MG: 2 INJECTION, SOLUTION INTRAMUSCULAR; INTRAVENOUS at 12:04

## 2024-12-16 RX ADMIN — OXYCODONE 5 MG: 5 TABLET ORAL at 13:38

## 2024-12-16 RX ADMIN — MIDAZOLAM 2 MG: 1 INJECTION INTRAMUSCULAR; INTRAVENOUS at 08:45

## 2024-12-16 RX ADMIN — SUGAMMADEX 200 MG: 100 INJECTION, SOLUTION INTRAVENOUS at 12:31

## 2024-12-16 RX ADMIN — TRANEXAMIC ACID 1000 MG: 10 INJECTION, SOLUTION INTRAVENOUS at 11:51

## 2024-12-16 RX ADMIN — HYDROMORPHONE HYDROCHLORIDE 0.5 MG: 2 INJECTION, SOLUTION INTRAMUSCULAR; INTRAVENOUS; SUBCUTANEOUS at 11:29

## 2024-12-16 RX ADMIN — PROPOFOL 30 MG: 10 INJECTION, EMULSION INTRAVENOUS at 10:41

## 2024-12-16 ASSESSMENT — PAIN DESCRIPTION - ORIENTATION: ORIENTATION: RIGHT

## 2024-12-16 ASSESSMENT — PAIN SCALES - GENERAL: PAINLEVEL_OUTOF10: 6

## 2024-12-16 ASSESSMENT — PAIN DESCRIPTION - DESCRIPTORS
DESCRIPTORS: BURNING
DESCRIPTORS: DISCOMFORT;BURNING

## 2024-12-16 ASSESSMENT — PAIN - FUNCTIONAL ASSESSMENT
PAIN_FUNCTIONAL_ASSESSMENT: NONE - DENIES PAIN
PAIN_FUNCTIONAL_ASSESSMENT: WONG-BAKER FACES
PAIN_FUNCTIONAL_ASSESSMENT: 0-10
PAIN_FUNCTIONAL_ASSESSMENT: 0-10

## 2024-12-16 ASSESSMENT — PAIN DESCRIPTION - LOCATION: LOCATION: KNEE

## 2024-12-16 NOTE — OP NOTE
[address] [address]                            OPERATIVE REPORT      PATIENT NAME: ZULAY TAN               : 1980  MED REC NO: [id number]                      ROOM: ASC OR  ACCOUNT NO: [id number]                       ADMIT DATE: 2024  PROVIDER: Usama Armendariz MD      DATE OF PROCEDURE:  2024    SURGEON:  Usama Armendariz MD    PREOPERATIVE DIAGNOSIS:  Failed right knee patellofemoral arthroplasty with right knee end-stage osteoarthritis.    POSTOPERATIVE DIAGNOSIS:  Failed right knee patellofemoral arthroplasty with right knee end-stage osteoarthritis.    PROCEDURE:  Revision right total knee arthroplasty.    ANESTHESIA:  General endotracheal.  The patient also did receive an adductor canal block and an i-PACK block.    IMPLANTS:  DePuy Attune total knee system, femoral component, posterior stabilized, size 5 narrow right; Revision tibial baseplate rotating platform size 4 with a 14 x 30 mm cemented stem; anatomic medialized patella size 35 mm; tibial insert rotating platform, posterior stabilized, size 5 x 10 mm.    BLOOD LOSS:  200 mL.    TOURNIQUET TIME:  53 minutes at 300 mmHg.    CONDITION:  The patient postoperatively was stable.    HISTORY:  The patient is a 44-year-old female, who was referred to me by one of my partners.  She had a prior history of a patellofemoral arthroplasty as well as a meniscal root repair.  She continues to have significant right knee pain, had failed nonoperative management.  We discussed further treatment options with her, and her best option was revision total knee arthroplasty.  After discussing risks and benefits of the procedure with her, informed consent was obtained.    DESCRIPTION OF PROCEDURE:  The patient was seen in the preoperative holding area.  The right knee was confirmed to be the operative extremity and marked at that period of time.  She did receive 2 g

## 2024-12-16 NOTE — ANESTHESIA POSTPROCEDURE EVALUATION
Department of Anesthesiology  Postprocedure Note    Patient: Savita Kohler  MRN: [id number]  YOB: 1980  Date of evaluation: 12/16/2024    Procedure Summary       Date: 12/16/24 Room / Location: [address]    Anesthesia Start: 1032 Anesthesia Stop: 1241    Procedure: RIGHT REVISION TOTAL KNEE ARTHROPLASTY-DEPUY (Right: Knee) Diagnosis:       Presence of right artificial knee joint      Instability of right knee joint      (Presence of right artificial knee joint [Z96.651])      (Instability of right knee joint [M25.361])    Surgeons: Usama Armendariz MD Responsible Provider: Douglas Diane MD    Anesthesia Type: general, regional ASA Status: 3            Anesthesia Type: No value filed.    Aline Phase I: Aline Score: 10    Aline Phase II:      Anesthesia Post Evaluation    Patient location during evaluation: PACU  Patient participation: complete - patient participated  Level of consciousness: awake  Airway patency: patent  Nausea & Vomiting: no nausea  Cardiovascular status: hemodynamically stable  Respiratory status: acceptable  Hydration status: euvolemic  Multimodal analgesia pain management approach  Pain management: adequate    No notable events documented.

## 2024-12-16 NOTE — ANESTHESIA PROCEDURE NOTES
Peripheral Block    Patient location during procedure: pre-op  Reason for block: post-op pain management and at surgeon's request  Start time: 12/16/2024 8:46 AM  End time: 12/16/2024 8:48 AM  Staffing  Performed: anesthesiologist   Anesthesiologist: Douglas Diane MD  Performed by: Douglas Diane MD  Authorized by: Douglas Diane MD    Preanesthetic Checklist  Completed: patient identified, IV checked, site marked, risks and benefits discussed, surgical/procedural consents, equipment checked, pre-op evaluation, timeout performed, anesthesia consent given, oxygen available, monitors applied/VS acknowledged, fire risk safety assessment completed and verbalized and blood product R/B/A discussed and consented  Peripheral Block   Patient position: supine  Prep: ChloraPrep  Provider prep: mask and sterile gloves  Patient monitoring: cardiac monitor, continuous pulse ox, frequent blood pressure checks, IV access, oxygen and responsive to questions  Block type: Femoral  Adductor canal  Laterality: right  Injection technique: single-shot  Guidance: ultrasound guided  Local infiltration: lidocaine  Infiltration strength: 1 %  Local infiltration: lidocaine  Dose: 1 mL    Needle   Needle type: insulated echogenic nerve stimulator needle   Needle localization: ultrasound guidance  Test dose: negative  Assessment   Injection assessment: negative aspiration for heme, no paresthesia on injection and no intravascular symptoms  Paresthesia pain: none  Slow fractionated injection: yes  Hemodynamics: stable  Outcomes: uncomplicated and patient tolerated procedure well    Medications Administered  ROPivacaine 0.5% with EPINEPHrine [id number] injection (ANESTHESIA USE ONLY) (Mixture components: EPINEPHrine PF 1 MG/ML Soln, 0.005 mL; ROPivacaine 0.5% Soln, 1 mL) - Perineural   20 mL - 12/16/2024 8:46:00 AM

## 2024-12-16 NOTE — H&P
Date of Surgery Update:  Savita Kohler was seen, history and physical examination reviewed, and patient examined by me today. There have been no significant clinical changes since the completion of the previous history and physical.    The risk, benefits, and alternatives of the proposed procedure have been explained to the patient (or appropriate guardian) and understanding verbalized. All questions answered. Patient wishes to proceed.    Electronically signed by: Usama Armendariz MD,12/16/2024,8:39 AM

## 2024-12-16 NOTE — ANESTHESIA PRE PROCEDURE
AM           Anesthesia Evaluation  Patient summary reviewed and Nursing notes reviewed   history of anesthetic complications (delayed emergence):   Airway: Mallampati: II  TM distance: >3 FB   Neck ROM: full  Mouth opening: > = 3 FB   Dental: normal exam         Pulmonary:normal exam  breath sounds clear to auscultation  (+)     sleep apnea: on CPAP,                                  Cardiovascular:  Exercise tolerance: good (>4 METS)  (+) hypertension:        Rhythm: regular  Rate: normal                    Neuro/Psych:   (+) neuromuscular disease:, headaches:, psychiatric history:            GI/Hepatic/Renal:   (+) GERD:          Endo/Other:    (+) : arthritis:..                 Abdominal:             Vascular:          Other Findings:             Anesthesia Plan      general and regional     ASA 3       Induction: intravenous.    MIPS: Postoperative opioids intended and Prophylactic antiemetics administered.  Anesthetic plan and risks discussed with patient.      Plan discussed with CRNA.    Attending anesthesiologist reviewed and agrees with Preprocedure content                Douglas Diane MD   12/16/2024

## 2024-12-16 NOTE — ANESTHESIA PROCEDURE NOTES
Peripheral Block    Patient location during procedure: pre-op  Reason for block: post-op pain management and at surgeon's request  Start time: 12/16/2024 8:50 AM  End time: 12/16/2024 8:51 AM  Staffing  Performed: anesthesiologist   Anesthesiologist: Douglas Diane MD  Performed by: Douglas Diane MD  Authorized by: Douglas Diane MD    Preanesthetic Checklist  Completed: patient identified, IV checked, site marked, risks and benefits discussed, surgical/procedural consents, equipment checked, pre-op evaluation, timeout performed, anesthesia consent given, oxygen available, monitors applied/VS acknowledged, fire risk safety assessment completed and verbalized and blood product R/B/A discussed and consented  Peripheral Block   Patient position: supine  Provider prep: mask and sterile gloves  Patient monitoring: cardiac monitor, continuous pulse ox, frequent blood pressure checks, IV access, oxygen and responsive to questions  Block type: iPacks  Laterality: right  Injection technique: single-shot  Guidance: ultrasound guided  Local infiltration: lidocaine  Infiltration strength: 1 %  Local infiltration: lidocaine  Dose: 1 mL    Needle   Needle type: insulated echogenic nerve stimulator needle   Needle localization: ultrasound guidance  Assessment   Injection assessment: negative aspiration for heme, no paresthesia on injection, local visualized surrounding nerve on ultrasound and no intravascular symptoms  Paresthesia pain: none  Slow fractionated injection: yes  Hemodynamics: stable  Outcomes: uncomplicated and patient tolerated procedure well    Medications Administered  ropivacaine (NAROPIN) injection 0.5% - Perineural   20 mL - 12/16/2024 8:50:00 AM

## 2024-12-16 NOTE — PROGRESS NOTES
[facility] - Inpatient Rehabilitation Department   Phone: [phone]    Physical Therapy    [x] Initial Evaluation            [] Daily Treatment Note         [x] Discharge Summary      Patient: Savita Kohler   : 1980   MRN: [id number]   Date of Service:  2024  Admitting Diagnosis: Primary osteoarthritis of right knee  Current Admission Summary: The pt presented for a revision of R TKR.  Past Medical History:  has a past medical history of ADHD (attention deficit hyperactivity disorder), Anesthesia, Anxiety, Arthritis, Cellulitis of left lower extremity, Chronic back pain, Chronic GERD, Depression, Essential hypertension, Fibromyalgia, Heart rate fast, Kidney stone, Migraine, Motor tic disorder, Obesity, Obstructive sleep apnea, adult, Prolonged emergence from general anesthesia, and Restless legs syndrome.  Past Surgical History:  has a past surgical history that includes Lithotripsy (x2); Tonsillectomy (Bilateral); Adenoidectomy (Bilateral); other surgical history; Abdominal exploration surgery (2011); Appendectomy (2011); lumbar laminectomy (2013); Abdominoplasty (2014); ovarian cyst removal (2018); Hysterectomy, total abdominal (2018); [place] tooth extraction; Urethra dilation; Meckel's diverticulum excision; back surgery; Carpal tunnel release; Ovary removal; Knee arthroscopy (Left, 2023); Total knee arthroplasty (Left, 2023); Cosmetic surgery (); Knee Arthroplasty (); Knee arthroscopy (Right, 2024); and Knee Arthroplasty (Right, 2024).  Discharge Recommendations: Savita Kohler scored a 20/24 on the AM-PAC short mobility form. Current research shows that an AM-PAC score of 18 or greater is typically associated with a discharge to the patient's home setting. Based on the patient's AM-PAC score and their current functional mobility deficits, it is recommended that the patient have 2-3 sessions per week of Physical 
 Pt arrived from OR to PACU bay 4.  Report received from OR staff.  Surgical dressing with cooling pad in place to right knee.  Pt on 4 L NC, ST, VSS.      
Confirmed patient received language preferred educational packet from the surgeons office and completed the review. Barriers to learning addressed. Patient education material mailed if requested in the language preferred. Reinforced with patient the importance of reading the entire packet of total joint replacement information and educational material received from their surgeon.  Encouraged to call their surgeons  office for questions or if they did not receive the packet. All patient  questions answered. Patient voices understanding.   
Discharge instructions reviewed with patient/father/step mom. All home medications have been reviewed, questions answered and patient verbalized understanding.  Discharge instructions signed.  Pt dc'd per wheelchair.  Patient discharged home with 6 medications, cryo therapy and other belongings. Pt already has wheeled-walker at home.  Father and step mom taking stable pt home.      
Per DR Armendariz office-it is ok to use PATs dated 9/23/24 for 12/16/24 surgery  
Preop instructions were sent to the patient via [employer]   
Pt awake and alert.  Pt on RA, VSS.  Father and step mom in the waiting room.  Pt denies pain and nausea, tolerating PO.  Skin warm RLE, palpable pulses and able to wiggle toes.  Pt meets criteria to be discharged from phase 1.    
Teaching / education initiated regarding perioperative experience, expectations, and pain management during stay. Patient verbalized understanding.    
in \"pril\"or \"sartan\" the evening prior or morning of surgery (such as Lisinopril or Losartan).  For blood thinners such as Plavix, Eliquis, Effient, Pradaxa, Xarelto or antiplatelets such as Pletal get instructions on if you need to stop prior to surgery and for how long.  Has instructions ___  to get instructions from surgeon and prescribing doctor and surgeon ___.  Aspirin,Ibuprofen,Advil,Aleve and any anti inflammatories along with vitamins should be stopped 5-7 days before surgery or as directed by surgeon. Tylenol is okay to take until the evening prior to surgery.  If you take a long-acting insulin at night,cut the dose in half the evening prior to surgery.  If you take a GLP-1 receptor (such as Trulicity,Mounjaro, Ozempic weekly),do not take 7 days prior to surgery. If you take a daily dose such as Rybelsus you must stop at least 24 hours prior to surgery.  If your blood sugar is low and you are symptomatic the AM of surgery you may take sips of a sugary clear liquid.  If you take any medication for weight loss(such as Adipex Naltrexone,Phentermine) you should not take for a minium of 72 hours prior to surgery.  Follow your surgeons instructions on showering prior to surgery.If you were not given specific instructions ,shower with an antibacterial soap such as dial the morning of surgery.  Wear clean loose fitting clothing.  Remove all piercings,rings and jewelry and leave at home.  Glasses,hearing aides and contacts will be removed prior to surgery- bring appropriate cases.  Leave your hair down-no buns,ponytails or metal hair accessories.  NO nail polish -if you have artificial or gel nails check with your surgeon.   NO makeup especially eye makeup.  Do not smoke,drink alcohol or use recreational drugs 24 hours prior to surgery.  Bring a copy of your Living Will or Durable Power of  the day of surgery.  If you are staying overnight bring your cpap or bipap but leave your personnel belongings in the 
    Cognition  WFL  Orientation:    alert and oriented x 4  Command Following:   WFL     Education  Barriers To Learning: none  Patient Education: patient educated on OT role and benefits, ADL adaptive strategies, transfer training, discharge recommendations  Learning Assessment:  patient verbalizes and demonstrates understanding    Assessment  Activity Tolerance: Tolerated well. Pt has numbness and drop foot in R LE.  Impairments Requiring Therapeutic Intervention: none - eval with same day discharge  Prognosis: good; eval & d/c  Clinical Assessment: Pt seen for OT eval s/p R TKA. Pt is independent at baseline. She currently requires SBA for mobility and standing ADLs, toileting, grooming. She does need assist to don/doff socks, but states that she doesn't wear them. No further OT needs as she is discharging home after same-day surgery and plans OP PT.  Safety Interventions: patient left in chair, gait belt, nurse notified, and family/caregiver present    Plan  Frequency: Eval with same day discharge.  No follow up required.  Current Treatment Recommendations: not applicable, evaluation completed with same day discharge.    Goals  Patient Goals: NA; eval & d/c   Short Term Goals:  Time Frame: N/A  Patient eval with same day discharge.  No goals set secondary to pt is discharging home after same-day surgery.         Therapy Session Time     Individual Group Co-treatment   Time In    1420   Time Out    1505   Minutes    45        Timed Code Treatment Minutes:   0 min (Units shared with PT as pt in OP in a bed status.)  Total Treatment Minutes:  45 min       Electronically Signed By: THERESE MCCALL, OT  STEPHANIE Do Ed., OTR/L, [id number]

## 2024-12-18 ENCOUNTER — HOSPITAL ENCOUNTER (OUTPATIENT)
Dept: PHYSICAL THERAPY | Age: 44
Setting detail: THERAPIES SERIES
Discharge: HOME | End: 2024-12-18
Payer: COMMERCIAL

## 2024-12-18 DIAGNOSIS — M17.11 UNILATERAL PRIMARY OSTEOARTHRITIS, RIGHT KNEE: Primary | ICD-10-CM

## 2024-12-18 DIAGNOSIS — M25.561 ACUTE PAIN OF RIGHT KNEE: ICD-10-CM

## 2024-12-18 PROCEDURE — 97016 VASOPNEUMATIC DEVICE THERAPY: CPT

## 2024-12-18 PROCEDURE — 97161 PT EVAL LOW COMPLEX 20 MIN: CPT

## 2024-12-18 PROCEDURE — 97530 THERAPEUTIC ACTIVITIES: CPT

## 2024-12-18 NOTE — PLAN OF CARE
[facility] - Outpatient Rehabilitation and Therapy [address], [place] [address] office: [phone] fax: [phone]     Physical Therapy Initial Evaluation Certification      Dear Usama Armendariz MD ,    We had the pleasure of evaluating the following patient for physical therapy services at [facility] Outpatient Physical Therapy.  A summary of our findings can be found in the initial assessment below.  This includes our plan of care.  If you have any questions or concerns regarding these findings, please do not hesitate to contact me at the office phone number listed above.  Thank you for the referral.     Physician Signature:_______________________________Date:__________________  By signing above (or electronic signature), therapist’s plan is approved by physician       Physical Therapy: TREATMENT/PROGRESS NOTE   Patient: Savita Kohler (44 y.o. female)   Examination Date: 2024   :  1980 MRN: [id number]   Visit #: 1   Insurance Allowable Auth Needed   20 []Yes    []No    Insurance: Payor: UNITED HE[employer]CARE / Plan: UNITED HEALTHCARE - CHOICE PLUS / Product Type: *No Product type* /   Insurance ID: [id number] - (Commercial)  Secondary Insurance (if applicable):    Treatment Diagnosis:     ICD-10-CM    1. Unilateral primary osteoarthritis, right knee  M17.11       2. Acute pain of right knee  M25.561          Medical Diagnosis:  Unilateral primary osteoarthritis, right knee [M17.11]   Referring Physician: Usama Armendariz MD  PCP: Nivia Badillo, APRN - CNP     Plan of care signed (Y/N):     Date of Patient follow up with Physician:      Plan of Care Report: EVAL today  POC update due: (10 visits /OR AUTH LIMITS, whichever is less)  2025                                             Medical History:  Comorbidities:  Hypertension  Osteoarthritis  Rheumatoid Arthritis  Anxiety  Depression  Relevant Medical History:

## 2024-12-22 ENCOUNTER — HOSPITAL ENCOUNTER (EMERGENCY)
Age: 44
Discharge: HOME | End: 2024-12-22
Payer: COMMERCIAL

## 2024-12-22 VITALS
HEIGHT: 65 IN | HEART RATE: 95 BPM | OXYGEN SATURATION: 100 % | RESPIRATION RATE: 18 BRPM | DIASTOLIC BLOOD PRESSURE: 84 MMHG | WEIGHT: 250 LBS | BODY MASS INDEX: 41.65 KG/M2 | SYSTOLIC BLOOD PRESSURE: 119 MMHG | TEMPERATURE: 98.2 F

## 2024-12-22 DIAGNOSIS — M79.661 RIGHT CALF PAIN: ICD-10-CM

## 2024-12-22 DIAGNOSIS — Z96.651 S/P TKR (TOTAL KNEE REPLACEMENT), RIGHT: Primary | ICD-10-CM

## 2024-12-22 PROCEDURE — 99284 EMERGENCY DEPT VISIT MOD MDM: CPT

## 2024-12-22 PROCEDURE — 6360000002 HC RX W HCPCS: Performed by: PHYSICIAN ASSISTANT

## 2024-12-22 PROCEDURE — 96372 THER/PROPH/DIAG INJ SC/IM: CPT

## 2024-12-22 RX ORDER — ENOXAPARIN SODIUM 150 MG/ML
1 INJECTION SUBCUTANEOUS ONCE
Status: COMPLETED | OUTPATIENT
Start: 2024-12-22 | End: 2024-12-22

## 2024-12-22 RX ADMIN — ENOXAPARIN SODIUM 120 MG: 150 INJECTION SUBCUTANEOUS at 12:29

## 2024-12-22 ASSESSMENT — PAIN DESCRIPTION - LOCATION: LOCATION: LEG

## 2024-12-22 ASSESSMENT — PAIN - FUNCTIONAL ASSESSMENT: PAIN_FUNCTIONAL_ASSESSMENT: 0-10

## 2024-12-22 ASSESSMENT — PAIN DESCRIPTION - ORIENTATION: ORIENTATION: RIGHT

## 2024-12-22 ASSESSMENT — PAIN SCALES - GENERAL: PAINLEVEL_OUTOF10: 5

## 2024-12-22 NOTE — ED PROVIDER NOTES
[facility] EMERGENCY DEPARTMENT  EMERGENCY DEPARTMENT ENCOUNTER        Pt Name: Savita Kohler  MRN: [id number]  Birthdate 1980  Date of evaluation: 12/22/2024  Provider: DOROTHY Mancilla  PCP: Nivia Badillo APRN - CNP  Note Started: 12:03 PM EST 12/22/24      CALIN. I have evaluated this patient.        CHIEF COMPLAINT       Chief Complaint   Patient presents with    Leg Swelling     Right calf swelling after right knee replacement Monday        HISTORY OF PRESENT ILLNESS: 1 or more Elements     History From: Patient  Limitations to history : None    Savita Kohler is a 44 y.o. female with past medical history of hypertension, fibromyalgia, obstructive sleep apnea who presents ED with complaint of right calf pain and swelling.  She had total knee replacement to her right knee on Monday, 12/16/2024 by orthopedic surgeon, Dr. Armendariz.  She believes she is on a blood thinner but she is not sure.  She reports the knee is doing well.  She denies worsening pain to the knee since surgery.  She does report that she has had some bruising and swelling to the leg due to her surgery.  Denies erythema or warmth.  Nuys fever or chills.  Has numbness or tingling.  Today she felt like she had some swelling to the right lateral posterior calf with some discomfort when she palpated the area.  She was concern for potential blood clot so she came to the ED for further evaluation and treatment.  Reports decreased range of motion and strength secondary to recent surgery.    Nursing Notes were all reviewed and agreed with or any disagreements were addressed in the HPI.    REVIEW OF SYSTEMS :      Review of Systems   Constitutional:  Negative for activity change, appetite change, chills, diaphoresis, fatigue and fever.   Respiratory: Negative.  Negative for cough and shortness of breath.    Cardiovascular: Negative.  Negative for chest pain.   Gastrointestinal:  Negative for abdominal pain, nausea and

## 2024-12-23 ENCOUNTER — TELEPHONE (OUTPATIENT)
Dept: FAMILY MEDICINE CLINIC | Age: 44
End: 2024-12-23

## 2024-12-23 DIAGNOSIS — M79.661 RIGHT CALF PAIN: Primary | ICD-10-CM

## 2024-12-23 NOTE — TELEPHONE ENCOUNTER
Paola Peter from [facility] scheduling called and needs to see if the order    Vascular duplex lower extremity venous right [[id number]] (Order [id number])  That was ordered by the emergency room Dr     Needs to be changed to hospital procedure and not clinical preformed.     They need to see if Nivia can change the order    Patient is scheduled for thurs 12/26/2024 at 3     Please call Paola Peter with any questions [phone] ext [phone] on her secure voice mail

## 2024-12-24 ENCOUNTER — HOSPITAL ENCOUNTER (OUTPATIENT)
Dept: PHYSICAL THERAPY | Age: 44
Setting detail: THERAPIES SERIES
Discharge: HOME | End: 2024-12-24
Payer: COMMERCIAL

## 2024-12-24 PROCEDURE — 97110 THERAPEUTIC EXERCISES: CPT

## 2024-12-24 NOTE — FLOWSHEET NOTE
Functional questionnaire LEFS 5 / 93.75%    Other:              Pain:  Pain location: R knee   Patient describes pain to be intermittent  Pain decreases with: Resting  Pain increases with: Activity and Movement      Occupation/School:  Work/School Status: Full time  Job Duties/Demands: off work currently     Sport/ Recreation/ Leisure/ Hobbies: walking without AD     Review Of Systems (ROS):  [x] Performed Review of systems (Integumentary, CardioPulmonary, Neurological) by intake and observation. Intake form has been scanned into medical record. Patient has been instructed to contact their primary care physician regarding ROS issues if not already being addressed at this time.    [x] Patient history, allergies, meds reviewed. Medical chart reviewed. See intake form.     OBJECTIVE EXAMINATION     12/18/24  ROM/Strength: (Blank cells denote NT)      Mvmt (norm) AROM L AROM R Notes PROM L PROM R Notes     LUMBAR Flex (90)         Ext (25)         SB (25)          Rotation (30)             HIP Flex (120)          Abd (45)          ER (50)          IR (45)          Ext (20)         KNEE Flex (140) 132 75 110       Ext (0) 0 -10 -5        ANKLE DF (20)          PF (50)          Inversion (30)          Eversion (20)             MMT L          MMT  R Notes     LUMBAR  Flexion       Extension       Lateral flexion        Rotation          MMT L MMT R Notes       HIP  Flexion 4       Abduction        ER        IR        Extension      KNEE  Flexion 5       Extension 5       ANKLE  DF        PF        Inversion        Eversion      *strength testing deferred d/t high pain level - NPV     Repeated Movements: [] Normal  [] Abnormal [] N/A    Palpation:   Unremarkable  Location:    Posture:   WNL    Bandages/Dressings/Incisions:  Patients wound/incision not visible or unable to be assessed at this time.  Covered w/ bandage - no redness, heat noted     Gait:    Pattern: antalgic pattern  Assistive Device Used: Crutch(es)

## 2024-12-27 ENCOUNTER — HOSPITAL ENCOUNTER (OUTPATIENT)
Dept: PHYSICAL THERAPY | Age: 44
Setting detail: THERAPIES SERIES
Discharge: HOME | End: 2024-12-27
Payer: COMMERCIAL

## 2024-12-27 PROCEDURE — 97110 THERAPEUTIC EXERCISES: CPT

## 2024-12-27 PROCEDURE — 97016 VASOPNEUMATIC DEVICE THERAPY: CPT

## 2024-12-27 NOTE — FLOWSHEET NOTE
[facility] - Outpatient Rehabilitation and Therapy [address], [place] [address] office: [phone] fax: [phone]           Physical Therapy: TREATMENT/PROGRESS NOTE   Patient: Savita Kohler (44 y.o. female)   Examination Date: 2024   :  1980 MRN: [id number]   Visit #: 3   Insurance Allowable Auth Needed   20 []Yes    []No    Insurance: Payor: UNITED HEALTHCARE / Plan: UNITED HEALTHCARE - CHOICE PLUS / Product Type: *No Product type* /   Insurance ID: [id number] - (Commercial)  Secondary Insurance (if applicable):    Treatment Diagnosis:     ICD-10-CM    1. Unilateral primary osteoarthritis, right knee  M17.11       2. Acute pain of right knee  M25.561          Medical Diagnosis:  Unilateral primary osteoarthritis, right knee [M17.11]   Referring Physician: Usama Armendariz MD  PCP: Nivia Badillo APRN - CNP     Plan of care signed (Y/N):     Date of Patient follow up with Physician:      Plan of Care Report: NO  POC update due: (10 visits /OR AUTH LIMITS, whichever is less)  2025                                             Medical History:  Comorbidities:  Hypertension  Osteoarthritis  Rheumatoid Arthritis  Anxiety  Depression  Relevant Medical History:                                          Precautions/ Contra-indications:           Latex allergy:  NO  Pacemaker:    NO  Contraindications for Manipulation: recent surgical history (relative)  Date of Surgery: 24  Other:    Red Flags:  None    Suicide Screening:   The patient did not verbalize a primary behavioral concern, suicidal ideation, suicidal intent, or demonstrate suicidal behaviors.    Preferred Language for Healthcare:   [x] English       [] other:    SUBJECTIVE EXAMINATION     Patient stated complaint: Patient reports R knee feels very tight due to swelling. Also, medial adhesive is causing a lot of itching, it is very uncomfortable and even worse than when she called her doctor's office

## 2024-12-30 ENCOUNTER — HOSPITAL ENCOUNTER (OUTPATIENT)
Dept: PHYSICAL THERAPY | Age: 44
Setting detail: THERAPIES SERIES
Discharge: HOME | End: 2024-12-30
Payer: COMMERCIAL

## 2024-12-30 PROCEDURE — 97112 NEUROMUSCULAR REEDUCATION: CPT

## 2024-12-30 PROCEDURE — 97110 THERAPEUTIC EXERCISES: CPT

## 2024-12-30 PROCEDURE — 97016 VASOPNEUMATIC DEVICE THERAPY: CPT

## 2024-12-30 NOTE — FLOWSHEET NOTE
[facility] - Outpatient Rehabilitation and Therapy [address], [place] [address] office: [phone] fax: [phone]           Physical Therapy: TREATMENT/PROGRESS NOTE   Patient: Savita Kohler (44 y.o. female)   Examination Date: 2024   :  1980 MRN: [id number]   Visit #: 4   Insurance Allowable Auth Needed   20 []Yes    []No    Insurance: Payor: UNITED HEALTHCARE / Plan: UNITED HEALTHCARE - CHOICE PLUS / Product Type: *No Product type* /   Insurance ID: [id number] - (Commercial)  Secondary Insurance (if applicable):    Treatment Diagnosis:     ICD-10-CM    1. Unilateral primary osteoarthritis, right knee  M17.11       2. Acute pain of right knee  M25.561          Medical Diagnosis:  Unilateral primary osteoarthritis, right knee [M17.11]   Referring Physician: Usama Armendariz MD  PCP: Nivia Badillo APRN - CNP     Plan of care signed (Y/N):     Date of Patient follow up with Physician:      Plan of Care Report: NO  POC update due: (10 visits /OR AUTH LIMITS, whichever is less)  2025                                             Medical History:  Comorbidities:  Hypertension  Osteoarthritis  Rheumatoid Arthritis  Anxiety  Depression  Relevant Medical History:                                          Precautions/ Contra-indications:           Latex allergy:  NO  Pacemaker:    NO  Contraindications for Manipulation: recent surgical history (relative)  Date of Surgery: 24  Other:    Red Flags:  None    Suicide Screening:   The patient did not verbalize a primary behavioral concern, suicidal ideation, suicidal intent, or demonstrate suicidal behaviors.    Preferred Language for Healthcare:   [x] English       [] other:    SUBJECTIVE EXAMINATION     Patient stated complaint: Patient reports she scratched her medial knee raw, wasn't able to find benedryl to take this weekend and limited due to transportation. She was also walking more and standing, doing stairs

## 2025-01-02 ENCOUNTER — HOSPITAL ENCOUNTER (OUTPATIENT)
Dept: PHYSICAL THERAPY | Age: 45
Setting detail: THERAPIES SERIES
Discharge: HOME | End: 2025-01-02
Payer: COMMERCIAL

## 2025-01-02 PROCEDURE — 97016 VASOPNEUMATIC DEVICE THERAPY: CPT

## 2025-01-02 PROCEDURE — 97110 THERAPEUTIC EXERCISES: CPT

## 2025-01-02 NOTE — FLOWSHEET NOTE
[email or web address]  Date: 12/18/2024  Prepared by: Pam Delgado    Exercises  - Supine Knee Extension Strengthening  - 3 x daily - 7 x weekly - 1 sets - 10 reps  - Supine Heel Slide with Strap  - 3 x daily - 7 x weekly - 1 sets - 10 reps  - Long Sitting Quad Set with Towel Roll Under Heel  - 3 x daily - 7 x weekly - 1 sets - 10 reps  - Supine Gluteal Sets  - 3 x daily - 7 x weekly - 1 sets - 10 reps  - Supine Ankle Pumps  - 3 x daily - 7 x weekly - 1 sets - 10 reps    Access Code: [email or web address]  URL: [email or web address]  Date: 12/27/2024  Prepared by: Elaine Monroe    Exercises  - Active Straight Leg Raise with Quad Set  - 1 x daily - 7 x weekly - 3 sets - 10 reps  - Mini Squat with Counter Support  - 1 x daily - 7 x weekly - 3 sets - 10 reps  - Heel Toe Raises with Counter Support  - 1 x daily - 7 x weekly - 3 sets - 10 reps  - Standing Alternating Knee Flexion  - 1 x daily - 7 x weekly - 3 sets - 10 reps    ASSESSMENT       Today's Assessment:   Resumed weight machines as patient is doing really well at this time. She tolerated session without inc sx. We did discuss how she gets anterior ankle pain with stair climbing, B and intermittent. She states she rests in plantarflexion position and resting in ankle DF uncomfortable. She likely is c/o sx of anterior ankle impingement, hx of dancing and RA, likely reduced mobility in ankle DF contributing. She will need to focus on stretching calf musculature and mobilizing ankle into DF to reduce symptoms. Pt demonstrates no quad lag this date with addition of ankle weight, improved from prior sessions. She still has some lingering swelling that limits full knee ROM, therefore vaso applied at end of session. Will continue to work on LE strength to restore baseline function post-op.    Medical Necessity Documentation:  I certify that this patient meets the below criteria necessary for medical necessity for care and/or justification of therapy services:  The patient

## 2025-01-07 ENCOUNTER — HOSPITAL ENCOUNTER (OUTPATIENT)
Dept: PHYSICAL THERAPY | Age: 45
Setting detail: THERAPIES SERIES
Discharge: HOME | End: 2025-01-07
Payer: COMMERCIAL

## 2025-01-07 PROCEDURE — 97112 NEUROMUSCULAR REEDUCATION: CPT

## 2025-01-07 PROCEDURE — 97016 VASOPNEUMATIC DEVICE THERAPY: CPT

## 2025-01-07 PROCEDURE — 97110 THERAPEUTIC EXERCISES: CPT

## 2025-01-07 PROCEDURE — 97530 THERAPEUTIC ACTIVITIES: CPT

## 2025-01-07 NOTE — FLOWSHEET NOTE
in: 2 weeks  1. Independent in HEP and progression per patient tolerance, in order to prevent re-injury.   [] Progressing: [] Met: [] Not Met: [] Adjusted  2. Patient will have a decrease in pain to <3/10 to facilitate improvement in movement, function, and ADLs as indicated by Functional Deficits.  [] Progressing: [] Met: [] Not Met: [] Adjusted    IF APPLICABLE:  [] Patient to demonstrate independence in wear and care for custom orthotic device. (Only if applicable for orthotic eval)     Long Term Goals: To be achieved in: 8 weeks  1. Disability index score of 20% or less for the LEFS to assist with reaching prior level of function with activities such as walking, climbing stairs, and performing ADLs.  [] Progressing: [] Met: [] Not Met: [] Adjusted  2. Patient will demonstrate increased AROM of R knee flexion, extension to at least 130, 0 respectively without pain to allow for proper joint functioning to enable patient to return to unrestricted ADLs.   [] Progressing: [] Met: [] Not Met: [] Adjusted  3. Patient will demonstrate increased Strength of B hips and knees in all planes to at least 5/5 throughout without pain to allow for proper functional mobility to enable patient to return to unrestricted ADLs.   [] Progressing: [] Met: [] Not Met: [] Adjusted  4. Patient will return to walking for exercise without an AD and with normal gait pattern without increased symptoms or restriction.   [] Progressing: [] Met: [] Not Met: [] Adjusted        Overall Progression Towards Functional goals/ Treatment Progress Update:  [] Patient is progressing as expected towards functional goals listed.    [] Progression is slowed due to complexities/Impairments listed.  [] Progression has been slowed due to co-morbidities.  [x] Plan just implemented, too soon (<30days) to assess goals progression   [] Goals require adjustment due to lack of progress  [] Patient is not progressing as expected and requires additional follow up with

## 2025-01-09 ENCOUNTER — APPOINTMENT (OUTPATIENT)
Dept: PHYSICAL THERAPY | Age: 45
End: 2025-01-09
Payer: COMMERCIAL

## 2025-01-13 ENCOUNTER — HOSPITAL ENCOUNTER (OUTPATIENT)
Dept: PHYSICAL THERAPY | Age: 45
Setting detail: THERAPIES SERIES
Discharge: HOME | End: 2025-01-13
Payer: COMMERCIAL

## 2025-01-13 PROCEDURE — 97112 NEUROMUSCULAR REEDUCATION: CPT

## 2025-01-13 PROCEDURE — 97530 THERAPEUTIC ACTIVITIES: CPT

## 2025-01-13 PROCEDURE — 97016 VASOPNEUMATIC DEVICE THERAPY: CPT

## 2025-01-13 PROCEDURE — 97110 THERAPEUTIC EXERCISES: CPT

## 2025-01-13 NOTE — FLOWSHEET NOTE
Patient:   Short Term Goals: To be achieved in: 2 weeks  1. Independent in HEP and progression per patient tolerance, in order to prevent re-injury.   [] Progressing: [] Met: [] Not Met: [] Adjusted  2. Patient will have a decrease in pain to <3/10 to facilitate improvement in movement, function, and ADLs as indicated by Functional Deficits.  [] Progressing: [] Met: [] Not Met: [] Adjusted    IF APPLICABLE:  [] Patient to demonstrate independence in wear and care for custom orthotic device. (Only if applicable for orthotic eval)     Long Term Goals: To be achieved in: 8 weeks  1. Disability index score of 20% or less for the LEFS to assist with reaching prior level of function with activities such as walking, climbing stairs, and performing ADLs.  [] Progressing: [] Met: [] Not Met: [] Adjusted  2. Patient will demonstrate increased AROM of R knee flexion, extension to at least 130, 0 respectively without pain to allow for proper joint functioning to enable patient to return to unrestricted ADLs.   [] Progressing: [] Met: [] Not Met: [] Adjusted  3. Patient will demonstrate increased Strength of B hips and knees in all planes to at least 5/5 throughout without pain to allow for proper functional mobility to enable patient to return to unrestricted ADLs.   [] Progressing: [] Met: [] Not Met: [] Adjusted  4. Patient will return to walking for exercise without an AD and with normal gait pattern without increased symptoms or restriction.   [] Progressing: [] Met: [] Not Met: [] Adjusted        Overall Progression Towards Functional goals/ Treatment Progress Update:  [] Patient is progressing as expected towards functional goals listed.    [] Progression is slowed due to complexities/Impairments listed.  [] Progression has been slowed due to co-morbidities.  [x] Plan just implemented, too soon (<30days) to assess goals progression   [] Goals require adjustment due to lack of progress  [] Patient is not progressing as

## 2025-01-15 DIAGNOSIS — R06.2 WHEEZING: ICD-10-CM

## 2025-01-15 DIAGNOSIS — R05.8 ALLERGIC COUGH: ICD-10-CM

## 2025-01-15 RX ORDER — ALBUTEROL SULFATE 90 UG/1
INHALANT RESPIRATORY (INHALATION)
Qty: 8.5 G | Refills: 1 | Status: SHIPPED | OUTPATIENT
Start: 2025-01-15

## 2025-01-15 NOTE — TELEPHONE ENCOUNTER
Medication:   Requested Prescriptions     Pending Prescriptions Disp Refills    albuterol sulfate HFA (PROVENTIL;VENTOLIN;PROAIR) 108 (90 Base) MCG/ACT inhaler [Pharmacy Med Name: ALBUTEROL HFA INH (200 PUFFS) 8.5GM] 8.5 g 1     Sig: INHALE 2 PUFFS INTO THE LUNGS EVERY 6 HOURS AS NEEDED FOR WHEEZING        Last Filled:  12/02/2024    Patient Phone Number: [phone] (home)     Last appt: 12/4/2024   Next appt: Visit date not found    Last OARRS:       10/11/2022     1:50 PM   RX Monitoring   Periodic Controlled Substance Monitoring No signs of potential drug abuse or diversion identified.

## 2025-01-16 ENCOUNTER — HOSPITAL ENCOUNTER (OUTPATIENT)
Dept: PHYSICAL THERAPY | Age: 45
Setting detail: THERAPIES SERIES
Discharge: HOME | End: 2025-01-16
Payer: COMMERCIAL

## 2025-01-16 PROCEDURE — 97530 THERAPEUTIC ACTIVITIES: CPT

## 2025-01-16 PROCEDURE — 97110 THERAPEUTIC EXERCISES: CPT

## 2025-01-16 PROCEDURE — 97112 NEUROMUSCULAR REEDUCATION: CPT

## 2025-01-16 PROCEDURE — 97016 VASOPNEUMATIC DEVICE THERAPY: CPT

## 2025-01-16 NOTE — FLOWSHEET NOTE
Pattern: antalgic pattern  Assistive Device Used: Crutch(es) bilaterally    Balance:  [x] WNL      [] NT       [] Dysfunction noted  Comment:     Falls Risk Assessment (30 days):   Falls Risk assessed and no intervention required.  Time Up and Go (TUG):   Not Assessed        Exercises/Interventions     Therapeutic Ex (07995)  resistance Sets/time Reps Notes/Cues/Progressions   R bike  5'            Quantum:  Hamstring curls  Leg extensions    45#   30#   3  3   10  10    Leg press  110# 3 10    Lateral band walks       DL heel raises on step  3 10 Focus on stretch at bottom of calf raise          Standing quad stretch  2'     SLR: 4 way 2# 2 ea 10 R Cues to avoid quad lag   Retro slider + TKE  2 10 R With red powerband                        Manual Intervention (08462)  TIME                                        NMR re-education (25108) resistance Sets/time Reps CUES NEEDED   SLS  10\" 5 R    Tandem firm 30\" 1 B    Toe taps to step, alternating 6\" 1'     Bosu:  - step up  - fwd lunge  - lateral lunge  1 ea 10 R    Rockerboard: A/P balance and rocking, L/R rocking  1' ea     SLS KB L/R passes 7.5# KB 45\"            Therapeutic Activity (66365)  Sets/time     Pt edu re: POC, eval findings, HEP   10'     Discussed methods to reduce itching under adhesive  5'     Step ups + TKE 6\" 2 10 The red power band   Lateral heel taps 4\" 2 10    Squats TRX 2 10 Cues for posterior hip reach   SL RDL, foot on wall 10# KB 2 10 B    Sled push/pull 70# 2 laps 25 ft Cues for symmetry and driving through toes   Trap bar deadlift Bar/45# 2 10 Cues for hips down and chest up          Exercise in bold in above table were performed 01/16/2025    Modalities:    Vasopneumatic Compression (Game Ready): Applied to Knee Right for significant edema, swelling, pain control for 15 minutes.  Relevant ICD-10 R22.4 Localized swelling of lower limb.   Girth Left Right Post Vaso   Knee: Midpatella 50.2 52.2 52.0   Knee: 5 cm above      Knee: 15 cm

## 2025-01-20 ENCOUNTER — APPOINTMENT (OUTPATIENT)
Dept: PHYSICAL THERAPY | Age: 45
End: 2025-01-20
Payer: COMMERCIAL

## 2025-01-23 ENCOUNTER — HOSPITAL ENCOUNTER (OUTPATIENT)
Dept: PHYSICAL THERAPY | Age: 45
Setting detail: THERAPIES SERIES
Discharge: HOME | End: 2025-01-23
Payer: COMMERCIAL

## 2025-01-23 PROCEDURE — 97016 VASOPNEUMATIC DEVICE THERAPY: CPT

## 2025-01-23 PROCEDURE — 97110 THERAPEUTIC EXERCISES: CPT

## 2025-01-23 PROCEDURE — 97530 THERAPEUTIC ACTIVITIES: CPT

## 2025-01-23 PROCEDURE — 97112 NEUROMUSCULAR REEDUCATION: CPT

## 2025-01-23 NOTE — FLOWSHEET NOTE
[facility] - Outpatient Rehabilitation and Therapy [address], [place] [address] office: [phone] fax: [phone]           Physical Therapy: TREATMENT/PROGRESS NOTE   Patient: Savita Kohler (44 y.o. female)   Examination Date: 2025   :  1980 MRN: [id number]   Visit #: 9   Insurance Allowable Auth Needed   20 []Yes    []No    Insurance: Payor: [employer] / Plan: [employer] - CHOICE PLUS / Product Type: *No Product type* /   Insurance ID: [id number] - (Commercial)  Secondary Insurance (if applicable):    Treatment Diagnosis:     ICD-10-CM    1. Unilateral primary osteoarthritis, right knee  M17.11       2. Acute pain of right knee  M25.561          Medical Diagnosis:  Unilateral primary osteoarthritis, right knee [M17.11]   Referring Physician: Usama Armendariz MD  PCP: Nivia Badillo APRN - CNP     Plan of care signed (Y/N):     Date of Patient follow up with Physician:      Plan of Care Report: NO  POC update due: (10 visits /OR AUTH LIMITS, whichever is less)  2025                                             Medical History:  Comorbidities:  Hypertension  Osteoarthritis  Rheumatoid Arthritis  Anxiety  Depression  Relevant Medical History:                                          Precautions/ Contra-indications:           Latex allergy:  NO  Pacemaker:    NO  Contraindications for Manipulation: recent surgical history (relative)  Date of Surgery: 24  Other:    Red Flags:  None    Suicide Screening:   The patient did not verbalize a primary behavioral concern, suicidal ideation, suicidal intent, or demonstrate suicidal behaviors.    Preferred Language for Healthcare:   [x] English       [] other:    SUBJECTIVE EXAMINATION     Patient stated complaint: Patient reports her knee is doing well.     Eval: s/p R TKA on 24. She had concerns of a blood clot, so she had an ultrasound done, which revealed no findings.        Test used

## 2025-01-27 ENCOUNTER — HOSPITAL ENCOUNTER (OUTPATIENT)
Dept: PHYSICAL THERAPY | Age: 45
Setting detail: THERAPIES SERIES
Discharge: HOME | End: 2025-01-27
Payer: COMMERCIAL

## 2025-01-27 PROCEDURE — 97110 THERAPEUTIC EXERCISES: CPT

## 2025-01-27 PROCEDURE — 97016 VASOPNEUMATIC DEVICE THERAPY: CPT

## 2025-01-27 PROCEDURE — 97530 THERAPEUTIC ACTIVITIES: CPT

## 2025-01-27 NOTE — PLAN OF CARE
[facility] - Outpatient Rehabilitation and Therapy [address], [place] [address] office: [phone] fax: [phone]     Physical Therapy Re-Certification Plan of Care    Dear Usama Armendariz MD  ,    We had the pleasure of treating the following patient for physical therapy services at [facility]. A summary of our findings can be found in the updated assessment below.  This includes our plan of care.  If you have any questions or concerns regarding these findings, please do not hesitate to contact me at the office phone number checked above.  Thank you for the referral.     Physician Signature:________________________________Date:__________________  By signing above (or electronic signature), therapist's plan is approved by physician      Total Visits: 10     Overall Response to Treatment:  LEFS = 46/80 = 42.5% LOF  Patient is responding well to treatment and improvement is noted with regards to goals  Patient has met 6 of 7 goals. She has a 20 physical therapy visit limit/year and has used 6 thus far. Due to this, it is recommended patient discharge from therapy following next scheduled visit. It is expected patient will achieve all goals with continuation of HEP. She has had a flare up of symptoms in the past week stemming from neural tension in lumbar spine with (+) slump and SLR on R. She was provided exercises this date to address these symptoms and education regarding positioning. Patient ambulates without difficulty and normal gait pattern without AD for up to 20 minutes, able to complete stairs reciprocally. Will recommend continue with walking program and continued strength training to further meet functional goals upon d/c.     Recommendation:    [] Continue PT x / wk for  weeks.   [] Hold PT, pending MD visit   [x] Discharge to [facility] after one more scheduled visit. Follow up with PT or MD PRN.         Physical Therapy: TREATMENT/PROGRESS NOTE

## 2025-01-30 ENCOUNTER — APPOINTMENT (OUTPATIENT)
Dept: PHYSICAL THERAPY | Age: 45
End: 2025-01-30
Payer: COMMERCIAL

## 2025-02-03 ENCOUNTER — HOSPITAL ENCOUNTER (OUTPATIENT)
Dept: PHYSICAL THERAPY | Age: 45
Setting detail: THERAPIES SERIES
Discharge: HOME | End: 2025-02-03
Payer: COMMERCIAL

## 2025-02-03 PROCEDURE — 97016 VASOPNEUMATIC DEVICE THERAPY: CPT

## 2025-02-03 PROCEDURE — 97110 THERAPEUTIC EXERCISES: CPT

## 2025-02-03 PROCEDURE — 97112 NEUROMUSCULAR REEDUCATION: CPT

## 2025-02-03 NOTE — FLOWSHEET NOTE
[facility] - Outpatient Rehabilitation and Therapy [address], [place] [address] office: [phone] fax: [phone]     Recommendation:    [] Continue PT x / wk for  weeks.   [] Hold PT, pending MD visit   [x] Discharge to [facility] after one more scheduled visit. Follow up with PT or MD PRN.         Physical Therapy: TREATMENT/PROGRESS NOTE   Patient: Savita Kohler (44 y.o. female)   Examination Date: 2025   :  1980 MRN: [id number]   Visit #: 11 - 4 used in   Insurance Allowable Auth Needed   20 []Yes    []No    Insurance: Payor: [employer] / Plan: [employer] HE[employer] - CHOICE PLUS / Product Type: *No Product type* /   Insurance ID: [id number] - (Commercial)  Secondary Insurance (if applicable):    Treatment Diagnosis:     ICD-10-CM    1. Unilateral primary osteoarthritis, right knee  M17.11       2. Acute pain of right knee  M25.561          Medical Diagnosis:  Unilateral primary osteoarthritis, right knee [M17.11]   Referring Physician: Usama Armendariz MD  PCP: Nivia Badillo APRN - CNP     Plan of care signed (Y/N):     Date of Patient follow up with Physician:      Plan of Care Report: NO  POC update due: (10 visits /OR AUTH LIMITS, whichever is less)  2025                                             Medical History:  Comorbidities:  Hypertension  Osteoarthritis  Rheumatoid Arthritis  Anxiety  Depression  Relevant Medical History:                                          Precautions/ Contra-indications:           Latex allergy:  NO  Pacemaker:    NO  Contraindications for Manipulation: recent surgical history (relative)  Date of Surgery: 24  Other:    Red Flags:  None    Suicide Screening:   The patient did not verbalize a primary behavioral concern, suicidal ideation, suicidal intent, or demonstrate suicidal behaviors.    Preferred Language for Healthcare:   [x] English       [] other:    SUBJECTIVE EXAMINATION     Patient stated complaint:

## 2025-02-10 ENCOUNTER — HOSPITAL ENCOUNTER (OUTPATIENT)
Dept: PHYSICAL THERAPY | Age: 45
Setting detail: THERAPIES SERIES
Discharge: HOME | End: 2025-02-10
Payer: COMMERCIAL

## 2025-02-10 PROCEDURE — 97530 THERAPEUTIC ACTIVITIES: CPT

## 2025-02-10 PROCEDURE — 97110 THERAPEUTIC EXERCISES: CPT

## 2025-02-10 PROCEDURE — 97112 NEUROMUSCULAR REEDUCATION: CPT

## 2025-02-10 NOTE — FLOWSHEET NOTE
[facility] - Outpatient Rehabilitation and Therapy [address], [place] [address] office: [phone] fax: [phone]     Recommendation:    [] Continue PT x / wk for  weeks.   [] Hold PT, pending MD visit   [x] Discharge to [facility]. Follow up with PT or MD PRN.         Physical Therapy: TREATMENT/PROGRESS NOTE   Patient: Savita Kohler (44 y.o. female)   Examination Date: 02/10/2025   :  1980 MRN: [id number]   Visit #: 12 - 4 used in   Insurance Allowable Auth Needed   20 []Yes    []No    Insurance: Payor: [employer] / Plan: UNITED HEALTHCARE - CHOICE PLUS / Product Type: *No Product type* /   Insurance ID: [id number] - (Commercial)  Secondary Insurance (if applicable):    Treatment Diagnosis:     ICD-10-CM    1. Unilateral primary osteoarthritis, right knee  M17.11       2. Acute pain of right knee  M25.561          Medical Diagnosis:  Unilateral primary osteoarthritis, right knee [M17.11]   Referring Physician: Usama Armendariz MD  PCP: Nivia Badillo APRN - CNP     Plan of care signed (Y/N):     Date of Patient follow up with Physician:      Plan of Care Report: NO  POC update due: (10 visits /OR AUTH LIMITS, whichever is less)  2025                                             Medical History:  Comorbidities:  Hypertension  Osteoarthritis  Rheumatoid Arthritis  Anxiety  Depression  Relevant Medical History:                                          Precautions/ Contra-indications:           Latex allergy:  NO  Pacemaker:    NO  Contraindications for Manipulation: recent surgical history (relative)  Date of Surgery: 24  Other:    Red Flags:  None    Suicide Screening:   The patient did not verbalize a primary behavioral concern, suicidal ideation, suicidal intent, or demonstrate suicidal behaviors.    Preferred Language for Healthcare:   [x] English       [] other:    SUBJECTIVE EXAMINATION     Patient stated complaint: Patient cheered with her

## 2025-03-20 ENCOUNTER — PATIENT MESSAGE (OUTPATIENT)
Dept: FAMILY MEDICINE CLINIC | Age: 45
End: 2025-03-20

## 2025-03-20 DIAGNOSIS — K21.9 GASTROESOPHAGEAL REFLUX DISEASE WITHOUT ESOPHAGITIS: ICD-10-CM

## 2025-03-20 RX ORDER — PANTOPRAZOLE SODIUM 40 MG/1
40 TABLET, DELAYED RELEASE ORAL DAILY
Qty: 90 TABLET | Refills: 3 | Status: SHIPPED | OUTPATIENT
Start: 2025-03-20 | End: 2026-03-20

## 2025-03-20 NOTE — TELEPHONE ENCOUNTER
Medication:   Requested Prescriptions     Pending Prescriptions Disp Refills    pantoprazole (PROTONIX) 40 MG tablet 90 tablet 3     Sig: Take 1 tablet by mouth daily        Last Filled:  04/08/24    Patient Phone Number: [phone] (home)     Last appt: 12/4/2024   Next appt: Visit date not found    Last OARRS:       10/11/2022     1:50 PM   RX Monitoring   Periodic Controlled Substance Monitoring No signs of potential drug abuse or diversion identified.

## 2025-04-11 DIAGNOSIS — F41.8 DEPRESSION WITH ANXIETY: ICD-10-CM

## 2025-04-11 RX ORDER — DESVENLAFAXINE 100 MG/1
100 TABLET, EXTENDED RELEASE ORAL DAILY
Qty: 90 TABLET | Refills: 2 | Status: SHIPPED | OUTPATIENT
Start: 2025-04-11 | End: 2026-01-06

## 2025-04-11 NOTE — TELEPHONE ENCOUNTER
Medication:   Requested Prescriptions     Pending Prescriptions Disp Refills    desvenlafaxine succinate (PRISTIQ) 100 MG TB24 extended release tablet [Pharmacy Med Name: DESVENLAFAXINE ER SUCCINATE 100MG T] 90 tablet 2     Sig: TAKE 1 TABLET BY MOUTH DAILY        Last Filled:  8/5/24 90 tabs 2 refill     Patient Phone Number: [phone] (home)     Last appt: 12/4/2024   Next appt: Visit date not found    Last OARRS:       10/11/2022     1:50 PM   RX Monitoring   Periodic Controlled Substance Monitoring No signs of potential drug abuse or diversion identified.

## 2025-04-27 DIAGNOSIS — G25.81 RLS (RESTLESS LEGS SYNDROME): ICD-10-CM

## 2025-04-28 RX ORDER — ROPINIROLE 3 MG/1
3 TABLET, FILM COATED ORAL NIGHTLY
Qty: 90 TABLET | Refills: 2 | Status: SHIPPED | OUTPATIENT
Start: 2025-04-28

## 2025-04-28 NOTE — TELEPHONE ENCOUNTER
Medication:   Requested Prescriptions     Pending Prescriptions Disp Refills    rOPINIRole (REQUIP) 3 MG tablet [Pharmacy Med Name: ROPINIROLE 3MG TABLETS] 90 tablet 2     Sig: TAKE 1 TABLET BY MOUTH EVERY NIGHT        Last Filled:      Patient Phone Number: [phone] (home)     Last appt: 12/4/2024   Next appt: Visit date not found    Last OARRS:       10/11/2022     1:50 PM   RX Monitoring   Periodic Controlled Substance Monitoring No signs of potential drug abuse or diversion identified.

## 2025-06-03 ENCOUNTER — TELEPHONE (OUTPATIENT)
Dept: FAMILY MEDICINE CLINIC | Age: 45
End: 2025-06-03

## 2025-06-03 DIAGNOSIS — R05.8 ALLERGIC COUGH: ICD-10-CM

## 2025-06-03 DIAGNOSIS — R06.2 WHEEZING: ICD-10-CM

## 2025-06-04 RX ORDER — ALBUTEROL SULFATE 90 UG/1
2 INHALANT RESPIRATORY (INHALATION) EVERY 6 HOURS PRN
Qty: 8.5 G | Refills: 1 | Status: SHIPPED | OUTPATIENT
Start: 2025-06-04

## 2025-06-04 NOTE — TELEPHONE ENCOUNTER
Medication:   Requested Prescriptions     Pending Prescriptions Disp Refills    albuterol sulfate HFA (PROVENTIL;VENTOLIN;PROAIR) 108 (90 Base) MCG/ACT inhaler [Pharmacy Med Name: ALBUTEROL HFA INH (200 PUFFS) 8.5GM] 8.5 g 1     Sig: INHALE 2 PUFFS INTO THE LUNGS EVERY 6 HOURS AS NEEDED FOR WHEEZING        Last Filled:  1/15/2025, 8.5g, 1    Patient Phone Number: [phone] (home)     Last appt: 12/4/2024   Next appt: Visit date not found    Last OARRS:       10/11/2022     1:50 PM   RX Monitoring   Periodic Controlled Substance Monitoring No signs of potential drug abuse or diversion identified.

## 2025-07-21 ENCOUNTER — OFFICE VISIT (OUTPATIENT)
Dept: FAMILY MEDICINE CLINIC | Age: 45
End: 2025-07-21
Payer: COMMERCIAL

## 2025-07-21 VITALS
HEART RATE: 88 BPM | HEIGHT: 65 IN | BODY MASS INDEX: 36.65 KG/M2 | TEMPERATURE: 97.3 F | SYSTOLIC BLOOD PRESSURE: 108 MMHG | DIASTOLIC BLOOD PRESSURE: 76 MMHG | OXYGEN SATURATION: 96 % | WEIGHT: 220 LBS

## 2025-07-21 DIAGNOSIS — E66.812 CLASS 2 SEVERE OBESITY DUE TO EXCESS CALORIES WITH SERIOUS COMORBIDITY AND BODY MASS INDEX (BMI) OF 36.0 TO 36.9 IN ADULT (HCC): ICD-10-CM

## 2025-07-21 DIAGNOSIS — M06.00 SERONEGATIVE RHEUMATOID ARTHRITIS (HCC): Primary | ICD-10-CM

## 2025-07-21 DIAGNOSIS — F33.1 MODERATE EPISODE OF RECURRENT MAJOR DEPRESSIVE DISORDER (HCC): ICD-10-CM

## 2025-07-21 DIAGNOSIS — E66.01 CLASS 2 SEVERE OBESITY DUE TO EXCESS CALORIES WITH SERIOUS COMORBIDITY AND BODY MASS INDEX (BMI) OF 36.0 TO 36.9 IN ADULT (HCC): ICD-10-CM

## 2025-07-21 PROCEDURE — 3078F DIAST BP <80 MM HG: CPT | Performed by: NURSE PRACTITIONER

## 2025-07-21 PROCEDURE — G8427 DOCREV CUR MEDS BY ELIG CLIN: HCPCS | Performed by: NURSE PRACTITIONER

## 2025-07-21 PROCEDURE — 3074F SYST BP LT 130 MM HG: CPT | Performed by: NURSE PRACTITIONER

## 2025-07-21 PROCEDURE — 1036F TOBACCO NON-USER: CPT | Performed by: NURSE PRACTITIONER

## 2025-07-21 PROCEDURE — 99214 OFFICE O/P EST MOD 30 MIN: CPT | Performed by: NURSE PRACTITIONER

## 2025-07-21 PROCEDURE — G8417 CALC BMI ABV UP PARAM F/U: HCPCS | Performed by: NURSE PRACTITIONER

## 2025-07-21 RX ORDER — OXCARBAZEPINE 150 MG/1
150 TABLET, FILM COATED ORAL NIGHTLY
Qty: 90 TABLET | Refills: 3 | Status: SHIPPED | OUTPATIENT
Start: 2025-07-21 | End: 2026-07-21

## 2025-07-21 RX ORDER — DESVENLAFAXINE 100 MG/1
100 TABLET, EXTENDED RELEASE ORAL DAILY
Qty: 90 TABLET | Refills: 3 | Status: SHIPPED | OUTPATIENT
Start: 2025-07-21 | End: 2026-07-21

## 2025-07-21 SDOH — ECONOMIC STABILITY: FOOD INSECURITY: WITHIN THE PAST 12 MONTHS, THE FOOD YOU BOUGHT JUST DIDN'T LAST AND YOU DIDN'T HAVE MONEY TO GET MORE.: NEVER TRUE

## 2025-07-21 SDOH — ECONOMIC STABILITY: FOOD INSECURITY: WITHIN THE PAST 12 MONTHS, YOU WORRIED THAT YOUR FOOD WOULD RUN OUT BEFORE YOU GOT MONEY TO BUY MORE.: NEVER TRUE

## 2025-07-21 ASSESSMENT — PATIENT HEALTH QUESTIONNAIRE - PHQ9
4. FEELING TIRED OR HAVING LITTLE ENERGY: NOT AT ALL
2. FEELING DOWN, DEPRESSED OR HOPELESS: NOT AT ALL
3. TROUBLE FALLING OR STAYING ASLEEP: NOT AT ALL
SUM OF ALL RESPONSES TO PHQ QUESTIONS 1-9: 6
7. TROUBLE CONCENTRATING ON THINGS, SUCH AS READING THE NEWSPAPER OR WATCHING TELEVISION: NEARLY EVERY DAY
6. FEELING BAD ABOUT YOURSELF - OR THAT YOU ARE A FAILURE OR HAVE LET YOURSELF OR YOUR FAMILY DOWN: NOT AT ALL
10. IF YOU CHECKED OFF ANY PROBLEMS, HOW DIFFICULT HAVE THESE PROBLEMS MADE IT FOR YOU TO DO YOUR WORK, TAKE CARE OF THINGS AT HOME, OR GET ALONG WITH OTHER PEOPLE: NOT DIFFICULT AT ALL
5. POOR APPETITE OR OVEREATING: NOT AT ALL
SUM OF ALL RESPONSES TO PHQ QUESTIONS 1-9: 6
9. THOUGHTS THAT YOU WOULD BE BETTER OFF DEAD, OR OF HURTING YOURSELF: NOT AT ALL
SUM OF ALL RESPONSES TO PHQ QUESTIONS 1-9: 6
1. LITTLE INTEREST OR PLEASURE IN DOING THINGS: NOT AT ALL
8. MOVING OR SPEAKING SO SLOWLY THAT OTHER PEOPLE COULD HAVE NOTICED. OR THE OPPOSITE, BEING SO FIGETY OR RESTLESS THAT YOU HAVE BEEN MOVING AROUND A LOT MORE THAN USUAL: NEARLY EVERY DAY
SUM OF ALL RESPONSES TO PHQ QUESTIONS 1-9: 6

## 2025-07-21 ASSESSMENT — ENCOUNTER SYMPTOMS: SHORTNESS OF BREATH: 0

## 2025-07-21 NOTE — ASSESSMENT & PLAN NOTE
Chronic, at goal (stable), Improving  Continue compounded Semaglutide  Recommend lower carb/higher protein diet, at least 65 grams protein daily, Increase water intake, at least 64 ounces water daily, no eating within 2 hours of bedtime. Recommended at least 30 minutes of aerobic exercise daily.

## 2025-07-21 NOTE — PROGRESS NOTES
SUBJECTIVE:  Pt is a of 44 y.o. female comes in today with   Chief Complaint   Patient presents with    Medication Refill     Presenting today for evaluation of depression. Needing Pristiq and Trileptal. Compliant with daily use, no side effects. Feeling her depression is well controlled at this time.     Obesity: started compounded Semaglutide in December after TKR and down 30 lbs. Current dose is 2.5 units. Going through GYN Dr Scruggs. Initially experienced loss of appetite and nausea. Those have since resolved.     RA: managed by Dr Altamirano. Stopped meds in Dec and has not needed to resume at this time. She has appt next week. States feeling better since not taking so many medications.     Prior to Visit Medications    Medication Sig Taking? Authorizing Provider   desvenlafaxine succinate (PRISTIQ) 100 MG TB24 extended release tablet Take 1 tablet by mouth daily Yes Nivia Badillo APRN - CNP   OXcarbazepine (TRILEPTAL) 150 MG tablet Take 1 tablet by mouth nightly Yes iNvia Badillo APRN - CNP   albuterol sulfate HFA (PROVENTIL;VENTOLIN;PROAIR) 108 (90 Base) MCG/ACT inhaler INHALE 2 PUFFS INTO THE LUNGS EVERY 6 HOURS AS NEEDED FOR WHEEZING Yes Nivia Badillo APRN - CNP   rOPINIRole (REQUIP) 3 MG tablet TAKE 1 TABLET BY MOUTH EVERY NIGHT Yes Nivia Badillo APRN - CNP   pantoprazole (PROTONIX) 40 MG tablet Take 1 tablet by mouth daily Yes Nivia Badillo APRN - CNP   aspirin (ASPIRIN 81) 81 MG EC tablet Take 1 tablet by mouth in the morning and at bedtime Yes Usama Armendariz MD   cephALEXin (KEFLEX) 500 MG capsule Take 1 capsule by mouth 2 times daily Yes Usama Armendariz MD   acetaminophen (APAP EXTRA STRENGTH) 500 MG tablet Take 2 tablets by mouth every 6 hours as needed for Pain Yes Usama Armendariz MD   meloxicam (MOBIC) 15 MG tablet Take 1 tablet by mouth daily Yes Usama Armendariz MD   cetirizine (ZYRTEC) 10 MG tablet Take 1 tablet by mouth daily Yes Nivia Badillo APRN - CNP   ondansetron

## 2025-08-05 DIAGNOSIS — R06.2 WHEEZING: ICD-10-CM

## 2025-08-05 DIAGNOSIS — R05.8 ALLERGIC COUGH: ICD-10-CM

## 2025-08-06 RX ORDER — ALBUTEROL SULFATE 90 UG/1
2 INHALANT RESPIRATORY (INHALATION) EVERY 6 HOURS PRN
Qty: 8.5 G | Refills: 1 | Status: SHIPPED | OUTPATIENT
Start: 2025-08-06

## 2025-08-09 DIAGNOSIS — F33.1 MODERATE EPISODE OF RECURRENT MAJOR DEPRESSIVE DISORDER (HCC): ICD-10-CM

## 2025-08-11 RX ORDER — OXCARBAZEPINE 150 MG/1
150 TABLET, FILM COATED ORAL NIGHTLY
Qty: 90 TABLET | Refills: 3 | Status: SHIPPED | OUTPATIENT
Start: 2025-08-11

## 2025-08-15 ENCOUNTER — HOSPITAL ENCOUNTER (EMERGENCY)
Age: 45
Discharge: HOME | End: 2025-08-15
Attending: EMERGENCY MEDICINE
Payer: COMMERCIAL

## 2025-08-15 ENCOUNTER — APPOINTMENT (OUTPATIENT)
Dept: CT IMAGING | Age: 45
End: 2025-08-15
Payer: COMMERCIAL

## 2025-08-15 VITALS
HEIGHT: 65 IN | WEIGHT: 224.5 LBS | RESPIRATION RATE: 16 BRPM | DIASTOLIC BLOOD PRESSURE: 78 MMHG | SYSTOLIC BLOOD PRESSURE: 111 MMHG | OXYGEN SATURATION: 93 % | HEART RATE: 66 BPM | BODY MASS INDEX: 37.4 KG/M2 | TEMPERATURE: 98.6 F

## 2025-08-15 DIAGNOSIS — K52.9 GASTROENTERITIS: Primary | ICD-10-CM

## 2025-08-15 LAB
ALBUMIN SERPL-MCNC: 4.2 G/DL (ref 3.4–5)
ALBUMIN/GLOB SERPL: 1.4 {RATIO} (ref 1.1–2.2)
ALP SERPL-CCNC: 112 U/L (ref 40–129)
ALT SERPL-CCNC: 14 U/L (ref 10–40)
ALT SERPL-CCNC: ABNORMAL U/L (ref 10–40)
ANION GAP SERPL CALCULATED.3IONS-SCNC: 11 MMOL/L (ref 3–16)
AST SERPL-CCNC: 32 U/L (ref 15–37)
BACTERIA URNS QL MICRO: ABNORMAL /HPF
BASOPHILS # BLD: 0 K/UL (ref 0–0.2)
BASOPHILS NFR BLD: 0.2 %
BILIRUB SERPL-MCNC: 0.3 MG/DL (ref 0–1)
BILIRUB UR QL STRIP.AUTO: NEGATIVE
BUN SERPL-MCNC: 11 MG/DL (ref 7–20)
CALCIUM SERPL-MCNC: 9.7 MG/DL (ref 8.3–10.6)
CHLORIDE SERPL-SCNC: 103 MMOL/L (ref 99–110)
CLARITY UR: CLEAR
CO2 SERPL-SCNC: 23 MMOL/L (ref 21–32)
COLOR UR: YELLOW
CREAT SERPL-MCNC: 0.8 MG/DL (ref 0.6–1.1)
DEPRECATED RDW RBC AUTO: 14 % (ref 12.4–15.4)
EKG ATRIAL RATE: 79 BPM
EKG DIAGNOSIS: NORMAL
EKG P AXIS: 38 DEGREES
EKG P-R INTERVAL: 142 MS
EKG Q-T INTERVAL: 392 MS
EKG QRS DURATION: 92 MS
EKG QTC CALCULATION (BAZETT): 449 MS
EKG R AXIS: 0 DEGREES
EKG T AXIS: 18 DEGREES
EKG VENTRICULAR RATE: 79 BPM
EOSINOPHIL # BLD: 0 K/UL (ref 0–0.6)
EOSINOPHIL NFR BLD: 0 %
EPI CELLS #/AREA URNS AUTO: 4 /HPF (ref 0–5)
GFR SERPLBLD CREATININE-BSD FMLA CKD-EPI: >90 ML/MIN/{1.73_M2}
GLUCOSE SERPL-MCNC: 125 MG/DL (ref 70–99)
GLUCOSE UR STRIP.AUTO-MCNC: NEGATIVE MG/DL
HCT VFR BLD AUTO: 39.6 % (ref 36–48)
HGB BLD-MCNC: 13.8 G/DL (ref 12–16)
HGB UR QL STRIP.AUTO: ABNORMAL
HYALINE CASTS #/AREA URNS AUTO: 1 /LPF (ref 0–8)
KETONES UR STRIP.AUTO-MCNC: NEGATIVE MG/DL
LEUKOCYTE ESTERASE UR QL STRIP.AUTO: ABNORMAL
LIPASE SERPL-CCNC: 41 U/L (ref 13–60)
LYMPHOCYTES # BLD: 1.9 K/UL (ref 1–5.1)
LYMPHOCYTES NFR BLD: 18.3 %
MCH RBC QN AUTO: 30.5 PG (ref 26–34)
MCHC RBC AUTO-ENTMCNC: 34.7 G/DL (ref 31–36)
MCV RBC AUTO: 87.7 FL (ref 80–100)
MONOCYTES # BLD: 0.7 K/UL (ref 0–1.3)
MONOCYTES NFR BLD: 6.6 %
NEUTROPHILS # BLD: 7.8 K/UL (ref 1.7–7.7)
NEUTROPHILS NFR BLD: 74.9 %
NITRITE UR QL STRIP.AUTO: NEGATIVE
PH UR STRIP.AUTO: 5 [PH] (ref 5–8)
PLATELET # BLD AUTO: 265 K/UL (ref 135–450)
PMV BLD AUTO: 8.4 FL (ref 5–10.5)
POTASSIUM SERPL-SCNC: 4.1 MMOL/L (ref 3.5–5.1)
POTASSIUM SERPL-SCNC: ABNORMAL MMOL/L (ref 3.5–5.1)
PROT SERPL-MCNC: 7.3 G/DL (ref 6.4–8.2)
PROT UR STRIP.AUTO-MCNC: NEGATIVE MG/DL
RBC # BLD AUTO: 4.52 M/UL (ref 4–5.2)
RBC CLUMPS #/AREA URNS AUTO: 8 /HPF (ref 0–4)
REASON FOR REJECTION: NORMAL
REJECTED TEST: NORMAL
SODIUM SERPL-SCNC: 137 MMOL/L (ref 136–145)
SP GR UR STRIP.AUTO: 1.02 (ref 1–1.03)
UA COMPLETE W REFLEX CULTURE PNL UR: ABNORMAL
UA DIPSTICK W REFLEX MICRO PNL UR: YES
URN SPEC COLLECT METH UR: ABNORMAL
UROBILINOGEN UR STRIP-ACNC: 0.2 E.U./DL
WBC # BLD AUTO: 10.4 K/UL (ref 4–11)
WBC #/AREA URNS AUTO: 5 /HPF (ref 0–5)

## 2025-08-15 PROCEDURE — 2580000003 HC RX 258: Performed by: PHYSICIAN ASSISTANT

## 2025-08-15 PROCEDURE — 81001 URINALYSIS AUTO W/SCOPE: CPT

## 2025-08-15 PROCEDURE — 85025 COMPLETE CBC W/AUTO DIFF WBC: CPT

## 2025-08-15 PROCEDURE — 83690 ASSAY OF LIPASE: CPT

## 2025-08-15 PROCEDURE — 80053 COMPREHEN METABOLIC PANEL: CPT

## 2025-08-15 PROCEDURE — 96374 THER/PROPH/DIAG INJ IV PUSH: CPT

## 2025-08-15 PROCEDURE — 93010 ELECTROCARDIOGRAM REPORT: CPT | Performed by: INTERNAL MEDICINE

## 2025-08-15 PROCEDURE — 74177 CT ABD & PELVIS W/CONTRAST: CPT

## 2025-08-15 PROCEDURE — 93005 ELECTROCARDIOGRAM TRACING: CPT | Performed by: EMERGENCY MEDICINE

## 2025-08-15 PROCEDURE — 6360000004 HC RX CONTRAST MEDICATION: Performed by: PHYSICIAN ASSISTANT

## 2025-08-15 PROCEDURE — 6360000002 HC RX W HCPCS: Performed by: PHYSICIAN ASSISTANT

## 2025-08-15 PROCEDURE — 99285 EMERGENCY DEPT VISIT HI MDM: CPT

## 2025-08-15 RX ORDER — 0.9 % SODIUM CHLORIDE 0.9 %
1000 INTRAVENOUS SOLUTION INTRAVENOUS ONCE
Status: COMPLETED | OUTPATIENT
Start: 2025-08-15 | End: 2025-08-15

## 2025-08-15 RX ORDER — KETOROLAC TROMETHAMINE 30 MG/ML
30 INJECTION, SOLUTION INTRAMUSCULAR; INTRAVENOUS ONCE
Status: COMPLETED | OUTPATIENT
Start: 2025-08-15 | End: 2025-08-15

## 2025-08-15 RX ORDER — ONDANSETRON 4 MG/1
4 TABLET, ORALLY DISINTEGRATING ORAL 3 TIMES DAILY PRN
Qty: 21 TABLET | Refills: 0 | Status: SHIPPED | OUTPATIENT
Start: 2025-08-15

## 2025-08-15 RX ORDER — ONDANSETRON 2 MG/ML
4 INJECTION INTRAMUSCULAR; INTRAVENOUS EVERY 6 HOURS PRN
Status: DISCONTINUED | OUTPATIENT
Start: 2025-08-15 | End: 2025-08-15 | Stop reason: HOSPADM

## 2025-08-15 RX ORDER — MORPHINE SULFATE 4 MG/ML
4 INJECTION, SOLUTION INTRAMUSCULAR; INTRAVENOUS EVERY 30 MIN PRN
Refills: 0 | Status: DISCONTINUED | OUTPATIENT
Start: 2025-08-15 | End: 2025-08-15 | Stop reason: HOSPADM

## 2025-08-15 RX ORDER — IOPAMIDOL 755 MG/ML
75 INJECTION, SOLUTION INTRAVASCULAR
Status: COMPLETED | OUTPATIENT
Start: 2025-08-15 | End: 2025-08-15

## 2025-08-15 RX ORDER — DICYCLOMINE HCL 20 MG
20 TABLET ORAL 4 TIMES DAILY
Qty: 30 TABLET | Refills: 0 | Status: SHIPPED | OUTPATIENT
Start: 2025-08-15

## 2025-08-15 RX ADMIN — KETOROLAC TROMETHAMINE 30 MG: 30 INJECTION, SOLUTION INTRAMUSCULAR at 03:10

## 2025-08-15 RX ADMIN — SODIUM CHLORIDE 1000 ML: 0.9 INJECTION, SOLUTION INTRAVENOUS at 03:09

## 2025-08-15 RX ADMIN — IOPAMIDOL 75 ML: 755 INJECTION, SOLUTION INTRAVENOUS at 03:32

## 2025-08-15 ASSESSMENT — PAIN SCALES - GENERAL
PAINLEVEL_OUTOF10: 2
PAINLEVEL_OUTOF10: 4
PAINLEVEL_OUTOF10: 4

## 2025-08-15 ASSESSMENT — PAIN DESCRIPTION - LOCATION
LOCATION: ABDOMEN

## 2025-08-15 ASSESSMENT — PAIN DESCRIPTION - DESCRIPTORS: DESCRIPTORS: ACHING

## 2025-08-15 ASSESSMENT — ENCOUNTER SYMPTOMS
NAUSEA: 1
COUGH: 0
RHINORRHEA: 0
VOMITING: 0
SHORTNESS OF BREATH: 0
ABDOMINAL PAIN: 1
DIARRHEA: 0

## 2025-08-15 ASSESSMENT — LIFESTYLE VARIABLES
HOW OFTEN DO YOU HAVE A DRINK CONTAINING ALCOHOL: NEVER
HOW MANY STANDARD DRINKS CONTAINING ALCOHOL DO YOU HAVE ON A TYPICAL DAY: PATIENT DOES NOT DRINK

## 2025-08-15 ASSESSMENT — PAIN - FUNCTIONAL ASSESSMENT
PAIN_FUNCTIONAL_ASSESSMENT: 0-10
PAIN_FUNCTIONAL_ASSESSMENT: 0-10

## 2025-08-18 ENCOUNTER — OFFICE VISIT (OUTPATIENT)
Dept: SURGERY | Age: 45
End: 2025-08-18
Payer: COMMERCIAL

## 2025-08-18 VITALS — BODY MASS INDEX: 36.91 KG/M2 | DIASTOLIC BLOOD PRESSURE: 79 MMHG | WEIGHT: 221.8 LBS | SYSTOLIC BLOOD PRESSURE: 103 MMHG

## 2025-08-18 DIAGNOSIS — R10.13 EPIGASTRIC PAIN: Primary | ICD-10-CM

## 2025-08-18 PROCEDURE — G8427 DOCREV CUR MEDS BY ELIG CLIN: HCPCS | Performed by: SURGERY

## 2025-08-18 PROCEDURE — 1036F TOBACCO NON-USER: CPT | Performed by: SURGERY

## 2025-08-18 PROCEDURE — 3074F SYST BP LT 130 MM HG: CPT | Performed by: SURGERY

## 2025-08-18 PROCEDURE — G8417 CALC BMI ABV UP PARAM F/U: HCPCS | Performed by: SURGERY

## 2025-08-18 PROCEDURE — 3078F DIAST BP <80 MM HG: CPT | Performed by: SURGERY

## 2025-08-18 PROCEDURE — 99213 OFFICE O/P EST LOW 20 MIN: CPT | Performed by: SURGERY

## 2025-08-18 ASSESSMENT — ENCOUNTER SYMPTOMS
CONSTIPATION: 0
APNEA: 0
ABDOMINAL PAIN: 1
NAUSEA: 0
BACK PAIN: 0
CHEST TIGHTNESS: 0
EYE DISCHARGE: 0
EYE ITCHING: 0
COLOR CHANGE: 0

## 2025-08-19 ENCOUNTER — HOSPITAL ENCOUNTER (OUTPATIENT)
Dept: ULTRASOUND IMAGING | Age: 45
Discharge: HOME | End: 2025-08-19
Attending: SURGERY
Payer: COMMERCIAL

## 2025-08-19 DIAGNOSIS — R10.13 EPIGASTRIC PAIN: Primary | ICD-10-CM

## 2025-08-19 DIAGNOSIS — R10.13 EPIGASTRIC PAIN: ICD-10-CM

## 2025-08-19 PROCEDURE — 76705 ECHO EXAM OF ABDOMEN: CPT

## 2025-08-26 ENCOUNTER — HOSPITAL ENCOUNTER (OUTPATIENT)
Dept: NUCLEAR MEDICINE | Age: 45
Discharge: HOME | End: 2025-08-26
Attending: SURGERY
Payer: COMMERCIAL

## 2025-08-26 VITALS — BODY MASS INDEX: 36.78 KG/M2 | WEIGHT: 221 LBS

## 2025-08-26 DIAGNOSIS — R10.13 EPIGASTRIC PAIN: ICD-10-CM

## 2025-08-26 PROCEDURE — A9537 TC99M MEBROFENIN: HCPCS | Performed by: SURGERY

## 2025-08-26 PROCEDURE — 2580000003 HC RX 258: Performed by: SURGERY

## 2025-08-26 PROCEDURE — 2500000003 HC RX 250 WO HCPCS: Performed by: SURGERY

## 2025-08-26 PROCEDURE — 3430000000 HC RX DIAGNOSTIC RADIOPHARMACEUTICAL: Performed by: SURGERY

## 2025-08-26 PROCEDURE — 6360000004 HC RX CONTRAST MEDICATION: Performed by: SURGERY

## 2025-08-26 PROCEDURE — 78227 HEPATOBIL SYST IMAGE W/DRUG: CPT

## 2025-08-26 RX ORDER — SODIUM CHLORIDE 0.9 % (FLUSH) 0.9 %
5-40 SYRINGE (ML) INJECTION PRN
Status: DISCONTINUED | OUTPATIENT
Start: 2025-08-26 | End: 2025-08-27 | Stop reason: HOSPADM

## 2025-08-26 RX ORDER — SINCALIDE 5 UG/5ML
0.02 INJECTION, POWDER, LYOPHILIZED, FOR SOLUTION INTRAVENOUS ONCE
Status: COMPLETED | OUTPATIENT
Start: 2025-08-26 | End: 2025-08-26

## 2025-08-26 RX ORDER — SODIUM CHLORIDE 9 MG/ML
INJECTION, SOLUTION INTRAVENOUS ONCE
Status: COMPLETED | OUTPATIENT
Start: 2025-08-26 | End: 2025-08-26

## 2025-08-26 RX ADMIN — SINCALIDE 2 MCG: 5 INJECTION, POWDER, LYOPHILIZED, FOR SOLUTION INTRAVENOUS at 08:51

## 2025-08-26 RX ADMIN — SODIUM CHLORIDE 100 ML: 9 INJECTION, SOLUTION INTRAVENOUS at 08:52

## 2025-08-26 RX ADMIN — Medication 5.1 MILLICURIE: at 08:00

## 2025-08-26 RX ADMIN — SODIUM CHLORIDE, PRESERVATIVE FREE 10 ML: 5 INJECTION INTRAVENOUS at 08:53

## 2025-09-02 ENCOUNTER — APPOINTMENT (OUTPATIENT)
Dept: CT IMAGING | Age: 45
End: 2025-09-02
Payer: COMMERCIAL

## 2025-09-02 ENCOUNTER — HOSPITAL ENCOUNTER (EMERGENCY)
Age: 45
Discharge: HOME | End: 2025-09-03
Attending: EMERGENCY MEDICINE
Payer: COMMERCIAL

## 2025-09-02 DIAGNOSIS — K82.8 BILIARY DYSKINESIA: Primary | ICD-10-CM

## 2025-09-02 DIAGNOSIS — R10.11 RIGHT UPPER QUADRANT ABDOMINAL PAIN: ICD-10-CM

## 2025-09-02 LAB
BASOPHILS # BLD: 0 K/UL (ref 0–0.2)
BASOPHILS NFR BLD: 0.3 %
DEPRECATED RDW RBC AUTO: 13.9 % (ref 12.4–15.4)
EOSINOPHIL # BLD: 0 K/UL (ref 0–0.6)
EOSINOPHIL NFR BLD: 0.1 %
HCT VFR BLD AUTO: 39.3 % (ref 36–48)
HGB BLD-MCNC: 13.4 G/DL (ref 12–16)
LYMPHOCYTES # BLD: 2.8 K/UL (ref 1–5.1)
LYMPHOCYTES NFR BLD: 37.8 %
MCH RBC QN AUTO: 30 PG (ref 26–34)
MCHC RBC AUTO-ENTMCNC: 34.1 G/DL (ref 31–36)
MCV RBC AUTO: 88 FL (ref 80–100)
MONOCYTES # BLD: 0.6 K/UL (ref 0–1.3)
MONOCYTES NFR BLD: 8.2 %
NEUTROPHILS # BLD: 4 K/UL (ref 1.7–7.7)
NEUTROPHILS NFR BLD: 53.6 %
PLATELET # BLD AUTO: 257 K/UL (ref 135–450)
PMV BLD AUTO: 8.3 FL (ref 5–10.5)
RBC # BLD AUTO: 4.47 M/UL (ref 4–5.2)
WBC # BLD AUTO: 7.5 K/UL (ref 4–11)

## 2025-09-02 PROCEDURE — 36415 COLL VENOUS BLD VENIPUNCTURE: CPT

## 2025-09-02 PROCEDURE — 80053 COMPREHEN METABOLIC PANEL: CPT

## 2025-09-02 PROCEDURE — 99285 EMERGENCY DEPT VISIT HI MDM: CPT

## 2025-09-02 PROCEDURE — 85025 COMPLETE CBC W/AUTO DIFF WBC: CPT

## 2025-09-02 PROCEDURE — 6370000000 HC RX 637 (ALT 250 FOR IP): Performed by: EMERGENCY MEDICINE

## 2025-09-02 PROCEDURE — 81003 URINALYSIS AUTO W/O SCOPE: CPT

## 2025-09-02 PROCEDURE — 83690 ASSAY OF LIPASE: CPT

## 2025-09-02 PROCEDURE — 93005 ELECTROCARDIOGRAM TRACING: CPT | Performed by: EMERGENCY MEDICINE

## 2025-09-02 RX ORDER — ONDANSETRON 2 MG/ML
4 INJECTION INTRAMUSCULAR; INTRAVENOUS ONCE
Status: COMPLETED | OUTPATIENT
Start: 2025-09-02 | End: 2025-09-03

## 2025-09-02 RX ORDER — MORPHINE SULFATE 4 MG/ML
4 INJECTION, SOLUTION INTRAMUSCULAR; INTRAVENOUS
Refills: 0 | Status: COMPLETED | OUTPATIENT
Start: 2025-09-02 | End: 2025-09-03

## 2025-09-02 RX ADMIN — LIDOCAINE HYDROCHLORIDE: 20 SOLUTION ORAL at 23:34

## 2025-09-02 ASSESSMENT — PAIN - FUNCTIONAL ASSESSMENT: PAIN_FUNCTIONAL_ASSESSMENT: 0-10

## 2025-09-02 ASSESSMENT — PAIN DESCRIPTION - LOCATION: LOCATION: ABDOMEN;BACK

## 2025-09-02 ASSESSMENT — PAIN DESCRIPTION - PAIN TYPE: TYPE: ACUTE PAIN

## 2025-09-02 ASSESSMENT — PAIN SCALES - GENERAL: PAINLEVEL_OUTOF10: 9

## 2025-09-03 ENCOUNTER — APPOINTMENT (OUTPATIENT)
Dept: CT IMAGING | Age: 45
End: 2025-09-03
Payer: COMMERCIAL

## 2025-09-03 ENCOUNTER — OFFICE VISIT (OUTPATIENT)
Dept: SURGERY | Age: 45
End: 2025-09-03
Payer: COMMERCIAL

## 2025-09-03 VITALS
BODY MASS INDEX: 37.49 KG/M2 | RESPIRATION RATE: 18 BRPM | SYSTOLIC BLOOD PRESSURE: 126 MMHG | HEIGHT: 65 IN | TEMPERATURE: 97.5 F | HEART RATE: 79 BPM | WEIGHT: 225 LBS | DIASTOLIC BLOOD PRESSURE: 89 MMHG | OXYGEN SATURATION: 95 %

## 2025-09-03 VITALS — SYSTOLIC BLOOD PRESSURE: 121 MMHG | BODY MASS INDEX: 37.44 KG/M2 | WEIGHT: 225 LBS | DIASTOLIC BLOOD PRESSURE: 82 MMHG

## 2025-09-03 DIAGNOSIS — K82.8 BILIARY DYSKINESIA: Primary | ICD-10-CM

## 2025-09-03 LAB
ALBUMIN SERPL-MCNC: 4.3 G/DL (ref 3.4–5)
ALBUMIN/GLOB SERPL: 1.4 {RATIO} (ref 1.1–2.2)
ALP SERPL-CCNC: 109 U/L (ref 40–129)
ALT SERPL-CCNC: 15 U/L (ref 10–40)
ANION GAP SERPL CALCULATED.3IONS-SCNC: 11 MMOL/L (ref 3–16)
AST SERPL-CCNC: 23 U/L (ref 15–37)
BACTERIA URNS QL MICRO: ABNORMAL /HPF
BILIRUB SERPL-MCNC: <0.2 MG/DL (ref 0–1)
BILIRUB UR QL STRIP.AUTO: NEGATIVE
BUN SERPL-MCNC: 16 MG/DL (ref 7–20)
CALCIUM SERPL-MCNC: 9.9 MG/DL (ref 8.3–10.6)
CHLORIDE SERPL-SCNC: 105 MMOL/L (ref 99–110)
CLARITY UR: ABNORMAL
CO2 SERPL-SCNC: 23 MMOL/L (ref 21–32)
COLOR UR: YELLOW
CREAT SERPL-MCNC: 0.8 MG/DL (ref 0.6–1.1)
EKG ATRIAL RATE: 70 BPM
EKG DIAGNOSIS: NORMAL
EKG P AXIS: 56 DEGREES
EKG P-R INTERVAL: 150 MS
EKG Q-T INTERVAL: 396 MS
EKG QRS DURATION: 92 MS
EKG QTC CALCULATION (BAZETT): 427 MS
EKG R AXIS: 20 DEGREES
EKG T AXIS: 36 DEGREES
EKG VENTRICULAR RATE: 70 BPM
EPI CELLS #/AREA URNS AUTO: 19 /HPF (ref 0–5)
GFR SERPLBLD CREATININE-BSD FMLA CKD-EPI: >90 ML/MIN/{1.73_M2}
GLUCOSE SERPL-MCNC: 109 MG/DL (ref 70–99)
GLUCOSE UR STRIP.AUTO-MCNC: NEGATIVE MG/DL
HGB UR QL STRIP.AUTO: ABNORMAL
HYALINE CASTS #/AREA URNS AUTO: 1 /LPF (ref 0–8)
KETONES UR STRIP.AUTO-MCNC: NEGATIVE MG/DL
LEUKOCYTE ESTERASE UR QL STRIP.AUTO: ABNORMAL
LIPASE SERPL-CCNC: 45 U/L (ref 13–60)
NITRITE UR QL STRIP.AUTO: NEGATIVE
PH UR STRIP.AUTO: 5.5 [PH] (ref 5–8)
POTASSIUM SERPL-SCNC: 4.2 MMOL/L (ref 3.5–5.1)
PROT SERPL-MCNC: 7.3 G/DL (ref 6.4–8.2)
PROT UR STRIP.AUTO-MCNC: NEGATIVE MG/DL
RBC CLUMPS #/AREA URNS AUTO: 16 /HPF (ref 0–4)
SODIUM SERPL-SCNC: 139 MMOL/L (ref 136–145)
SP GR UR STRIP.AUTO: 1.02 (ref 1–1.03)
UA COMPLETE W REFLEX CULTURE PNL UR: ABNORMAL
UA DIPSTICK W REFLEX MICRO PNL UR: YES
URN SPEC COLLECT METH UR: ABNORMAL
UROBILINOGEN UR STRIP-ACNC: 0.2 E.U./DL
WBC #/AREA URNS AUTO: 7 /HPF (ref 0–5)

## 2025-09-03 PROCEDURE — G8427 DOCREV CUR MEDS BY ELIG CLIN: HCPCS | Performed by: SURGERY

## 2025-09-03 PROCEDURE — 6360000004 HC RX CONTRAST MEDICATION: Performed by: EMERGENCY MEDICINE

## 2025-09-03 PROCEDURE — 1036F TOBACCO NON-USER: CPT | Performed by: SURGERY

## 2025-09-03 PROCEDURE — 3079F DIAST BP 80-89 MM HG: CPT | Performed by: SURGERY

## 2025-09-03 PROCEDURE — G8417 CALC BMI ABV UP PARAM F/U: HCPCS | Performed by: SURGERY

## 2025-09-03 PROCEDURE — 74177 CT ABD & PELVIS W/CONTRAST: CPT

## 2025-09-03 PROCEDURE — 6370000000 HC RX 637 (ALT 250 FOR IP): Performed by: EMERGENCY MEDICINE

## 2025-09-03 PROCEDURE — 99213 OFFICE O/P EST LOW 20 MIN: CPT | Performed by: SURGERY

## 2025-09-03 PROCEDURE — 3074F SYST BP LT 130 MM HG: CPT | Performed by: SURGERY

## 2025-09-03 PROCEDURE — 96375 TX/PRO/DX INJ NEW DRUG ADDON: CPT

## 2025-09-03 PROCEDURE — 93010 ELECTROCARDIOGRAM REPORT: CPT | Performed by: INTERNAL MEDICINE

## 2025-09-03 PROCEDURE — 96374 THER/PROPH/DIAG INJ IV PUSH: CPT

## 2025-09-03 PROCEDURE — 6360000002 HC RX W HCPCS: Performed by: EMERGENCY MEDICINE

## 2025-09-03 RX ORDER — SODIUM CHLORIDE 0.9 % (FLUSH) 0.9 %
5-40 SYRINGE (ML) INJECTION PRN
Status: CANCELLED | OUTPATIENT
Start: 2025-09-03

## 2025-09-03 RX ORDER — MORPHINE SULFATE 4 MG/ML
4 INJECTION, SOLUTION INTRAMUSCULAR; INTRAVENOUS
Refills: 0 | Status: DISCONTINUED | OUTPATIENT
Start: 2025-09-03 | End: 2025-09-03

## 2025-09-03 RX ORDER — INDOCYANINE GREEN AND WATER 25 MG
5 KIT INJECTION ONCE
Status: CANCELLED | OUTPATIENT
Start: 2025-09-03 | End: 2025-09-03

## 2025-09-03 RX ORDER — SODIUM CHLORIDE 9 MG/ML
INJECTION, SOLUTION INTRAVENOUS PRN
Status: CANCELLED | OUTPATIENT
Start: 2025-09-03

## 2025-09-03 RX ORDER — OXYCODONE HYDROCHLORIDE 5 MG/1
5 TABLET ORAL EVERY 8 HOURS PRN
Qty: 9 TABLET | Refills: 0 | Status: ON HOLD | OUTPATIENT
Start: 2025-09-03 | End: 2025-09-05 | Stop reason: HOSPADM

## 2025-09-03 RX ORDER — SODIUM CHLORIDE 0.9 % (FLUSH) 0.9 %
5-40 SYRINGE (ML) INJECTION EVERY 12 HOURS SCHEDULED
Status: CANCELLED | OUTPATIENT
Start: 2025-09-03

## 2025-09-03 RX ORDER — OXYCODONE HYDROCHLORIDE 5 MG/1
5 TABLET ORAL ONCE
Refills: 0 | Status: COMPLETED | OUTPATIENT
Start: 2025-09-03 | End: 2025-09-03

## 2025-09-03 RX ADMIN — ONDANSETRON 4 MG: 2 INJECTION, SOLUTION INTRAMUSCULAR; INTRAVENOUS at 00:30

## 2025-09-03 RX ADMIN — MORPHINE SULFATE 4 MG: 4 INJECTION, SOLUTION INTRAMUSCULAR; INTRAVENOUS at 00:31

## 2025-09-03 RX ADMIN — OXYCODONE 5 MG: 5 TABLET ORAL at 03:30

## 2025-09-03 RX ADMIN — IOHEXOL 75 ML: 350 INJECTION, SOLUTION INTRAVENOUS at 00:15

## 2025-09-03 ASSESSMENT — PAIN SCALES - GENERAL: PAINLEVEL_OUTOF10: 5

## 2025-09-04 RX ORDER — SUMATRIPTAN SUCCINATE 100 MG/1
100 TABLET ORAL
COMMUNITY

## 2025-09-04 RX ORDER — M-VIT,TX,IRON,MINS/CALC/FOLIC 27MG-0.4MG
1 TABLET ORAL DAILY
COMMUNITY

## 2025-09-04 RX ORDER — SPIRONOLACTONE 100 MG/1
100 TABLET, FILM COATED ORAL DAILY
COMMUNITY
Start: 2024-09-13

## 2025-09-05 ENCOUNTER — ANESTHESIA (OUTPATIENT)
Dept: OPERATING ROOM | Age: 45
End: 2025-09-05
Payer: COMMERCIAL

## 2025-09-05 ENCOUNTER — ANESTHESIA EVENT (OUTPATIENT)
Dept: OPERATING ROOM | Age: 45
End: 2025-09-05
Payer: COMMERCIAL

## 2025-09-05 ENCOUNTER — HOSPITAL ENCOUNTER (OUTPATIENT)
Age: 45
Setting detail: OUTPATIENT SURGERY
Discharge: HOME | End: 2025-09-05
Attending: SURGERY | Admitting: SURGERY
Payer: COMMERCIAL

## 2025-09-05 ENCOUNTER — APPOINTMENT (OUTPATIENT)
Dept: GENERAL RADIOLOGY | Age: 45
End: 2025-09-05
Attending: SURGERY
Payer: COMMERCIAL

## 2025-09-05 VITALS
HEART RATE: 101 BPM | RESPIRATION RATE: 17 BRPM | OXYGEN SATURATION: 94 % | SYSTOLIC BLOOD PRESSURE: 97 MMHG | TEMPERATURE: 97.3 F | DIASTOLIC BLOOD PRESSURE: 53 MMHG | BODY MASS INDEX: 36.99 KG/M2 | HEIGHT: 65 IN | WEIGHT: 222 LBS

## 2025-09-05 DIAGNOSIS — K82.8 BILIARY DYSKINESIA: Primary | ICD-10-CM

## 2025-09-05 PROCEDURE — 3600000009 HC SURGERY ROBOT BASE: Performed by: SURGERY

## 2025-09-05 PROCEDURE — 74300 X-RAY BILE DUCTS/PANCREAS: CPT

## 2025-09-05 PROCEDURE — 7100000011 HC PHASE II RECOVERY - ADDTL 15 MIN: Performed by: SURGERY

## 2025-09-05 PROCEDURE — 2500000003 HC RX 250 WO HCPCS: Performed by: SURGERY

## 2025-09-05 PROCEDURE — 6360000004 HC RX CONTRAST MEDICATION: Performed by: SURGERY

## 2025-09-05 PROCEDURE — 7100000000 HC PACU RECOVERY - FIRST 15 MIN: Performed by: SURGERY

## 2025-09-05 PROCEDURE — S2900 ROBOTIC SURGICAL SYSTEM: HCPCS | Performed by: SURGERY

## 2025-09-05 PROCEDURE — 2580000003 HC RX 258: Performed by: SURGERY

## 2025-09-05 PROCEDURE — 3700000001 HC ADD 15 MINUTES (ANESTHESIA): Performed by: SURGERY

## 2025-09-05 PROCEDURE — 6370000000 HC RX 637 (ALT 250 FOR IP): Performed by: ANESTHESIOLOGY

## 2025-09-05 PROCEDURE — C1894 INTRO/SHEATH, NON-LASER: HCPCS | Performed by: SURGERY

## 2025-09-05 PROCEDURE — 6360000002 HC RX W HCPCS: Performed by: NURSE ANESTHETIST, CERTIFIED REGISTERED

## 2025-09-05 PROCEDURE — C1889 IMPLANT/INSERT DEVICE, NOC: HCPCS | Performed by: SURGERY

## 2025-09-05 PROCEDURE — 6360000002 HC RX W HCPCS: Performed by: SURGERY

## 2025-09-05 PROCEDURE — 7100000001 HC PACU RECOVERY - ADDTL 15 MIN: Performed by: SURGERY

## 2025-09-05 PROCEDURE — 3600000019 HC SURGERY ROBOT ADDTL 15MIN: Performed by: SURGERY

## 2025-09-05 PROCEDURE — 2709999900 HC NON-CHARGEABLE SUPPLY: Performed by: SURGERY

## 2025-09-05 PROCEDURE — 2500000003 HC RX 250 WO HCPCS: Performed by: NURSE ANESTHETIST, CERTIFIED REGISTERED

## 2025-09-05 PROCEDURE — 3700000000 HC ANESTHESIA ATTENDED CARE: Performed by: SURGERY

## 2025-09-05 PROCEDURE — 7100000010 HC PHASE II RECOVERY - FIRST 15 MIN: Performed by: SURGERY

## 2025-09-05 DEVICE — CLIP INT M L POLYMER LOK LIG HEM O LOK: Type: IMPLANTABLE DEVICE | Site: ABDOMEN | Status: FUNCTIONAL

## 2025-09-05 RX ORDER — ONDANSETRON 2 MG/ML
INJECTION INTRAMUSCULAR; INTRAVENOUS
Status: DISCONTINUED | OUTPATIENT
Start: 2025-09-05 | End: 2025-09-05 | Stop reason: SDUPTHER

## 2025-09-05 RX ORDER — FENTANYL CITRATE 50 UG/ML
INJECTION, SOLUTION INTRAMUSCULAR; INTRAVENOUS
Status: DISCONTINUED | OUTPATIENT
Start: 2025-09-05 | End: 2025-09-05 | Stop reason: SDUPTHER

## 2025-09-05 RX ORDER — SODIUM CHLORIDE 0.9 % (FLUSH) 0.9 %
5-40 SYRINGE (ML) INJECTION EVERY 12 HOURS SCHEDULED
Status: DISCONTINUED | OUTPATIENT
Start: 2025-09-05 | End: 2025-09-05 | Stop reason: HOSPADM

## 2025-09-05 RX ORDER — SODIUM CHLORIDE 0.9 % (FLUSH) 0.9 %
5-40 SYRINGE (ML) INJECTION PRN
Status: CANCELLED | OUTPATIENT
Start: 2025-09-05

## 2025-09-05 RX ORDER — SODIUM CHLORIDE 9 MG/ML
INJECTION, SOLUTION INTRAVENOUS PRN
Status: DISCONTINUED | OUTPATIENT
Start: 2025-09-05 | End: 2025-09-05 | Stop reason: HOSPADM

## 2025-09-05 RX ORDER — OXYCODONE HYDROCHLORIDE 5 MG/1
5 TABLET ORAL EVERY 4 HOURS PRN
Qty: 20 TABLET | Refills: 0 | Status: SHIPPED | OUTPATIENT
Start: 2025-09-05 | End: 2025-09-12

## 2025-09-05 RX ORDER — HYDROMORPHONE HYDROCHLORIDE 2 MG/ML
0.5 INJECTION, SOLUTION INTRAMUSCULAR; INTRAVENOUS; SUBCUTANEOUS EVERY 5 MIN PRN
Status: DISCONTINUED | OUTPATIENT
Start: 2025-09-05 | End: 2025-09-05 | Stop reason: HOSPADM

## 2025-09-05 RX ORDER — FENTANYL CITRATE 50 UG/ML
25 INJECTION, SOLUTION INTRAMUSCULAR; INTRAVENOUS EVERY 5 MIN PRN
Status: DISCONTINUED | OUTPATIENT
Start: 2025-09-05 | End: 2025-09-05 | Stop reason: HOSPADM

## 2025-09-05 RX ORDER — SODIUM CHLORIDE 9 MG/ML
INJECTION, SOLUTION INTRAVENOUS CONTINUOUS
Status: DISCONTINUED | OUTPATIENT
Start: 2025-09-05 | End: 2025-09-05 | Stop reason: HOSPADM

## 2025-09-05 RX ORDER — LIDOCAINE HYDROCHLORIDE 20 MG/ML
INJECTION, SOLUTION EPIDURAL; INFILTRATION; INTRACAUDAL; PERINEURAL
Status: DISCONTINUED | OUTPATIENT
Start: 2025-09-05 | End: 2025-09-05 | Stop reason: SDUPTHER

## 2025-09-05 RX ORDER — SODIUM CHLORIDE 0.9 % (FLUSH) 0.9 %
5-40 SYRINGE (ML) INJECTION PRN
Status: DISCONTINUED | OUTPATIENT
Start: 2025-09-05 | End: 2025-09-05 | Stop reason: HOSPADM

## 2025-09-05 RX ORDER — DIPHENHYDRAMINE HYDROCHLORIDE 50 MG/ML
12.5 INJECTION, SOLUTION INTRAMUSCULAR; INTRAVENOUS
Status: DISCONTINUED | OUTPATIENT
Start: 2025-09-05 | End: 2025-09-05 | Stop reason: HOSPADM

## 2025-09-05 RX ORDER — MIDAZOLAM HYDROCHLORIDE 1 MG/ML
INJECTION, SOLUTION INTRAMUSCULAR; INTRAVENOUS
Status: DISCONTINUED | OUTPATIENT
Start: 2025-09-05 | End: 2025-09-05 | Stop reason: SDUPTHER

## 2025-09-05 RX ORDER — SODIUM CHLORIDE 9 MG/ML
INJECTION, SOLUTION INTRAVENOUS PRN
Status: CANCELLED | OUTPATIENT
Start: 2025-09-05

## 2025-09-05 RX ORDER — DEXAMETHASONE SODIUM PHOSPHATE 4 MG/ML
INJECTION, SOLUTION INTRA-ARTICULAR; INTRALESIONAL; INTRAMUSCULAR; INTRAVENOUS; SOFT TISSUE
Status: DISCONTINUED | OUTPATIENT
Start: 2025-09-05 | End: 2025-09-05 | Stop reason: SDUPTHER

## 2025-09-05 RX ORDER — MAGNESIUM SULFATE HEPTAHYDRATE 500 MG/ML
INJECTION, SOLUTION INTRAMUSCULAR; INTRAVENOUS
Status: DISCONTINUED | OUTPATIENT
Start: 2025-09-05 | End: 2025-09-05 | Stop reason: SDUPTHER

## 2025-09-05 RX ORDER — BUPIVACAINE HYDROCHLORIDE AND EPINEPHRINE 5; 5 MG/ML; UG/ML
INJECTION, SOLUTION EPIDURAL; INTRACAUDAL; PERINEURAL
Status: DISCONTINUED | OUTPATIENT
Start: 2025-09-05 | End: 2025-09-05 | Stop reason: ALTCHOICE

## 2025-09-05 RX ORDER — ONDANSETRON 2 MG/ML
4 INJECTION INTRAMUSCULAR; INTRAVENOUS
Status: DISCONTINUED | OUTPATIENT
Start: 2025-09-05 | End: 2025-09-05 | Stop reason: HOSPADM

## 2025-09-05 RX ORDER — INDOCYANINE GREEN AND WATER 25 MG
5 KIT INJECTION ONCE
Status: COMPLETED | OUTPATIENT
Start: 2025-09-05 | End: 2025-09-05

## 2025-09-05 RX ORDER — DEXTROSE MONOHYDRATE 100 MG/ML
INJECTION, SOLUTION INTRAVENOUS CONTINUOUS PRN
Status: DISCONTINUED | OUTPATIENT
Start: 2025-09-05 | End: 2025-09-05 | Stop reason: HOSPADM

## 2025-09-05 RX ORDER — OXYCODONE HYDROCHLORIDE 5 MG/1
5 TABLET ORAL
Status: COMPLETED | OUTPATIENT
Start: 2025-09-05 | End: 2025-09-05

## 2025-09-05 RX ORDER — GLUCAGON 1 MG/ML
1 KIT INJECTION PRN
Status: DISCONTINUED | OUTPATIENT
Start: 2025-09-05 | End: 2025-09-05 | Stop reason: HOSPADM

## 2025-09-05 RX ORDER — DROPERIDOL 2.5 MG/ML
0.62 INJECTION, SOLUTION INTRAMUSCULAR; INTRAVENOUS
Status: DISCONTINUED | OUTPATIENT
Start: 2025-09-05 | End: 2025-09-05 | Stop reason: HOSPADM

## 2025-09-05 RX ORDER — PHENYLEPHRINE HCL IN 0.9% NACL 1 MG/10 ML
SYRINGE (ML) INTRAVENOUS
Status: DISCONTINUED | OUTPATIENT
Start: 2025-09-05 | End: 2025-09-05 | Stop reason: SDUPTHER

## 2025-09-05 RX ORDER — MEPERIDINE HYDROCHLORIDE 25 MG/ML
12.5 INJECTION INTRAMUSCULAR; INTRAVENOUS; SUBCUTANEOUS EVERY 5 MIN PRN
Status: DISCONTINUED | OUTPATIENT
Start: 2025-09-05 | End: 2025-09-05 | Stop reason: HOSPADM

## 2025-09-05 RX ORDER — KETOROLAC TROMETHAMINE 30 MG/ML
INJECTION, SOLUTION INTRAMUSCULAR; INTRAVENOUS
Status: DISCONTINUED | OUTPATIENT
Start: 2025-09-05 | End: 2025-09-05 | Stop reason: SDUPTHER

## 2025-09-05 RX ORDER — SODIUM CHLORIDE 0.9 % (FLUSH) 0.9 %
5-40 SYRINGE (ML) INJECTION EVERY 12 HOURS SCHEDULED
Status: CANCELLED | OUTPATIENT
Start: 2025-09-05

## 2025-09-05 RX ORDER — MAGNESIUM HYDROXIDE 1200 MG/15ML
LIQUID ORAL CONTINUOUS PRN
Status: COMPLETED | OUTPATIENT
Start: 2025-09-05 | End: 2025-09-05

## 2025-09-05 RX ORDER — ROCURONIUM BROMIDE 50 MG/5 ML
SYRINGE (ML) INTRAVENOUS
Status: DISCONTINUED | OUTPATIENT
Start: 2025-09-05 | End: 2025-09-05 | Stop reason: SDUPTHER

## 2025-09-05 RX ORDER — PROPOFOL 10 MG/ML
INJECTION, EMULSION INTRAVENOUS
Status: DISCONTINUED | OUTPATIENT
Start: 2025-09-05 | End: 2025-09-05 | Stop reason: SDUPTHER

## 2025-09-05 RX ADMIN — LIDOCAINE HYDROCHLORIDE 40 MG: 20 INJECTION, SOLUTION EPIDURAL; INFILTRATION; INTRACAUDAL; PERINEURAL at 13:18

## 2025-09-05 RX ADMIN — KETOROLAC TROMETHAMINE 30 MG: 60 INJECTION, SOLUTION INTRAMUSCULAR at 14:15

## 2025-09-05 RX ADMIN — Medication 100 MCG: at 13:16

## 2025-09-05 RX ADMIN — PROPOFOL 200 MG: 10 INJECTION, EMULSION INTRAVENOUS at 13:06

## 2025-09-05 RX ADMIN — PROPOFOL 100 MG: 10 INJECTION, EMULSION INTRAVENOUS at 14:12

## 2025-09-05 RX ADMIN — Medication 50 MG: at 13:06

## 2025-09-05 RX ADMIN — Medication 200 MCG: at 13:24

## 2025-09-05 RX ADMIN — OXYCODONE HYDROCHLORIDE 5 MG: 5 TABLET ORAL at 15:00

## 2025-09-05 RX ADMIN — CEFAZOLIN 2000 MG: 2 INJECTION, POWDER, FOR SOLUTION INTRAMUSCULAR; INTRAVENOUS at 13:18

## 2025-09-05 RX ADMIN — ONDANSETRON 4 MG: 2 INJECTION INTRAMUSCULAR; INTRAVENOUS at 13:06

## 2025-09-05 RX ADMIN — FENTANYL CITRATE 100 MCG: 50 INJECTION, SOLUTION INTRAMUSCULAR; INTRAVENOUS at 13:06

## 2025-09-05 RX ADMIN — Medication 200 MCG: at 13:31

## 2025-09-05 RX ADMIN — SODIUM CHLORIDE: 9 INJECTION, SOLUTION INTRAVENOUS at 13:29

## 2025-09-05 RX ADMIN — Medication 200 MCG: at 13:17

## 2025-09-05 RX ADMIN — Medication 30 MG: at 13:47

## 2025-09-05 RX ADMIN — SUGAMMADEX 200 MG: 100 INJECTION, SOLUTION INTRAVENOUS at 14:13

## 2025-09-05 RX ADMIN — Medication 100 MCG: at 13:29

## 2025-09-05 RX ADMIN — DEXAMETHASONE SODIUM PHOSPHATE 8 MG: 4 INJECTION, SOLUTION INTRAMUSCULAR; INTRAVENOUS at 13:06

## 2025-09-05 RX ADMIN — Medication 200 MCG: at 13:32

## 2025-09-05 RX ADMIN — MIDAZOLAM 2 MG: 1 INJECTION INTRAMUSCULAR; INTRAVENOUS at 13:00

## 2025-09-05 RX ADMIN — Medication 200 MCG: at 14:09

## 2025-09-05 RX ADMIN — INDOCYANINE GREEN AND WATER 5 MG: KIT at 12:09

## 2025-09-05 RX ADMIN — SODIUM CHLORIDE: 9 INJECTION, SOLUTION INTRAVENOUS at 12:10

## 2025-09-05 RX ADMIN — Medication 100 MCG: at 13:43

## 2025-09-05 RX ADMIN — Medication 100 MCG: at 13:25

## 2025-09-05 RX ADMIN — Medication 100 MCG: at 13:41

## 2025-09-05 RX ADMIN — MAGNESIUM SULFATE HEPTAHYDRATE 2 G: 500 INJECTION, SOLUTION INTRAMUSCULAR; INTRAVENOUS at 13:29

## 2025-09-05 ASSESSMENT — PAIN - FUNCTIONAL ASSESSMENT
PAIN_FUNCTIONAL_ASSESSMENT: 0-10
PAIN_FUNCTIONAL_ASSESSMENT: PREVENTS OR INTERFERES SOME ACTIVE ACTIVITIES AND ADLS
PAIN_FUNCTIONAL_ASSESSMENT: 0-10

## 2025-09-05 ASSESSMENT — PAIN DESCRIPTION - LOCATION: LOCATION: ABDOMEN

## 2025-09-05 ASSESSMENT — PAIN DESCRIPTION - DESCRIPTORS
DESCRIPTORS: DISCOMFORT
DESCRIPTORS: ACHING

## 2025-09-05 ASSESSMENT — PAIN DESCRIPTION - ORIENTATION: ORIENTATION: RIGHT

## 2025-09-05 ASSESSMENT — PAIN SCALES - GENERAL: PAINLEVEL_OUTOF10: 4

## (undated) DEVICE — FOLEY TRAY 1 LAYR 16 FR 10 CC URIMTR 100% SIL SNAPSECURE LF

## (undated) DEVICE — SET EXTN PRIMING 4.9ML L30IN INCL SLDE CLMP SPIN M LUERLOCK

## (undated) DEVICE — CATHETER CHOLANGIOGRAM 4.5 FRX3 IN W/ 20018M55 TAUT INTRO

## (undated) DEVICE — DEVICE ANTIFOG FOR SHRP SCP SYS 30978

## (undated) DEVICE — SUTURE MONOCRYL + SZ 4-0 L27IN ABSRB UD L19MM PS-2 3/8 CIR MCP426H

## (undated) DEVICE — ELECTRODE PT RET AD L9FT HI MOIST COND ADH HYDRGEL CORDED

## (undated) DEVICE — SPONGE LAP W18XL18IN WHT COT 4 PLY FLD STRUNG RADPQ DISP ST 2 PER PACK

## (undated) DEVICE — SOLUTION IRRIG 3000ML 0.9% SOD CHL USP UROMATIC PLAS CONT

## (undated) DEVICE — GLOVE ORANGE PI 7 1/2   MSG9075

## (undated) DEVICE — OBTURATOR ROBOTIC DIA8MM STD OPT BLDELSS

## (undated) DEVICE — CONTAINER,SPECIMEN,OR STERILE,4OZ: Brand: MEDLINE

## (undated) DEVICE — MASTISOL ADHESIVE LIQ 2/3ML

## (undated) DEVICE — SUTURE VCRL + SZ 2-0 L36IN ABSRB UD L36MM CT-1 1/2 CIR VCP945H

## (undated) DEVICE — TUBING, SUCTION, 1/4" X 10', STRAIGHT: Brand: MEDLINE

## (undated) DEVICE — GLOVE ORANGE PI 8   MSG9080

## (undated) DEVICE — INTENDED FOR TISSUE SEPARATION, AND OTHER PROCEDURES THAT REQUIRE A SHARP SURGICAL BLADE TO PUNCTURE OR CUT.: Brand: BARD-PARKER ® STAINLESS STEEL BLADES

## (undated) DEVICE — SUTURE VICRYL + SZ 1 L18IN ABSRB UD L36MM CT-1 1/2 CIR VCP841D

## (undated) DEVICE — NOVOSTITCH PRO MEN RPR SYS 2-0: Brand: NOVOSTITCH

## (undated) DEVICE — PENCIL SMK EVAC TELSCP 3 M TBNG

## (undated) DEVICE — KIT DETRGNT ENZYMATIC SOLUTION 100 ML SOAK SHLD 5 VI LG HUMD

## (undated) DEVICE — GLOVE ORANGE PI 8 1/2   MSG9085

## (undated) DEVICE — GLOVE ORANGE PI 7   MSG9070

## (undated) DEVICE — RECIPROCATING BLADE, DOUBLE SIDED, OFFSET  (70.0 X 0.64 X 12.6MM)

## (undated) DEVICE — PADDING CAST W6INXL4YD ST COT RAYON MICROPLEATED HIGHLY

## (undated) DEVICE — SEAL UNIVERSAL 5-12MM

## (undated) DEVICE — APPLICATOR MEDICATED 26 CC SOLUTION HI LT ORNG CHLORAPREP

## (undated) DEVICE — PAD ABSRB W8XL10IN ABD HYDROPHOBIC NONWOVEN THCK LAYR CELOS

## (undated) DEVICE — ADHESIVE SKIN CLSR 0.7ML TOP DERMBND ADV

## (undated) DEVICE — TOTAL BASIC PK

## (undated) DEVICE — 3M™ STERI-DRAPE™ ARTHROSCOPY SHEET WITH POUCH 1194: Brand: STERI-DRAPE™

## (undated) DEVICE — DRAPE C ARM W/ POLY STRP W42XL72IN FOR MOB XR

## (undated) DEVICE — SHEET,DRAPE,53X77,STERILE: Brand: MEDLINE

## (undated) DEVICE — BNDG,ELSTC,MATRIX,STRL,6"X5YD,LF,HOOK&LP: Brand: MEDLINE

## (undated) DEVICE — NEEDLE SPNL L3.5IN PNK HUB S STL REG WALL FIT STYL W/ QNCKE

## (undated) DEVICE — SUTURE PDS + SZ 0 L36IN ABSRB VLT CT 1 L36MM 1 2 CIR PDP346H

## (undated) DEVICE — 3M™ COBAN™ NL STERILE NON-LATEX SELF-ADHERENT WRAP, 2084S, 4 IN X 5 YD (10 CM X 4,5 M), 18 ROLLS/CASE: Brand: 3M™ COBAN™

## (undated) DEVICE — Device

## (undated) DEVICE — 4.5 MM FULL RADIUS ELITE STRAIGHT                                    DISPOSABLE BLADES, MAROON,PACKAGED 6                                    PER BOX, STERILE

## (undated) DEVICE — SYRINGE MED 10ML LUERLOCK TIP W/O SFTY DISP

## (undated) DEVICE — DEVON TUBE HOLDER REMOVABLE TOUCH FASTEN STRAP: Brand: DEVON

## (undated) DEVICE — DRESSING,GAUZE,XEROFORM,CURAD,1"X8",ST: Brand: CURAD

## (undated) DEVICE — CUFF REPROC TRNQT DPSB W/PLC BROWN 34IN

## (undated) DEVICE — GAUZE,SPONGE,4"X4",8PLY,STRL,LF,10/TRAY: Brand: MEDLINE

## (undated) DEVICE — SUTURE VCRL + SZ 1 L18IN ABSRB UD L36MM CT-1 1/2 CIR VCP841D

## (undated) DEVICE — DYONICS 25 INFLOW TUBE SET, 3 PER BOX

## (undated) DEVICE — STERILE TOTAL KNEE DRAPE PACK: Brand: CARDINAL HEALTH

## (undated) DEVICE — 450 ML BOTTLE OF 0.05% CHLORHEXIDINE GLUCONATE IN 99.95% STERILE WATER FOR IRRIGATION, USP AND APPLICATOR.: Brand: IRRISEPT ANTIMICROBIAL WOUND LAVAGE

## (undated) DEVICE — FAIRFIELD KNEE LF

## (undated) DEVICE — SUTURE ABSORBABLE MONOFILAMENT 1 MO-4 36 CM 36 MM VIO PDS +

## (undated) DEVICE — PAD,ABDOMINAL,8"X10",ST,LF: Brand: MEDLINE

## (undated) DEVICE — STERI STRIPS 1X5 25 PKS 4

## (undated) DEVICE — DYONICS 25 PATIENT TUBE SET MUST                                    BE USED WITH 7211007, 12 PER BOX

## (undated) DEVICE — TOWEL,OR,DSP,ST,BLUE,STD,4/PK,20PK/CS: Brand: MEDLINE

## (undated) DEVICE — SUTURE VICRYL + SZ 2-0 L36IN ABSRB UD L36MM CT-1 1/2 CIR VCP945H

## (undated) DEVICE — SOLUTION IRRIG 500ML 0.9% SOD CHLO USP POUR PLAS BTL

## (undated) DEVICE — STRIP,CLOSURE,WOUND,MEDI-STRIP,1/2X4: Brand: MEDLINE

## (undated) DEVICE — DUAL CUT SAGITTAL BLADE

## (undated) DEVICE — STOPCOCK IV 3 W 3 GANG EXT M LUERLOCK FIX MLL HI-FLO

## (undated) DEVICE — NOVOSTITCH CARTRIDGE 2-0: Brand: NOVOSTITCH

## (undated) DEVICE — CATHETER CHOLGM 4.5FR L18IN W/ MTL SUPP TB

## (undated) DEVICE — KNEE HOLDER DISPOSABLE LINER: Brand: ALVARADO®  KNEE SUPPORT

## (undated) DEVICE — HOOD: Brand: T7PLUS

## (undated) DEVICE — BRACE KNEE POSTOP COOL PROCARE TROM ADV

## (undated) DEVICE — BANDAGE COMPR W6INXL15FT WHT E SGL LAYERED VELC CLSR

## (undated) DEVICE — SUTURE ETHLN SZ 3-0 L18IN NONABSORBABLE BLK PS-2 L19MM 3/8 1669H

## (undated) DEVICE — NEEDLE INSUF 13GA L120MM

## (undated) DEVICE — HANDPIECE SET WITH HIGH FLOW TIP AND SUCTION TUBE: Brand: INTERPULSE

## (undated) DEVICE — SUTURE VICRYL + SZ 2-0 L18IN ABSRB UD CT1 L36MM 1/2 CIR VCP839D

## (undated) DEVICE — SOLUTION IRRIG 1000ML 0.9% SOD CHL USP POUR PLAS BTL

## (undated) DEVICE — PIN DRL 6MM ACL PCL KNEE ALL IN 1 GUID RG RMR FLIPCUTTER II

## (undated) DEVICE — CLOSURE PUNCTURE/SUTURE 14G DEV

## (undated) DEVICE — MASC TURNOVER KIT: Brand: MEDLINE INDUSTRIES, INC.

## (undated) DEVICE — HOOD WITH PEEL AWAY FACE SHIELD: Brand: T7PLUS

## (undated) DEVICE — TOTAL KNEE: Brand: MEDLINE INDUSTRIES, INC.

## (undated) DEVICE — 3M™ IOBAN™ 2 ANTIMICROBIAL INCISE DRAPE 6650EZ: Brand: IOBAN™ 2

## (undated) DEVICE — YANKAUER,BULB TIP,W/O VENT,RIGID,STERILE: Brand: MEDLINE

## (undated) DEVICE — GLOVE SURG SZ 75 L12IN FNGR THK79MIL GRN LTX FREE

## (undated) DEVICE — GOWN SURG 2XL L50IN BLU NONREINFORCED AURORA W RAGLAN SL

## (undated) DEVICE — SUTURE VCRL + SZ 2-0 L27IN ABSRB WHT SH 1/2 CIR TAPERCUT VCP417H

## (undated) DEVICE — 3D ISLAND DRESSING 4IN X 12IN: Brand: THERABOND 3D ANTIMICROBIAL BARRIER SYSTEMS

## (undated) DEVICE — COVER LT HNDL BLU PLAS

## (undated) DEVICE — BAG RETRIEVAL SPECIMEN SUPERBAG 5 SMALL NYLON ITRODUCER

## (undated) DEVICE — GLOVE SURG SZ 85 L12IN FNGR ORTHO 126MIL CRM LTX FREE

## (undated) DEVICE — HEALICOIL REGENESORB 5.5 MM                                    THREADED DILATOR, DISPOSABLE: Brand: HEALICOIL

## (undated) DEVICE — SYRINGE MED 30ML STD CLR PLAS LUERLOCK TIP N CTRL DISP

## (undated) DEVICE — 3 BONE CEMENT MIXER: Brand: MIXEVAC

## (undated) DEVICE — WEREWOLF FLOW 90 COBLATION WAND: Brand: COBLATION

## (undated) DEVICE — GOWN SIRUS NONREIN XL W/TWL: Brand: MEDLINE INDUSTRIES, INC.

## (undated) DEVICE — DRAPE,U/ SHT,SPLIT,PLAS,STERIL: Brand: MEDLINE

## (undated) DEVICE — DECANTER FLD 9IN ST BG FOR ASEP TRNSF OF FLD

## (undated) DEVICE — SUTURE MCRYL + SZ 4-0 L27IN ABSRB UD L19MM PS-2 3/8 CIR MCP426H

## (undated) DEVICE — GLOVE SURG SZ 85 L12IN FNGR THK79MIL GRN LTX FREE

## (undated) DEVICE — ATTUNE SOLO PINNING SYSTEM

## (undated) DEVICE — STERILE SURGICAL BLADE SIZE 15: Brand: CARDINAL HEALTH

## (undated) DEVICE — GLOVE ORTHO 8   MSG9480

## (undated) DEVICE — DRAPE SURG W77XL90IN REINF OVRHD TBL CVR

## (undated) DEVICE — DRAPE ARM W21XH19XL10.5IN DA VINCI XI INTUITIVE SURGICAL

## (undated) DEVICE — SUTURE VICRYL SZ 0 L36IN ABSRB UD CT-1 L36MM 1/2 CIR TAPR PNT VCP946H

## (undated) DEVICE — GLOVE SURG SZ 65 THK91MIL LTX FREE SYN POLYISOPRENE

## (undated) DEVICE — STERILE LATEX POWDER-FREE SURGICAL GLOVESWITH NITRILE COATING: Brand: PROTEXIS

## (undated) DEVICE — INCISOR PLUS PLATINUM 4.5 MM BLADE: Brand: DYONICS INCISOR